# Patient Record
Sex: MALE | Race: WHITE | Employment: OTHER | URBAN - METROPOLITAN AREA
[De-identification: names, ages, dates, MRNs, and addresses within clinical notes are randomized per-mention and may not be internally consistent; named-entity substitution may affect disease eponyms.]

---

## 2017-01-05 ENCOUNTER — TELEPHONE (OUTPATIENT)
Dept: DERMATOLOGY | Facility: CLINIC | Age: 77
End: 2017-01-05

## 2017-01-05 NOTE — TELEPHONE ENCOUNTER
"RAJ Kennedy:    Patient has issue last week with Eye burning sensation after PDT treatment.     Spoke to patient who stated: \"I thought I would be warned about this and I was really upset- I really just think someone should warn the patient this can happen. I was really upset-that acetone was put so close to my eye that I thought my eye sight was damaged.. Then I had no one to call on a holiday weekend..\"     \"I spent an hour and a half [facility] yesterday and they gave me some eye gtts and I am doing better.. I have done this before and didn't have any problems, but I was really disappointed this time.. Please let her know that..\"   "

## 2017-01-05 NOTE — TELEPHONE ENCOUNTER
I spoke to the patient both on Monday and today.   Monday told me that he was having burning and sensitivity to light but no redness/swelling to eyelids and no blurriness. He stated he waited to call me until Monday because he didn't want to interrupt my holiday weekend even though my cell phone number was given to patient at end of visit yesterday.   Discussed to use gel rewetting drops and follow-up with eye doctor if not resolved in 1-2 days.     Called patient today.   He reports that eye burning/light sensitivity started more than 24 hours after treatment. He was using saline drops which did help some.   He reports he did go to ophthalmologist who evaluated eye and did not see any concerns and diagnosed with dry eye and given systane eye drops.   Since using systane eye symptoms have dramatically reduced.     He denies touching his eyes during the procedure.     Discussed with a Dr. Jenny Clarke at the [email or web address] patient's side effects.   Doctor believes that most eye reports happen from known contact with the acid and that it does not absorb and that there are no long term issues.   Most likely some levulan acid somehow got transferred to the eye can caused his symptoms.   Although unusual for eye symptoms to be so delayed.   She also offered her phone number for patient to discuss the medication.     Discussed this with patient, he denies any contact with eyes. He reports again that his eye symptoms did not start until over 24 hours after the treatment. Phone number of Dr. Jenny Clarke was given to patient.      Also discussed patient with Dr. Jay, who believes since symptoms started over 24 hours after treatment that eye symptoms are unlikely due to the PDT treatment. He reports that he will also look into this.

## 2017-01-06 ENCOUNTER — OFFICE VISIT - HEALTHEAST (OUTPATIENT)
Dept: INTERNAL MEDICINE | Facility: CLINIC | Age: 77
End: 2017-01-06

## 2017-01-06 DIAGNOSIS — H90.5 SNHL (SENSORINEURAL HEARING LOSS): ICD-10-CM

## 2017-01-06 DIAGNOSIS — M19.019 OSTEOARTHRITIS OF SHOULDER: ICD-10-CM

## 2017-01-06 DIAGNOSIS — H93.13 TINNITUS, BILATERAL: ICD-10-CM

## 2017-01-06 DIAGNOSIS — Z00.00 ROUTINE PHYSICAL EXAMINATION: ICD-10-CM

## 2017-01-06 DIAGNOSIS — Z87.891 FORMER SMOKER: ICD-10-CM

## 2017-01-06 DIAGNOSIS — R42 VERTIGO: ICD-10-CM

## 2017-01-06 DIAGNOSIS — E78.5 HLD (HYPERLIPIDEMIA): ICD-10-CM

## 2017-01-06 DIAGNOSIS — Z78.9 FULL CODE STATUS: ICD-10-CM

## 2017-01-06 DIAGNOSIS — M17.12 OSTEOARTHRITIS OF LEFT PATELLOFEMORAL JOINT: ICD-10-CM

## 2017-01-06 DIAGNOSIS — H35.30 AMD (AGE RELATED MACULAR DEGENERATION): ICD-10-CM

## 2017-01-06 LAB
CHOLEST SERPL-MCNC: 224 MG/DL
FASTING STATUS PATIENT QL REPORTED: YES
HDLC SERPL-MCNC: 57 MG/DL
LDLC SERPL CALC-MCNC: 134 MG/DL
TRIGL SERPL-MCNC: 164 MG/DL

## 2017-01-09 ENCOUNTER — COMMUNICATION - HEALTHEAST (OUTPATIENT)
Dept: INTERNAL MEDICINE | Facility: CLINIC | Age: 77
End: 2017-01-09

## 2017-01-17 ENCOUNTER — OFFICE VISIT (OUTPATIENT)
Dept: DERMATOLOGY | Facility: CLINIC | Age: 77
End: 2017-01-17
Payer: COMMERCIAL

## 2017-01-17 VITALS — DIASTOLIC BLOOD PRESSURE: 80 MMHG | HEART RATE: 68 BPM | OXYGEN SATURATION: 95 % | SYSTOLIC BLOOD PRESSURE: 134 MMHG

## 2017-01-17 DIAGNOSIS — L57.0 AK (ACTINIC KERATOSIS): Primary | ICD-10-CM

## 2017-01-17 PROCEDURE — 17003 DESTRUCT PREMALG LES 2-14: CPT | Performed by: PHYSICIAN ASSISTANT

## 2017-01-17 PROCEDURE — 17000 DESTRUCT PREMALG LESION: CPT | Performed by: PHYSICIAN ASSISTANT

## 2017-01-17 NOTE — MR AVS SNAPSHOT
After Visit Summary   1/17/2017    Nir Guy    MRN: [id number]           Patient Information     Date Of Birth          1940        Visit Information        Provider Department      1/17/2017 9:00 AM Marcia Reinoso PA-C [facility]        Care Instructions    WOUND CARE INSTRUCTIONS   FOR CRYOSURGERY   This area treated with liquid nitrogen will form a blister. You do not need to bandage the area until after the blister forms and breaks (which may be a few days). When the blister breaks, begin daily dressing changes as follows:   1) Clean and dry the area with tap water using clean Q-tip or sterile gauze pad.   2) Apply Polysporin ointment or Bacitracin ointment over entire wound. Do NOT use Neosporin ointment.   3) Cover the wound with a band-aid or sterile non-stick gauze pad and micropore paper tape.   REPEAT THESE INSTRUCTIONS AT LEAST ONCE A DAY UNTIL THE WOUND HAS COMPLETELY HEALED.   It is an old wives tale that a wound heals better when it is exposed to air and allowed to dry out. The wound will heal faster with a better cosmetic result if it is kept moist with ointment and covered with a bandage.   Do not let the wound dry out.   IMPORTANT INFORMATION ON REVERSE SIDE   Supplies Needed:   *Cotton tipped applicators (Q-tips)   *Polysporin ointment or Bacitracin ointment (NOT NEOSPORIN)   *Band-aids, or non stick gauze pads and micropore paper tape   PATIENT INFORMATION   During the healing process you will notice a number of changes. All wounds develop a small halo of redness surrounding the wound. This means healing is occurring. Severe itching with extensive redness usually indicates sensitivity to the ointment or bandage tape used to dress the wound. You should call our office if this develops.   Swelling and/or discoloration around your surgical site is common, particularly when performed around the eye.   All wounds normally drain. The larger the wound the  "more drainage there will be. After 7-10 days, you will notice the wound beginning to shrink and new skin will begin to grow. The wound is healed when you can see skin has formed over the entire area. A healed wound has a healthy, shiny look to the surface and is red to dark pink in color to normalize. Wounds may take approximately 4-6 weeks to heal. Larger wounds may take 6-8 weeks. After the wound is healed you may discontinue dressing changes.   You may experience a sensation of tightness as your wound heals. This is normal and will gradually subside.   Your healed wound may be sensitive to temperature changes. This sensitivity improves with time, but if you re having a lot of discomfort, try to avoid temperature extremes.   Patients frequently experience itching after their wound appears to have healed because of the continue healing under the skin. Plain Vaseline will help relieve the itching.               Follow-ups after your visit        Who to contact     If you have questions or need follow up information about today's clinic visit or your schedule please contact [facility] directly at [phone].  Normal or non-critical lab and imaging results will be communicated to you by [employer]iNationhart, letter or phone within 4 business days after the clinic has received the results. If you do not hear from us within 7 days, please contact the clinic through 2smst or phone. If you have a critical or abnormal lab result, we will notify you by phone as soon as possible.  Submit refill requests through [employer]or[employer] or call your pharmacy and they will forward the refill request to us. Please allow 3 business days for your refill to be completed.          Additional Information About Your Visit        Recorrido Information     Recorrido lets you send messages to your doctor, view your test results, renew your prescriptions, schedule appointments and more. To sign up, go to [email or web address] . Click on \"Log in\" on " "the left side of the screen, which will take you to the Welcome page. Then click on \"Sign up Now\" on the right side of the page.     You will be asked to enter the access code listed below, as well as some personal information. Please follow the directions to create your username and password.     Your access code is: [email or web address]  Expires: 3/20/2017  9:03 AM     Your access code will  in 90 days. If you need help or a new code, please call your [place] clinic or [phone].        Care EveryWhere ID     This is your Care EveryWhere ID. This could be used by other organizations to access your [place] medical records  [id number]        Your Vitals Were     Pulse Pulse Oximetry                68 95%           Blood Pressure from Last 3 Encounters:   17 134/80   16 130/84   16 147/82    Weight from Last 3 Encounters:   16 95.255 kg (210 lb)   10/19/15 95.255 kg (210 lb)   07/10/15 97.07 kg (214 lb)              Today, you had the following     No orders found for display       Primary Care Provider Office Phone # Fax #    German Mcgregor [phone]       [address] [address]        Thank you!     Thank you for choosing [facility]  for your care. Our goal is always to provide you with excellent care. Hearing back from our patients is one way we can continue to improve our services. Please take a few minutes to complete the written survey that you may receive in the mail after your visit with us. Thank you!             Your Updated Medication List - Protect others around you: Learn how to safely use, store and throw away your medicines at www.disposemymeds.org.          This list is accurate as of: 17  9:27 AM.  Always use your most recent med list.                   Brand Name Dispense Instructions for use    aspirin 81 MG tablet      Take by mouth daily       Fish Oil 500 MG Caps          Garlic 1250 MG Tabs          " GENTEAL OP          glucosamine-chondroitinoitin 3150-5344 MG/30ML Liqd          Multiple vitamin Tabs          SYSTANE 0.4-0.3 % Soln ophthalmic solution   Generic drug:  polyethylene glycol 0.4%- propylene glycol 0.3%          vitamin  s/Minerals Tabs

## 2017-01-17 NOTE — NURSING NOTE
"Initial /80 mmHg  Pulse 68  SpO2 95% Estimated body mass index is 27.71 kg/(m^2) as calculated from the following:    Height as of 1/25/16: 1.854 m (6' 1\").    Weight as of 1/25/16: 95.255 kg (210 lb). .      "

## 2017-01-17 NOTE — PROGRESS NOTES
Nir Guy is a 77 year old year old male patient here today for recheck after PDT.  Patient reports eye symptoms have resolved. He reports that his skin does appear improved after PDT. He only notes a few scaly areas on scalp.  Remainder of the HPI, Meds, PMH, Allergies, FH, and SH was reviewed in chart.    Pertinent Hx:  History of NMSC  Past Medical History   Diagnosis Date     Squamous cell carcinoma (H)      Actinic keratosis      Basal cell carcinoma        History reviewed. No pertinent past surgical history.     Family History   Problem Relation Age of Onset     Melanoma No family hx of        Social History     Social History     Marital Status:      Spouse Name: N/A     Number of Children: N/A     Years of Education: N/A     Occupational History      Retired     Social History Main Topics     Smoking status: Former Smoker     Types: Pipe     Quit date: 12/03/1999     Smokeless tobacco: Not on file     Alcohol Use: Not on file     Drug Use: Not on file     Sexual Activity: Not on file     Other Topics Concern     Not on file     Social History Narrative       Outpatient Encounter Prescriptions as of 1/17/2017   Medication Sig Dispense Refill     Carboxymethylcell-Hypromellose (GENTEAL OP)        Garlic 1250 MG TABS        glucosamine-chondroitinoitin 8698-8061 MG/30ML LIQD        Multiple vitamin TABS        vitamin  s/Minerals TABS        Omega-3 Fatty Acids (FISH OIL) 500 MG CAPS        polyethylene glycol 0.4%- propylene glycol 0.3% (SYSTANE) 0.4-0.3 % SOLN ophthalmic solution        aspirin 81 MG tablet Take by mouth daily       No facility-administered encounter medications on file as of 1/17/2017.             Review Of Systems  Skin: As above  Eyes: negative  Ears/Nose/Throat: negative  Respiratory: No shortness of breath, dyspnea on exertion, cough, or hemoptysis  Cardiovascular: negative  Gastrointestinal: negative  Genitourinary: negative  Musculoskeletal: negative  Neurologic:  negative  Psychiatric: negative  Hematologic/Lymphatic/Immunologic: negative  Endocrine: negative      O:   NAD, WDWN, Alert & Oriented, Mood & Affect wnl, Vitals stable   Here today alone   /80 mmHg  Pulse 68  SpO2 95%   General appearance normal   Vitals stable   Alert, oriented and in no acute distress      Pink gritty papules on scalp x 6      Eyes: Conjunctivae/lids:Normal     ENT: Lips    MSK:Normal    Pulm: Breathing Normal    Neuro/Psych: Orientation:Normal; Mood/Affect:Normal  A/P:  1. Actinic Keratoses on scalp x 6  LN2:  Treated with LN2 for 5s for 1-2 cycles. Warned risks of blistering, pain, pigment change, scarring, and incomplete resolution.  Advised patient to return if lesions do not completely resolve.  Wound care sheet given.

## 2017-07-26 ENCOUNTER — OFFICE VISIT (OUTPATIENT)
Dept: DERMATOLOGY | Facility: CLINIC | Age: 77
End: 2017-07-26
Payer: COMMERCIAL

## 2017-07-26 VITALS — OXYGEN SATURATION: 95 % | HEART RATE: 75 BPM | DIASTOLIC BLOOD PRESSURE: 76 MMHG | SYSTOLIC BLOOD PRESSURE: 145 MMHG

## 2017-07-26 DIAGNOSIS — L82.1 SK (SEBORRHEIC KERATOSIS): ICD-10-CM

## 2017-07-26 DIAGNOSIS — L81.4 LENTIGO: ICD-10-CM

## 2017-07-26 DIAGNOSIS — D18.00 ANGIOMA: ICD-10-CM

## 2017-07-26 DIAGNOSIS — Z85.828 HISTORY OF SKIN CANCER: Primary | ICD-10-CM

## 2017-07-26 PROCEDURE — 99213 OFFICE O/P EST LOW 20 MIN: CPT | Performed by: DERMATOLOGY

## 2017-07-26 NOTE — NURSING NOTE
Chief Complaint   Patient presents with     Derm Problem     skin check       Vitals:    07/26/17 1148   BP: 145/76   Pulse: 75   SpO2: 95%     Wt Readings from Last 1 Encounters:   01/25/16 95.3 kg (210 lb)       Kath Smith LPN.................7/26/2017

## 2017-07-26 NOTE — PROGRESS NOTES
Nir Guy is a 77 year old year old male patient here today for f/u hx of non-melanoma skin cancer.  Today he notes bumps on scalp .  Patient states this has been present for ?.  Patient reports the following symptoms:  none .  Patient reports the following previous treatments none.  Patient reports the following modifying factors none.  Associated symptoms: none.  Patient has no other skin complaints today.  Remainder of the HPI, Meds, PMH, Allergies, FH, and SH was reviewed in chart.    Pertinent Hx:   Non-melanoma skin cancer   Past Medical History:   Diagnosis Date     Actinic keratosis      Basal cell carcinoma      Squamous cell carcinoma        History reviewed. No pertinent surgical history.     Family History   Problem Relation Age of Onset     Melanoma No family hx of        Social History     Social History     Marital status:      Spouse name: N/A     Number of children: N/A     Years of education: N/A     Occupational History      Retired     Social History Main Topics     Smoking status: Former Smoker     Types: Pipe     Quit date: 12/3/1999     Smokeless tobacco: Never Used     Alcohol use Not on file     Drug use: Not on file     Sexual activity: Not on file     Other Topics Concern     Not on file     Social History Narrative       Outpatient Encounter Prescriptions as of 7/26/2017   Medication Sig Dispense Refill     Carboxymethylcell-Hypromellose (GENTEAL OP)        Garlic 1250 MG TABS        glucosamine-chondroitinoitin 8631-9600 MG/30ML LIQD        Multiple vitamin TABS        vitamin  s/Minerals TABS        Omega-3 Fatty Acids (FISH OIL) 500 MG CAPS        polyethylene glycol 0.4%- propylene glycol 0.3% (SYSTANE) 0.4-0.3 % SOLN ophthalmic solution        aspirin 81 MG tablet Take by mouth daily       No facility-administered encounter medications on file as of 7/26/2017.              Review Of Systems  Skin: As above  Eyes: negative  Ears/Nose/Throat: negative  Respiratory: No  shortness of breath, dyspnea on exertion, cough, or hemoptysis  Cardiovascular: negative  Gastrointestinal: negative  Genitourinary: negative  Musculoskeletal: negative  Neurologic: negative  Psychiatric: negative  Hematologic/Lymphatic/Immunologic: negative  Endocrine: negative      O:   NAD, WDWN, Alert & Oriented, Mood & Affect wnl, Vitals stable   Here today alone   /76  Pulse 75  SpO2 95%   General appearance normal   Vitals stable   Alert, oriented and in no acute distress      Following lymph nodes palpated: Occipital, Cervical, Supraclavicular no lad   Stuck on papules and brown macules on trunk and ext and scalp   Red papules on trunk         The remainder of the full exam was unremarkable; the following areas were examined:  conjunctiva/lids, oral mucosa, neck, peripheral vascular system, abdomen, lymph nodes, digits/nails, eccrine and apocrine glands, scalp/hair, face, neck, chest, abdomen, buttocks, back, RUE, LUE, RLE, LLE       Eyes: Conjunctivae/lids:Normal     ENT: Lips, buccal mucosa, tongue: normal    MSK:Normal    Cardiovascular: peripheral edema none    Pulm: Breathing Normal    Lymph Nodes: No Head and Neck Lymphadenopathy     Neuro/Psych: Orientation:Normal; Mood/Affect:Normal      A/P:  1. Seborrheic keratosis, lentiog, angioma, hx of non-melanoma skin cancer   BENIGN LESIONS DISCUSSED WITH PATIENT:  I discussed the specifics of tumor, prognosis, and genetics of benign lesions.  I explained that treatment of these lesions would be purely cosmetic and not medically neccessary.  I discussed with patient different removal options including excision, cautery and /or laser.      Nature and genetics of benign skin lesions dicussed with patient.  Signs and Symptoms of skin cancer discussed with patient.  ABCDEs of melanoma reviewed with patient.  Patient encouraged to perform monthly skin exams.  UV precautions reviewed with patient.  Skin care regimen reviewed with patient: Eliminate harsh  soaps, i.e. Dial, zest, irsih spring; Mild soaps such as Cetaphil or Dove sensitive skin, avoid hot or cold showers, aggressive use of emollients including vanicream, cetaphil or cerave discussed with patient.    Risks of non-melanoma skin cancer discussed with patient   Return to clinic 6 months

## 2017-07-26 NOTE — MR AVS SNAPSHOT
"              After Visit Summary   7/26/2017    Nir Guy    MRN: [id number]           Patient Information     Date Of Birth          1940        Visit Information        Provider Department      7/26/2017 11:30 AM Chavez Jay MD [facility]        Today's Diagnoses     History of skin cancer    -  1    Lentigo        Angioma        SK (seborrheic keratosis)           Follow-ups after your visit        Your next 10 appointments already scheduled     Max 10, 2018  9:00 AM CST   Return Visit with Chavez Jay MD   [facility]    [address] [address]   [phone]              Who to contact     If you have questions or need follow up information about today's clinic visit or your schedule please contact [facility] directly at [phone].  Normal or non-critical lab and imaging results will be communicated to you by MyChart, letter or phone within 4 business days after the clinic has received the results. If you do not hear from us within 7 days, please contact the clinic through MyChart or phone. If you have a critical or abnormal lab result, we will notify you by phone as soon as possible.  Submit refill requests through Genomera or call your pharmacy and they will forward the refill request to us. Please allow 3 business days for your refill to be completed.          Additional Information About Your Visit        MyChart Information     Genomera lets you send messages to your doctor, view your test results, renew your prescriptions, schedule appointments and more. To sign up, go to [email or web address] . Click on \"Log in\" on the left side of the screen, which will take you to the Welcome page. Then click on \"Sign up Now\" on the right side of the page.     You will be asked to enter the access code listed below, as well as some personal information. Please follow the directions to " create your username and password.     Your access code is: [email or web address]  Expires: 10/24/2017 12:37 PM     Your access code will  in 90 days. If you need help or a new code, please call your [place] clinic or [phone].        Care EveryWhere ID     This is your Care EveryWhere ID. This could be used by other organizations to access your [place] medical records  [id number]        Your Vitals Were     Pulse Pulse Oximetry                75 95%           Blood Pressure from Last 3 Encounters:   17 145/76   17 134/80   16 130/84    Weight from Last 3 Encounters:   16 95.3 kg (210 lb)   10/19/15 95.3 kg (210 lb)   07/10/15 97.1 kg (214 lb)              Today, you had the following     No orders found for display       Primary Care Provider Office Phone # Fax #    German Mcgregor [phone]       [address] [address]        Equal Access to Services     [facility] Hospital[facility]ARHAN : Hadii aad ku hadasho Soomaali, waaxda luqadaha, qaybta kaalmada adeegyada, waxay suryain haymike alegria . So [facility] [phone].    ATENCIÓN: Si habla español, tiene a barkley disposición servicios gratuitos de asistencia lingüística. Llame al [phone].    We comply with applicable federal civil rights laws and [place] laws. We do not discriminate on the basis of race, color, national origin, age, disability sex, sexual orientation or gender identity.            Thank you!     Thank you for choosing [facility]  for your care. Our goal is always to provide you with excellent care. Hearing back from our patients is one way we can continue to improve our services. Please take a few minutes to complete the written survey that you may receive in the mail after your visit with us. Thank you!             Your Updated Medication List - Protect others around you: Learn how to safely use, store and throw away your medicines at www.disposemymeds.org.           This list is accurate as of: 7/26/17 12:37 PM.  Always use your most recent med list.                   Brand Name Dispense Instructions for use Diagnosis    aspirin 81 MG tablet      Take by mouth daily        Fish Oil 500 MG Caps           Garlic 1250 MG Tabs           GENTEAL OP           glucosamine-chondroitinoitin 3202-8827 MG/30ML Liqd           Multiple vitamin Tabs           SYSTANE 0.4-0.3 % Soln ophthalmic solution   Generic drug:  polyethylene glycol 0.4%- propylene glycol 0.3%           vitamin  s/Minerals Tabs

## 2017-08-15 ENCOUNTER — COMMUNICATION - HEALTHEAST (OUTPATIENT)
Dept: INTERNAL MEDICINE | Facility: CLINIC | Age: 77
End: 2017-08-15

## 2017-09-21 ENCOUNTER — RECORDS - HEALTHEAST (OUTPATIENT)
Dept: GENERAL RADIOLOGY | Age: 77
End: 2017-09-21

## 2017-09-21 ENCOUNTER — COMMUNICATION - HEALTHEAST (OUTPATIENT)
Dept: INTERNAL MEDICINE | Facility: CLINIC | Age: 77
End: 2017-09-21

## 2017-09-21 ENCOUNTER — OFFICE VISIT - HEALTHEAST (OUTPATIENT)
Dept: INTERNAL MEDICINE | Facility: CLINIC | Age: 77
End: 2017-09-21

## 2017-09-21 DIAGNOSIS — M79.604 BILATERAL LEG PAIN: ICD-10-CM

## 2017-09-21 DIAGNOSIS — M79.605 PAIN IN LEFT LEG: ICD-10-CM

## 2017-09-21 DIAGNOSIS — M79.604 PAIN IN RIGHT LEG: ICD-10-CM

## 2017-09-21 DIAGNOSIS — M79.605 BILATERAL LEG PAIN: ICD-10-CM

## 2017-09-21 DIAGNOSIS — M17.12 OSTEOARTHRITIS OF LEFT PATELLOFEMORAL JOINT: ICD-10-CM

## 2017-11-17 ENCOUNTER — OFFICE VISIT - HEALTHEAST (OUTPATIENT)
Dept: INTERNAL MEDICINE | Facility: CLINIC | Age: 77
End: 2017-11-17

## 2017-11-17 DIAGNOSIS — M79.10 MUSCLE PAIN: ICD-10-CM

## 2017-11-17 DIAGNOSIS — M79.605 BILATERAL LEG PAIN: ICD-10-CM

## 2017-11-17 DIAGNOSIS — M79.604 BILATERAL LEG PAIN: ICD-10-CM

## 2017-11-19 ENCOUNTER — COMMUNICATION - HEALTHEAST (OUTPATIENT)
Dept: INTERNAL MEDICINE | Facility: CLINIC | Age: 77
End: 2017-11-19

## 2017-11-20 ENCOUNTER — AMBULATORY - HEALTHEAST (OUTPATIENT)
Dept: INTERNAL MEDICINE | Facility: CLINIC | Age: 77
End: 2017-11-20

## 2017-11-24 ENCOUNTER — AMBULATORY - HEALTHEAST (OUTPATIENT)
Dept: INTERNAL MEDICINE | Facility: CLINIC | Age: 77
End: 2017-11-24

## 2017-12-11 ENCOUNTER — COMMUNICATION - HEALTHEAST (OUTPATIENT)
Dept: INTERNAL MEDICINE | Facility: CLINIC | Age: 77
End: 2017-12-11

## 2017-12-11 DIAGNOSIS — M79.605 BILATERAL LEG PAIN: ICD-10-CM

## 2017-12-11 DIAGNOSIS — M79.604 BILATERAL LEG PAIN: ICD-10-CM

## 2017-12-11 DIAGNOSIS — M54.9 BACK PAIN: ICD-10-CM

## 2017-12-11 DIAGNOSIS — M25.551 RIGHT HIP PAIN: ICD-10-CM

## 2017-12-12 ENCOUNTER — COMMUNICATION - HEALTHEAST (OUTPATIENT)
Dept: INTERNAL MEDICINE | Facility: CLINIC | Age: 77
End: 2017-12-12

## 2017-12-13 ENCOUNTER — AMBULATORY - HEALTHEAST (OUTPATIENT)
Dept: INTERNAL MEDICINE | Facility: CLINIC | Age: 77
End: 2017-12-13

## 2017-12-21 ENCOUNTER — HOSPITAL ENCOUNTER (OUTPATIENT)
Dept: MRI IMAGING | Facility: HOSPITAL | Age: 77
Discharge: HOME | End: 2017-12-21
Attending: INTERNAL MEDICINE

## 2017-12-21 DIAGNOSIS — M79.605 BILATERAL LEG PAIN: ICD-10-CM

## 2017-12-21 DIAGNOSIS — M25.551 RIGHT HIP PAIN: ICD-10-CM

## 2017-12-21 DIAGNOSIS — M79.604 BILATERAL LEG PAIN: ICD-10-CM

## 2017-12-21 DIAGNOSIS — M54.9 BACK PAIN: ICD-10-CM

## 2017-12-22 ENCOUNTER — COMMUNICATION - HEALTHEAST (OUTPATIENT)
Dept: INTERNAL MEDICINE | Facility: CLINIC | Age: 77
End: 2017-12-22

## 2017-12-26 ENCOUNTER — OFFICE VISIT - HEALTHEAST (OUTPATIENT)
Dept: INTERNAL MEDICINE | Facility: CLINIC | Age: 77
End: 2017-12-26

## 2017-12-26 DIAGNOSIS — M16.11 OSTEOARTHRITIS OF RIGHT HIP: ICD-10-CM

## 2017-12-26 DIAGNOSIS — M48.061 LUMBAR SPINAL STENOSIS: ICD-10-CM

## 2017-12-27 ENCOUNTER — COMMUNICATION - HEALTHEAST (OUTPATIENT)
Dept: INTERNAL MEDICINE | Facility: CLINIC | Age: 77
End: 2017-12-27

## 2017-12-28 ENCOUNTER — RECORDS - HEALTHEAST (OUTPATIENT)
Dept: ADMINISTRATIVE | Facility: OTHER | Age: 77
End: 2017-12-28

## 2018-01-02 ENCOUNTER — COMMUNICATION - HEALTHEAST (OUTPATIENT)
Dept: INTERNAL MEDICINE | Facility: CLINIC | Age: 78
End: 2018-01-02

## 2018-01-02 DIAGNOSIS — M54.9 BACK PAIN WITH RADIATION: ICD-10-CM

## 2018-01-02 DIAGNOSIS — M48.061 FORAMINAL STENOSIS OF LUMBAR REGION: ICD-10-CM

## 2018-01-05 ENCOUNTER — AMBULATORY - HEALTHEAST (OUTPATIENT)
Dept: INTERNAL MEDICINE | Facility: CLINIC | Age: 78
End: 2018-01-05

## 2018-01-05 ENCOUNTER — COMMUNICATION - HEALTHEAST (OUTPATIENT)
Dept: INTERNAL MEDICINE | Facility: CLINIC | Age: 78
End: 2018-01-05

## 2018-01-05 DIAGNOSIS — M54.41 LOW BACK PAIN WITH RIGHT-SIDED SCIATICA: ICD-10-CM

## 2018-01-05 DIAGNOSIS — M99.83 OTHER BIOMECHANICAL LESIONS OF LUMBAR REGION: ICD-10-CM

## 2018-01-05 DIAGNOSIS — M16.11 OSTEOARTHRITIS OF RIGHT HIP: ICD-10-CM

## 2018-01-05 DIAGNOSIS — M48.061 FORAMINAL STENOSIS OF LUMBAR REGION: ICD-10-CM

## 2018-01-05 DIAGNOSIS — M54.9 BACK PAIN WITH RADIATION: ICD-10-CM

## 2018-01-10 ENCOUNTER — RECORDS - HEALTHEAST (OUTPATIENT)
Dept: ADMINISTRATIVE | Facility: OTHER | Age: 78
End: 2018-01-10

## 2018-01-10 ENCOUNTER — OFFICE VISIT (OUTPATIENT)
Dept: DERMATOLOGY | Facility: CLINIC | Age: 78
End: 2018-01-10
Payer: COMMERCIAL

## 2018-01-10 VITALS — DIASTOLIC BLOOD PRESSURE: 69 MMHG | TEMPERATURE: 97.9 F | SYSTOLIC BLOOD PRESSURE: 126 MMHG | HEART RATE: 86 BPM

## 2018-01-10 DIAGNOSIS — L82.1 SK (SEBORRHEIC KERATOSIS): ICD-10-CM

## 2018-01-10 DIAGNOSIS — L57.0 AK (ACTINIC KERATOSIS): ICD-10-CM

## 2018-01-10 DIAGNOSIS — Z85.828 HISTORY OF SKIN CANCER: ICD-10-CM

## 2018-01-10 DIAGNOSIS — D04.4 SQUAMOUS CELL CARCINOMA IN SITU OF SCALP: Primary | ICD-10-CM

## 2018-01-10 DIAGNOSIS — L81.4 LENTIGO: ICD-10-CM

## 2018-01-10 PROCEDURE — 17311 MOHS 1 STAGE H/N/HF/G: CPT | Performed by: DERMATOLOGY

## 2018-01-10 PROCEDURE — 11100 HC DESTRUCT PREMALIGNANT LESION, 2-14: CPT | Mod: 59 | Performed by: DERMATOLOGY

## 2018-01-10 PROCEDURE — 88331 PATH CONSLTJ SURG 1 BLK 1SPC: CPT | Mod: 59 | Performed by: DERMATOLOGY

## 2018-01-10 PROCEDURE — 17003 DESTRUCT PREMALG LES 2-14: CPT | Performed by: DERMATOLOGY

## 2018-01-10 PROCEDURE — 17000 DESTRUCT PREMALG LESION: CPT | Mod: 51 | Performed by: DERMATOLOGY

## 2018-01-10 PROCEDURE — 99213 OFFICE O/P EST LOW 20 MIN: CPT | Mod: 25 | Performed by: DERMATOLOGY

## 2018-01-10 NOTE — LETTER
1/10/2018         RE: Nir Guy  [address] [place] [id number]        Dear Colleague,    Thank you for referring your patient, iNr Guy, to the [facility]. Please see a copy of my visit note below.    Nir Guy is a 78 year old year old male patient here today for tender spot on scalp.   .  Patient states this has been present for a while.  Patient reports the following symptoms:  tender.  Patient reports the following previous treatments cryo.  Patient reports the following modifying factors none.  Associated symptoms: none.  Patient has no other skin complaints today.  Remainder of the HPI, Meds, PMH, Allergies, FH, and SH was reviewed in chart.    Pertinent Hx:   Non-melanoma skin cancer   Past Medical History:   Diagnosis Date     Actinic keratosis      Basal cell carcinoma      Squamous cell carcinoma        History reviewed. No pertinent surgical history.     Family History   Problem Relation Age of Onset     Melanoma No family hx of        Social History     Social History     Marital status:      Spouse name: N/A     Number of children: N/A     Years of education: N/A     Occupational History      Retired     Social History Main Topics     Smoking status: Former Smoker     Types: Pipe     Quit date: 12/3/1999     Smokeless tobacco: Never Used     Alcohol use Not on file     Drug use: Not on file     Sexual activity: Not on file     Other Topics Concern     Not on file     Social History Narrative       Outpatient Encounter Prescriptions as of 1/10/2018   Medication Sig Dispense Refill     Carboxymethylcell-Hypromellose (GENTEAL OP)        Garlic 1250 MG TABS        glucosamine-chondroitinoitin 0958-4126 MG/30ML LIQD        Multiple vitamin TABS        vitamin  s/Minerals TABS        Omega-3 Fatty Acids (FISH OIL) 500 MG CAPS        polyethylene glycol 0.4%- propylene glycol 0.3% (SYSTANE) 0.4-0.3 % SOLN ophthalmic solution        aspirin 81 MG  tablet Take by mouth daily       No facility-administered encounter medications on file as of 1/10/2018.              Review Of Systems  Skin: As above  Eyes: negative  Ears/Nose/Throat: negative  Respiratory: No shortness of breath, dyspnea on exertion, cough, or hemoptysis  Cardiovascular: negative  Gastrointestinal: negative  Genitourinary: negative  Musculoskeletal: negative  Neurologic: negative  Psychiatric: negative  Hematologic/Lymphatic/Immunologic: negative  Endocrine: negative      O:   NAD, WDWN, Alert & Oriented, Mood & Affect wnl, Vitals stable   Here today alone   /69  Pulse 86  Temp 97.9  F (36.6  C) (Tympanic)   General appearance normal   Vitals stable   Alert, oriented and in no acute distress      Following lymph nodes palpated: Occipital, Cervical, Supraclavicular no lad   Gritty papule on face   Stuck on papules and brown macules on trunk and ext    R forntal scalp ill-deifned 1.6cm bleeding scaly papule            The remainder of the full exam was unremarkable; the following areas were examined:  conjunctiva/lids, oral mucosa, neck, peripheral vascular system, abdomen, lymph nodes, digits/nails, eccrine and apocrine glands, scalp/hair, face, neck, chest, abdomen, buttocks, back, RUE, LUE, RLE, LLE       Eyes: Conjunctivae/lids:Normal     ENT: Lips, buccal mucosa, tongue: normal    MSK:Normal    Cardiovascular: peripheral edema none    Pulm: Breathing Normal    Lymph Nodes: No Head and Neck Lymphadenopathy     Neuro/Psych: Orientation:Normal; Mood/Affect:Normal      MICRO:   R frontal scalp:There is hyperkeratosis & parakeratosis of the epidermis, with full thickness epidermal involvement by atypical keratinocytes with rare pale vacuolated cells.  Unremarkable dermis.    A/P:  1. R frontal scalp r/o squamous cell carcinoma   TANGENTIAL BIOPSY IN HOUSE:  After consent, anesthesia with LEC and prep, tangential excision performed and dx above confirmed with frozen section histology.  No  complications and routine wound care.  Patient told result squamous cell carcinoma in situ  2. Seborrheic keratosis, letnigo, hx of non-melanoma skin cancer  3. Actinic keratosis   L cheekx3  LN2:  Treated with LN2 for 5s for 1-2 cycles. Warned risks of blistering, pain, pigment change, scarring, and incomplete resolution.  Advised patient to return if lesions do not completely resolve.  Wound care sheet given.  Efudex declined by patient  pdt declines.      BENIGN LESIONS DISCUSSED WITH PATIENT:  I discussed the specifics of tumor, prognosis, and genetics of benign lesions.  I explained that treatment of these lesions would be purely cosmetic and not medically neccessary.  I discussed with patient different removal options including excision, cautery and /or laser.      Nature and genetics of benign skin lesions dicussed with patient.  Signs and Symptoms of skin cancer discussed with patient.  Patient encouraged to perform monthly skin exams.  UV precautions reviewed with patient.  Skin care regimen reviewed with patient: Eliminate harsh soaps, i.e. Dial, zest, irsih spring; Mild soaps such as Cetaphil or Dove sensitive skin, avoid hot or cold showers, aggressive use of emollients including vanicream, cetaphil or cerave discussed with patient.    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 Indication for Mohs was Ill-defined margins. Patient confirmed the site with Dr. Jay.  After anesthesia with LEC, the tumor was excised using standard Mohs technique in 1 stages(s).  CLEAR MARGINS OBTAINED and Final defect size was 2 cm.       REPAIR SECOND INTENT: We discussed the options for wound management in full with the patient including risks/benefits/possible outcomes. Decision made to allow the wound to  heal by second intention. EBL minimal; complications none; wound care routine.  The patient was discharged in good condition and will return in one month or prn for wound evaluation.        Again, thank you for allowing me to participate in the care of your patient.        Sincerely,        Chavez Jay MD

## 2018-01-10 NOTE — MR AVS SNAPSHOT
After Visit Summary   1/10/2018    Nir Guy    MRN: [id number]           Patient Information     Date Of Birth          1940        Visit Information        Provider Department      1/10/2018 9:00 AM Chavez Jay MD [facility]        Care Instructions    Open Wound Care     for R frontal scalp        ? No strenuous activity for 48 hours    ? Take Tylenol as needed for discomfort.                                                .         ? Do not drink alcoholic beverages for 48 hours.    ? Keep the pressure bandage in place for 24 hours. If the bandage becomes blood tinged or loose, reinforce it with gauze and tape.        (Refer to the reverse side of this page for management of bleeding).    ? Remove bandage in 24 hours and begin wound care as follows:     1. Clean area with tap water using a Q tip or gauze pad, (shower / bathe normally)  2. Dry wound with Q tip or gauze pad  3. Apply Aquaphor, Vaseline, Polysporin or Bacitracin Ointment with a Q tip    Do NOT use Neosporin Ointment *  4. Cover the wound with a band-aid or nonstick gauze pad and paper tape.  5. Repeat wound care once a day until wound is completely healed.    It is an old wives tale that a wound heals better when it is exposed to air and allowed to dry out. The wound will heal faster with a better cosmetic result if it is kept moist with ointment and covered with a bandage.  Do not let the wound dry out.      Supplies Needed:                Qtips or gauze pads                Polysporin or Bacitracin Ointment                Bandaids or nonstick gauze pads and paper tape    Wound care kits and brown paper tape are available for purchase at   the pharmacy.    BLEEDIN. Use tightly rolled up gauze or cloth to apply direct pressure over the bandage for 20   minutes.  2. Reapply pressure for an additional 20 minutes if necessary  3. Call the office or go to the nearest emergency room if pressure  fails to stop the bleeding.  4. Use additional gauze and tape to maintain pressure once the bleeding has stopped.  5. Begin wound care 24 hours after surgery as directed.                  WOUND HEALING    1. One week after surgery a pink / red halo will form around the outside of the wound.   This is new skin.  2. The center of the wound will appear yellowish white and produce some drainage.  3. The pink halo will slowly migrate in toward the center of the wound until the wound is covered with new shiny pink skin.  4. There will be no more drainage when the wound is completely healed.  5. It will take six months to one year for the redness to fade.  6. The scar may be itchy, tight and sensitive to extreme temperatures for a year after the surgery.  7. Massaging the area several times a day for several minutes after the wound is completely healed will help the scar soften and normalize faster. Begin massage only after healing is complete.      In case of emergency call: Dr Jay: [phone]     [facility]: [phone]    [facility]: [phone]      WOUND CARE INSTRUCTIONS   FOR CRYOSURGERY   This area treated with liquid nitrogen will form a blister. You do not need to bandage the area until after the blister forms and breaks (which may be a few days). When the blister breaks, begin daily dressing changes as follows:   1) Clean and dry the area with tap water using clean Q-tip or sterile gauze pad.   2) Apply Polysporin ointment or Bacitracin ointment over entire wound. Do NOT use Neosporin ointment.   3) Cover the wound with a band-aid or sterile non-stick gauze pad and micropore paper tape.   REPEAT THESE INSTRUCTIONS AT LEAST ONCE A DAY UNTIL THE WOUND HAS COMPLETELY HEALED.   It is an old wives tale that a wound heals better when it is exposed to air and allowed to dry out. The wound will heal faster with a better cosmetic result if it is kept moist with ointment and covered with a  bandage.   Do not let the wound dry out.   IMPORTANT INFORMATION ON REVERSE SIDE   Supplies Needed:   *Cotton tipped applicators (Q-tips)   *Polysporin ointment or Bacitracin ointment (NOT NEOSPORIN)   *Band-aids, or non stick gauze pads and micropore paper tape   PATIENT INFORMATION   During the healing process you will notice a number of changes. All wounds develop a small halo of redness surrounding the wound. This means healing is occurring. Severe itching with extensive redness usually indicates sensitivity to the ointment or bandage tape used to dress the wound. You should call our office if this develops.   Swelling and/or discoloration around your surgical site is common, particularly when performed around the eye.   All wounds normally drain. The larger the wound the more drainage there will be. After 7-10 days, you will notice the wound beginning to shrink and new skin will begin to grow. The wound is healed when you can see skin has formed over the entire area. A healed wound has a healthy, shiny look to the surface and is red to dark pink in color to normalize. Wounds may take approximately 4-6 weeks to heal. Larger wounds may take 6-8 weeks. After the wound is healed you may discontinue dressing changes.   You may experience a sensation of tightness as your wound heals. This is normal and will gradually subside.   Your healed wound may be sensitive to temperature changes. This sensitivity improves with time, but if you re having a lot of discomfort, try to avoid temperature extremes.   Patients frequently experience itching after their wound appears to have healed because of the continue healing under the skin. Plain Vaseline will help relieve the itching.                   Follow-ups after your visit        Who to contact     If you have questions or need follow up information about today's clinic visit or your schedule please contact [facility] directly at [phone].  Normal or  "non-critical lab and imaging results will be communicated to you by MyChart, letter or phone within 4 business days after the clinic has received the results. If you do not hear from us within 7 days, please contact the clinic through reMail[facility] or phone. If you have a critical or abnormal lab result, we will notify you by phone as soon as possible.  Submit refill requests through [employer] or call your pharmacy and they will forward the refill request to us. Please allow 3 business days for your refill to be completed.          Additional Information About Your Visit        [facility] Information     [employer] lets you send messages to your doctor, view your test results, renew your prescriptions, schedule appointments and more. To sign up, go to [email or web address] . Click on \"Log in\" on the left side of the screen, which will take you to the Welcome page. Then click on \"Sign up Now\" on the right side of the page.     You will be asked to enter the access code listed below, as well as some personal information. Please follow the directions to create your username and password.     Your access code is: [email or web address]  Expires: 4/10/2018  9:55 AM     Your access code will  in 90 days. If you need help or a new code, please call your [facility] clinic or [phone].        Care EveryWhere ID     This is your Care EveryWhere ID. This could be used by other organizations to access your [facility] medical records  [id number]        Your Vitals Were     Pulse Temperature                86 97.9  F (36.6  C) (Tympanic)           Blood Pressure from Last 3 Encounters:   01/10/18 126/69   17 145/76   17 134/80    Weight from Last 3 Encounters:   16 95.3 kg (210 lb)   10/19/15 95.3 kg (210 lb)   07/10/15 97.1 kg (214 lb)              Today, you had the following     No orders found for display       Primary Care Provider Office Phone # Fax #    German Mcgregor [phone]       [facility] " [address] [facility] [address]        Equal Access to Services     SOCOJEANNE WERNER : Hadii aad ku hadkevynkevin Bronwyntayler, kodybruce aleman, dejachin deckerfitzbruce paige, gay martinhoneynas horner. So [facility] [phone].    ATENCIÓN: Si habla español, tiene a barkley disposición servicios gratuitos de asistencia lingüística. [facility] [phone].    We comply with applicable federal civil rights laws and [place] laws. We do not discriminate on the basis of race, color, national origin, age, disability, sex, sexual orientation, or gender identity.            Thank you!     Thank you for choosing [facility]  for your care. Our goal is always to provide you with excellent care. Hearing back from our patients is one way we can continue to improve our services. Please take a few minutes to complete the written survey that you may receive in the mail after your visit with us. Thank you!             Your Updated Medication List - Protect others around you: Learn how to safely use, store and throw away your medicines at www.disposemymeds.org.          This list is accurate as of: 1/10/18  9:55 AM.  Always use your most recent med list.                   Brand Name Dispense Instructions for use Diagnosis    aspirin 81 MG tablet      Take by mouth daily        Fish Oil 500 MG Caps           Garlic 1250 MG Tabs           GENTEAL OP           glucosamine-chondroitinoitin 2416-8317 MG/30ML Liqd           Multiple vitamin Tabs           SYSTANE 0.4-0.3 % Soln ophthalmic solution   Generic drug:  polyethylene glycol 0.4%- propylene glycol 0.3%           vitamin  s/Minerals Tabs

## 2018-01-10 NOTE — PROGRESS NOTES
Nir Guy is a 78 year old year old male patient here today for tender spot on scalp.   .  Patient states this has been present for a while.  Patient reports the following symptoms:  tender.  Patient reports the following previous treatments cryo.  Patient reports the following modifying factors none.  Associated symptoms: none.  Patient has no other skin complaints today.  Remainder of the HPI, Meds, PMH, Allergies, FH, and SH was reviewed in chart.    Pertinent Hx:   Non-melanoma skin cancer   Past Medical History:   Diagnosis Date     Actinic keratosis      Basal cell carcinoma      Squamous cell carcinoma        History reviewed. No pertinent surgical history.     Family History   Problem Relation Age of Onset     Melanoma No family hx of        Social History     Social History     Marital status:      Spouse name: N/A     Number of children: N/A     Years of education: N/A     Occupational History      Retired     Social History Main Topics     Smoking status: Former Smoker     Types: Pipe     Quit date: 12/3/1999     Smokeless tobacco: Never Used     Alcohol use Not on file     Drug use: Not on file     Sexual activity: Not on file     Other Topics Concern     Not on file     Social History Narrative       Outpatient Encounter Prescriptions as of 1/10/2018   Medication Sig Dispense Refill     Carboxymethylcell-Hypromellose (GENTEAL OP)        Garlic 1250 MG TABS        glucosamine-chondroitinoitin 9655-8577 MG/30ML LIQD        Multiple vitamin TABS        vitamin  s/Minerals TABS        Omega-3 Fatty Acids (FISH OIL) 500 MG CAPS        polyethylene glycol 0.4%- propylene glycol 0.3% (SYSTANE) 0.4-0.3 % SOLN ophthalmic solution        aspirin 81 MG tablet Take by mouth daily       No facility-administered encounter medications on file as of 1/10/2018.              Review Of Systems  Skin: As above  Eyes: negative  Ears/Nose/Throat: negative  Respiratory: No shortness of breath, dyspnea on  exertion, cough, or hemoptysis  Cardiovascular: negative  Gastrointestinal: negative  Genitourinary: negative  Musculoskeletal: negative  Neurologic: negative  Psychiatric: negative  Hematologic/Lymphatic/Immunologic: negative  Endocrine: negative      O:   NAD, WDWN, Alert & Oriented, Mood & Affect wnl, Vitals stable   Here today alone   /69  Pulse 86  Temp 97.9  F (36.6  C) (Tympanic)   General appearance normal   Vitals stable   Alert, oriented and in no acute distress      Following lymph nodes palpated: Occipital, Cervical, Supraclavicular no lad   Gritty papule on face   Stuck on papules and brown macules on trunk and ext    R forntal scalp ill-deifned 1.6cm bleeding scaly papule            The remainder of the full exam was unremarkable; the following areas were examined:  conjunctiva/lids, oral mucosa, neck, peripheral vascular system, abdomen, lymph nodes, digits/nails, eccrine and apocrine glands, scalp/hair, face, neck, chest, abdomen, buttocks, back, RUE, LUE, RLE, LLE       Eyes: Conjunctivae/lids:Normal     ENT: Lips, buccal mucosa, tongue: normal    MSK:Normal    Cardiovascular: peripheral edema none    Pulm: Breathing Normal    Lymph Nodes: No Head and Neck Lymphadenopathy     Neuro/Psych: Orientation:Normal; Mood/Affect:Normal      MICRO:   R frontal scalp:There is hyperkeratosis & parakeratosis of the epidermis, with full thickness epidermal involvement by atypical keratinocytes with rare pale vacuolated cells.  Unremarkable dermis.    A/P:  1. R frontal scalp r/o squamous cell carcinoma   TANGENTIAL BIOPSY IN HOUSE:  After consent, anesthesia with LEC and prep, tangential excision performed and dx above confirmed with frozen section histology.  No complications and routine wound care.  Patient told result squamous cell carcinoma in situ  2. Seborrheic keratosis, letnigo, hx of non-melanoma skin cancer  3. Actinic keratosis   L cheekx3  LN2:  Treated with LN2 for 5s for 1-2 cycles. Warned  risks of blistering, pain, pigment change, scarring, and incomplete resolution.  Advised patient to return if lesions do not completely resolve.  Wound care sheet given.  Efudex declined by patient  pdt declines.      BENIGN LESIONS DISCUSSED WITH PATIENT:  I discussed the specifics of tumor, prognosis, and genetics of benign lesions.  I explained that treatment of these lesions would be purely cosmetic and not medically neccessary.  I discussed with patient different removal options including excision, cautery and /or laser.      Nature and genetics of benign skin lesions dicussed with patient.  Signs and Symptoms of skin cancer discussed with patient.  Patient encouraged to perform monthly skin exams.  UV precautions reviewed with patient.  Skin care regimen reviewed with patient: Eliminate harsh soaps, i.e. Dial, zest, irsih spring; Mild soaps such as Cetaphil or Dove sensitive skin, avoid hot or cold showers, aggressive use of emollients including vanicream, cetaphil or cerave discussed with patient.    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 Indication for Mohs was Ill-defined margins. Patient confirmed the site with Dr. Jay.  After anesthesia with LEC, the tumor was excised using standard Mohs technique in 1 stages(s).  CLEAR MARGINS OBTAINED and Final defect size was 2 cm.       REPAIR SECOND INTENT: We discussed the options for wound management in full with the patient including risks/benefits/possible outcomes. Decision made to allow the wound to heal by second intention. EBL minimal; complications none; wound care routine.  The patient was discharged in good condition and will return in one month or prn for wound evaluation.

## 2018-01-10 NOTE — NURSING NOTE
"Initial /69  Pulse 86  Temp 97.9  F (36.6  C) (Tympanic) Estimated body mass index is 27.71 kg/(m^2) as calculated from the following:    Height as of 1/25/16: 1.854 m (6' 1\").    Weight as of 1/25/16: 95.3 kg (210 lb). .    Socorro Ventura LPN    "

## 2018-01-10 NOTE — PATIENT INSTRUCTIONS
Open Wound Care     for R frontal scalp        ? No strenuous activity for 48 hours    ? Take Tylenol as needed for discomfort.                                                .         ? Do not drink alcoholic beverages for 48 hours.    ? Keep the pressure bandage in place for 24 hours. If the bandage becomes blood tinged or loose, reinforce it with gauze and tape.        (Refer to the reverse side of this page for management of bleeding).    ? Remove bandage in 24 hours and begin wound care as follows:     1. Clean area with tap water using a Q tip or gauze pad, (shower / bathe normally)  2. Dry wound with Q tip or gauze pad  3. Apply Aquaphor, Vaseline, Polysporin or Bacitracin Ointment with a Q tip    Do NOT use Neosporin Ointment *  4. Cover the wound with a band-aid or nonstick gauze pad and paper tape.  5. Repeat wound care once a day until wound is completely healed.    It is an old wives tale that a wound heals better when it is exposed to air and allowed to dry out. The wound will heal faster with a better cosmetic result if it is kept moist with ointment and covered with a bandage.  Do not let the wound dry out.      Supplies Needed:                Qtips or gauze pads                Polysporin or Bacitracin Ointment                Bandaids or nonstick gauze pads and paper tape    Wound care kits and brown paper tape are available for purchase at   the pharmacy.    BLEEDIN. Use tightly rolled up gauze or cloth to apply direct pressure over the bandage for 20   minutes.  2. Reapply pressure for an additional 20 minutes if necessary  3. Call the office or go to the nearest emergency room if pressure fails to stop the bleeding.  4. Use additional gauze and tape to maintain pressure once the bleeding has stopped.  5. Begin wound care 24 hours after surgery as directed.                  WOUND HEALING    1. One week after surgery a pink / red halo will form around the outside of the wound.   This is new  skin.  2. The center of the wound will appear yellowish white and produce some drainage.  3. The pink halo will slowly migrate in toward the center of the wound until the wound is covered with new shiny pink skin.  4. There will be no more drainage when the wound is completely healed.  5. It will take six months to one year for the redness to fade.  6. The scar may be itchy, tight and sensitive to extreme temperatures for a year after the surgery.  7. Massaging the area several times a day for several minutes after the wound is completely healed will help the scar soften and normalize faster. Begin massage only after healing is complete.      In case of emergency call: Dr Jay: [phone]     [facility]: [phone]    [facility]: [phone]      WOUND CARE INSTRUCTIONS   FOR CRYOSURGERY   This area treated with liquid nitrogen will form a blister. You do not need to bandage the area until after the blister forms and breaks (which may be a few days). When the blister breaks, begin daily dressing changes as follows:   1) Clean and dry the area with tap water using clean Q-tip or sterile gauze pad.   2) Apply Polysporin ointment or Bacitracin ointment over entire wound. Do NOT use Neosporin ointment.   3) Cover the wound with a band-aid or sterile non-stick gauze pad and micropore paper tape.   REPEAT THESE INSTRUCTIONS AT LEAST ONCE A DAY UNTIL THE WOUND HAS COMPLETELY HEALED.   It is an old wives tale that a wound heals better when it is exposed to air and allowed to dry out. The wound will heal faster with a better cosmetic result if it is kept moist with ointment and covered with a bandage.   Do not let the wound dry out.   IMPORTANT INFORMATION ON REVERSE SIDE   Supplies Needed:   *Cotton tipped applicators (Q-tips)   *Polysporin ointment or Bacitracin ointment (NOT NEOSPORIN)   *Band-aids, or non stick gauze pads and micropore paper tape   PATIENT INFORMATION   During the healing  process you will notice a number of changes. All wounds develop a small halo of redness surrounding the wound. This means healing is occurring. Severe itching with extensive redness usually indicates sensitivity to the ointment or bandage tape used to dress the wound. You should call our office if this develops.   Swelling and/or discoloration around your surgical site is common, particularly when performed around the eye.   All wounds normally drain. The larger the wound the more drainage there will be. After 7-10 days, you will notice the wound beginning to shrink and new skin will begin to grow. The wound is healed when you can see skin has formed over the entire area. A healed wound has a healthy, shiny look to the surface and is red to dark pink in color to normalize. Wounds may take approximately 4-6 weeks to heal. Larger wounds may take 6-8 weeks. After the wound is healed you may discontinue dressing changes.   You may experience a sensation of tightness as your wound heals. This is normal and will gradually subside.   Your healed wound may be sensitive to temperature changes. This sensitivity improves with time, but if you re having a lot of discomfort, try to avoid temperature extremes.   Patients frequently experience itching after their wound appears to have healed because of the continue healing under the skin. Plain Vaseline will help relieve the itching.

## 2018-01-11 ENCOUNTER — COMMUNICATION - HEALTHEAST (OUTPATIENT)
Dept: INTERNAL MEDICINE | Facility: CLINIC | Age: 78
End: 2018-01-11

## 2018-01-11 ENCOUNTER — RECORDS - HEALTHEAST (OUTPATIENT)
Dept: ADMINISTRATIVE | Facility: OTHER | Age: 78
End: 2018-01-11

## 2018-01-11 ENCOUNTER — OFFICE VISIT - HEALTHEAST (OUTPATIENT)
Dept: INTERNAL MEDICINE | Facility: CLINIC | Age: 78
End: 2018-01-11

## 2018-01-11 DIAGNOSIS — M25.50 JOINT PAIN: ICD-10-CM

## 2018-01-11 DIAGNOSIS — R06.2 WHEEZING: ICD-10-CM

## 2018-01-11 DIAGNOSIS — Z00.00 ROUTINE PHYSICAL EXAMINATION: ICD-10-CM

## 2018-01-11 DIAGNOSIS — Z13.220 LIPID SCREENING: ICD-10-CM

## 2018-01-11 DIAGNOSIS — M16.11 OSTEOARTHRITIS OF RIGHT HIP: ICD-10-CM

## 2018-01-11 DIAGNOSIS — M48.061 FORAMINAL STENOSIS OF LUMBAR REGION: ICD-10-CM

## 2018-01-11 DIAGNOSIS — J32.9 RHINOSINUSITIS: ICD-10-CM

## 2018-01-11 DIAGNOSIS — Z12.5 SCREENING FOR PROSTATE CANCER: ICD-10-CM

## 2018-01-11 DIAGNOSIS — R97.20 ELEVATED PSA: ICD-10-CM

## 2018-01-11 DIAGNOSIS — R70.0 ELEVATED SEDIMENTATION RATE: ICD-10-CM

## 2018-01-11 LAB
ALBUMIN SERPL-MCNC: 3.4 G/DL (ref 3.5–5)
ALP SERPL-CCNC: 65 U/L (ref 45–120)
ALT SERPL W P-5'-P-CCNC: 49 U/L (ref 0–45)
ANION GAP SERPL CALCULATED.3IONS-SCNC: 10 MMOL/L (ref 5–18)
AST SERPL W P-5'-P-CCNC: 24 U/L (ref 0–40)
BILIRUB SERPL-MCNC: 0.5 MG/DL (ref 0–1)
BUN SERPL-MCNC: 14 MG/DL (ref 8–28)
C REACTIVE PROTEIN LHE: 4.5 MG/DL (ref 0–0.8)
CALCIUM SERPL-MCNC: 9.5 MG/DL (ref 8.5–10.5)
CHLORIDE BLD-SCNC: 99 MMOL/L (ref 98–107)
CHOLEST SERPL-MCNC: 190 MG/DL
CO2 SERPL-SCNC: 28 MMOL/L (ref 22–31)
CREAT SERPL-MCNC: 0.83 MG/DL (ref 0.7–1.3)
ERYTHROCYTE [DISTWIDTH] IN BLOOD BY AUTOMATED COUNT: 11.4 % (ref 11–14.5)
ERYTHROCYTE [SEDIMENTATION RATE] IN BLOOD BY WESTERGREN METHOD: 38 MM/HR (ref 0–15)
FASTING STATUS PATIENT QL REPORTED: YES
GFR SERPL CREATININE-BSD FRML MDRD: >60 ML/MIN/1.73M2
GLUCOSE BLD-MCNC: 76 MG/DL (ref 70–125)
HCT VFR BLD AUTO: 43.7 % (ref 40–54)
HDLC SERPL-MCNC: 55 MG/DL
HGB BLD-MCNC: 15.2 G/DL (ref 14–18)
LDLC SERPL CALC-MCNC: 114 MG/DL
MCH RBC QN AUTO: 32.6 PG (ref 27–34)
MCHC RBC AUTO-ENTMCNC: 34.7 G/DL (ref 32–36)
MCV RBC AUTO: 94 FL (ref 80–100)
PLATELET # BLD AUTO: 328 THOU/UL (ref 140–440)
PMV BLD AUTO: 6.5 FL (ref 7–10)
POTASSIUM BLD-SCNC: 4.9 MMOL/L (ref 3.5–5)
PROT SERPL-MCNC: 7 G/DL (ref 6–8)
PSA SERPL-MCNC: 7.5 NG/ML (ref 0–6.5)
RBC # BLD AUTO: 4.66 MILL/UL (ref 4.4–6.2)
SODIUM SERPL-SCNC: 137 MMOL/L (ref 136–145)
TRIGL SERPL-MCNC: 107 MG/DL
WBC: 15.4 THOU/UL (ref 4–11)

## 2018-01-22 ENCOUNTER — COMMUNICATION - HEALTHEAST (OUTPATIENT)
Dept: INTERNAL MEDICINE | Facility: CLINIC | Age: 78
End: 2018-01-22

## 2018-01-22 ENCOUNTER — OFFICE VISIT - HEALTHEAST (OUTPATIENT)
Dept: INTERNAL MEDICINE | Facility: CLINIC | Age: 78
End: 2018-01-22

## 2018-01-22 DIAGNOSIS — M48.061 FORAMINAL STENOSIS OF LUMBAR REGION: ICD-10-CM

## 2018-01-22 DIAGNOSIS — M16.11 OSTEOARTHRITIS OF RIGHT HIP: ICD-10-CM

## 2018-01-22 DIAGNOSIS — R97.20 ELEVATED PSA: ICD-10-CM

## 2018-01-26 ENCOUNTER — OFFICE VISIT - HEALTHEAST (OUTPATIENT)
Dept: INTERNAL MEDICINE | Facility: CLINIC | Age: 78
End: 2018-01-26

## 2018-01-26 ENCOUNTER — RECORDS - HEALTHEAST (OUTPATIENT)
Dept: ADMINISTRATIVE | Facility: OTHER | Age: 78
End: 2018-01-26

## 2018-01-26 DIAGNOSIS — M16.11 OSTEOARTHRITIS OF RIGHT HIP: ICD-10-CM

## 2018-01-26 DIAGNOSIS — R97.20 ELEVATED PSA: ICD-10-CM

## 2018-01-26 DIAGNOSIS — Z78.9 FULL CODE STATUS: ICD-10-CM

## 2018-01-26 DIAGNOSIS — Z01.810 PREOPERATIVE CARDIOVASCULAR EXAMINATION: ICD-10-CM

## 2018-01-26 DIAGNOSIS — M25.50 JOINT PAIN: ICD-10-CM

## 2018-01-26 DIAGNOSIS — M48.061 FORAMINAL STENOSIS OF LUMBAR REGION: ICD-10-CM

## 2018-01-26 LAB
ALBUMIN UR-MCNC: NEGATIVE MG/DL
ANION GAP SERPL CALCULATED.3IONS-SCNC: 10 MMOL/L (ref 5–18)
APPEARANCE UR: CLEAR
BACTERIA #/AREA URNS HPF: NORMAL HPF
BILIRUB UR QL STRIP: NEGATIVE
BUN SERPL-MCNC: 13 MG/DL (ref 8–28)
C REACTIVE PROTEIN LHE: 0.8 MG/DL (ref 0–0.8)
CALCIUM SERPL-MCNC: 9.4 MG/DL (ref 8.5–10.5)
CHLORIDE BLD-SCNC: 102 MMOL/L (ref 98–107)
CO2 SERPL-SCNC: 30 MMOL/L (ref 22–31)
COLOR UR AUTO: YELLOW
CREAT SERPL-MCNC: 0.92 MG/DL (ref 0.7–1.3)
ERYTHROCYTE [DISTWIDTH] IN BLOOD BY AUTOMATED COUNT: 11.4 % (ref 11–14.5)
ERYTHROCYTE [SEDIMENTATION RATE] IN BLOOD BY WESTERGREN METHOD: 12 MM/HR (ref 0–15)
GFR SERPL CREATININE-BSD FRML MDRD: >60 ML/MIN/1.73M2
GLUCOSE BLD-MCNC: 81 MG/DL (ref 70–125)
GLUCOSE UR STRIP-MCNC: NEGATIVE MG/DL
HCT VFR BLD AUTO: 43.3 % (ref 40–54)
HGB BLD-MCNC: 15.1 G/DL (ref 14–18)
HGB UR QL STRIP: NEGATIVE
KETONES UR STRIP-MCNC: NEGATIVE MG/DL
LEUKOCYTE ESTERASE UR QL STRIP: NEGATIVE
MCH RBC QN AUTO: 32.8 PG (ref 27–34)
MCHC RBC AUTO-ENTMCNC: 34.8 G/DL (ref 32–36)
MCV RBC AUTO: 94 FL (ref 80–100)
NITRATE UR QL: NEGATIVE
PH UR STRIP: 6 [PH] (ref 5–8)
PLATELET # BLD AUTO: 208 THOU/UL (ref 140–440)
PMV BLD AUTO: 6.4 FL (ref 7–10)
POTASSIUM BLD-SCNC: 4.5 MMOL/L (ref 3.5–5)
PSA SERPL-MCNC: 3.5 NG/ML (ref 0–6.5)
RBC # BLD AUTO: 4.59 MILL/UL (ref 4.4–6.2)
RBC #/AREA URNS AUTO: NORMAL HPF
SODIUM SERPL-SCNC: 142 MMOL/L (ref 136–145)
SP GR UR STRIP: 1.01 (ref 1–1.03)
SQUAMOUS #/AREA URNS AUTO: NORMAL LPF
UROBILINOGEN UR STRIP-ACNC: NORMAL
WBC #/AREA URNS AUTO: NORMAL HPF
WBC: 7.2 THOU/UL (ref 4–11)

## 2018-01-26 ASSESSMENT — MIFFLIN-ST. JEOR: SCORE: 1695.09

## 2018-01-27 LAB
ATRIAL RATE - MUSE: 59 BPM
BACTERIA SPEC CULT: NO GROWTH
DIASTOLIC BLOOD PRESSURE - MUSE: NORMAL MMHG
INTERPRETATION ECG - MUSE: NORMAL
P AXIS - MUSE: 43 DEGREES
PR INTERVAL - MUSE: 168 MS
QRS DURATION - MUSE: 94 MS
QT - MUSE: 414 MS
QTC - MUSE: 409 MS
R AXIS - MUSE: 33 DEGREES
SYSTOLIC BLOOD PRESSURE - MUSE: NORMAL MMHG
T AXIS - MUSE: 58 DEGREES
VENTRICULAR RATE- MUSE: 59 BPM

## 2018-01-29 ENCOUNTER — COMMUNICATION - HEALTHEAST (OUTPATIENT)
Dept: INTERNAL MEDICINE | Facility: CLINIC | Age: 78
End: 2018-01-29

## 2018-01-29 DIAGNOSIS — M48.061 FORAMINAL STENOSIS OF LUMBAR REGION: ICD-10-CM

## 2018-01-29 DIAGNOSIS — M16.11 OSTEOARTHRITIS OF RIGHT HIP: ICD-10-CM

## 2018-02-01 ENCOUNTER — COMMUNICATION - HEALTHEAST (OUTPATIENT)
Dept: INTERNAL MEDICINE | Facility: CLINIC | Age: 78
End: 2018-02-01

## 2018-02-13 ENCOUNTER — COMMUNICATION - HEALTHEAST (OUTPATIENT)
Dept: INTERNAL MEDICINE | Facility: CLINIC | Age: 78
End: 2018-02-13

## 2018-02-14 ASSESSMENT — MIFFLIN-ST. JEOR: SCORE: 1695.09

## 2018-02-19 ENCOUNTER — COMMUNICATION - HEALTHEAST (OUTPATIENT)
Dept: INTERNAL MEDICINE | Facility: CLINIC | Age: 78
End: 2018-02-19

## 2018-02-20 ENCOUNTER — ANESTHESIA - HEALTHEAST (OUTPATIENT)
Dept: SURGERY | Facility: CLINIC | Age: 78
End: 2018-02-20

## 2018-02-21 ENCOUNTER — SURGERY - HEALTHEAST (OUTPATIENT)
Dept: SURGERY | Facility: CLINIC | Age: 78
End: 2018-02-21

## 2018-02-21 ASSESSMENT — MIFFLIN-ST. JEOR
SCORE: 1676.95
SCORE: 1676.95

## 2018-02-27 ENCOUNTER — OFFICE VISIT - HEALTHEAST (OUTPATIENT)
Dept: INTERNAL MEDICINE | Facility: CLINIC | Age: 78
End: 2018-02-27

## 2018-02-27 DIAGNOSIS — R52 PAIN: ICD-10-CM

## 2018-02-27 DIAGNOSIS — M19.90 OA (OSTEOARTHRITIS): ICD-10-CM

## 2018-02-27 DIAGNOSIS — Z96.641 STATUS POST TOTAL REPLACEMENT OF RIGHT HIP: ICD-10-CM

## 2018-02-27 DIAGNOSIS — M48.061 FORAMINAL STENOSIS OF LUMBAR REGION: ICD-10-CM

## 2018-02-27 DIAGNOSIS — M48.061 SPINAL STENOSIS OF LUMBAR REGION: ICD-10-CM

## 2018-03-02 ENCOUNTER — RECORDS - HEALTHEAST (OUTPATIENT)
Dept: ADMINISTRATIVE | Facility: OTHER | Age: 78
End: 2018-03-02

## 2018-03-06 ENCOUNTER — TELEPHONE (OUTPATIENT)
Dept: DERMATOLOGY | Facility: CLINIC | Age: 78
End: 2018-03-06

## 2018-03-06 NOTE — TELEPHONE ENCOUNTER
Pt called stating that he had a procedure done on the top of his head on 01/10/18 which was about the size of a dime and that the wound still has not healed. Pt states that he has done everything he was told to do including use bacitracin, however, he still has an open wound which is not normal because similar procedures in the past have healed up normally. Please advise.    Alessandra Kenton  [facility] [place]

## 2018-03-07 NOTE — TELEPHONE ENCOUNTER
"Called and spoke to patient. He stated that wound is \"finally\" starting to scab over. I let him know that some wounds take much longer to heal, but to keep the area moist.  Wanda Hathaway RN-BSN  "

## 2018-03-30 ENCOUNTER — RECORDS - HEALTHEAST (OUTPATIENT)
Dept: ADMINISTRATIVE | Facility: OTHER | Age: 78
End: 2018-03-30

## 2018-07-11 ENCOUNTER — TELEPHONE (OUTPATIENT)
Dept: DERMATOLOGY | Facility: CLINIC | Age: 78
End: 2018-07-11

## 2018-07-11 ENCOUNTER — RECORDS - HEALTHEAST (OUTPATIENT)
Dept: ADMINISTRATIVE | Facility: OTHER | Age: 78
End: 2018-07-11

## 2018-07-11 ENCOUNTER — OFFICE VISIT (OUTPATIENT)
Dept: DERMATOLOGY | Facility: CLINIC | Age: 78
End: 2018-07-11
Payer: COMMERCIAL

## 2018-07-11 VITALS — SYSTOLIC BLOOD PRESSURE: 136 MMHG | OXYGEN SATURATION: 95 % | DIASTOLIC BLOOD PRESSURE: 92 MMHG | HEART RATE: 61 BPM

## 2018-07-11 DIAGNOSIS — D18.00 ANGIOMA: ICD-10-CM

## 2018-07-11 DIAGNOSIS — C44.329 SQUAMOUS CELL CARCINOMA OF FOREHEAD: ICD-10-CM

## 2018-07-11 DIAGNOSIS — L81.4 LENTIGO: ICD-10-CM

## 2018-07-11 DIAGNOSIS — L57.0 AK (ACTINIC KERATOSIS): ICD-10-CM

## 2018-07-11 DIAGNOSIS — L82.1 SK (SEBORRHEIC KERATOSIS): ICD-10-CM

## 2018-07-11 DIAGNOSIS — Z85.828 HISTORY OF SKIN CANCER: Primary | ICD-10-CM

## 2018-07-11 PROCEDURE — 17000 DESTRUCT PREMALG LESION: CPT | Mod: 59 | Performed by: DERMATOLOGY

## 2018-07-11 PROCEDURE — 88331 PATH CONSLTJ SURG 1 BLK 1SPC: CPT | Performed by: DERMATOLOGY

## 2018-07-11 PROCEDURE — 99213 OFFICE O/P EST LOW 20 MIN: CPT | Mod: 25 | Performed by: DERMATOLOGY

## 2018-07-11 PROCEDURE — 11100 HC DESTRUCT PREMALIGNANT LESION, FIRST: CPT | Mod: 59 | Performed by: DERMATOLOGY

## 2018-07-11 PROCEDURE — 17003 DESTRUCT PREMALG LES 2-14: CPT | Performed by: DERMATOLOGY

## 2018-07-11 PROCEDURE — 17110 DESTRUCTION B9 LES UP TO 14: CPT | Mod: 51 | Performed by: DERMATOLOGY

## 2018-07-11 NOTE — TELEPHONE ENCOUNTER
Message left to return call.     Needs one Mohs surgery appointment. Pre op letter drafted.     Anu Patino RN

## 2018-07-11 NOTE — LETTER
7/11/2018         RE: Nir Guy  [address] [place] [phone]        Dear Colleague,    Thank you for referring your patient, Nir Guy, to the [facility]. Please see a copy of my visit note below.    Nir Guy is a 78 year old year old male patient here today for bleeding spot on forehead, tender spot on scalp.   .  Patient states this has been present for a .  While Patient reports the following symptoms:  Bleeding on forehead.  Patient reports the following previous treatments none.  Patient reports the following modifying factors none.  Associated symptoms: none.  Patient has no other skin complaints today.  Remainder of the HPI, Meds, PMH, Allergies, FH, and SH was reviewed in chart.      Past Medical History:   Diagnosis Date     Actinic keratosis      Basal cell carcinoma      Squamous cell carcinoma        History reviewed. No pertinent surgical history.     Family History   Problem Relation Age of Onset     Melanoma No family hx of        Social History     Social History     Marital status:      Spouse name: N/A     Number of children: N/A     Years of education: N/A     Occupational History      Retired     Social History Main Topics     Smoking status: Former Smoker     Types: Pipe     Quit date: 12/3/1999     Smokeless tobacco: Never Used     Alcohol use Not on file     Drug use: Not on file     Sexual activity: Not on file     Other Topics Concern     Not on file     Social History Narrative       Outpatient Encounter Prescriptions as of 7/11/2018   Medication Sig Dispense Refill     aspirin 81 MG tablet Take by mouth daily       Carboxymethylcell-Hypromellose (GENTEAL OP)        MECLIZINE HCL PO        Multiple vitamin TABS        Omega-3 Fatty Acids (FISH OIL) 500 MG CAPS        polyethylene glycol 0.4%- propylene glycol 0.3% (SYSTANE) 0.4-0.3 % SOLN ophthalmic solution        Garlic 1250 MG TABS        glucosamine-chondroitinoitin  7473-6051 MG/30ML LIQD        vitamin  s/Minerals TABS        No facility-administered encounter medications on file as of 7/11/2018.              Review Of Systems  Skin: As above  Eyes: negative  Ears/Nose/Throat: negative  Respiratory: No shortness of breath, dyspnea on exertion, cough, or hemoptysis  Cardiovascular: negative  Gastrointestinal: negative  Genitourinary: negative  Musculoskeletal: negative  Neurologic: negative  Psychiatric: negative  Hematologic/Lymphatic/Immunologic: negative  Endocrine: negative      O:   NAD, WDWN, Alert & Oriented, Mood & Affect wnl, Vitals stable   Here today alone   BP (!) 136/92  Pulse 61  SpO2 95%   General appearance normal   Vitals stable   Alert, oriented and in no acute distress      Following lymph nodes palpated: Occipital, Cervical, Supraclavicular no lad   Inflamed seborrheic keratosis on scalp   Gritty papules on face   L forehead 4mm bleeding scaly papule    Stuck on papules and brown macules on trunk and ext    Red papules on trunk         The remainder of the full exam was unremarkable; the following areas were examined:  conjunctiva/lids, oral mucosa, neck, peripheral vascular system, abdomen, lymph nodes, digits/nails, eccrine and apocrine glands, scalp/hair, face, neck, chest, abdomen, buttocks, back, RUE, LUE, RLE, LLE       Eyes: Conjunctivae/lids:Normal     ENT: Lips, buccal mucosa, tongue: normal    MSK:Normal    Cardiovascular: peripheral edema none    Pulm: Breathing Normal    Lymph Nodes: No Head and Neck Lymphadenopathy     Neuro/Psych: Orientation:Normal; Mood/Affect:Normal      MICRO:   L forehead:There is hyperkeratosis & parakeratosis of the epidermis, with full thickness epidermal involvement by atypical keratinocytes with rare pale vacuolated cells invading dermis.    A/P:  1. Seborrheic keratosis, letnigo, hx of non-melanoma skin cancer , angioma  2. Scalp inflamed seborrheic keratosis   LN2:  Treated with LN2 for 5s for 1-2 cycles. Warned  risks of blistering, pain, pigment change, scarring, and incomplete resolution.  Advised patient to return if lesions do not completely resolve.  Wound care sheet given.  3. Actinic keratosis   Forehead x4  L temple x3  R templex1  LN2:  Treated with LN2 for 5s for 1-2 cycles. Warned risks of blistering, pain, pigment change, scarring, and incomplete resolution.  Advised patient to return if lesions do not completely resolve.  Wound care sheet given.  4. R/o squamous cell carcinoma   TANGENTIAL BIOPSY IN HOUSE:  After consent, anesthesia with LEC and prep, tangential excision performed and dx above confirmed with frozen section histology.  No complications and routine wound care.  Patient told result squamous cell carcinoma forehead schedulee xcision .      BENIGN LESIONS DISCUSSED WITH PATIENT:  I discussed the specifics of tumor, prognosis, and genetics of benign lesions.  I explained that treatment of these lesions would be purely cosmetic and not medically neccessary.  I discussed with patient different removal options including excision, cautery and /or laser.      Nature and genetics of benign skin lesions dicussed with patient.  Signs and Symptoms of skin cancer discussed with patient.  ABCDEs of melanoma reviewed with patient.  Patient encouraged to perform monthly skin exams.  UV precautions reviewed with patient.  Patient to follow up with Primary Care provider regarding elevated blood pressure.  Skin care regimen reviewed with patient: Eliminate harsh soaps, i.e. Dial, zest, irsih spring; Mild soaps such as Cetaphil or Dove sensitive skin, avoid hot or cold showers, aggressive use of emollients including vanicream, cetaphil or cerave discussed with patient.    Risks of non-melanoma skin cancer discussed with patient   Return to clinic 6 months    Patient to follow up with Primary Care provider regarding elevated blood pressure.        Again, thank you for allowing me to participate in the care of your  patient.        Sincerely,        Chavez Jay MD

## 2018-07-11 NOTE — TELEPHONE ENCOUNTER
Patient notified. Patient verbalized understanding. Scheduled for MOHS Surgery. Mohs Pre-op letter sent.   Anu Patino RN

## 2018-07-11 NOTE — PROGRESS NOTES
Nir Guy is a 78 year old year old male patient here today for bleeding spot on forehead, tender spot on scalp.   .  Patient states this has been present for a .  While Patient reports the following symptoms:  Bleeding on forehead.  Patient reports the following previous treatments none.  Patient reports the following modifying factors none.  Associated symptoms: none.  Patient has no other skin complaints today.  Remainder of the HPI, Meds, PMH, Allergies, FH, and SH was reviewed in chart.      Past Medical History:   Diagnosis Date     Actinic keratosis      Basal cell carcinoma      Squamous cell carcinoma        History reviewed. No pertinent surgical history.     Family History   Problem Relation Age of Onset     Melanoma No family hx of        Social History     Social History     Marital status:      Spouse name: N/A     Number of children: N/A     Years of education: N/A     Occupational History      Retired     Social History Main Topics     Smoking status: Former Smoker     Types: Pipe     Quit date: 12/3/1999     Smokeless tobacco: Never Used     Alcohol use Not on file     Drug use: Not on file     Sexual activity: Not on file     Other Topics Concern     Not on file     Social History Narrative       Outpatient Encounter Prescriptions as of 7/11/2018   Medication Sig Dispense Refill     aspirin 81 MG tablet Take by mouth daily       Carboxymethylcell-Hypromellose (GENTEAL OP)        MECLIZINE HCL PO        Multiple vitamin TABS        Omega-3 Fatty Acids (FISH OIL) 500 MG CAPS        polyethylene glycol 0.4%- propylene glycol 0.3% (SYSTANE) 0.4-0.3 % SOLN ophthalmic solution        Garlic 1250 MG TABS        glucosamine-chondroitinoitin 6636-9310 MG/30ML LIQD        vitamin  s/Minerals TABS        No facility-administered encounter medications on file as of 7/11/2018.              Review Of Systems  Skin: As above  Eyes: negative  Ears/Nose/Throat: negative  Respiratory: No shortness of  breath, dyspnea on exertion, cough, or hemoptysis  Cardiovascular: negative  Gastrointestinal: negative  Genitourinary: negative  Musculoskeletal: negative  Neurologic: negative  Psychiatric: negative  Hematologic/Lymphatic/Immunologic: negative  Endocrine: negative      O:   NAD, WDWN, Alert & Oriented, Mood & Affect wnl, Vitals stable   Here today alone   BP (!) 136/92  Pulse 61  SpO2 95%   General appearance normal   Vitals stable   Alert, oriented and in no acute distress      Following lymph nodes palpated: Occipital, Cervical, Supraclavicular no lad   Inflamed seborrheic keratosis on scalp   Gritty papules on face   L forehead 4mm bleeding scaly papule    Stuck on papules and brown macules on trunk and ext    Red papules on trunk         The remainder of the full exam was unremarkable; the following areas were examined:  conjunctiva/lids, oral mucosa, neck, peripheral vascular system, abdomen, lymph nodes, digits/nails, eccrine and apocrine glands, scalp/hair, face, neck, chest, abdomen, buttocks, back, RUE, LUE, RLE, LLE       Eyes: Conjunctivae/lids:Normal     ENT: Lips, buccal mucosa, tongue: normal    MSK:Normal    Cardiovascular: peripheral edema none    Pulm: Breathing Normal    Lymph Nodes: No Head and Neck Lymphadenopathy     Neuro/Psych: Orientation:Normal; Mood/Affect:Normal      MICRO:   L forehead:There is hyperkeratosis & parakeratosis of the epidermis, with full thickness epidermal involvement by atypical keratinocytes with rare pale vacuolated cells invading dermis.    A/P:  1. Seborrheic keratosis, letnigo, hx of non-melanoma skin cancer , angioma  2. Scalp inflamed seborrheic keratosis   LN2:  Treated with LN2 for 5s for 1-2 cycles. Warned risks of blistering, pain, pigment change, scarring, and incomplete resolution.  Advised patient to return if lesions do not completely resolve.  Wound care sheet given.  3. Actinic keratosis   Forehead x4  L temple x3  R templex1  LN2:  Treated with LN2  for 5s for 1-2 cycles. Warned risks of blistering, pain, pigment change, scarring, and incomplete resolution.  Advised patient to return if lesions do not completely resolve.  Wound care sheet given.  4. R/o squamous cell carcinoma   TANGENTIAL BIOPSY IN HOUSE:  After consent, anesthesia with LEC and prep, tangential excision performed and dx above confirmed with frozen section histology.  No complications and routine wound care.  Patient told result squamous cell carcinoma forehead schedulee xcision .      BENIGN LESIONS DISCUSSED WITH PATIENT:  I discussed the specifics of tumor, prognosis, and genetics of benign lesions.  I explained that treatment of these lesions would be purely cosmetic and not medically neccessary.  I discussed with patient different removal options including excision, cautery and /or laser.      Nature and genetics of benign skin lesions dicussed with patient.  Signs and Symptoms of skin cancer discussed with patient.  ABCDEs of melanoma reviewed with patient.  Patient encouraged to perform monthly skin exams.  UV precautions reviewed with patient.  Patient to follow up with Primary Care provider regarding elevated blood pressure.  Skin care regimen reviewed with patient: Eliminate harsh soaps, i.e. Dial, zest, irsih spring; Mild soaps such as Cetaphil or Dove sensitive skin, avoid hot or cold showers, aggressive use of emollients including vanicream, cetaphil or cerave discussed with patient.    Risks of non-melanoma skin cancer discussed with patient   Return to clinic 6 months    Patient to follow up with Primary Care provider regarding elevated blood pressure.

## 2018-07-11 NOTE — LETTER
[facility]  [address] [facility] [address]  Phone: [phone]    July 11, 2018    Nir Guy                                                                                                                  [address] [place] [phone]            Dear Mr. Guy,    You are scheduled for Mohs Surgery on Monday August 13 th at 7:45 am.    Please check in at Dermatology Clinic.   (2nd Floor, last  Clinic on right up staircase or elevator -past OB/GYN clinic)    You don't need to arrive more than 5-10 minutes prior to your appointment time.     Be sure to eat a good breakfast and bathe and wash your hair prior to Surgery.    If you are taking any anti-coagulants that are prescribed by your Doctor (such as Coumadin/warfarin, Plavix, Aspirin, Ibuprofen), please continue taking them.     However, If you are taking anti-coagulants over the counter without  a Doctor's order for a Medical condition, please discontinue them 10 days prior to Surgery.      Please wear loose comfortable clothing as it could possibly be 4-6 hours until your surgery is completed depending upon how many layers of tissue need to be removed.     Wi-fi access is available.     Thank you,      Chavez Jay MD/ Anu Patino RN

## 2018-07-11 NOTE — LETTER
[facility]  [address] [facility] [address]  Phone: [phone]    July 11, 2018    Nir Guy                                                                                                                     [address] [place] [phone]            Dear Mr. Guy,    You are scheduled for Mohs Surgery on     Please check in at Dermatology Clinic.   (2nd Floor, last  Clinic on right up staircase or elevator -past OB/GYN clinic)    You don't need to arrive more than 5-10 minutes prior to your appointment time.     Be sure to eat a good breakfast and bathe and wash your hair prior to Surgery.    If you are taking any anti-coagulants that are prescribed by your Doctor (such as Coumadin/warfarin, Plavix, Aspirin, Ibuprofen), please continue taking them.     However, If you are taking anti-coagulants over the counter without  a Doctor's order for a Medical condition, please discontinue them 10 days prior to Surgery.      Please wear loose comfortable clothing as it could possibly be 4-6 hours until your surgery is completed depending upon how many layers of tissue need to be removed.     Wi-fi access is available.     Thank you,      Chavez Jay MD/ Anu Patino RN

## 2018-07-11 NOTE — NURSING NOTE
Chief Complaint   Patient presents with     Skin Check       Vitals:    07/11/18 0916   BP: (!) 136/92   Pulse: 61   SpO2: 95%     Wt Readings from Last 1 Encounters:   01/25/16 95.3 kg (210 lb)     Kath Smith LPN.................7/11/2018

## 2018-07-11 NOTE — MR AVS SNAPSHOT
After Visit Summary   7/11/2018    Nir Guy    MRN: [id number]           Patient Information     Date Of Birth          1940        Visit Information        Provider Department      7/11/2018 9:30 AM Chavez Jay MD [facility]        Care Instructions          Wound Care Instructions     FOR SUPERFICIAL WOUNDS     [facility] [phone]    [facility] [phone]                       AFTER 24 HOURS YOU SHOULD REMOVE THE BANDAGE AND BEGIN DAILY DRESSING CHANGES AS FOLLOWS:     1) Remove Dressing.     2) Clean and dry the area with tap water using a Q-tip or sterile gauze pad.     3) Apply Vaseline, Aquaphor, Polysporin ointment or Bacitracin ointment over entire wound.  Do NOT use Neosporin ointment.     4) Cover the wound with a band-aid, or a sterile non-stick gauze pad and micropore paper tape      REPEAT THESE INSTRUCTIONS AT LEAST ONCE A DAY UNTIL THE WOUND HAS COMPLETELY HEALED.    It is an old wives tale that a wound heals better when it is exposed to air and allowed to dry out. The wound will heal faster with a better cosmetic result if it is kept moist with ointment and covered with a bandage.    **Do not let the wound dry out.**      Supplies Needed:      *Cotton tipped applicators (Q-tips)    *Polysporin Ointment or Bacitracin Ointment (NOT NEOSPORIN)    *Band-aids or non-stick gauze pads and micropore paper tape.      PATIENT INFORMATION:    During the healing process you will notice a number of changes. All wounds develop a small halo of redness surrounding the wound.  This means healing is occurring. Severe itching with extensive redness usually indicates sensitivity to the ointment or bandage tape used to dress the wound.  You should call our office if this develops.      Swelling  and/or discoloration around your surgical site is common, particularly when performed around the eye.    All wounds normally drain.  The larger  the wound the more drainage there will be.  After 7-10 days, you will notice the wound beginning to shrink and new skin will begin to grow.  The wound is healed when you can see skin has formed over the entire area.  A healed wound has a healthy, shiny look to the surface and is red to dark pink in color to normalize.  Wounds may take approximately 4-6 weeks to heal.  Larger wounds may take 6-8 weeks.  After the wound is healed you may discontinue dressing changes.    You may experience a sensation of tightness as your wound heals. This is normal and will gradually subside.    Your healed wound may be sensitive to temperature changes. This sensitivity improves with time, but if you re having a lot of discomfort, try to avoid temperature extremes.    Patients frequently experience itching after their wound appears to have healed because of the continue healing under the skin.  Plain Vaseline will help relieve the itching.        POSSIBLE COMPLICATIONS    BLEEDIN. Leave the bandage in place.  2. Use tightly rolled up gauze or a cloth to apply direct pressure over the bandage for 30  minutes.  3. Reapply pressure for an additional 30 minutes if necessary  4. Use additional gauze and tape to maintain pressure once the bleeding has stopped.    WOUND CARE INSTRUCTIONS   FOR CRYOSURGERY   This area treated with liquid nitrogen will form a blister. You do not need to bandage the area until after the blister forms and breaks (which may be a few days). When the blister breaks, begin daily dressing changes as follows:   1) Clean and dry the area with tap water using clean Q-tip or sterile gauze pad.   2) Apply Polysporin ointment or Bacitracin ointment over entire wound. Do NOT use Neosporin ointment.   3) Cover the wound with a band-aid or sterile non-stick gauze pad and micropore paper tape.   REPEAT THESE INSTRUCTIONS AT LEAST ONCE A DAY UNTIL THE WOUND HAS COMPLETELY HEALED.   It is an old wives tale that a wound  heals better when it is exposed to air and allowed to dry out. The wound will heal faster with a better cosmetic result if it is kept moist with ointment and covered with a bandage.   Do not let the wound dry out.   IMPORTANT INFORMATION ON REVERSE SIDE   Supplies Needed:   *Cotton tipped applicators (Q-tips)   *Polysporin ointment or Bacitracin ointment (NOT NEOSPORIN)   *Band-aids, or non stick gauze pads and micropore paper tape   PATIENT INFORMATION   During the healing process you will notice a number of changes. All wounds develop a small halo of redness surrounding the wound. This means healing is occurring. Severe itching with extensive redness usually indicates sensitivity to the ointment or bandage tape used to dress the wound. You should call our office if this develops.   Swelling and/or discoloration around your surgical site is common, particularly when performed around the eye.   All wounds normally drain. The larger the wound the more drainage there will be. After 7-10 days, you will notice the wound beginning to shrink and new skin will begin to grow. The wound is healed when you can see skin has formed over the entire area. A healed wound has a healthy, shiny look to the surface and is red to dark pink in color to normalize. Wounds may take approximately 4-6 weeks to heal. Larger wounds may take 6-8 weeks. After the wound is healed you may discontinue dressing changes.   You may experience a sensation of tightness as your wound heals. This is normal and will gradually subside.   Your healed wound may be sensitive to temperature changes. This sensitivity improves with time, but if you re having a lot of discomfort, try to avoid temperature extremes.   Patients frequently experience itching after their wound appears to have healed because of the continue healing under the skin. Plain Vaseline will help relieve the itching.                 Follow-ups after your visit        Your next 10 appointments  already scheduled     Jul 11, 2018  9:30 AM CDT   [facility]    [address] [address] [phone]              Who to contact     If you have questions or need follow up information about today's clinic visit or your schedule please contact [facility] directly at [phone].  Normal or non-critical lab and imaging results will be communicated to you by MyChart, letter or phone within 4 business days after the clinic has received the results. If you do not hear from us within 7 days, please contact the clinic through MyChart or phone. If you have a critical or abnormal lab result, we will notify you by phone as soon as possible.  Submit refill requests through [employer] or call your pharmacy and they will forward the refill request to us. Please allow 3 business days for your refill to be completed.          Additional Information About Your Visit        Care EveryWhere ID     This is your Care EveryWhere ID. This could be used by other organizations to access your [place] medical records  [id number]        Your Vitals Were     Pulse Pulse Oximetry                61 95%           Blood Pressure from Last 3 Encounters:   07/11/18 (!) 136/92   01/10/18 126/69   07/26/17 145/76    Weight from Last 3 Encounters:   01/25/16 95.3 kg (210 lb)   10/19/15 95.3 kg (210 lb)   07/10/15 97.1 kg (214 lb)              Today, you had the following     No orders found for display       Primary Care Provider Office Phone # Fax #    German Mcgregor [phone]       [facility] [address] [address]        Equal Access to Services     ELIANA CALDERA : Hadii jessica Bauer, waifeanyida luqadaha, qaybta kaalmada grecia, gay horner. So [facility] [phone].    ATENCIÓN: Si habla español, tiene a barkley disposición servicios gratuitos de asistencia  lingüística. Arnulfo al [phone].    We comply with applicable federal civil rights laws and [place] laws. We do not discriminate on the basis of race, color, national origin, age, disability, sex, sexual orientation, or gender identity.            Thank you!     Thank you for choosing [facility]  for your care. Our goal is always to provide you with excellent care. Hearing back from our patients is one way we can continue to improve our services. Please take a few minutes to complete the written survey that you may receive in the mail after your visit with us. Thank you!             Your Updated Medication List - Protect others around you: Learn how to safely use, store and throw away your medicines at www.disposemymeds.org.          This list is accurate as of 7/11/18  9:28 AM.  Always use your most recent med list.                   Brand Name Dispense Instructions for use Diagnosis    aspirin 81 MG tablet      Take by mouth daily        Fish Oil 500 MG Caps           Garlic 1250 MG Tabs           GENTEAL OP           glucosamine-chondroitinoitin 3089-4756 MG/30ML Liqd           MECLIZINE HCL PO           Multiple vitamin Tabs           SYSTANE 0.4-0.3 % Soln ophthalmic solution   Generic drug:  polyethylene glycol 0.4%- propylene glycol 0.3%           vitamin  s/Minerals Tabs

## 2018-07-11 NOTE — PATIENT INSTRUCTIONS
Wound Care Instructions     FOR SUPERFICIAL WOUNDS     [facility] [phone]    [facility] [phone]                       AFTER 24 HOURS YOU SHOULD REMOVE THE BANDAGE AND BEGIN DAILY DRESSING CHANGES AS FOLLOWS:     1) Remove Dressing.     2) Clean and dry the area with tap water using a Q-tip or sterile gauze pad.     3) Apply Vaseline, Aquaphor, Polysporin ointment or Bacitracin ointment over entire wound.  Do NOT use Neosporin ointment.     4) Cover the wound with a band-aid, or a sterile non-stick gauze pad and micropore paper tape      REPEAT THESE INSTRUCTIONS AT LEAST ONCE A DAY UNTIL THE WOUND HAS COMPLETELY HEALED.    It is an old wives tale that a wound heals better when it is exposed to air and allowed to dry out. The wound will heal faster with a better cosmetic result if it is kept moist with ointment and covered with a bandage.    **Do not let the wound dry out.**      Supplies Needed:      *Cotton tipped applicators (Q-tips)    *Polysporin Ointment or Bacitracin Ointment (NOT NEOSPORIN)    *Band-aids or non-stick gauze pads and micropore paper tape.      PATIENT INFORMATION:    During the healing process you will notice a number of changes. All wounds develop a small halo of redness surrounding the wound.  This means healing is occurring. Severe itching with extensive redness usually indicates sensitivity to the ointment or bandage tape used to dress the wound.  You should call our office if this develops.      Swelling  and/or discoloration around your surgical site is common, particularly when performed around the eye.    All wounds normally drain.  The larger the wound the more drainage there will be.  After 7-10 days, you will notice the wound beginning to shrink and new skin will begin to grow.  The wound is healed when you can see skin has formed over the entire area.  A healed wound has a healthy, shiny look to the surface and is red to dark pink in color  to normalize.  Wounds may take approximately 4-6 weeks to heal.  Larger wounds may take 6-8 weeks.  After the wound is healed you may discontinue dressing changes.    You may experience a sensation of tightness as your wound heals. This is normal and will gradually subside.    Your healed wound may be sensitive to temperature changes. This sensitivity improves with time, but if you re having a lot of discomfort, try to avoid temperature extremes.    Patients frequently experience itching after their wound appears to have healed because of the continue healing under the skin.  Plain Vaseline will help relieve the itching.        POSSIBLE COMPLICATIONS    BLEEDIN. Leave the bandage in place.  2. Use tightly rolled up gauze or a cloth to apply direct pressure over the bandage for 30  minutes.  3. Reapply pressure for an additional 30 minutes if necessary  4. Use additional gauze and tape to maintain pressure once the bleeding has stopped.    WOUND CARE INSTRUCTIONS   FOR CRYOSURGERY   This area treated with liquid nitrogen will form a blister. You do not need to bandage the area until after the blister forms and breaks (which may be a few days). When the blister breaks, begin daily dressing changes as follows:   1) Clean and dry the area with tap water using clean Q-tip or sterile gauze pad.   2) Apply Polysporin ointment or Bacitracin ointment over entire wound. Do NOT use Neosporin ointment.   3) Cover the wound with a band-aid or sterile non-stick gauze pad and micropore paper tape.   REPEAT THESE INSTRUCTIONS AT LEAST ONCE A DAY UNTIL THE WOUND HAS COMPLETELY HEALED.   It is an old wives tale that a wound heals better when it is exposed to air and allowed to dry out. The wound will heal faster with a better cosmetic result if it is kept moist with ointment and covered with a bandage.   Do not let the wound dry out.   IMPORTANT INFORMATION ON REVERSE SIDE   Supplies Needed:   *Cotton tipped applicators  (Q-tips)   *Polysporin ointment or Bacitracin ointment (NOT NEOSPORIN)   *Band-aids, or non stick gauze pads and micropore paper tape   PATIENT INFORMATION   During the healing process you will notice a number of changes. All wounds develop a small halo of redness surrounding the wound. This means healing is occurring. Severe itching with extensive redness usually indicates sensitivity to the ointment or bandage tape used to dress the wound. You should call our office if this develops.   Swelling and/or discoloration around your surgical site is common, particularly when performed around the eye.   All wounds normally drain. The larger the wound the more drainage there will be. After 7-10 days, you will notice the wound beginning to shrink and new skin will begin to grow. The wound is healed when you can see skin has formed over the entire area. A healed wound has a healthy, shiny look to the surface and is red to dark pink in color to normalize. Wounds may take approximately 4-6 weeks to heal. Larger wounds may take 6-8 weeks. After the wound is healed you may discontinue dressing changes.   You may experience a sensation of tightness as your wound heals. This is normal and will gradually subside.   Your healed wound may be sensitive to temperature changes. This sensitivity improves with time, but if you re having a lot of discomfort, try to avoid temperature extremes.   Patients frequently experience itching after their wound appears to have healed because of the continue healing under the skin. Plain Vaseline will help relieve the itching.

## 2018-07-18 ENCOUNTER — COMMUNICATION - HEALTHEAST (OUTPATIENT)
Dept: INTERNAL MEDICINE | Facility: CLINIC | Age: 78
End: 2018-07-18

## 2018-08-02 ENCOUNTER — OFFICE VISIT - HEALTHEAST (OUTPATIENT)
Dept: INTERNAL MEDICINE | Facility: CLINIC | Age: 78
End: 2018-08-02

## 2018-08-02 DIAGNOSIS — M19.90 OA (OSTEOARTHRITIS): ICD-10-CM

## 2018-08-02 DIAGNOSIS — H35.30 AMD (AGE RELATED MACULAR DEGENERATION): ICD-10-CM

## 2018-08-02 DIAGNOSIS — Z01.810 PREOPERATIVE CARDIOVASCULAR EXAMINATION: ICD-10-CM

## 2018-08-02 DIAGNOSIS — H25.13 NUCLEAR SCLEROTIC CATARACT OF BOTH EYES: ICD-10-CM

## 2018-08-02 DIAGNOSIS — E78.5 HLD (HYPERLIPIDEMIA): ICD-10-CM

## 2018-08-02 DIAGNOSIS — R42 VERTIGO: ICD-10-CM

## 2018-08-02 ASSESSMENT — MIFFLIN-ST. JEOR: SCORE: 1724.34

## 2018-08-13 ENCOUNTER — OFFICE VISIT (OUTPATIENT)
Dept: DERMATOLOGY | Facility: CLINIC | Age: 78
End: 2018-08-13
Payer: COMMERCIAL

## 2018-08-13 ENCOUNTER — RECORDS - HEALTHEAST (OUTPATIENT)
Dept: ADMINISTRATIVE | Facility: OTHER | Age: 78
End: 2018-08-13

## 2018-08-13 VITALS — HEART RATE: 79 BPM | OXYGEN SATURATION: 97 % | SYSTOLIC BLOOD PRESSURE: 131 MMHG | DIASTOLIC BLOOD PRESSURE: 74 MMHG

## 2018-08-13 DIAGNOSIS — C44.329 SQUAMOUS CELL CARCINOMA OF FOREHEAD: Primary | ICD-10-CM

## 2018-08-13 PROCEDURE — 17311 MOHS 1 STAGE H/N/HF/G: CPT | Performed by: DERMATOLOGY

## 2018-08-13 NOTE — LETTER
8/13/2018         RE: Nir Guy  [address] [place] [address] [address]  [facility] [address]        Dear Colleague,    Thank you for referring your patient, Nir Guy, to the [facility]. Please see a copy of my visit note below.    Nir Guy is a 78 year old year old male patient here today for evaluation and managment of squamous cell carcinoma on left foreheadPatient has no other skin complaints today.  Remainder of the HPI, Meds, PMH, Allergies, FH, and SH was reviewed in chart.      Past Medical History:   Diagnosis Date     Actinic keratosis      Basal cell carcinoma      Squamous cell carcinoma        History reviewed. No pertinent surgical history.     Family History   Problem Relation Age of Onset     Melanoma No family hx of        Social History     Social History     Marital status:      Spouse name: N/A     Number of children: N/A     Years of education: N/A     Occupational History      Retired     Social History Main Topics     Smoking status: Former Smoker     Types: Pipe     Quit date: 12/3/1999     Smokeless tobacco: Never Used     Alcohol use Not on file     Drug use: Not on file     Sexual activity: Not on file     Other Topics Concern     Not on file     Social History Narrative       Outpatient Encounter Prescriptions as of 8/13/2018   Medication Sig Dispense Refill     aspirin 81 MG tablet Take by mouth daily       Carboxymethylcell-Hypromellose (GENTEAL OP)        Garlic 1250 MG TABS        glucosamine-chondroitinoitin 2683-6670 MG/30ML LIQD        MECLIZINE HCL PO        Multiple vitamin TABS        Omega-3 Fatty Acids (FISH OIL) 500 MG CAPS        polyethylene glycol 0.4%- propylene glycol 0.3% (SYSTANE) 0.4-0.3 % SOLN ophthalmic solution        vitamin  s/Minerals TABS        No facility-administered encounter medications on file as of 8/13/2018.              Review Of Systems  Skin: As above  Eyes: negative  Ears/Nose/Throat: negative  Respiratory: No  shortness of breath, dyspnea on exertion, cough, or hemoptysis  Cardiovascular: negative  Gastrointestinal: negative  Genitourinary: negative  Musculoskeletal: negative  Neurologic: negative  Psychiatric: negative  Hematologic/Lymphatic/Immunologic: negative  Endocrine: negative      O:   NAD, WDWN, Alert & Oriented, Mood & Affect wnl, Vitals stable   Here today alone   /74  Pulse 79  SpO2 97%   General appearance normal   Vitals stable   Alert, oriented and in no acute distress      Following lymph nodes palpated: Occipital, Cervical, Supraclavicular no lad   L forehead 4mm tender scaly papule   Eyes: Conjunctivae/lids:Normal     ENT: Lips, buccal mucosa, tongue: normal    MSK:Normal    Cardiovascular: peripheral edema none    Pulm: Breathing Normal    Lymph Nodes: No Head and Neck Lymphadenopathy     Neuro/Psych: Orientation:Normal; Mood/Affect:Normal      A/P:  1. L forehead squamous cell carcinoma   MOHS:   Location    After PGACAC discussed with patient, decision for Mohs surgery was made. Indication for Mohs was Location. Patient confirmed the site with Dr. Jay.  After anesthesia with LEC, the tumor was excised using standard Mohs technique in 1 stages(s).  CLEAR MARGINS OBTAINED and Final defect size was 0.9 cm.       REPAIR SECOND INTENT: We discussed the options for wound management in full with the patient including risks/benefits/possible outcomes. Decision made to allow the wound to heal by second intention. EBL minimal; complications none; wound care routine.  The patient was discharged in good condition and will return in one month or prn for wound evaluation.    BENIGN LESIONS DISCUSSED WITH PATIENT:  I discussed the specifics of tumor, prognosis, and genetics of benign lesions.  I explained that treatment of these lesions would be purely cosmetic and not medically neccessary.  I discussed with patient different removal options including excision, cautery and /or laser.      Nature and  genetics of benign skin lesions dicussed with patient.  Signs and Symptoms of skin cancer discussed with patient.  Patient encouraged to perform monthly skin exams.  UV precautions reviewed with patient.  Patient to follow up with Primary Care provider regarding elevated blood pressure.  Skin care regimen reviewed with patient: Eliminate harsh soaps, i.e. Dial, zest, irsih spring; Mild soaps such as Cetaphil or Dove sensitive skin, avoid hot or cold showers, aggressive use of emollients including vanicream, cetaphil or cerave discussed with patient.    Risks of non-melanoma skin cancer discussed with patient   Return to clinic 6 months      Again, thank you for allowing me to participate in the care of your patient.        Sincerely,        Chavez Jay MD

## 2018-08-13 NOTE — PATIENT INSTRUCTIONS
Open Wound Care     for Forehead        ? No strenuous activity for 48 hours    ? Take Tylenol as needed for discomfort.                                                .         ? Do not drink alcoholic beverages for 48 hours.    ? Keep the pressure bandage in place for 24 hours. If the bandage becomes blood tinged or loose, reinforce it with gauze and tape.        (Refer to the reverse side of this page for management of bleeding).    ? Remove bandage in 24 hours and begin wound care as follows:     1. Clean area with tap water using a Q tip or gauze pad, (shower / bathe normally)  2. Dry wound with Q tip or gauze pad  3. Apply Aquaphor, Vaseline, Polysporin or Bacitracin Ointment with a Q tip    Do NOT use Neosporin Ointment *  4. Cover the wound with a band-aid or nonstick gauze pad and paper tape.  5. Repeat wound care once a day until wound is completely healed.    It is an old wives tale that a wound heals better when it is exposed to air and allowed to dry out. The wound will heal faster with a better cosmetic result if it is kept moist with ointment and covered with a bandage.  Do not let the wound dry out.      Supplies Needed:                Qtips or gauze pads                Polysporin or Bacitracin Ointment                Bandaids or nonstick gauze pads and paper tape    Wound care kits and brown paper tape are available for purchase at   the pharmacy.    BLEEDIN. Use tightly rolled up gauze or cloth to apply direct pressure over the bandage for 20   minutes.  2. Reapply pressure for an additional 20 minutes if necessary  3. Call the office or go to the nearest emergency room if pressure fails to stop the bleeding.  4. Use additional gauze and tape to maintain pressure once the bleeding has stopped.  5. Begin wound care 24 hours after surgery as directed.                  WOUND HEALING    1. One week after surgery a pink / red halo will form around the outside of the wound.   This is new  skin.  2. The center of the wound will appear yellowish white and produce some drainage.  3. The pink halo will slowly migrate in toward the center of the wound until the wound is covered with new shiny pink skin.  4. There will be no more drainage when the wound is completely healed.  5. It will take six months to one year for the redness to fade.  6. The scar may be itchy, tight and sensitive to extreme temperatures for a year after the surgery.  7. Massaging the area several times a day for several minutes after the wound is completely healed will help the scar soften and normalize faster. Begin massage only after healing is complete.      In case of emergency call: Dr Jay: [phone]     [facility]: [phone]    [facility]: [phone]

## 2018-08-13 NOTE — PROGRESS NOTES
Nir Guy is a 78 year old year old male patient here today for evaluation and managment of squamous cell carcinoma on left foreheadPatient has no other skin complaints today.  Remainder of the HPI, Meds, PMH, Allergies, FH, and SH was reviewed in chart.      Past Medical History:   Diagnosis Date     Actinic keratosis      Basal cell carcinoma      Squamous cell carcinoma        History reviewed. No pertinent surgical history.     Family History   Problem Relation Age of Onset     Melanoma No family hx of        Social History     Social History     Marital status:      Spouse name: N/A     Number of children: N/A     Years of education: N/A     Occupational History      Retired     Social History Main Topics     Smoking status: Former Smoker     Types: Pipe     Quit date: 12/3/1999     Smokeless tobacco: Never Used     Alcohol use Not on file     Drug use: Not on file     Sexual activity: Not on file     Other Topics Concern     Not on file     Social History Narrative       Outpatient Encounter Prescriptions as of 8/13/2018   Medication Sig Dispense Refill     aspirin 81 MG tablet Take by mouth daily       Carboxymethylcell-Hypromellose (GENTEAL OP)        Garlic 1250 MG TABS        glucosamine-chondroitinoitin 7248-4491 MG/30ML LIQD        MECLIZINE HCL PO        Multiple vitamin TABS        Omega-3 Fatty Acids (FISH OIL) 500 MG CAPS        polyethylene glycol 0.4%- propylene glycol 0.3% (SYSTANE) 0.4-0.3 % SOLN ophthalmic solution        vitamin  s/Minerals TABS        No facility-administered encounter medications on file as of 8/13/2018.              Review Of Systems  Skin: As above  Eyes: negative  Ears/Nose/Throat: negative  Respiratory: No shortness of breath, dyspnea on exertion, cough, or hemoptysis  Cardiovascular: negative  Gastrointestinal: negative  Genitourinary: negative  Musculoskeletal: negative  Neurologic: negative  Psychiatric: negative  Hematologic/Lymphatic/Immunologic:  negative  Endocrine: negative      O:   NAD, WDWN, Alert & Oriented, Mood & Affect wnl, Vitals stable   Here today alone   /74  Pulse 79  SpO2 97%   General appearance normal   Vitals stable   Alert, oriented and in no acute distress      Following lymph nodes palpated: Occipital, Cervical, Supraclavicular no lad   L forehead 4mm tender scaly papule   Eyes: Conjunctivae/lids:Normal     ENT: Lips, buccal mucosa, tongue: normal    MSK:Normal    Cardiovascular: peripheral edema none    Pulm: Breathing Normal    Lymph Nodes: No Head and Neck Lymphadenopathy     Neuro/Psych: Orientation:Normal; Mood/Affect:Normal      A/P:  1. L forehead squamous cell carcinoma   MOHS:   Location    After PGACAC discussed with patient, decision for Mohs surgery was made. Indication for Mohs was Location. Patient confirmed the site with Dr. Jay.  After anesthesia with LEC, the tumor was excised using standard Mohs technique in 1 stages(s).  CLEAR MARGINS OBTAINED and Final defect size was 0.9 cm.       REPAIR SECOND INTENT: We discussed the options for wound management in full with the patient including risks/benefits/possible outcomes. Decision made to allow the wound to heal by second intention. EBL minimal; complications none; wound care routine.  The patient was discharged in good condition and will return in one month or prn for wound evaluation.    BENIGN LESIONS DISCUSSED WITH PATIENT:  I discussed the specifics of tumor, prognosis, and genetics of benign lesions.  I explained that treatment of these lesions would be purely cosmetic and not medically neccessary.  I discussed with patient different removal options including excision, cautery and /or laser.      Nature and genetics of benign skin lesions dicussed with patient.  Signs and Symptoms of skin cancer discussed with patient.  Patient encouraged to perform monthly skin exams.  UV precautions reviewed with patient.  Patient to follow up with Primary Care provider  regarding elevated blood pressure.  Skin care regimen reviewed with patient: Eliminate harsh soaps, i.e. Dial, zest, irsih spring; Mild soaps such as Cetaphil or Dove sensitive skin, avoid hot or cold showers, aggressive use of emollients including vanicream, cetaphil or cerave discussed with patient.    Risks of non-melanoma skin cancer discussed with patient   Return to clinic 6 months

## 2018-08-13 NOTE — MR AVS SNAPSHOT
After Visit Summary   2018    Nir Guy    MRN: [id number]           Patient Information     Date Of Birth          1940        Visit Information        Provider Department      2018 7:45 AM Chavez Jay MD [facility]        Care Instructions    Open Wound Care     for Forehead        ? No strenuous activity for 48 hours    ? Take Tylenol as needed for discomfort.                                                .         ? Do not drink alcoholic beverages for 48 hours.    ? Keep the pressure bandage in place for 24 hours. If the bandage becomes blood tinged or loose, reinforce it with gauze and tape.        (Refer to the reverse side of this page for management of bleeding).    ? Remove bandage in 24 hours and begin wound care as follows:     1. Clean area with tap water using a Q tip or gauze pad, (shower / bathe normally)  2. Dry wound with Q tip or gauze pad  3. Apply Aquaphor, Vaseline, Polysporin or Bacitracin Ointment with a Q tip    Do NOT use Neosporin Ointment *  4. Cover the wound with a band-aid or nonstick gauze pad and paper tape.  5. Repeat wound care once a day until wound is completely healed.    It is an old wives tale that a wound heals better when it is exposed to air and allowed to dry out. The wound will heal faster with a better cosmetic result if it is kept moist with ointment and covered with a bandage.  Do not let the wound dry out.      Supplies Needed:                Qtips or gauze pads                Polysporin or Bacitracin Ointment                Bandaids or nonstick gauze pads and paper tape    Wound care kits and brown paper tape are available for purchase at   the pharmacy.    BLEEDIN. Use tightly rolled up gauze or cloth to apply direct pressure over the bandage for 20   minutes.  2. Reapply pressure for an additional 20 minutes if necessary  3. Call the office or go to the nearest emergency room if pressure fails to  stop the bleeding.  4. Use additional gauze and tape to maintain pressure once the bleeding has stopped.  5. Begin wound care 24 hours after surgery as directed.                  WOUND HEALING    1. One week after surgery a pink / red halo will form around the outside of the wound.   This is new skin.  2. The center of the wound will appear yellowish white and produce some drainage.  3. The pink halo will slowly migrate in toward the center of the wound until the wound is covered with new shiny pink skin.  4. There will be no more drainage when the wound is completely healed.  5. It will take six months to one year for the redness to fade.  6. The scar may be itchy, tight and sensitive to extreme temperatures for a year after the surgery.  7. Massaging the area several times a day for several minutes after the wound is completely healed will help the scar soften and normalize faster. Begin massage only after healing is complete.      In case of emergency call: Dr Jay: [phone]     [facility]: [phone]    [facility]: [phone]              Follow-ups after your visit        Your next 10 appointments already scheduled     2019  9:30 AM CST   [facility] Visit with Chavez Jay MD   [facility]    [address] [address] [phone]              Who to contact     If you have questions or need follow up information about today's clinic visit or your schedule please contact [facility] directly at [phone].  Normal or non-critical lab and imaging results will be communicated to you by MyChart, letter or phone within 4 business days after the clinic has received the results. If you do not hear from us within 7 days, please contact the clinic through MyChart or phone. If you have a critical or abnormal lab result, we will notify you by phone as soon as possible.  Submit refill requests  through [employer] or call your pharmacy and they will forward the refill request to us. Please allow 3 business days for your refill to be completed.          Additional Information About Your Visit        Care EveryWhere ID     This is your Care EveryWhere ID. This could be used by other organizations to access your [place] medical records  [id number]        Your Vitals Were     Pulse Pulse Oximetry                79 97%           Blood Pressure from Last 3 Encounters:   08/13/18 131/74   07/11/18 (!) 136/92   01/10/18 126/69    Weight from Last 3 Encounters:   01/25/16 95.3 kg (210 lb)   10/19/15 95.3 kg (210 lb)   07/10/15 97.1 kg (214 lb)              Today, you had the following     No orders found for display       Primary Care Provider Office Phone # Fax #    German Mcgregor [phone]       [facility] [address] [place] [address]        Equal Access to Services     ELIANA CALDERA : Hadii jessica zabalao Sotayler, waaxda luqadaha, qaybta kaalmada adejoseyada, gay alegria . So [facility] [phone].    ATENCIÓN: Si habla español, tiene a barkley disposición servicios gratuitos de asistencia lingüística. Llame al [phone].    We comply with applicable federal civil rights laws and [place] laws. We do not discriminate on the basis of race, color, national origin, age, disability, sex, sexual orientation, or gender identity.            Thank you!     Thank you for choosing [facility]  for your care. Our goal is always to provide you with excellent care. Hearing back from our patients is one way we can continue to improve our services. Please take a few minutes to complete the written survey that you may receive in the mail after your visit with us. Thank you!             Your Updated Medication List - Protect others around you: Learn how to safely use, store and throw away your medicines at www.disposemymeds.org.          This list is accurate as of  8/13/18  9:07 AM.  Always use your most recent med list.                   Brand Name Dispense Instructions for use Diagnosis    aspirin 81 MG tablet      Take by mouth daily        Fish Oil 500 MG Caps           Garlic 1250 MG Tabs           GENTEAL OP           glucosamine-chondroitinoitin 3801-5253 MG/30ML Liqd           MECLIZINE HCL PO       History of skin cancer, SK (seborrheic keratosis), Lentigo, [place] (actinic keratosis), Squamous cell carcinoma of forehead, Angioma       Multiple vitamin Tabs           SYSTANE 0.4-0.3 % Soln ophthalmic solution   Generic drug:  polyethylene glycol 0.4%- propylene glycol 0.3%           vitamin  s/Minerals Tabs

## 2018-08-13 NOTE — NURSING NOTE
Surgical Office Location :   [facility]  [address] [place] [address]

## 2018-08-13 NOTE — NURSING NOTE
"Initial /74  Pulse 79  SpO2 97% Estimated body mass index is 27.71 kg/(m^2) as calculated from the following:    Height as of 1/25/16: 1.854 m (6' 1\").    Weight as of 1/25/16: 95.3 kg (210 lb). .      "

## 2018-09-11 ENCOUNTER — COMMUNICATION - HEALTHEAST (OUTPATIENT)
Dept: INTERNAL MEDICINE | Facility: CLINIC | Age: 78
End: 2018-09-11

## 2019-01-09 ENCOUNTER — OFFICE VISIT (OUTPATIENT)
Dept: DERMATOLOGY | Facility: CLINIC | Age: 79
End: 2019-01-09
Payer: MEDICARE

## 2019-01-09 ENCOUNTER — RECORDS - HEALTHEAST (OUTPATIENT)
Dept: ADMINISTRATIVE | Facility: OTHER | Age: 79
End: 2019-01-09

## 2019-01-09 VITALS
DIASTOLIC BLOOD PRESSURE: 82 MMHG | HEART RATE: 70 BPM | WEIGHT: 215 LBS | BODY MASS INDEX: 28.37 KG/M2 | SYSTOLIC BLOOD PRESSURE: 134 MMHG

## 2019-01-09 DIAGNOSIS — L57.0 AK (ACTINIC KERATOSIS): ICD-10-CM

## 2019-01-09 DIAGNOSIS — Z85.828 HISTORY OF SKIN CANCER: Primary | ICD-10-CM

## 2019-01-09 DIAGNOSIS — L81.4 LENTIGO: ICD-10-CM

## 2019-01-09 DIAGNOSIS — C44.41 BASAL CELL CARCINOMA (BCC) OF SCALP: ICD-10-CM

## 2019-01-09 DIAGNOSIS — L82.1 SEBORRHEIC KERATOSIS: ICD-10-CM

## 2019-01-09 PROCEDURE — 11102 TANGNTL BX SKIN SINGLE LES: CPT | Mod: 59 | Performed by: DERMATOLOGY

## 2019-01-09 PROCEDURE — 88331 PATH CONSLTJ SURG 1 BLK 1SPC: CPT | Mod: 59 | Performed by: DERMATOLOGY

## 2019-01-09 PROCEDURE — 17311 MOHS 1 STAGE H/N/HF/G: CPT | Performed by: DERMATOLOGY

## 2019-01-09 PROCEDURE — 99213 OFFICE O/P EST LOW 20 MIN: CPT | Mod: 25 | Performed by: DERMATOLOGY

## 2019-01-09 NOTE — PROGRESS NOTES
Nir Guy is a 79 year old year old male patient here today for spot growing on scalp.   .  Patient states this has been present for a while.  Patient reports the following symptoms:  growing.  Patient reports the following previous treatments none.  Patient reports the following modifying factors none.  Associated symptoms: none.  Patient has no other skin complaints today.  Remainder of the HPI, Meds, PMH, Allergies, FH, and SH was reviewed in chart.      Past Medical History:   Diagnosis Date     Actinic keratosis      Basal cell carcinoma      Squamous cell carcinoma        Past Surgical History:   Procedure Laterality Date     CATARACT IOL, RT/LT          Family History   Problem Relation Age of Onset     Melanoma No family hx of        Social History     Socioeconomic History     Marital status:      Spouse name: Not on file     Number of children: Not on file     Years of education: Not on file     Highest education level: Not on file   Social Needs     Financial resource strain: Not on file     Food insecurity - worry: Not on file     Food insecurity - inability: Not on file     Transportation needs - medical: Not on file     Transportation needs - non-medical: Not on file   Occupational History     Employer: RETIRED   Tobacco Use     Smoking status: Former Smoker     Types: Pipe     Last attempt to quit: 12/3/1999     Years since quittin.1     Smokeless tobacco: Never Used   Substance and Sexual Activity     Alcohol use: Not on file     Drug use: Not on file     Sexual activity: Not on file   Other Topics Concern     Not on file   Social History Narrative     Not on file       Outpatient Encounter Medications as of 2019   Medication Sig Dispense Refill     aspirin 81 MG tablet Take by mouth daily       MECLIZINE HCL PO        Multiple vitamin TABS        Multiple Vitamins-Minerals (PRESERVISION AREDS PO) Take by mouth daily       Omega-3 Fatty Acids (FISH OIL) 500 MG CAPS         polyvinyl alcohol-povidone PF (REFRESH) 1.4-0.6 % ophthalmic solution 1-2 drops 3 times daily       [DISCONTINUED] Garlic 1250 MG TABS        Garlic 1250 MG TABS Take 1 tablet by mouth       [DISCONTINUED] Carboxymethylcell-Hypromellose (GENTEAL OP)        [DISCONTINUED] glucosamine-chondroitinoitin 2824-3264 MG/30ML LIQD        [DISCONTINUED] polyethylene glycol 0.4%- propylene glycol 0.3% (SYSTANE) 0.4-0.3 % SOLN ophthalmic solution        [DISCONTINUED] vitamin  s/Minerals TABS        No facility-administered encounter medications on file as of 1/9/2019.              Review Of Systems  Skin: As above  Eyes: negative  Ears/Nose/Throat: negative  Respiratory: No shortness of breath, dyspnea on exertion, cough, or hemoptysis  Cardiovascular: negative  Gastrointestinal: negative  Genitourinary: negative  Musculoskeletal: negative  Neurologic: negative  Psychiatric: negative  Hematologic/Lymphatic/Immunologic: negative  Endocrine: negative      O:   NAD, WDWN, Alert & Oriented, Mood & Affect wnl, Vitals stable   Here today alone   /82 (BP Location: Left arm, Patient Position: Chair, Cuff Size: Adult Regular)   Pulse 70   Wt 97.5 kg (215 lb)   BMI 28.37 kg/m     General appearance normal   Vitals stable   Alert, oriented and in no acute distress      Following lymph nodes palpated: Occipital, Cervical, Supraclavicular no lad   Gritty papules on face   R fronta scalp 9mm pink pearly papule        Stuck on papules and brown macules on trunk and ext   Red papules on trunk     The remainder of the full exam was unremarkable; the following areas were examined:  conjunctiva/lids, oral mucosa, neck, peripheral vascular system, abdomen, lymph nodes, digits/nails, eccrine and apocrine glands, scalp/hair, face, neck, chest, abdomen, buttocks, back, RUE, LUE, RLE, LLE       Eyes: Conjunctivae/lids:Normal     ENT: Lips, buccal mucosa, tongue: normal    MSK:Normal    Cardiovascular: peripheral edema none    Pulm: Breathing  Normal    Lymph Nodes: No Head and Neck Lymphadenopathy     Neuro/Psych: Orientation:Normal; Mood/Affect:Normal      MICRO:   R frontal scalp:Orthokeratosis of epidermis with a proliferation of nests of basaloid cells, with peripheral palisading and a haphazard arrangement in the center extending into the dermis, forming nodules.  The tumor cells have hyperchromatic nuclei. Poor cytoplasm and intercellular bridging.    A/P:  1. Seborrheic keratosis, lentigo, angioma, hx of non-melanoma skin cancer   2. Actinic keratosis   Efudex and pdt discussed with patient   ge pdt  3. R frontal scalp r/o basal cell carcinoma   TANGENTIAL BIOPSY IN HOUSE:  After consent, anesthesia with LEC and prep, tangential excision performed and dx above confirmed with frozen section histology.  No complications and routine wound care.  Patient told result basal cell carcinoma .      BENIGN LESIONS DISCUSSED WITH PATIENT:  I discussed the specifics of tumor, prognosis, and genetics of benign lesions.  I explained that treatment of these lesions would be purely cosmetic and not medically neccessary.  I discussed with patient different removal options including excision, cautery and /or laser.      Nature and genetics of benign skin lesions dicussed with patient.  Signs and Symptoms of skin cancer discussed with patient.  ABCDEs of melanoma reviewed with patient.  Patient encouraged to perform monthly skin exams.  UV precautions reviewed with patient.  Patient to follow up with Primary Care provider regarding elevated blood pressure.  Skin care regimen reviewed with patient: Eliminate harsh soaps, i.e. Dial, zest, irsih spring; Mild soaps such as Cetaphil or Dove sensitive skin, avoid hot or cold showers, aggressive use of emollients including vanicream, cetaphil or cerave discussed with patient.    Risks of non-melanoma skin cancer discussed with patient   Return to clinic 6 months      PROCEDURE NOTE  R frontal scalp basal cell  carcinoma   MOHS:   Location    After PGACAC discussed with patient, decision for Mohs surgery was made. Indication for Mohs was Location. Patient confirmed the site with Dr. Jay.  After anesthesia with LEC, the tumor was excised using standard Mohs technique in 1 stages(s).  CLEAR MARGINS OBTAINED and Final defect size was 1.3 x 1.2 cm.       REPAIR SECOND INTENT: We discussed the options for wound management in full with the patient including risks/benefits/possible outcomes. Decision made to allow the wound to heal by second intention. EBL minimal; complications none; wound care routine.  The patient was discharged in good condition and will return in one month or prn for wound evaluation.

## 2019-01-09 NOTE — PATIENT INSTRUCTIONS
Open Wound Care     for _SCALP_____________        ? No strenuous activity for 48 hours    ? Take Tylenol as needed for discomfort.                                                .         ? Do not drink alcoholic beverages for 48 hours.    ? Keep the pressure bandage in place for 24 hours. If the bandage becomes blood tinged or loose, reinforce it with gauze and tape.        (Refer to the reverse side of this page for management of bleeding).    ? Remove bandage in 24 hours and begin wound care as follows:     1. Clean area with tap water using a Q tip or gauze pad, (shower / bathe normally)  2. Dry wound with Q tip or gauze pad  3. Apply Aquaphor, Vaseline, Polysporin or Bacitracin Ointment with a Q tip    Do NOT use Neosporin Ointment *  4. Cover the wound with a band-aid or nonstick gauze pad and paper tape.  5. Repeat wound care once a day until wound is completely healed.    It is an old wives tale that a wound heals better when it is exposed to air and allowed to dry out. The wound will heal faster with a better cosmetic result if it is kept moist with ointment and covered with a bandage.  Do not let the wound dry out.      Supplies Needed:                Qtips or gauze pads                Polysporin or Bacitracin Ointment                Bandaids or nonstick gauze pads and paper tape    Wound care kits and brown paper tape are available for purchase at   the pharmacy.    BLEEDIN. Use tightly rolled up gauze or cloth to apply direct pressure over the bandage for 20   minutes.  2. Reapply pressure for an additional 20 minutes if necessary  3. Call the office or go to the nearest emergency room if pressure fails to stop the bleeding.  4. Use additional gauze and tape to maintain pressure once the bleeding has stopped.  5. Begin wound care 24 hours after surgery as directed.                  WOUND HEALING    1. One week after surgery a pink / red halo will form around the outside of the wound.   This is  new skin.  2. The center of the wound will appear yellowish white and produce some drainage.  3. The pink halo will slowly migrate in toward the center of the wound until the wound is covered with new shiny pink skin.  4. There will be no more drainage when the wound is completely healed.  5. It will take six months to one year for the redness to fade.  6. The scar may be itchy, tight and sensitive to extreme temperatures for a year after the surgery.  7. Massaging the area several times a day for several minutes after the wound is completely healed will help the scar soften and normalize faster. Begin massage only after healing is complete.      In case of emergency call: Dr Jay: [phone]     [facility]: [phone]    [facility]: [phone]

## 2019-01-09 NOTE — LETTER
2019         RE: Nir Guy  [address] [place] [id number]        Dear Colleague,    Thank you for referring your patient, Nir Guy, to the [facility]. Please see a copy of my visit note below.    Nir Guy is a 79 year old year old male patient here today for spot growing on scalp.   .  Patient states this has been present for a while.  Patient reports the following symptoms:  growing.  Patient reports the following previous treatments none.  Patient reports the following modifying factors none.  Associated symptoms: none.  Patient has no other skin complaints today.  Remainder of the HPI, Meds, PMH, Allergies, FH, and SH was reviewed in chart.      Past Medical History:   Diagnosis Date     Actinic keratosis      Basal cell carcinoma      Squamous cell carcinoma        Past Surgical History:   Procedure Laterality Date     CATARACT IOL, RT/LT          Family History   Problem Relation Age of Onset     Melanoma No family hx of        Social History     Socioeconomic History     Marital status:      Spouse name: Not on file     Number of children: Not on file     Years of education: Not on file     Highest education level: Not on file   Social Needs     Financial resource strain: Not on file     Food insecurity - worry: Not on file     Food insecurity - inability: Not on file     Transportation needs - medical: Not on file     Transportation needs - non-medical: Not on file   Occupational History     Employer: RETIRED   Tobacco Use     Smoking status: Former Smoker     Types: Pipe     Last attempt to quit: 12/3/1999     Years since quittin.1     Smokeless tobacco: Never Used   Substance and Sexual Activity     Alcohol use: Not on file     Drug use: Not on file     Sexual activity: Not on file   Other Topics Concern     Not on file   Social History Narrative     Not on file       Outpatient Encounter Medications as of 2019   Medication Sig  Dispense Refill     aspirin 81 MG tablet Take by mouth daily       MECLIZINE HCL PO        Multiple vitamin TABS        Multiple Vitamins-Minerals (PRESERVISION AREDS PO) Take by mouth daily       Omega-3 Fatty Acids (FISH OIL) 500 MG CAPS        polyvinyl alcohol-povidone PF (REFRESH) 1.4-0.6 % ophthalmic solution 1-2 drops 3 times daily       [DISCONTINUED] Garlic 1250 MG TABS        Garlic 1250 MG TABS Take 1 tablet by mouth       [DISCONTINUED] Carboxymethylcell-Hypromellose (GENTEAL OP)        [DISCONTINUED] glucosamine-chondroitinoitin 7033-2992 MG/30ML LIQD        [DISCONTINUED] polyethylene glycol 0.4%- propylene glycol 0.3% (SYSTANE) 0.4-0.3 % SOLN ophthalmic solution        [DISCONTINUED] vitamin  s/Minerals TABS        No facility-administered encounter medications on file as of 1/9/2019.              Review Of Systems  Skin: As above  Eyes: negative  Ears/Nose/Throat: negative  Respiratory: No shortness of breath, dyspnea on exertion, cough, or hemoptysis  Cardiovascular: negative  Gastrointestinal: negative  Genitourinary: negative  Musculoskeletal: negative  Neurologic: negative  Psychiatric: negative  Hematologic/Lymphatic/Immunologic: negative  Endocrine: negative      O:   NAD, WDWN, Alert & Oriented, Mood & Affect wnl, Vitals stable   Here today alone   /82 (BP Location: Left arm, Patient Position: Chair, Cuff Size: Adult Regular)   Pulse 70   Wt 97.5 kg (215 lb)   BMI 28.37 kg/m      General appearance normal   Vitals stable   Alert, oriented and in no acute distress      Following lymph nodes palpated: Occipital, Cervical, Supraclavicular no lad   Gritty papules on face   R fronta scalp 9mm pink pearly papule        Stuck on papules and brown macules on trunk and ext   Red papules on trunk     The remainder of the full exam was unremarkable; the following areas were examined:  conjunctiva/lids, oral mucosa, neck, peripheral vascular system, abdomen, lymph nodes, digits/nails, eccrine and  apocrine glands, scalp/hair, face, neck, chest, abdomen, buttocks, back, RUE, LUE, RLE, LLE       Eyes: Conjunctivae/lids:Normal     ENT: Lips, buccal mucosa, tongue: normal    MSK:Normal    Cardiovascular: peripheral edema none    Pulm: Breathing Normal    Lymph Nodes: No Head and Neck Lymphadenopathy     Neuro/Psych: Orientation:Normal; Mood/Affect:Normal      MICRO:   R frontal scalp:Orthokeratosis of epidermis with a proliferation of nests of basaloid cells, with peripheral palisading and a haphazard arrangement in the center extending into the dermis, forming nodules.  The tumor cells have hyperchromatic nuclei. Poor cytoplasm and intercellular bridging.    A/P:  1. Seborrheic keratosis, lentigo, angioma, hx of non-melanoma skin cancer   2. Actinic keratosis   Efudex and pdt discussed with patient   schewdle pdt  3. R frontal scalp r/o basal cell carcinoma   TANGENTIAL BIOPSY IN HOUSE:  After consent, anesthesia with LEC and prep, tangential excision performed and dx above confirmed with frozen section histology.  No complications and routine wound care.  Patient told result basal cell carcinoma .      BENIGN LESIONS DISCUSSED WITH PATIENT:  I discussed the specifics of tumor, prognosis, and genetics of benign lesions.  I explained that treatment of these lesions would be purely cosmetic and not medically neccessary.  I discussed with patient different removal options including excision, cautery and /or laser.      Nature and genetics of benign skin lesions dicussed with patient.  Signs and Symptoms of skin cancer discussed with patient.  ABCDEs of melanoma reviewed with patient.  Patient encouraged to perform monthly skin exams.  UV precautions reviewed with patient.  Patient to follow up with Primary Care provider regarding elevated blood pressure.  Skin care regimen reviewed with patient: Eliminate harsh soaps, i.e. Dial, zest, irsih spring; Mild soaps such as Cetaphil or Dove sensitive skin, avoid hot or  cold showers, aggressive use of emollients including vanicream, cetaphil or cerave discussed with patient.    Risks of non-melanoma skin cancer discussed with patient   Return to clinic 6 months      PROCEDURE NOTE  R frontal scalp basal cell carcinoma   MOHS:   Location    After PGACAC discussed with patient, decision for Mohs surgery was made. Indication for Mohs was Location. Patient confirmed the site with Dr. Jay.  After anesthesia with LEC, the tumor was excised using standard Mohs technique in 1 stages(s).  CLEAR MARGINS OBTAINED and Final defect size was 1.3 x 1.2 cm.       REPAIR SECOND INTENT: We discussed the options for wound management in full with the patient including risks/benefits/possible outcomes. Decision made to allow the wound to heal by second intention. EBL minimal; complications none; wound care routine.  The patient was discharged in good condition and will return in one month or prn for wound evaluation.      Again, thank you for allowing me to participate in the care of your patient.        Sincerely,        Chavez Jay MD

## 2019-01-09 NOTE — NURSING NOTE
"Initial /82 (BP Location: Left arm, Patient Position: Chair, Cuff Size: Adult Regular)   Pulse 70   Wt 97.5 kg (215 lb)   BMI 28.37 kg/m   Estimated body mass index is 28.37 kg/m  as calculated from the following:    Height as of 1/25/16: 1.854 m (6' 1\").    Weight as of this encounter: 97.5 kg (215 lb). .    Ace HOLDEN CMA    "

## 2019-03-08 ENCOUNTER — RECORDS - HEALTHEAST (OUTPATIENT)
Dept: ADMINISTRATIVE | Facility: OTHER | Age: 79
End: 2019-03-08

## 2019-03-22 ENCOUNTER — OFFICE VISIT - HEALTHEAST (OUTPATIENT)
Dept: INTERNAL MEDICINE | Facility: CLINIC | Age: 79
End: 2019-03-22

## 2019-03-22 DIAGNOSIS — M25.462 KNEE EFFUSION, LEFT: ICD-10-CM

## 2019-03-22 DIAGNOSIS — Z13.228 SCREENING FOR METABOLIC DISORDER: ICD-10-CM

## 2019-03-22 DIAGNOSIS — M17.12 OSTEOARTHRITIS OF LEFT PATELLOFEMORAL JOINT: ICD-10-CM

## 2019-03-22 DIAGNOSIS — M20.42 HAMMERTOE, BILATERAL: ICD-10-CM

## 2019-03-22 DIAGNOSIS — H93.13 TINNITUS OF BOTH EARS: ICD-10-CM

## 2019-03-22 DIAGNOSIS — M16.11 PRIMARY OSTEOARTHRITIS OF RIGHT HIP: ICD-10-CM

## 2019-03-22 DIAGNOSIS — Z00.00 MEDICARE ANNUAL WELLNESS VISIT, SUBSEQUENT: ICD-10-CM

## 2019-03-22 DIAGNOSIS — H90.A22 SENSORINEURAL HEARING LOSS (SNHL) OF LEFT EAR WITH RESTRICTED HEARING OF RIGHT EAR: ICD-10-CM

## 2019-03-22 DIAGNOSIS — E78.2 MIXED HYPERLIPIDEMIA: ICD-10-CM

## 2019-03-22 DIAGNOSIS — M19.019 OSTEOARTHRITIS OF SHOULDER, UNSPECIFIED LATERALITY, UNSPECIFIED OSTEOARTHRITIS TYPE: ICD-10-CM

## 2019-03-22 DIAGNOSIS — M20.41 HAMMERTOE, BILATERAL: ICD-10-CM

## 2019-03-22 DIAGNOSIS — Z13.220 LIPID SCREENING: ICD-10-CM

## 2019-03-22 LAB
ANION GAP SERPL CALCULATED.3IONS-SCNC: 10 MMOL/L (ref 5–18)
BUN SERPL-MCNC: 14 MG/DL (ref 8–28)
CALCIUM SERPL-MCNC: 9.4 MG/DL (ref 8.5–10.5)
CHLORIDE BLD-SCNC: 103 MMOL/L (ref 98–107)
CHOLEST SERPL-MCNC: 218 MG/DL
CO2 SERPL-SCNC: 28 MMOL/L (ref 22–31)
CREAT SERPL-MCNC: 0.96 MG/DL (ref 0.7–1.3)
ERYTHROCYTE [DISTWIDTH] IN BLOOD BY AUTOMATED COUNT: 11.5 % (ref 11–14.5)
FASTING STATUS PATIENT QL REPORTED: ABNORMAL
GFR SERPL CREATININE-BSD FRML MDRD: >60 ML/MIN/1.73M2
GLUCOSE BLD-MCNC: 97 MG/DL (ref 70–125)
HCT VFR BLD AUTO: 47.2 % (ref 40–54)
HDLC SERPL-MCNC: 59 MG/DL
HGB BLD-MCNC: 16 G/DL (ref 14–18)
LDLC SERPL CALC-MCNC: 132 MG/DL
MCH RBC QN AUTO: 31.6 PG (ref 27–34)
MCHC RBC AUTO-ENTMCNC: 33.8 G/DL (ref 32–36)
MCV RBC AUTO: 93 FL (ref 80–100)
PLATELET # BLD AUTO: 214 THOU/UL (ref 140–440)
PMV BLD AUTO: 7 FL (ref 7–10)
POTASSIUM BLD-SCNC: 4.9 MMOL/L (ref 3.5–5)
RBC # BLD AUTO: 5.05 MILL/UL (ref 4.4–6.2)
SODIUM SERPL-SCNC: 141 MMOL/L (ref 136–145)
TRIGL SERPL-MCNC: 135 MG/DL
WBC: 6.8 THOU/UL (ref 4–11)

## 2019-03-22 ASSESSMENT — MIFFLIN-ST. JEOR: SCORE: 1692.93

## 2019-03-29 ENCOUNTER — OFFICE VISIT (OUTPATIENT)
Dept: DERMATOLOGY | Facility: CLINIC | Age: 79
End: 2019-03-29
Payer: MEDICARE

## 2019-03-29 ENCOUNTER — RECORDS - HEALTHEAST (OUTPATIENT)
Dept: ADMINISTRATIVE | Facility: OTHER | Age: 79
End: 2019-03-29

## 2019-03-29 VITALS — DIASTOLIC BLOOD PRESSURE: 77 MMHG | SYSTOLIC BLOOD PRESSURE: 149 MMHG | HEART RATE: 67 BPM | OXYGEN SATURATION: 94 %

## 2019-03-29 DIAGNOSIS — L57.0 ACTINIC KERATOSIS: Primary | ICD-10-CM

## 2019-03-29 PROCEDURE — 96567 PDT DSTR PRMLG LES SKN: CPT | Performed by: PHYSICIAN ASSISTANT

## 2019-03-29 RX ORDER — CARBOXYMETHYLCELLULOSE SODIUM 10 MG/ML
1 GEL OPHTHALMIC 4 TIMES DAILY
COMMUNITY
End: 2021-07-13

## 2019-03-29 NOTE — PROGRESS NOTES
Nir Guy is a 79 year old year old male patient here today for actinic keratoses on face and scalp. Here today for PDT. Last treatment was 2.5 years ago. Patient has no other skin complaints today.  Remainder of the HPI, Meds, PMH, Allergies, FH, and SH was reviewed in chart.    Pertinent Hx:   History of NMSC  Past Medical History:   Diagnosis Date     Actinic keratosis      Basal cell carcinoma      Squamous cell carcinoma        Past Surgical History:   Procedure Laterality Date     CATARACT IOL, RT/LT          Family History   Problem Relation Age of Onset     Melanoma No family hx of        Social History     Socioeconomic History     Marital status:      Spouse name: Not on file     Number of children: Not on file     Years of education: Not on file     Highest education level: Not on file   Occupational History     Employer: RETIRED   Social Needs     Financial resource strain: Not on file     Food insecurity:     Worry: Not on file     Inability: Not on file     Transportation needs:     Medical: Not on file     Non-medical: Not on file   Tobacco Use     Smoking status: Former Smoker     Types: Pipe     Last attempt to quit: 12/3/1999     Years since quittin.3     Smokeless tobacco: Never Used   Substance and Sexual Activity     Alcohol use: Not on file     Drug use: Not on file     Sexual activity: Not on file   Lifestyle     Physical activity:     Days per week: Not on file     Minutes per session: Not on file     Stress: Not on file   Relationships     Social connections:     Talks on phone: Not on file     Gets together: Not on file     Attends Moravian service: Not on file     Active member of club or organization: Not on file     Attends meetings of clubs or organizations: Not on file     Relationship status: Not on file     Intimate partner violence:     Fear of current or ex partner: Not on file     Emotionally abused: Not on file     Physically abused: Not on file     Forced  sexual activity: Not on file   Other Topics Concern     Not on file   Social History Narrative     Not on file       Outpatient Encounter Medications as of 3/29/2019   Medication Sig Dispense Refill     Carboxymethylcellulose Sod PF (REFRESH CELLUVISC) 1 % ophthalmic gel Place 1 drop into both eyes 4 times daily       hypromellose-dextran (GENTEAL TEARS) 0.1-0.3 % ophthalmic solution Place 1 drop into both eyes daily as needed for dry eyes       MECLIZINE HCL PO        Multiple vitamin TABS        Multiple Vitamins-Minerals (PRESERVISION AREDS PO) Take by mouth daily       Omega-3 Fatty Acids (FISH OIL) 500 MG CAPS        [DISCONTINUED] aspirin 81 MG tablet Take by mouth daily       [DISCONTINUED] Garlic 1250 MG TABS Take 1 tablet by mouth       [DISCONTINUED] polyvinyl alcohol-povidone PF (REFRESH) 1.4-0.6 % ophthalmic solution 1-2 drops 3 times daily       No facility-administered encounter medications on file as of 3/29/2019.              Review Of Systems  Skin: As above  Eyes: negative  Ears/Nose/Throat: negative  Respiratory: No shortness of breath, dyspnea on exertion, cough, or hemoptysis  Cardiovascular: negative  Gastrointestinal: negative  Genitourinary: negative  Musculoskeletal: negative  Neurologic: negative  Psychiatric: negative  Hematologic/Lymphatic/Immunologic: negative  Endocrine: negative      O:   NAD, WDWN, Alert & Oriented, Mood & Affect wnl, Vitals stable   Here today alone   /77   Pulse 67   SpO2 94%    General appearance normal   Vitals stable   Alert, oriented and in no acute distress     Pink gritty papules on face and scalp     Eyes: Conjunctivae/lids:Normal     ENT: Lips: normal    MSK:Normal    Pulm: Breathing Normal    Neuro/Psych: Orientation:Normal; Mood/Affect:Normal    A/P:  1. Actinic Keratoses on face and scalp  PDT: PGACAC discussed. Risks including but not limited to redness, swelling, and blistering to treated area. Patient was instructed to cleanse face thoroughly  "with a mild cleanser, then face and scalp was degreased with acetone. ALA was then applied to face and scalp in a uniform manner. Patient sat for 90 minutes after ALA was applied. The blue light shined on patient's face and scalp for 16 minutes 40 seconds he was approximately 2-4\" away from the light. Patient then was instructed to wash face and sunscreen with spf 50 was applied. Instructed patient to avoid sun light for 48 hours and apply sunscreen daily. Follow-up in 3-4 months.     Lot: [id number]  Exp: 11/20  Nir to follow up with Primary Care provider regarding elevated blood pressure.    "

## 2019-03-29 NOTE — LETTER
3/29/2019         RE: Nir Guy  [address] [place] [id number]        Dear Colleague,    Thank you for referring your patient, Nir Guy, to the [facility]. Please see a copy of my visit note below.    Nir Guy is a 79 year old year old male patient here today for actinic keratoses on face and scalp. Here today for PDT. Last treatment was 2.5 years ago. Patient has no other skin complaints today.  Remainder of the HPI, Meds, PMH, Allergies, FH, and SH was reviewed in chart.    Pertinent Hx:   History of NMSC  Past Medical History:   Diagnosis Date     Actinic keratosis      Basal cell carcinoma      Squamous cell carcinoma        Past Surgical History:   Procedure Laterality Date     CATARACT IOL, RT/LT          Family History   Problem Relation Age of Onset     Melanoma No family hx of        Social History     Socioeconomic History     Marital status:      Spouse name: Not on file     Number of children: Not on file     Years of education: Not on file     Highest education level: Not on file   Occupational History     Employer: RETIRED   Social Needs     Financial resource strain: Not on file     Food insecurity:     Worry: Not on file     Inability: Not on file     Transportation needs:     Medical: Not on file     Non-medical: Not on file   Tobacco Use     Smoking status: Former Smoker     Types: Pipe     Last attempt to quit: 12/3/1999     Years since quittin.3     Smokeless tobacco: Never Used   Substance and Sexual Activity     Alcohol use: Not on file     Drug use: Not on file     Sexual activity: Not on file   Lifestyle     Physical activity:     Days per week: Not on file     Minutes per session: Not on file     Stress: Not on file   Relationships     Social connections:     Talks on phone: Not on file     Gets together: Not on file     Attends Mandaeism service: Not on file     Active member of club or organization: Not on file      Attends meetings of clubs or organizations: Not on file     Relationship status: Not on file     Intimate partner violence:     Fear of current or ex partner: Not on file     Emotionally abused: Not on file     Physically abused: Not on file     Forced sexual activity: Not on file   Other Topics Concern     Not on file   Social History Narrative     Not on file       Outpatient Encounter Medications as of 3/29/2019   Medication Sig Dispense Refill     Carboxymethylcellulose Sod PF (REFRESH CELLUVISC) 1 % ophthalmic gel Place 1 drop into both eyes 4 times daily       hypromellose-dextran (GENTEAL TEARS) 0.1-0.3 % ophthalmic solution Place 1 drop into both eyes daily as needed for dry eyes       MECLIZINE HCL PO        Multiple vitamin TABS        Multiple Vitamins-Minerals (PRESERVISION AREDS PO) Take by mouth daily       Omega-3 Fatty Acids (FISH OIL) 500 MG CAPS        [DISCONTINUED] aspirin 81 MG tablet Take by mouth daily       [DISCONTINUED] Garlic 1250 MG TABS Take 1 tablet by mouth       [DISCONTINUED] polyvinyl alcohol-povidone PF (REFRESH) 1.4-0.6 % ophthalmic solution 1-2 drops 3 times daily       No facility-administered encounter medications on file as of 3/29/2019.              Review Of Systems  Skin: As above  Eyes: negative  Ears/Nose/Throat: negative  Respiratory: No shortness of breath, dyspnea on exertion, cough, or hemoptysis  Cardiovascular: negative  Gastrointestinal: negative  Genitourinary: negative  Musculoskeletal: negative  Neurologic: negative  Psychiatric: negative  Hematologic/Lymphatic/Immunologic: negative  Endocrine: negative      O:   NAD, WDWN, Alert & Oriented, Mood & Affect wnl, Vitals stable   Here today alone   /77   Pulse 67   SpO2 94%    General appearance normal   Vitals stable   Alert, oriented and in no acute distress     Pink gritty papules on face and scalp     Eyes: Conjunctivae/lids:Normal     ENT: Lips: normal    MSK:Normal    Pulm: Breathing  "Normal    Neuro/Psych: Orientation:Normal; Mood/Affect:Normal    A/P:  1. Actinic Keratoses on face and scalp  PDT: PGACAC discussed. Risks including but not limited to redness, swelling, and blistering to treated area. Patient was instructed to cleanse face thoroughly with a mild cleanser, then face and scalp was degreased with acetone. ALA was then applied to face and scalp in a uniform manner. Patient sat for 90 minutes after ALA was applied. The blue light shined on patient's face and scalp for 16 minutes 40 seconds he was approximately 2-4\" away from the light. Patient then was instructed to wash face and sunscreen with spf 50 was applied. Instructed patient to avoid sun light for 48 hours and apply sunscreen daily. Follow-up in 3-4 months.     Lot: [id number]  Exp: 11/20  Nir to follow up with Primary Care provider regarding elevated blood pressure.      Again, thank you for allowing me to participate in the care of your patient.        Sincerely,        Marcia Richards PA-C    "

## 2019-03-29 NOTE — PATIENT INSTRUCTIONS
Possible side effects  Photosensitivity reactions: Reactions caused by light can show up on the skin where the drug is applied. They usually involve redness and a tingling or burning sensation. For about 2 days after the drug is used, you should take care to not expose treated areas of your face and scalp to light.    Stay out of strong, direct light.     Stay indoors as much as possible.     Wear protective clothing and wide-brimmed hats to avoid sunlight when outdoors.     Avoid beaches, snow, light colored concrete, or other surfaces where strong light may be reflected.  Sunscreens will not protect the skin from photosensitivity reactions.  Skin changes: The treated skin will likely turn red and may swell after treatment. Maybe mild or severe. Can use solarcaine, aquaphor or OTC hydrocortisone to help with the irritation after treatment. Can use cool compresses if uncomfortable. If needed you can take tylenol or ibuprofen to help with your discomfort, as long as these medications are considered safe for you. This usually peaks about a day after treatment and gets better within a week. It should be gone about 4 weeks after treatment. The skin may also be itchy or change color after treatment.  Recommended General Skin care:   Eliminate harsh soaps, i.e. Dial, Zest, Janeth spring.  Use mild soaps such as Cetaphil or Dove sensitive skin.  Avoid overly hot or cold showers.  Use Vanicream, Cetaphil, Eucerin or Cerave creams to moisturize your skin.  Scoop-able creams are better than thin lotions.  Apply moisturizer immediately to damp skin after patting skin dry with towel.   [employer] Public inFormation Center:   Informative resources for the public to learn more about   skin conditions, tips on performing self-examinations, sun   safety, and more The public can find information online at   www aad org or by calling [phone] 825-SOIB (2618)     Please call Marcia Reinoso P.A.-C @ [phone] if  you have any questions or concerns over the weekend.

## 2019-03-29 NOTE — NURSING NOTE
"Initial /77   Pulse 67   SpO2 94%  Estimated body mass index is 28.37 kg/m  as calculated from the following:    Height as of 1/25/16: 1.854 m (6' 1\").    Weight as of 1/9/19: 97.5 kg (215 lb). .    Socorro Ventura LPN    "

## 2019-04-02 ENCOUNTER — OFFICE VISIT - HEALTHEAST (OUTPATIENT)
Dept: INTERNAL MEDICINE | Facility: CLINIC | Age: 79
End: 2019-04-02

## 2019-04-02 DIAGNOSIS — R68.83 CHILLS: ICD-10-CM

## 2019-04-02 DIAGNOSIS — R68.89 FLU-LIKE SYMPTOMS: ICD-10-CM

## 2019-04-02 DIAGNOSIS — R05.9 COUGH: ICD-10-CM

## 2019-04-02 LAB
ANION GAP SERPL CALCULATED.3IONS-SCNC: 10 MMOL/L (ref 5–18)
BASOPHILS # BLD AUTO: 0 THOU/UL (ref 0–0.2)
BASOPHILS NFR BLD AUTO: 0 % (ref 0–2)
BUN SERPL-MCNC: 13 MG/DL (ref 8–28)
CALCIUM SERPL-MCNC: 8.7 MG/DL (ref 8.5–10.5)
CHLORIDE BLD-SCNC: 102 MMOL/L (ref 98–107)
CO2 SERPL-SCNC: 24 MMOL/L (ref 22–31)
CREAT SERPL-MCNC: 0.92 MG/DL (ref 0.7–1.3)
EOSINOPHIL # BLD AUTO: 0.1 THOU/UL (ref 0–0.4)
EOSINOPHIL NFR BLD AUTO: 1 % (ref 0–6)
ERYTHROCYTE [DISTWIDTH] IN BLOOD BY AUTOMATED COUNT: 11.1 % (ref 11–14.5)
FLUAV AG SPEC QL IA: ABNORMAL
FLUBV AG SPEC QL IA: ABNORMAL
GFR SERPL CREATININE-BSD FRML MDRD: >60 ML/MIN/1.73M2
GLUCOSE BLD-MCNC: 103 MG/DL (ref 70–125)
HCT VFR BLD AUTO: 44 % (ref 40–54)
HGB BLD-MCNC: 14.8 G/DL (ref 14–18)
LYMPHOCYTES # BLD AUTO: 0.8 THOU/UL (ref 0.8–4.4)
LYMPHOCYTES NFR BLD AUTO: 12 % (ref 20–40)
MCH RBC QN AUTO: 31.8 PG (ref 27–34)
MCHC RBC AUTO-ENTMCNC: 33.6 G/DL (ref 32–36)
MCV RBC AUTO: 94 FL (ref 80–100)
MONOCYTES # BLD AUTO: 0.4 THOU/UL (ref 0–0.9)
MONOCYTES NFR BLD AUTO: 6 % (ref 2–10)
NEUTROPHILS # BLD AUTO: 5.5 THOU/UL (ref 2–7.7)
NEUTROPHILS NFR BLD AUTO: 80 % (ref 50–70)
PLATELET # BLD AUTO: 148 THOU/UL (ref 140–440)
PMV BLD AUTO: 6.8 FL (ref 7–10)
POTASSIUM BLD-SCNC: 4 MMOL/L (ref 3.5–5)
RBC # BLD AUTO: 4.66 MILL/UL (ref 4.4–6.2)
SODIUM SERPL-SCNC: 136 MMOL/L (ref 136–145)
WBC: 6.9 THOU/UL (ref 4–11)

## 2019-04-03 ENCOUNTER — COMMUNICATION - HEALTHEAST (OUTPATIENT)
Dept: FAMILY MEDICINE | Facility: CLINIC | Age: 79
End: 2019-04-03

## 2019-04-07 LAB
AEROBIC BLOOD CULTURE BOTTLE: NO GROWTH
ANAEROBIC BLOOD CULTURE BOTTLE: NO GROWTH

## 2019-04-08 ENCOUNTER — COMMUNICATION - HEALTHEAST (OUTPATIENT)
Dept: PEDIATRICS | Facility: CLINIC | Age: 79
End: 2019-04-08

## 2019-04-29 ENCOUNTER — OFFICE VISIT (OUTPATIENT)
Dept: DERMATOLOGY | Facility: CLINIC | Age: 79
End: 2019-04-29
Payer: MEDICARE

## 2019-04-29 ENCOUNTER — RECORDS - HEALTHEAST (OUTPATIENT)
Dept: ADMINISTRATIVE | Facility: OTHER | Age: 79
End: 2019-04-29

## 2019-04-29 ENCOUNTER — TELEPHONE (OUTPATIENT)
Dept: DERMATOLOGY | Facility: CLINIC | Age: 79
End: 2019-04-29

## 2019-04-29 VITALS — DIASTOLIC BLOOD PRESSURE: 74 MMHG | SYSTOLIC BLOOD PRESSURE: 136 MMHG | OXYGEN SATURATION: 96 % | HEART RATE: 72 BPM

## 2019-04-29 DIAGNOSIS — C44.42 SQUAMOUS CELL CARCINOMA OF SCALP: ICD-10-CM

## 2019-04-29 DIAGNOSIS — D23.9 DERMAL NEVUS: ICD-10-CM

## 2019-04-29 DIAGNOSIS — Z85.828 HISTORY OF SKIN CANCER: Primary | ICD-10-CM

## 2019-04-29 DIAGNOSIS — L81.4 LENTIGO: ICD-10-CM

## 2019-04-29 DIAGNOSIS — L82.1 SEBORRHEIC KERATOSIS: ICD-10-CM

## 2019-04-29 PROCEDURE — 88331 PATH CONSLTJ SURG 1 BLK 1SPC: CPT | Performed by: DERMATOLOGY

## 2019-04-29 PROCEDURE — 11102 TANGNTL BX SKIN SINGLE LES: CPT | Performed by: DERMATOLOGY

## 2019-04-29 PROCEDURE — 99213 OFFICE O/P EST LOW 20 MIN: CPT | Mod: 25 | Performed by: DERMATOLOGY

## 2019-04-29 NOTE — NURSING NOTE
Chief Complaint   Patient presents with     Derm Problem     spot on scalp       Vitals:    04/29/19 1138   BP: 136/74   Pulse: 72   SpO2: 96%     Wt Readings from Last 1 Encounters:   01/09/19 97.5 kg (215 lb)       Kath Smith LPN.................4/29/2019

## 2019-04-29 NOTE — TELEPHONE ENCOUNTER
----- Message from Chavez Jay MD sent at 4/29/2019 12:04 PM CDT -----  r frontal scalp squamous cell carcinoma schedule excision

## 2019-04-29 NOTE — LETTER
[facility]  [address] [facility] [address]  Phone: [phone]    April 29, 2019    Nir Guy                                                                                                                  [address] [place] [id number]            Dear Mr. Guy,    You are scheduled for Mohs Surgery on Tuesday May 14 th at 7:45 am.    Please check in at Dermatology Clinic.   (2nd Floor, last  Clinic on right up staircase or elevator -past OB/GYN clinic)    You don't need to arrive more than 5-10 minutes prior to your appointment time.     Be sure to eat a good breakfast and bathe and wash your hair prior to Surgery.    If you are taking any anti-coagulants that are prescribed by your Doctor (such as Coumadin/warfarin, Plavix, Aspirin, Ibuprofen), please continue taking them.     However, If you are taking anti-coagulants over the counter without  a Doctor's order for a Medical condition, please discontinue them 10 days prior to Surgery.      Please wear loose comfortable clothing as it could possibly be 4-6 hours until your surgery is completed depending upon how many layers of tissue need to be removed.     Wi-fi access is available.     Thank you,      Chavez Jay MD/ Anu Patino RN

## 2019-04-29 NOTE — PATIENT INSTRUCTIONS
Wound Care Instructions     FOR SUPERFICIAL WOUNDS     [facility] [place] [phone]    [facility] [phone]  Scalp                       AFTER 24 HOURS YOU SHOULD REMOVE THE BANDAGE AND BEGIN DAILY DRESSING CHANGES AS FOLLOWS:     1) Remove Dressing.     2) Clean and dry the area with tap water using a Q-tip or sterile gauze pad.     3) Apply Vaseline, Aquaphor, Polysporin ointment or Bacitracin ointment over entire wound.  Do NOT use Neosporin ointment.     4) Cover the wound with a band-aid, or a sterile non-stick gauze pad and micropore paper tape      REPEAT THESE INSTRUCTIONS AT LEAST ONCE A DAY UNTIL THE WOUND HAS COMPLETELY HEALED.    It is an old wives tale that a wound heals better when it is exposed to air and allowed to dry out. The wound will heal faster with a better cosmetic result if it is kept moist with ointment and covered with a bandage.    **Do not let the wound dry out.**      Supplies Needed:      *Cotton tipped applicators (Q-tips)    *Polysporin Ointment or Bacitracin Ointment (NOT NEOSPORIN)    *Band-aids or non-stick gauze pads and micropore paper tape.      PATIENT INFORMATION:    During the healing process you will notice a number of changes. All wounds develop a small halo of redness surrounding the wound.  This means healing is occurring. Severe itching with extensive redness usually indicates sensitivity to the ointment or bandage tape used to dress the wound.  You should call our office if this develops.      Swelling  and/or discoloration around your surgical site is common, particularly when performed around the eye.    All wounds normally drain.  The larger the wound the more drainage there will be.  After 7-10 days, you will notice the wound beginning to shrink and new skin will begin to grow.  The wound is healed when you can see skin has formed over the entire area.  A healed wound has a healthy, shiny look to the surface and is red to dark pink in  color to normalize.  Wounds may take approximately 4-6 weeks to heal.  Larger wounds may take 6-8 weeks.  After the wound is healed you may discontinue dressing changes.    You may experience a sensation of tightness as your wound heals. This is normal and will gradually subside.    Your healed wound may be sensitive to temperature changes. This sensitivity improves with time, but if you re having a lot of discomfort, try to avoid temperature extremes.    Patients frequently experience itching after their wound appears to have healed because of the continue healing under the skin.  Plain Vaseline will help relieve the itching.        POSSIBLE COMPLICATIONS    BLEEDIN. Leave the bandage in place.  2. Use tightly rolled up gauze or a cloth to apply direct pressure over the bandage for 30  minutes.  3. Reapply pressure for an additional 30 minutes if necessary  4. Use additional gauze and tape to maintain pressure once the bleeding has stopped.

## 2019-04-29 NOTE — PROGRESS NOTES
Nir Guy is a 79 year old year old male patient here today for growth on scalp after PDT>   .  Patient states this has been present for ?.  Patient reports the following symptoms:  growing.  Patient reports the following previous treatments none.  Patient reports the following modifying factors none.  Associated symptoms: none.  Patient has no other skin complaints today.  Remainder of the HPI, Meds, PMH, Allergies, FH, and SH was reviewed in chart.      Past Medical History:   Diagnosis Date     Actinic keratosis      Basal cell carcinoma      Squamous cell carcinoma        Past Surgical History:   Procedure Laterality Date     CATARACT IOL, RT/LT          Family History   Problem Relation Age of Onset     Melanoma No family hx of        Social History     Socioeconomic History     Marital status:      Spouse name: Not on file     Number of children: Not on file     Years of education: Not on file     Highest education level: Not on file   Occupational History     Employer: RETIRED   Social Needs     Financial resource strain: Not on file     Food insecurity:     Worry: Not on file     Inability: Not on file     Transportation needs:     Medical: Not on file     Non-medical: Not on file   Tobacco Use     Smoking status: Former Smoker     Types: Pipe     Last attempt to quit: 12/3/1999     Years since quittin.4     Smokeless tobacco: Never Used   Substance and Sexual Activity     Alcohol use: Not on file     Drug use: Not on file     Sexual activity: Not on file   Lifestyle     Physical activity:     Days per week: Not on file     Minutes per session: Not on file     Stress: Not on file   Relationships     Social connections:     Talks on phone: Not on file     Gets together: Not on file     Attends Scientology service: Not on file     Active member of club or organization: Not on file     Attends meetings of clubs or organizations: Not on file     Relationship status: Not on file     Intimate  partner violence:     Fear of current or ex partner: Not on file     Emotionally abused: Not on file     Physically abused: Not on file     Forced sexual activity: Not on file   Other Topics Concern     Not on file   Social History Narrative     Not on file       Outpatient Encounter Medications as of 4/29/2019   Medication Sig Dispense Refill     Carboxymethylcellulose Sod PF (REFRESH CELLUVISC) 1 % ophthalmic gel Place 1 drop into both eyes 4 times daily       hypromellose-dextran (GENTEAL TEARS) 0.1-0.3 % ophthalmic solution Place 1 drop into both eyes daily as needed for dry eyes       MECLIZINE HCL PO        Multiple Vitamins-Minerals (MENS MULTIVITAMIN PLUS PO)        Multiple Vitamins-Minerals (PRESERVISION AREDS PO) Take by mouth daily       Omega-3 Fatty Acids (FISH OIL) 500 MG CAPS        [DISCONTINUED] Multiple vitamin TABS        No facility-administered encounter medications on file as of 4/29/2019.              Review Of Systems  Skin: As above  Eyes: negative  Ears/Nose/Throat: negative  Respiratory: No shortness of breath, dyspnea on exertion, cough, or hemoptysis  Cardiovascular: negative  Gastrointestinal: negative  Genitourinary: negative  Musculoskeletal: negative  Neurologic: negative  Psychiatric: negative  Hematologic/Lymphatic/Immunologic: negative  Endocrine: negative      O:   NAD, WDWN, Alert & Oriented, Mood & Affect wnl, Vitals stable   Here today alone   /74   Pulse 72   SpO2 96%    General appearance normal   Vitals stable   Alert, oriented and in no acute distress      Following lymph nodes palpated: Occipital, Cervical, Supraclavicular no lad   R frontal scalp 1.5cm nodule       Stuck on papules and brown macules on trunk and ext   Flesh colored papules on face     The remainder of expanded problem focused exam was unremarkable; the following areas were examined:  scalp/hair, conjunctiva/lids, face, neck, lips, chest, digits/nails, RUE, LUE.      Eyes:  Conjunctivae/lids:Normal     ENT: Lips, buccal mucosa, tongue: normal    MSK:Normal    Cardiovascular: peripheral edema none    Pulm: Breathing Normal    Lymph Nodes: No Head and Neck Lymphadenopathy     Neuro/Psych: Orientation:Normal; Mood/Affect:Normal      MICRO:   R frontal scalp:There is a proliferation of irregular nests of abnormal squamous cells arising from the epidermis and invading the dermis. These are well differentiated. The dermis shows a variable superficial perivascular inflammatory infiltrate.   A/P:  1. Seborrheic keratosis, lentigo, angioma, dermal nevus  2. R frontal scalp r/o squamous cell carcinoma   TANGENTIAL BIOPSY IN HOUSE:  After consent, anesthesia with LEC and prep, tangential excision performed and dx above confirmed with frozen section histology.  No complications and routine wound care.  Patient told result squamous cell carcinoma schedule exciison .      BENIGN LESIONS DISCUSSED WITH PATIENT:  I discussed the specifics of tumor, prognosis, and genetics of benign lesions.  I explained that treatment of these lesions would be purely cosmetic and not medically neccessary.  I discussed with patient different removal options including excision, cautery and /or laser.      Nature and genetics of benign skin lesions dicussed with patient.  Signs and Symptoms of skin cancer discussed with patient.  Patient encouraged to perform monthly skin exams.  UV precautions reviewed with patient.  Patient to follow up with Primary Care provider regarding elevated blood pressure.  Skin care regimen reviewed with patient: Eliminate harsh soaps, i.e. Dial, zest, irsih spring; Mild soaps such as Cetaphil or Dove sensitive skin, avoid hot or cold showers, aggressive use of emollients including vanicream, cetaphil or cerave discussed with patient.    Risks of non-melanoma skin cancer discussed with patient   Return to clinic fro squamous cell carcinoma on scalp

## 2019-04-29 NOTE — LETTER
2019         RE: Nir Guy  [address] [place] [id number]        Dear Colleague,    Thank you for referring your patient, Nir Guy, to the [facility]. Please see a copy of my visit note below.    Nir Guy is a 79 year old year old male patient here today for growth on scalp after PDT>   .  Patient states this has been present for ?.  Patient reports the following symptoms:  growing.  Patient reports the following previous treatments none.  Patient reports the following modifying factors none.  Associated symptoms: none.  Patient has no other skin complaints today.  Remainder of the HPI, Meds, PMH, Allergies, FH, and SH was reviewed in chart.      Past Medical History:   Diagnosis Date     Actinic keratosis      Basal cell carcinoma      Squamous cell carcinoma        Past Surgical History:   Procedure Laterality Date     CATARACT IOL, RT/LT          Family History   Problem Relation Age of Onset     Melanoma No family hx of        Social History     Socioeconomic History     Marital status:      Spouse name: Not on file     Number of children: Not on file     Years of education: Not on file     Highest education level: Not on file   Occupational History     Employer: RETIRED   Social Needs     Financial resource strain: Not on file     Food insecurity:     Worry: Not on file     Inability: Not on file     Transportation needs:     Medical: Not on file     Non-medical: Not on file   Tobacco Use     Smoking status: Former Smoker     Types: Pipe     Last attempt to quit: 12/3/1999     Years since quittin.4     Smokeless tobacco: Never Used   Substance and Sexual Activity     Alcohol use: Not on file     Drug use: Not on file     Sexual activity: Not on file   Lifestyle     Physical activity:     Days per week: Not on file     Minutes per session: Not on file     Stress: Not on file   Relationships     Social connections:     Talks on phone: Not  on file     Gets together: Not on file     Attends Anabaptist service: Not on file     Active member of club or organization: Not on file     Attends meetings of clubs or organizations: Not on file     Relationship status: Not on file     Intimate partner violence:     Fear of current or ex partner: Not on file     Emotionally abused: Not on file     Physically abused: Not on file     Forced sexual activity: Not on file   Other Topics Concern     Not on file   Social History Narrative     Not on file       Outpatient Encounter Medications as of 4/29/2019   Medication Sig Dispense Refill     Carboxymethylcellulose Sod PF (REFRESH CELLUVISC) 1 % ophthalmic gel Place 1 drop into both eyes 4 times daily       hypromellose-dextran (GENTEAL TEARS) 0.1-0.3 % ophthalmic solution Place 1 drop into both eyes daily as needed for dry eyes       MECLIZINE HCL PO        Multiple Vitamins-Minerals (MENS MULTIVITAMIN PLUS PO)        Multiple Vitamins-Minerals (PRESERVISION AREDS PO) Take by mouth daily       Omega-3 Fatty Acids (FISH OIL) 500 MG CAPS        [DISCONTINUED] Multiple vitamin TABS        No facility-administered encounter medications on file as of 4/29/2019.              Review Of Systems  Skin: As above  Eyes: negative  Ears/Nose/Throat: negative  Respiratory: No shortness of breath, dyspnea on exertion, cough, or hemoptysis  Cardiovascular: negative  Gastrointestinal: negative  Genitourinary: negative  Musculoskeletal: negative  Neurologic: negative  Psychiatric: negative  Hematologic/Lymphatic/Immunologic: negative  Endocrine: negative      O:   NAD, WDWN, Alert & Oriented, Mood & Affect wnl, Vitals stable   Here today alone   /74   Pulse 72   SpO2 96%    General appearance normal   Vitals stable   Alert, oriented and in no acute distress      Following lymph nodes palpated: Occipital, Cervical, Supraclavicular no lad   R frontal scalp 1.5cm nodule       Stuck on papules and brown macules on trunk and ext    Flesh colored papules on face     The remainder of expanded problem focused exam was unremarkable; the following areas were examined:  scalp/hair, conjunctiva/lids, face, neck, lips, chest, digits/nails, RUE, LUE.      Eyes: Conjunctivae/lids:Normal     ENT: Lips, buccal mucosa, tongue: normal    MSK:Normal    Cardiovascular: peripheral edema none    Pulm: Breathing Normal    Lymph Nodes: No Head and Neck Lymphadenopathy     Neuro/Psych: Orientation:Normal; Mood/Affect:Normal      MICRO:   R frontal scalp:There is a proliferation of irregular nests of abnormal squamous cells arising from the epidermis and invading the dermis. These are well differentiated. The dermis shows a variable superficial perivascular inflammatory infiltrate.   A/P:  1. Seborrheic keratosis, lentigo, angioma, dermal nevus  2. R frontal scalp r/o squamous cell carcinoma   TANGENTIAL BIOPSY IN HOUSE:  After consent, anesthesia with LEC and prep, tangential excision performed and dx above confirmed with frozen section histology.  No complications and routine wound care.  Patient told result squamous cell carcinoma schedule exciison .      BENIGN LESIONS DISCUSSED WITH PATIENT:  I discussed the specifics of tumor, prognosis, and genetics of benign lesions.  I explained that treatment of these lesions would be purely cosmetic and not medically neccessary.  I discussed with patient different removal options including excision, cautery and /or laser.      Nature and genetics of benign skin lesions dicussed with patient.  Signs and Symptoms of skin cancer discussed with patient.  Patient encouraged to perform monthly skin exams.  UV precautions reviewed with patient.  Patient to follow up with Primary Care provider regarding elevated blood pressure.  Skin care regimen reviewed with patient: Eliminate harsh soaps, i.e. Dial, zest, irsih spring; Mild soaps such as Cetaphil or Dove sensitive skin, avoid hot or cold showers, aggressive use of  emollients including vanicream, cetaphil or cerave discussed with patient.    Risks of non-melanoma skin cancer discussed with patient   Return to clinic fro squamous cell carcinoma on scalp     Again, thank you for allowing me to participate in the care of your patient.        Sincerely,        Chavez Jay MD

## 2019-05-14 ENCOUNTER — OFFICE VISIT (OUTPATIENT)
Dept: DERMATOLOGY | Facility: CLINIC | Age: 79
End: 2019-05-14
Payer: MEDICARE

## 2019-05-14 ENCOUNTER — RECORDS - HEALTHEAST (OUTPATIENT)
Dept: ADMINISTRATIVE | Facility: OTHER | Age: 79
End: 2019-05-14

## 2019-05-14 VITALS
DIASTOLIC BLOOD PRESSURE: 81 MMHG | HEART RATE: 81 BPM | BODY MASS INDEX: 29.16 KG/M2 | SYSTOLIC BLOOD PRESSURE: 140 MMHG | HEIGHT: 72 IN

## 2019-05-14 DIAGNOSIS — C44.42 SQUAMOUS CELL CARCINOMA OF SCALP: Primary | ICD-10-CM

## 2019-05-14 PROCEDURE — 17311 MOHS 1 STAGE H/N/HF/G: CPT | Performed by: DERMATOLOGY

## 2019-05-14 PROCEDURE — 13121 CMPLX RPR S/A/L 2.6-7.5 CM: CPT | Mod: 51 | Performed by: DERMATOLOGY

## 2019-05-14 NOTE — NURSING NOTE
Initial /81   Pulse 81   Ht 1.829 m (6')   BMI 29.16 kg/m   Estimated body mass index is 29.16 kg/m  as calculated from the following:    Height as of this encounter: 1.829 m (6').    Weight as of 1/9/19: 97.5 kg (215 lb). .

## 2019-05-14 NOTE — PROGRESS NOTES
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 Patient reports the following modifying factors none.  Associated symptoms: none.  Patient has no other skin complaints today.  Remainder of the HPI, Meds, PMH, Allergies, FH, and SH was reviewed in chart.      Past Medical History:   Diagnosis Date     Actinic keratosis      Basal cell carcinoma      Squamous cell carcinoma        Past Surgical History:   Procedure Laterality Date     CATARACT IOL, RT/LT          Family History   Problem Relation Age of Onset     Melanoma No family hx of        Social History     Socioeconomic History     Marital status:      Spouse name: Not on file     Number of children: Not on file     Years of education: Not on file     Highest education level: Not on file   Occupational History     Employer: RETIRED   Social Needs     Financial resource strain: Not on file     Food insecurity:     Worry: Not on file     Inability: Not on file     Transportation needs:     Medical: Not on file     Non-medical: Not on file   Tobacco Use     Smoking status: Former Smoker     Types: Pipe     Last attempt to quit: 12/3/1999     Years since quittin.4     Smokeless tobacco: Never Used   Substance and Sexual Activity     Alcohol use: Not on file     Drug use: Not on file     Sexual activity: Not on file   Lifestyle     Physical activity:     Days per week: Not on file     Minutes per session: Not on file     Stress: Not on file   Relationships     Social connections:     Talks on phone: Not on file     Gets together: Not on file     Attends Mu-ism service: Not on file     Active member of club or organization: Not on file     Attends meetings of clubs or organizations: Not on file     Relationship status: Not on file     Intimate partner violence:     Fear of current or ex partner: Not on file     Emotionally abused: Not on file     Physically  abused: Not on file     Forced sexual activity: Not on file   Other Topics Concern     Not on file   Social History Narrative     Not on file       Outpatient Encounter Medications as of 5/14/2019   Medication Sig Dispense Refill     Carboxymethylcellulose Sod PF (REFRESH CELLUVISC) 1 % ophthalmic gel Place 1 drop into both eyes 4 times daily       hypromellose-dextran (GENTEAL TEARS) 0.1-0.3 % ophthalmic solution Place 1 drop into both eyes daily as needed for dry eyes       MECLIZINE HCL PO        Multiple Vitamins-Minerals (MENS MULTIVITAMIN PLUS PO)        Multiple Vitamins-Minerals (PRESERVISION AREDS PO) Take by mouth daily       Omega-3 Fatty Acids (FISH OIL) 500 MG CAPS        No facility-administered encounter medications on file as of 5/14/2019.              Review Of Systems  Skin: As above  Eyes: negative  Ears/Nose/Throat: negative  Respiratory: No shortness of breath, dyspnea on exertion, cough, or hemoptysis  Cardiovascular: negative  Gastrointestinal: negative  Genitourinary: negative  Musculoskeletal: negative  Neurologic: negative  Psychiatric: negative  Hematologic/Lymphatic/Immunologic: negative  Endocrine: negative      O:   NAD, WDWN, Alert & Oriented, Mood & Affect wnl, Vitals stable   Here today alone   /81   Pulse 81   Ht 1.829 m (6')   BMI 29.16 kg/m     General appearance normal   Vitals stable   Alert, oriented and in no acute distress      Following lymph nodes palpated: Occipital, Cervical, Supraclavicular no lad   R frontal scalp 1.5cm red plaque       Eyes: Conjunctivae/lids:Normal     ENT: Lips, buccal mucosa, tongue: normal    MSK:Normal    Cardiovascular: peripheral edema none    Pulm: Breathing Normal    Lymph Nodes: No Head and Neck Lymphadenopathy     Neuro/Psych: Orientation:Normal; Mood/Affect:Normal      A/P:  1. R frontal scalp squamous cell carcinoma   MOHS:   Location    After PGACAC discussed with patient, decision for Mohs surgery was made. Indication for Mohs  was Location. Patient confirmed the site with Dr. Jay.  After anesthesia with LEC, the tumor was excised using standard Mohs technique in 1 stages(s).  CLEAR MARGINS OBTAINED and Final defect size was 2 x 2.1 cm.       REPAIR COMPLEX: Because of the tightness of the surrounding skin and Because of the size and full thickness nature of the defect, a complex closure was planned. After LEC anesthesia and prep, Burow's triangles were excised in the relaxed skin tension lines. The wound edges were widely undermined by dissection in the subcutaneous plane until adequate tissue mobility was obtained. Hemostasis was obtained. The wound edges were closed in a layered fashion using Vicryl and Fast Absorbing Plain Gut sutures. Postoperative length was 4.3 cm.   EBL minimal; complications none; wound care routine.  The patient was discharged in good condition and will return in one week for wound evaluation.  BENIGN LESIONS DISCUSSED WITH PATIENT:  I discussed the specifics of tumor, prognosis, and genetics of benign lesions.  I explained that treatment of these lesions would be purely cosmetic and not medically neccessary.  I discussed with patient different removal options including excision, cautery and /or laser.      Nature and genetics of benign skin lesions dicussed with patient.  Signs and Symptoms of skin cancer discussed with patient.  Patient encouraged to perform monthly skin exams.  UV precautions reviewed with patient.  Patient to follow up with Primary Care provider regarding elevated blood pressure.  Skin care regimen reviewed with patient: Eliminate harsh soaps, i.e. Dial, zest, irsih spring; Mild soaps such as Cetaphil or Dove sensitive skin, avoid hot or cold showers, aggressive use of emollients including vanicream, cetaphil or cerave discussed with patient.    Risks of non-melanoma skin cancer discussed with patient   Return to clinic 6 months  Nir to follow up with Primary Care provider regarding  elevated blood pressure.

## 2019-05-14 NOTE — LETTER
2019         RE: Nir Guy  [address] [place] [id number]        Dear Colleague,    Thank you for referring your patient, Nir Guy, to the [facility]. Please see a copy of my visit note below.    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 Patient reports the following modifying factors none.  Associated symptoms: none.  Patient has no other skin complaints today.  Remainder of the HPI, Meds, PMH, Allergies, FH, and SH was reviewed in chart.      Past Medical History:   Diagnosis Date     Actinic keratosis      Basal cell carcinoma      Squamous cell carcinoma        Past Surgical History:   Procedure Laterality Date     CATARACT IOL, RT/LT          Family History   Problem Relation Age of Onset     Melanoma No family hx of        Social History     Socioeconomic History     Marital status:      Spouse name: Not on file     Number of children: Not on file     Years of education: Not on file     Highest education level: Not on file   Occupational History     Employer: RETIRED   Social Needs     Financial resource strain: Not on file     Food insecurity:     Worry: Not on file     Inability: Not on file     Transportation needs:     Medical: Not on file     Non-medical: Not on file   Tobacco Use     Smoking status: Former Smoker     Types: Pipe     Last attempt to quit: 12/3/1999     Years since quittin.4     Smokeless tobacco: Never Used   Substance and Sexual Activity     Alcohol use: Not on file     Drug use: Not on file     Sexual activity: Not on file   Lifestyle     Physical activity:     Days per week: Not on file     Minutes per session: Not on file     Stress: Not on file   Relationships     Social connections:     Talks on phone: Not on file     Gets together: Not on file     Attends Oriental orthodox service: Not on file     Active member of club or  organization: Not on file     Attends meetings of clubs or organizations: Not on file     Relationship status: Not on file     Intimate partner violence:     Fear of current or ex partner: Not on file     Emotionally abused: Not on file     Physically abused: Not on file     Forced sexual activity: Not on file   Other Topics Concern     Not on file   Social History Narrative     Not on file       Outpatient Encounter Medications as of 5/14/2019   Medication Sig Dispense Refill     Carboxymethylcellulose Sod PF (REFRESH CELLUVISC) 1 % ophthalmic gel Place 1 drop into both eyes 4 times daily       hypromellose-dextran (GENTEAL TEARS) 0.1-0.3 % ophthalmic solution Place 1 drop into both eyes daily as needed for dry eyes       MECLIZINE HCL PO        Multiple Vitamins-Minerals (MENS MULTIVITAMIN PLUS PO)        Multiple Vitamins-Minerals (PRESERVISION AREDS PO) Take by mouth daily       Omega-3 Fatty Acids (FISH OIL) 500 MG CAPS        No facility-administered encounter medications on file as of 5/14/2019.              Review Of Systems  Skin: As above  Eyes: negative  Ears/Nose/Throat: negative  Respiratory: No shortness of breath, dyspnea on exertion, cough, or hemoptysis  Cardiovascular: negative  Gastrointestinal: negative  Genitourinary: negative  Musculoskeletal: negative  Neurologic: negative  Psychiatric: negative  Hematologic/Lymphatic/Immunologic: negative  Endocrine: negative      O:   NAD, WDWN, Alert & Oriented, Mood & Affect wnl, Vitals stable   Here today alone   /81   Pulse 81   Ht 1.829 m (6')   BMI 29.16 kg/m      General appearance normal   Vitals stable   Alert, oriented and in no acute distress      Following lymph nodes palpated: Occipital, Cervical, Supraclavicular no lad   R frontal scalp 1.5cm red plaque       Eyes: Conjunctivae/lids:Normal     ENT: Lips, buccal mucosa, tongue: normal    MSK:Normal    Cardiovascular: peripheral edema none    Pulm: Breathing Normal    Lymph Nodes: No  Head and Neck Lymphadenopathy     Neuro/Psych: Orientation:Normal; Mood/Affect:Normal      A/P:  1. R frontal scalp squamous cell carcinoma   MOHS:   Location    After PGACAC discussed with patient, decision for Mohs surgery was made. Indication for Mohs was Location. Patient confirmed the site with Dr. Jay.  After anesthesia with LEC, the tumor was excised using standard Mohs technique in 1 stages(s).  CLEAR MARGINS OBTAINED and Final defect size was 2 x 2.1 cm.       REPAIR COMPLEX: Because of the tightness of the surrounding skin and Because of the size and full thickness nature of the defect, a complex closure was planned. After LEC anesthesia and prep, Burow's triangles were excised in the relaxed skin tension lines. The wound edges were widely undermined by dissection in the subcutaneous plane until adequate tissue mobility was obtained. Hemostasis was obtained. The wound edges were closed in a layered fashion using Vicryl and Fast Absorbing Plain Gut sutures. Postoperative length was 4.3 cm.   EBL minimal; complications none; wound care routine.  The patient was discharged in good condition and will return in one week for wound evaluation.  BENIGN LESIONS DISCUSSED WITH PATIENT:  I discussed the specifics of tumor, prognosis, and genetics of benign lesions.  I explained that treatment of these lesions would be purely cosmetic and not medically neccessary.  I discussed with patient different removal options including excision, cautery and /or laser.      Nature and genetics of benign skin lesions dicussed with patient.  Signs and Symptoms of skin cancer discussed with patient.  Patient encouraged to perform monthly skin exams.  UV precautions reviewed with patient.  Patient to follow up with Primary Care provider regarding elevated blood pressure.  Skin care regimen reviewed with patient: Eliminate harsh soaps, i.e. Dial, zest, irsih spring; Mild soaps such as Cetaphil or Dove sensitive skin, avoid hot or  cold showers, aggressive use of emollients including vanicream, cetaphil or cerave discussed with patient.    Risks of non-melanoma skin cancer discussed with patient   Return to clinic 6 months  Nir to follow up with Primary Care provider regarding elevated blood pressure.      Again, thank you for allowing me to participate in the care of your patient.        Sincerely,        Chavez Jay MD

## 2019-05-14 NOTE — PATIENT INSTRUCTIONS
Stapled Wound Care     for __SCALP____________        ? No strenuous activity for 48 hours. Resume moderate activity in 48 hours. No heavy exercising until you are seen for follow up in one week.    ? Take Tylenol as needed for discomfort.                                        ? Do not drink alcoholic beverages for 48 hours.    ? Keep the pressure bandage in place for 24 hours. If the bandage becomes blood tinged or loose, reinforce it with gauze and tape.       (Refer to the reverse side of this page for management of bleeding).    ? Remove bandage in 24 hours and begin wound care as follows:         1. Rinse the stapled area with tap water. (shower / bathe / shampoo normally)  2. Dry wound with Q tip or gauze pad  3. Apply Vaseline, Aquaphor, Polysporin or Bacitracin Ointment to the staples.    Do NOT use Neosporin Ointment *  4. Cover the wound with a bandaid or nonstick gauze pad and paper tape.  5. Repeat wound care once a day until staples are removed.                  BLEEDIN. Use tightly rolled up gauze or cloth to apply direct pressure over the bandage for 20   minutes.  2. Reapply pressure for an additional 20 minutes if necessary  3. Call the office or go to the nearest emergency room if pressure fails to stop the bleeding.  4. Use additional gauze and tape to maintain pressure once the bleeding has stopped.  5. Begin wound care 24 hours after surgery as directed.              PAIN:    1. Post operative pain should slowly get better, beginning the evening after surgery.  2.  A sudden or severe increase in pain may indicate a problem. Call the office if this occurs.          In case of emergency phone:[phone]

## 2019-05-21 ENCOUNTER — ALLIED HEALTH/NURSE VISIT (OUTPATIENT)
Dept: DERMATOLOGY | Facility: CLINIC | Age: 79
End: 2019-05-21
Payer: COMMERCIAL

## 2019-05-21 DIAGNOSIS — Z48.02 REMOVAL OF STAPLES: Primary | ICD-10-CM

## 2019-05-21 PROCEDURE — 99207 ZZC NO CHARGE NURSE ONLY: CPT

## 2019-05-21 NOTE — PATIENT INSTRUCTIONS
ONE WEEK DRESSING CHANGE  For  STAPLE REMOVAL     The following information has been compiled to offer assistance with the dressing change or wound evaluation. Please feel free to call our office to speak with one of the nurses if you have any questions or concerns about the progress of the wound healing process especially if there are any signs of flap necrosis or infection. We will be happy to answer any questions you might have.                                                 AFTER 24 HOURS YOU SHOULD REMOVE THE BANDAGE AND BEGIN DAILY DRESSING CHANGES AS FOLLOWS:     1) Remove Dressing.     2) Clean and dry the area with tap water using a Q-tip or sterile gauze pad.     3) Apply Vaseline, Polysporin ointment, Aquaphor or Bacitracin ointment over entire wound.  Do NOT use Neosporin ointment.     4) Cover the wound with a band-aid, or a sterile non-stick gauze pad and micropore paper tape      REPEAT THESE INSTRUCTIONS AT LEAST ONCE A DAY UNTIL THE WOUND HAS COMPLETELY HEALED. DO NOT LET THE WOUND SCAB OVER.    It is an old wives tale that a wound heals better when it is exposed to air and allowed to dry out. The wound will heal faster with a better cosmetic result if it is kept moist with ointment and covered with a bandage.     Massaging the wound site hastens the healing process by softening the scar tissue and fading the scar. Begin massaging the area one month after surgery as often as possible. Continue to massage the area for 2-3 months or until they feel the scar tissue has softened. Moisturizers can be used during the massaging but are not necessary. Ultimately it takes six months for the scar to soften and blend into the surrounding skin.

## 2019-05-21 NOTE — PROGRESS NOTES
Pt returned to clinic for post surgery 1 week follow up bandage change. Pt has no complaints, denies pain. Bandage removed. Removed 10 staples from the right scalp. The area cleansed with normal saline. Site is healing and wound edges approximating well. Applied Aquaphor and a bandage.    Advised to watch for signs/sx of infection; spreading redness, drainage, odor, fever. Call or report promptly to clinic. Pt given written instructions and informed to rtc as needed. Patient verbalized understanding.

## 2019-07-15 ENCOUNTER — OFFICE VISIT (OUTPATIENT)
Dept: DERMATOLOGY | Facility: CLINIC | Age: 79
End: 2019-07-15
Payer: MEDICARE

## 2019-07-15 VITALS — SYSTOLIC BLOOD PRESSURE: 132 MMHG | OXYGEN SATURATION: 96 % | DIASTOLIC BLOOD PRESSURE: 85 MMHG | HEART RATE: 77 BPM

## 2019-07-15 DIAGNOSIS — D23.9 DERMAL NEVUS: ICD-10-CM

## 2019-07-15 DIAGNOSIS — Z85.828 HISTORY OF SKIN CANCER: Primary | ICD-10-CM

## 2019-07-15 DIAGNOSIS — L81.4 LENTIGO: ICD-10-CM

## 2019-07-15 DIAGNOSIS — L82.1 SEBORRHEIC KERATOSIS: ICD-10-CM

## 2019-07-15 DIAGNOSIS — D18.01 ANGIOMA OF SKIN: ICD-10-CM

## 2019-07-15 DIAGNOSIS — L57.0 AK (ACTINIC KERATOSIS): ICD-10-CM

## 2019-07-15 DIAGNOSIS — L82.0 INFLAMED SEBORRHEIC KERATOSIS: ICD-10-CM

## 2019-07-15 PROCEDURE — 17000 DESTRUCT PREMALG LESION: CPT | Mod: 59 | Performed by: DERMATOLOGY

## 2019-07-15 PROCEDURE — 99213 OFFICE O/P EST LOW 20 MIN: CPT | Mod: 25 | Performed by: DERMATOLOGY

## 2019-07-15 PROCEDURE — 17003 DESTRUCT PREMALG LES 2-14: CPT | Mod: 59 | Performed by: DERMATOLOGY

## 2019-07-15 PROCEDURE — 17110 DESTRUCTION B9 LES UP TO 14: CPT | Performed by: DERMATOLOGY

## 2019-07-15 NOTE — PROGRESS NOTES
Nir Guy is a 79 year old year old male patient here today for f/u hx of non-melanoma skin cancer.  He notes rough spot son face .   .  Patient states this has been present for a wijhle.  Patient reports the following symptoms:  Cut while shaving.  .  Patient reports the following previous treatments none.  Patient reports the following modifying factors none.  Associated symptoms: none.  Patient has no other skin complaints today.  Remainder of the HPI, Meds, PMH, Allergies, FH, and SH was reviewed in chart.      Past Medical History:   Diagnosis Date     Actinic keratosis      Basal cell carcinoma      Squamous cell carcinoma        Past Surgical History:   Procedure Laterality Date     CATARACT IOL, RT/LT          Family History   Problem Relation Age of Onset     Melanoma No family hx of        Social History     Socioeconomic History     Marital status:      Spouse name: Not on file     Number of children: Not on file     Years of education: Not on file     Highest education level: Not on file   Occupational History     Employer: RETIRED   Social Needs     Financial resource strain: Not on file     Food insecurity:     Worry: Not on file     Inability: Not on file     Transportation needs:     Medical: Not on file     Non-medical: Not on file   Tobacco Use     Smoking status: Former Smoker     Types: Pipe     Last attempt to quit: 12/3/1999     Years since quittin.6     Smokeless tobacco: Never Used   Substance and Sexual Activity     Alcohol use: Not on file     Drug use: Not on file     Sexual activity: Not on file   Lifestyle     Physical activity:     Days per week: Not on file     Minutes per session: Not on file     Stress: Not on file   Relationships     Social connections:     Talks on phone: Not on file     Gets together: Not on file     Attends Mormonism service: Not on file     Active member of club or organization: Not on file     Attends meetings of clubs or organizations: Not  on file     Relationship status: Not on file     Intimate partner violence:     Fear of current or ex partner: Not on file     Emotionally abused: Not on file     Physically abused: Not on file     Forced sexual activity: Not on file   Other Topics Concern     Not on file   Social History Narrative     Not on file       Outpatient Encounter Medications as of 7/15/2019   Medication Sig Dispense Refill     Carboxymethylcellulose Sod PF (REFRESH CELLUVISC) 1 % ophthalmic gel Place 1 drop into both eyes 4 times daily       hypromellose-dextran (GENTEAL TEARS) 0.1-0.3 % ophthalmic solution Place 1 drop into both eyes daily as needed for dry eyes       MECLIZINE HCL PO        Multiple Vitamins-Minerals (MENS MULTIVITAMIN PLUS PO)        Multiple Vitamins-Minerals (PRESERVISION AREDS PO) Take by mouth daily       Omega-3 Fatty Acids (FISH OIL) 500 MG CAPS        No facility-administered encounter medications on file as of 7/15/2019.              Review Of Systems  Skin: As above  Eyes: negative  Ears/Nose/Throat: negative  Respiratory: No shortness of breath, dyspnea on exertion, cough, or hemoptysis  Cardiovascular: negative  Gastrointestinal: negative  Genitourinary: negative  Musculoskeletal: negative  Neurologic: negative  Psychiatric: negative  Hematologic/Lymphatic/Immunologic: negative  Endocrine: negative      O:   NAD, WDWN, Alert & Oriented, Mood & Affect wnl, Vitals stable   Here today alone   /85   Pulse 77   SpO2 96%    General appearance normal   Vitals stable   Alert, oriented and in no acute distress      Following lymph nodes palpated: Occipital, Cervical, Supraclavicular no lad   R cheek inflamed seborrheic keratosis    Gritty papules on face     Stuck on papules and brown macules on trunk and ext   Red papules on trunk  Flesh colored papules on trunk     The remainder of the full exam was unremarkable; the following areas were examined:  conjunctiva/lids, oral mucosa, neck, peripheral vascular  system, abdomen, lymph nodes, digits/nails, eccrine and apocrine glands, scalp/hair, face, neck, chest, abdomen, buttocks, back, RUE, LUE, RLE, LLE       Eyes: Conjunctivae/lids:Normal     ENT: Lips, buccal mucosa, tongue: normal    MSK:Normal    Cardiovascular: peripheral edema none    Pulm: Breathing Normal    Lymph Nodes: No Head and Neck Lymphadenopathy     Neuro/Psych: Orientation:Normal; Mood/Affect:Normal      A/P:  1. Seborrheic keratosis, lentigo, angioma, dermal nevus, hx of non-melanoma skin cancer   2. R cheek inflamed seborrheic keratosis  LN2:  Treated with LN2 for 5s for 1-2 cycles. Warned risks of blistering, pain, pigment change, scarring, and incomplete resolution.  Advised patient to return if lesions do not completely resolve.  Wound care sheet given.  3. Actinic keratosis   L cheek x2  R temple x4  LN2:  Treated with LN2 for 5s for 1-2 cycles. Warned risks of blistering, pain, pigment change, scarring, and incomplete resolution.  Advised patient to return if lesions do not completely resolve.  Wound care sheet given.    BENIGN LESIONS DISCUSSED WITH PATIENT:  I discussed the specifics of tumor, prognosis, and genetics of benign lesions.  I explained that treatment of these lesions would be purely cosmetic and not medically neccessary.  I discussed with patient different removal options including excision, cautery and /or laser.      Nature and genetics of benign skin lesions dicussed with patient.  Signs and Symptoms of skin cancer discussed with patient.  Patient encouraged to perform monthly skin exams.  UV precautions reviewed with patient.  Skin care regimen reviewed with patient: Eliminate harsh soaps, i.e. Dial, zest, irsih spring; Mild soaps such as Cetaphil or Dove sensitive skin, avoid hot or cold showers, aggressive use of emollients including vanicream, cetaphil or cerave discussed with patient.    Risks of non-melanoma skin cancer discussed with patient   Return to clinic 6  months

## 2019-07-15 NOTE — LETTER
7/15/2019         RE: Nir Guy  [address] [place] [phone]        Dear Colleague,    Thank you for referring your patient, Nir Guy, to the [facility]. Please see a copy of my visit note below.    Nir Guy is a 79 year old year old male patient here today for f/u hx of non-melanoma skin cancer.  He notes rough spot son face .   .  Patient states this has been present for a wijhle.  Patient reports the following symptoms:  Cut while shaving.  .  Patient reports the following previous treatments none.  Patient reports the following modifying factors none.  Associated symptoms: none.  Patient has no other skin complaints today.  Remainder of the HPI, Meds, PMH, Allergies, FH, and SH was reviewed in chart.      Past Medical History:   Diagnosis Date     Actinic keratosis      Basal cell carcinoma      Squamous cell carcinoma        Past Surgical History:   Procedure Laterality Date     CATARACT IOL, RT/LT          Family History   Problem Relation Age of Onset     Melanoma No family hx of        Social History     Socioeconomic History     Marital status:      Spouse name: Not on file     Number of children: Not on file     Years of education: Not on file     Highest education level: Not on file   Occupational History     Employer: RETIRED   Social Needs     Financial resource strain: Not on file     Food insecurity:     Worry: Not on file     Inability: Not on file     Transportation needs:     Medical: Not on file     Non-medical: Not on file   Tobacco Use     Smoking status: Former Smoker     Types: Pipe     Last attempt to quit: 12/3/1999     Years since quittin.6     Smokeless tobacco: Never Used   Substance and Sexual Activity     Alcohol use: Not on file     Drug use: Not on file     Sexual activity: Not on file   Lifestyle     Physical activity:     Days per week: Not on file     Minutes per session: Not on file     Stress: Not on file    Relationships     Social connections:     Talks on phone: Not on file     Gets together: Not on file     Attends Episcopal service: Not on file     Active member of club or organization: Not on file     Attends meetings of clubs or organizations: Not on file     Relationship status: Not on file     Intimate partner violence:     Fear of current or ex partner: Not on file     Emotionally abused: Not on file     Physically abused: Not on file     Forced sexual activity: Not on file   Other Topics Concern     Not on file   Social History Narrative     Not on file       Outpatient Encounter Medications as of 7/15/2019   Medication Sig Dispense Refill     Carboxymethylcellulose Sod PF (REFRESH CELLUVISC) 1 % ophthalmic gel Place 1 drop into both eyes 4 times daily       hypromellose-dextran (GENTEAL TEARS) 0.1-0.3 % ophthalmic solution Place 1 drop into both eyes daily as needed for dry eyes       MECLIZINE HCL PO        Multiple Vitamins-Minerals (MENS MULTIVITAMIN PLUS PO)        Multiple Vitamins-Minerals (PRESERVISION AREDS PO) Take by mouth daily       Omega-3 Fatty Acids (FISH OIL) 500 MG CAPS        No facility-administered encounter medications on file as of 7/15/2019.              Review Of Systems  Skin: As above  Eyes: negative  Ears/Nose/Throat: negative  Respiratory: No shortness of breath, dyspnea on exertion, cough, or hemoptysis  Cardiovascular: negative  Gastrointestinal: negative  Genitourinary: negative  Musculoskeletal: negative  Neurologic: negative  Psychiatric: negative  Hematologic/Lymphatic/Immunologic: negative  Endocrine: negative      O:   NAD, WDWN, Alert & Oriented, Mood & Affect wnl, Vitals stable   Here today alone   /85   Pulse 77   SpO2 96%    General appearance normal   Vitals stable   Alert, oriented and in no acute distress      Following lymph nodes palpated: Occipital, Cervical, Supraclavicular no lad   R cheek inflamed seborrheic keratosis    Gritty papules on  face     Stuck on papules and brown macules on trunk and ext   Red papules on trunk  Flesh colored papules on trunk     The remainder of the full exam was unremarkable; the following areas were examined:  conjunctiva/lids, oral mucosa, neck, peripheral vascular system, abdomen, lymph nodes, digits/nails, eccrine and apocrine glands, scalp/hair, face, neck, chest, abdomen, buttocks, back, RUE, LUE, RLE, LLE       Eyes: Conjunctivae/lids:Normal     ENT: Lips, buccal mucosa, tongue: normal    MSK:Normal    Cardiovascular: peripheral edema none    Pulm: Breathing Normal    Lymph Nodes: No Head and Neck Lymphadenopathy     Neuro/Psych: Orientation:Normal; Mood/Affect:Normal      A/P:  1. Seborrheic keratosis, lentigo, angioma, dermal nevus, hx of non-melanoma skin cancer   2. R cheek inflamed seborrheic keratosis  LN2:  Treated with LN2 for 5s for 1-2 cycles. Warned risks of blistering, pain, pigment change, scarring, and incomplete resolution.  Advised patient to return if lesions do not completely resolve.  Wound care sheet given.  3. Actinic keratosis   L cheek x2  R temple x4  LN2:  Treated with LN2 for 5s for 1-2 cycles. Warned risks of blistering, pain, pigment change, scarring, and incomplete resolution.  Advised patient to return if lesions do not completely resolve.  Wound care sheet given.    BENIGN LESIONS DISCUSSED WITH PATIENT:  I discussed the specifics of tumor, prognosis, and genetics of benign lesions.  I explained that treatment of these lesions would be purely cosmetic and not medically neccessary.  I discussed with patient different removal options including excision, cautery and /or laser.      Nature and genetics of benign skin lesions dicussed with patient.  Signs and Symptoms of skin cancer discussed with patient.  Patient encouraged to perform monthly skin exams.  UV precautions reviewed with patient.  Skin care regimen reviewed with patient: Eliminate harsh soaps, i.e. Dial, zest, irsih spring;  Mild soaps such as Cetaphil or Dove sensitive skin, avoid hot or cold showers, aggressive use of emollients including vanicream, cetaphil or cerave discussed with patient.    Risks of non-melanoma skin cancer discussed with patient   Return to clinic 6 months      Again, thank you for allowing me to participate in the care of your patient.        Sincerely,        Chavez Jay MD

## 2019-07-15 NOTE — PATIENT INSTRUCTIONS
WOUND CARE INSTRUCTIONS   FOR CRYOSURGERY   This area treated with liquid nitrogen should form a blister (areas treated may or may not blister-skin may just turn dark and slough off). You do not need to bandage the area unless a blister forms and breaks (which may be a few days). When the blister breaks, begin daily dressing changes as follows:  1) Clean and dry the area with tap water using clean Q-tip or sterile gauze pad.   2) Apply Polysporin ointment or Bacitracin ointment over entire wound. Do NOT use Neosporin ointment.   3) Cover the wound with a band-aid or sterile non-stick gauze pad and micropore paper tape.   REPEAT THESE INSTRUCTIONS AT LEAST ONCE A DAY UNTIL THE WOUND HAS COMPLETELY HEALED.   It is an old wives tale that a wound heals better when it is exposed to air and allowed to dry out. The wound will heal faster with a better cosmetic result if it is kept moist with ointment and covered with a bandage.   Do not let the wound dry out.   IMPORTANT INFORMATION ON REVERSE SIDE   Supplies Needed:   *Cotton tipped applicators (Q-tips)   *Polysporin ointment or Bacitracin ointment (NOT NEOSPORIN)   *Band-aids, or non stick gauze pads and micropore paper tape   PATIENT INFORMATION   During the healing process you will notice a number of changes. All wounds develop a small halo of redness surrounding the wound. This means healing is occurring. Severe itching with extensive redness usually indicates sensitivity to the ointment or bandage tape used to dress the wound. You should call our office if this develops.   Swelling and/or discoloration around your surgical site is common, particularly when performed around the eye.   All wounds normally drain. The larger the wound the more drainage there will be. After 7-10 days, you will notice the wound beginning to shrink and new skin will begin to grow. The wound is healed when you can see skin has formed over the entire area. A healed wound has a healthy, shiny  look to the surface and is red to dark pink in color to normalize. Wounds may take approximately 4-6 weeks to heal. Larger wounds may take 6-8 weeks. After the wound is healed you may discontinue dressing changes.   You may experience a sensation of tightness as your wound heals. This is normal and will gradually subside.   Your healed wound may be sensitive to temperature changes. This sensitivity improves with time, but if you re having a lot of discomfort, try to avoid temperature extremes.   Patients frequently experience itching after their wound appears to have healed because of the continue healing under the skin. Plain Vaseline will help relieve the itching.       .

## 2019-07-30 ENCOUNTER — RECORDS - HEALTHEAST (OUTPATIENT)
Dept: ADMINISTRATIVE | Facility: OTHER | Age: 79
End: 2019-07-30

## 2019-09-03 ENCOUNTER — COMMUNICATION - HEALTHEAST (OUTPATIENT)
Dept: INTERNAL MEDICINE | Facility: CLINIC | Age: 79
End: 2019-09-03

## 2019-09-06 ENCOUNTER — OFFICE VISIT - HEALTHEAST (OUTPATIENT)
Dept: INTERNAL MEDICINE | Facility: CLINIC | Age: 79
End: 2019-09-06

## 2019-09-06 DIAGNOSIS — M65.341 TRIGGER RING FINGER OF RIGHT HAND: ICD-10-CM

## 2019-09-06 DIAGNOSIS — M19.041 OSTEOARTHRITIS OF FINGERS OF BOTH HANDS: ICD-10-CM

## 2019-09-06 DIAGNOSIS — M19.042 OSTEOARTHRITIS OF FINGERS OF BOTH HANDS: ICD-10-CM

## 2019-11-04 ENCOUNTER — OFFICE VISIT - HEALTHEAST (OUTPATIENT)
Dept: FAMILY MEDICINE | Facility: CLINIC | Age: 79
End: 2019-11-04

## 2019-11-04 DIAGNOSIS — J06.9 VIRAL URI WITH COUGH: ICD-10-CM

## 2019-11-13 ENCOUNTER — OFFICE VISIT - HEALTHEAST (OUTPATIENT)
Dept: FAMILY MEDICINE | Facility: CLINIC | Age: 79
End: 2019-11-13

## 2019-11-13 ENCOUNTER — COMMUNICATION - HEALTHEAST (OUTPATIENT)
Dept: INTERNAL MEDICINE | Facility: CLINIC | Age: 79
End: 2019-11-13

## 2019-11-13 DIAGNOSIS — R05.9 COUGH: ICD-10-CM

## 2019-11-13 ASSESSMENT — MIFFLIN-ST. JEOR: SCORE: 1686.87

## 2019-11-19 ENCOUNTER — COMMUNICATION - HEALTHEAST (OUTPATIENT)
Dept: INTERNAL MEDICINE | Facility: CLINIC | Age: 79
End: 2019-11-19

## 2019-11-19 DIAGNOSIS — R05.9 COUGH: ICD-10-CM

## 2019-11-22 ENCOUNTER — OFFICE VISIT - HEALTHEAST (OUTPATIENT)
Dept: INTERNAL MEDICINE | Facility: CLINIC | Age: 79
End: 2019-11-22

## 2019-11-22 ENCOUNTER — COMMUNICATION - HEALTHEAST (OUTPATIENT)
Dept: INTERNAL MEDICINE | Facility: CLINIC | Age: 79
End: 2019-11-22

## 2019-11-22 DIAGNOSIS — R79.81 LOW OXYGEN SATURATION: ICD-10-CM

## 2019-11-22 DIAGNOSIS — R19.7 DIARRHEA, UNSPECIFIED TYPE: ICD-10-CM

## 2019-11-22 DIAGNOSIS — J98.8 RESPIRATORY INFECTION: ICD-10-CM

## 2019-11-22 DIAGNOSIS — R50.9 FEVER, UNSPECIFIED FEVER CAUSE: ICD-10-CM

## 2019-11-22 DIAGNOSIS — R05.9 COUGH: ICD-10-CM

## 2019-11-22 LAB
ALBUMIN SERPL-MCNC: 2.9 G/DL (ref 3.5–5)
ALP SERPL-CCNC: 62 U/L (ref 45–120)
ALT SERPL W P-5'-P-CCNC: 28 U/L (ref 0–45)
ANION GAP SERPL CALCULATED.3IONS-SCNC: 12 MMOL/L (ref 5–18)
AST SERPL W P-5'-P-CCNC: 26 U/L (ref 0–40)
BASOPHILS # BLD AUTO: 0.1 THOU/UL (ref 0–0.2)
BASOPHILS NFR BLD AUTO: 1 % (ref 0–2)
BILIRUB SERPL-MCNC: 0.4 MG/DL (ref 0–1)
BUN SERPL-MCNC: 8 MG/DL (ref 8–28)
C REACTIVE PROTEIN LHE: 8.1 MG/DL (ref 0–0.8)
CALCIUM SERPL-MCNC: 8.3 MG/DL (ref 8.5–10.5)
CHLORIDE BLD-SCNC: 99 MMOL/L (ref 98–107)
CO2 SERPL-SCNC: 27 MMOL/L (ref 22–31)
CREAT SERPL-MCNC: 0.79 MG/DL (ref 0.7–1.3)
EOSINOPHIL # BLD AUTO: 0.4 THOU/UL (ref 0–0.4)
EOSINOPHIL NFR BLD AUTO: 3 % (ref 0–6)
ERYTHROCYTE [DISTWIDTH] IN BLOOD BY AUTOMATED COUNT: 10.9 % (ref 11–14.5)
GFR SERPL CREATININE-BSD FRML MDRD: >60 ML/MIN/1.73M2
GLUCOSE BLD-MCNC: 102 MG/DL (ref 70–125)
HCT VFR BLD AUTO: 42.1 % (ref 40–54)
HGB BLD-MCNC: 14.2 G/DL (ref 14–18)
LYMPHOCYTES # BLD AUTO: 1.7 THOU/UL (ref 0.8–4.4)
LYMPHOCYTES NFR BLD AUTO: 13 % (ref 20–40)
MCH RBC QN AUTO: 32 PG (ref 27–34)
MCHC RBC AUTO-ENTMCNC: 33.7 G/DL (ref 32–36)
MCV RBC AUTO: 95 FL (ref 80–100)
MONOCYTES # BLD AUTO: 0.7 THOU/UL (ref 0–0.9)
MONOCYTES NFR BLD AUTO: 5 % (ref 2–10)
NEUTROPHILS # BLD AUTO: 10.5 THOU/UL (ref 2–7.7)
NEUTROPHILS NFR BLD AUTO: 79 % (ref 50–70)
PLATELET # BLD AUTO: 329 THOU/UL (ref 140–440)
PMV BLD AUTO: 6.4 FL (ref 7–10)
POTASSIUM BLD-SCNC: 4.3 MMOL/L (ref 3.5–5)
PROCALCITONIN SERPL-MCNC: 0.04 NG/ML (ref 0–0.49)
PROT SERPL-MCNC: 5.7 G/DL (ref 6–8)
RBC # BLD AUTO: 4.43 MILL/UL (ref 4.4–6.2)
SODIUM SERPL-SCNC: 138 MMOL/L (ref 136–145)
WBC: 13.4 THOU/UL (ref 4–11)

## 2019-11-26 ENCOUNTER — OFFICE VISIT - HEALTHEAST (OUTPATIENT)
Dept: INTERNAL MEDICINE | Facility: CLINIC | Age: 79
End: 2019-11-26

## 2019-11-26 DIAGNOSIS — R05.9 COUGH: ICD-10-CM

## 2019-11-26 DIAGNOSIS — J98.8 RESPIRATORY INFECTION: ICD-10-CM

## 2019-11-26 DIAGNOSIS — R05.8 RECURRENT COUGH: ICD-10-CM

## 2019-11-26 DIAGNOSIS — R50.9 FEVER, UNSPECIFIED FEVER CAUSE: ICD-10-CM

## 2019-11-26 DIAGNOSIS — R79.81 LOW OXYGEN SATURATION: ICD-10-CM

## 2019-11-27 LAB
AEROBIC BLOOD CULTURE BOTTLE: NO GROWTH
ANAEROBIC BLOOD CULTURE BOTTLE: NO GROWTH

## 2020-01-13 ENCOUNTER — RECORDS - HEALTHEAST (OUTPATIENT)
Dept: ADMINISTRATIVE | Facility: OTHER | Age: 80
End: 2020-01-13

## 2020-01-13 ENCOUNTER — COMMUNICATION - HEALTHEAST (OUTPATIENT)
Dept: INTERNAL MEDICINE | Facility: CLINIC | Age: 80
End: 2020-01-13

## 2020-01-13 ENCOUNTER — OFFICE VISIT (OUTPATIENT)
Dept: DERMATOLOGY | Facility: CLINIC | Age: 80
End: 2020-01-13
Payer: MEDICARE

## 2020-01-13 ENCOUNTER — TELEPHONE (OUTPATIENT)
Dept: DERMATOLOGY | Facility: CLINIC | Age: 80
End: 2020-01-13

## 2020-01-13 VITALS — OXYGEN SATURATION: 94 % | DIASTOLIC BLOOD PRESSURE: 81 MMHG | SYSTOLIC BLOOD PRESSURE: 135 MMHG | HEART RATE: 85 BPM

## 2020-01-13 DIAGNOSIS — D18.01 ANGIOMA OF SKIN: ICD-10-CM

## 2020-01-13 DIAGNOSIS — Z85.828 HISTORY OF SKIN CANCER: ICD-10-CM

## 2020-01-13 DIAGNOSIS — D23.9 DERMAL NEVUS: ICD-10-CM

## 2020-01-13 DIAGNOSIS — L81.4 LENTIGO: ICD-10-CM

## 2020-01-13 DIAGNOSIS — L82.1 SEBORRHEIC KERATOSIS: ICD-10-CM

## 2020-01-13 DIAGNOSIS — L57.0 AK (ACTINIC KERATOSIS): Primary | ICD-10-CM

## 2020-01-13 PROCEDURE — 99214 OFFICE O/P EST MOD 30 MIN: CPT | Mod: 25 | Performed by: DERMATOLOGY

## 2020-01-13 PROCEDURE — 11102 TANGNTL BX SKIN SINGLE LES: CPT | Mod: 59 | Performed by: DERMATOLOGY

## 2020-01-13 PROCEDURE — 17003 DESTRUCT PREMALG LES 2-14: CPT | Mod: 59 | Performed by: DERMATOLOGY

## 2020-01-13 PROCEDURE — 17311 MOHS 1 STAGE H/N/HF/G: CPT | Performed by: DERMATOLOGY

## 2020-01-13 PROCEDURE — 11103 TANGNTL BX SKIN EA SEP/ADDL: CPT | Performed by: DERMATOLOGY

## 2020-01-13 PROCEDURE — 88331 PATH CONSLTJ SURG 1 BLK 1SPC: CPT | Mod: 59 | Performed by: DERMATOLOGY

## 2020-01-13 PROCEDURE — 17000 DESTRUCT PREMALG LESION: CPT | Mod: 59 | Performed by: DERMATOLOGY

## 2020-01-13 NOTE — LETTER
[facility]  [address] [facility] [address]  Phone: [phone]    January 13, 2020    Nir Guy                                                                                                                 [address] [place] [id number]            Dear Mr. Guy,    You are scheduled for Mohs Surgery on     Please check in at Dermatology Clinic.   (2nd Floor, last  Clinic on right up staircase or elevator -past OB/GYN clinic)    You don't need to arrive more than 5-10 minutes prior to your appointment time.     Be sure to eat a good breakfast and bathe and wash your hair prior to Surgery.    If you are taking any anti-coagulants that are prescribed by your Doctor (such as Coumadin/warfarin, Plavix, Aspirin, Ibuprofen), please continue taking them.     However, If you are taking anti-coagulants over the counter without  a Doctor's order for a Medical condition, please discontinue them 10 days prior to Surgery.      Please wear loose comfortable clothing as it could possibly be 4-6 hours until your surgery is completed depending upon how many layers of tissue need to be removed.     Wi-fi access is available.     Thank you,      Chavez Jay MD/ Anu Patino RN

## 2020-01-13 NOTE — TELEPHONE ENCOUNTER
Patient notified. Patient verbalized understanding.     Scheduled for MOHS Surgery. Mohs Pre-op info reviewed with patient as appt is tomorrow. I did advise he may need a 2nd appt, but that will be up to Dr. Jay.    Anu Patino RN

## 2020-01-13 NOTE — LETTER
2020         RE: Nir Guy  [address] [place] [id number]        Dear Colleague,    Thank you for referring your patient, Nir Guy, to the [facility]. Please see a copy of my visit note below.    Nir Guy is a 80 year old year old male patient here today for tender spot on scalp and bleeding spot on forehead.   .  Patient states this has been present for ?.  Patient reports the following symptoms:  bleeding.  Patient reports the following previous treatments cryo.  These treatments did not work.  Patient reports the following modifying factors none.  Associated symptoms: none.  Patient has no other skin complaints today.  Remainder of the HPI, Meds, PMH, Allergies, FH, and SH was reviewed in chart.      Past Medical History:   Diagnosis Date     Actinic keratosis      Basal cell carcinoma      Squamous cell carcinoma        Past Surgical History:   Procedure Laterality Date     CATARACT IOL, RT/LT          Family History   Problem Relation Age of Onset     Melanoma No family hx of        Social History     Socioeconomic History     Marital status:      Spouse name: Not on file     Number of children: Not on file     Years of education: Not on file     Highest education level: Not on file   Occupational History     Employer: RETIRED   Social Needs     Financial resource strain: Not on file     Food insecurity:     Worry: Not on file     Inability: Not on file     Transportation needs:     Medical: Not on file     Non-medical: Not on file   Tobacco Use     Smoking status: Former Smoker     Types: Pipe     Last attempt to quit: 12/3/1999     Years since quittin.1     Smokeless tobacco: Never Used   Substance and Sexual Activity     Alcohol use: Not on file     Drug use: Not on file     Sexual activity: Not on file   Lifestyle     Physical activity:     Days per week: Not on file     Minutes per session: Not on file     Stress: Not on file    Relationships     Social connections:     Talks on phone: Not on file     Gets together: Not on file     Attends Yazidi service: Not on file     Active member of club or organization: Not on file     Attends meetings of clubs or organizations: Not on file     Relationship status: Not on file     Intimate partner violence:     Fear of current or ex partner: Not on file     Emotionally abused: Not on file     Physically abused: Not on file     Forced sexual activity: Not on file   Other Topics Concern     Not on file   Social History Narrative     Not on file       Outpatient Encounter Medications as of 1/13/2020   Medication Sig Dispense Refill     Carboxymethylcellulose Sod PF (REFRESH CELLUVISC) 1 % ophthalmic gel Place 1 drop into both eyes 4 times daily       hypromellose-dextran (GENTEAL TEARS) 0.1-0.3 % ophthalmic solution Place 1 drop into both eyes daily as needed for dry eyes       MECLIZINE HCL PO        Multiple Vitamins-Minerals (MENS MULTIVITAMIN PLUS PO)        Multiple Vitamins-Minerals (PRESERVISION AREDS PO) Take by mouth daily       Omega-3 Fatty Acids (FISH OIL) 500 MG CAPS        No facility-administered encounter medications on file as of 1/13/2020.              Review Of Systems  Skin: As above  Eyes: negative  Ears/Nose/Throat: negative  Respiratory: No shortness of breath, dyspnea on exertion, cough, or hemoptysis  Cardiovascular: negative  Gastrointestinal: negative  Genitourinary: negative  Musculoskeletal: negative  Neurologic: negative  Psychiatric: negative  Hematologic/Lymphatic/Immunologic: negative  Endocrine: negative      O:   NAD, WDWN, Alert & Oriented, Mood & Affect wnl, Vitals stable   Here today alone   /81   Pulse 85   SpO2 94%    General appearance normal   Vitals stable   Alert, oriented and in no acute distress      Following lymph nodes palpated: Occipital, Cervical, Supraclavicular no lad   Gritty papules ons calp and R temple   L forehead bleeding 4mm scaly  papule    L parietal scalp 8mm tender scaly papule    R parietal scalp 5mm tender scaly papule        Stuck on papules and brown macules on trunk and ext   Red papules on trunk  Flesh colored papules on trunk     The remainder of the full exam was unremarkable; the following areas were examined:  conjunctiva/lids, oral mucosa, neck, peripheral vascular system, abdomen, lymph nodes, digits/nails, eccrine and apocrine glands, scalp/hair, face, neck, chest, abdomen, buttocks, back, RUE, LUE, RLE, LLE       Eyes: Conjunctivae/lids:Normal     ENT: Lips, buccal mucosa, tongue: normal    MSK:Normal    Cardiovascular: peripheral edema none    Pulm: Breathing Normal    Lymph Nodes: No Head and Neck Lymphadenopathy     Neuro/Psych: Orientation:Alert and Orientedx3 ; Mood/Affect:normal       MICRO:     L forehead:There is hyperkeratosis & parakeratosis of the epidermis, with full thickness epidermal involvement by atypical keratinocytes with rare pale vacuolated cells invading dermis.    L parietal scalp:There is hyperkeratosis & parakeratosis of the epidermis, with full thickness epidermal involvement by atypical keratinocytes with rare pale vacuolated cells invading dermis.    R parietal scalp:There is hyperkeratosis & parakeratosis of the epidermis, with full thickness epidermal involvement by atypical keratinocytes with rare pale vacuolated cells invading dermis.    A/P:  1. Seborrheic keratosis, lentigo, angioma, dermal nevus, hx of non-melanoma skin cancer   2. Actinic keratosis   Scalp x5  R temple x3  LN2:  Treated with LN2 for 5s for 1-2 cycles. Warned risks of blistering, pain, pigment change, scarring, and incomplete resolution.  Advised patient to return if lesions do not completely resolve.  Wound care sheet given.  3. R/o squamous cell carcinoma   TANGENTIAL BIOPSY IN HOUSE:  After consent, anesthesia with LEC and prep, tangential excision performed and dx above confirmed with frozen section histology.  No  complications and routine wound care.      I have personally reviewed all specimens and/or slides and used them with my medical judgement to determine or confirm the final diagnosis.     Patient told result   L forehead squamous cell carcinoma schedule   L parietal scalp squamous cell carcinoma schedule  R parietal scalp  Squamous cell carcinoma treated     BENIGN LESIONS DISCUSSED WITH PATIENT:  I discussed the specifics of tumor, prognosis, and genetics of benign lesions.  I explained that treatment of these lesions would be purely cosmetic and not medically neccessary.  I discussed with patient different removal options including excision, cautery and /or laser.      Nature and genetics of benign skin lesions dicussed with patient.  Signs and Symptoms of skin cancer discussed with patient.  ABCDEs of melanoma reviewed with patient.  Patient encouraged to perform monthly skin exams.  UV precautions reviewed with patient.  Patient to follow up with Primary Care provider regarding elevated blood pressure.  Skin care regimen reviewed with patient: Eliminate harsh soaps, i.e. Dial, zest, irsih spring; Mild soaps such as Cetaphil or Dove sensitive skin, avoid hot or cold showers, aggressive use of emollients including vanicream, cetaphil or cerave discussed with patient.    Risks of non-melanoma skin cancer discussed with patient   Return to clinic next appt    PROCEDURE NOTE    R parietal scalp squamous cell carcinoma   MOHS:   Location    After PGACAC discussed with patient, decision for Mohs surgery was made. Indication for Mohs was Location. Patient confirmed the site with Dr. Jay.  After anesthesia with LEC, the tumor was excised using standard Mohs technique in 1 stages(s).  CLEAR MARGINS OBTAINED and Final defect size was 1 cm.       REPAIR SECOND INTENT: We discussed the options for wound management in full with the patient including risks/benefits/possible outcomes. Decision made to allow the wound to heal  by second intention. EBL minimal; complications none; wound care routine.  The patient was discharged in good condition and will return in one month or prn for wound evaluation.      Again, thank you for allowing me to participate in the care of your patient.        Sincerely,        Chavez Jay MD

## 2020-01-13 NOTE — TELEPHONE ENCOUNTER
----- Message from Chavez Jay MD sent at 1/13/2020 10:36 AM CST -----  L forehead squamous cell carcinoma schedule   L parietal scalp squamous cell carcinoma schedule  R parietal scalp  Squamous cell carcinoma treated

## 2020-01-13 NOTE — PROGRESS NOTES
Nir Guy is a 80 year old year old male patient here today for tender spot on scalp and bleeding spot on forehead.   .  Patient states this has been present for ?.  Patient reports the following symptoms:  bleeding.  Patient reports the following previous treatments cryo.  These treatments did not work.  Patient reports the following modifying factors none.  Associated symptoms: none.  Patient has no other skin complaints today.  Remainder of the HPI, Meds, PMH, Allergies, FH, and SH was reviewed in chart.      Past Medical History:   Diagnosis Date     Actinic keratosis      Basal cell carcinoma      Squamous cell carcinoma        Past Surgical History:   Procedure Laterality Date     CATARACT IOL, RT/LT          Family History   Problem Relation Age of Onset     Melanoma No family hx of        Social History     Socioeconomic History     Marital status:      Spouse name: Not on file     Number of children: Not on file     Years of education: Not on file     Highest education level: Not on file   Occupational History     Employer: RETIRED   Social Needs     Financial resource strain: Not on file     Food insecurity:     Worry: Not on file     Inability: Not on file     Transportation needs:     Medical: Not on file     Non-medical: Not on file   Tobacco Use     Smoking status: Former Smoker     Types: Pipe     Last attempt to quit: 12/3/1999     Years since quittin.1     Smokeless tobacco: Never Used   Substance and Sexual Activity     Alcohol use: Not on file     Drug use: Not on file     Sexual activity: Not on file   Lifestyle     Physical activity:     Days per week: Not on file     Minutes per session: Not on file     Stress: Not on file   Relationships     Social connections:     Talks on phone: Not on file     Gets together: Not on file     Attends Islam service: Not on file     Active member of club or organization: Not on file     Attends meetings of clubs or organizations: Not on  file     Relationship status: Not on file     Intimate partner violence:     Fear of current or ex partner: Not on file     Emotionally abused: Not on file     Physically abused: Not on file     Forced sexual activity: Not on file   Other Topics Concern     Not on file   Social History Narrative     Not on file       Outpatient Encounter Medications as of 1/13/2020   Medication Sig Dispense Refill     Carboxymethylcellulose Sod PF (REFRESH CELLUVISC) 1 % ophthalmic gel Place 1 drop into both eyes 4 times daily       hypromellose-dextran (GENTEAL TEARS) 0.1-0.3 % ophthalmic solution Place 1 drop into both eyes daily as needed for dry eyes       MECLIZINE HCL PO        Multiple Vitamins-Minerals (MENS MULTIVITAMIN PLUS PO)        Multiple Vitamins-Minerals (PRESERVISION AREDS PO) Take by mouth daily       Omega-3 Fatty Acids (FISH OIL) 500 MG CAPS        No facility-administered encounter medications on file as of 1/13/2020.              Review Of Systems  Skin: As above  Eyes: negative  Ears/Nose/Throat: negative  Respiratory: No shortness of breath, dyspnea on exertion, cough, or hemoptysis  Cardiovascular: negative  Gastrointestinal: negative  Genitourinary: negative  Musculoskeletal: negative  Neurologic: negative  Psychiatric: negative  Hematologic/Lymphatic/Immunologic: negative  Endocrine: negative      O:   NAD, WDWN, Alert & Oriented, Mood & Affect wnl, Vitals stable   Here today alone   /80   Pulse 85   SpO2 94%    General appearance normal   Vitals stable   Alert, oriented and in no acute distress      Following lymph nodes palpated: Occipital, Cervical, Supraclavicular no lad   Gritty papules ons calp and R temple   L forehead bleeding 4mm scaly papule    L parietal scalp 8mm tender scaly papule    R parietal scalp 5mm tender scaly papule        Stuck on papules and brown macules on trunk and ext   Red papules on trunk  Flesh colored papules on trunk     The remainder of the full exam was  unremarkable; the following areas were examined:  conjunctiva/lids, oral mucosa, neck, peripheral vascular system, abdomen, lymph nodes, digits/nails, eccrine and apocrine glands, scalp/hair, face, neck, chest, abdomen, buttocks, back, RUE, LUE, RLE, LLE       Eyes: Conjunctivae/lids:Normal     ENT: Lips, buccal mucosa, tongue: normal    MSK:Normal    Cardiovascular: peripheral edema none    Pulm: Breathing Normal    Lymph Nodes: No Head and Neck Lymphadenopathy     Neuro/Psych: Orientation:Alert and Orientedx3 ; Mood/Affect:normal       MICRO:     L forehead:There is hyperkeratosis & parakeratosis of the epidermis, with full thickness epidermal involvement by atypical keratinocytes with rare pale vacuolated cells invading dermis.    L parietal scalp:There is hyperkeratosis & parakeratosis of the epidermis, with full thickness epidermal involvement by atypical keratinocytes with rare pale vacuolated cells invading dermis.    R parietal scalp:There is hyperkeratosis & parakeratosis of the epidermis, with full thickness epidermal involvement by atypical keratinocytes with rare pale vacuolated cells invading dermis.    A/P:  1. Seborrheic keratosis, lentigo, angioma, dermal nevus, hx of non-melanoma skin cancer   2. Actinic keratosis   Scalp x5  R temple x3  LN2:  Treated with LN2 for 5s for 1-2 cycles. Warned risks of blistering, pain, pigment change, scarring, and incomplete resolution.  Advised patient to return if lesions do not completely resolve.  Wound care sheet given.  3. R/o squamous cell carcinoma   TANGENTIAL BIOPSY IN HOUSE:  After consent, anesthesia with LEC and prep, tangential excision performed and dx above confirmed with frozen section histology.  No complications and routine wound care.      I have personally reviewed all specimens and/or slides and used them with my medical judgement to determine or confirm the final diagnosis.     Patient told result   L forehead squamous cell carcinoma schedule    L parietal scalp squamous cell carcinoma schedule  R parietal scalp  Squamous cell carcinoma treated     BENIGN LESIONS DISCUSSED WITH PATIENT:  I discussed the specifics of tumor, prognosis, and genetics of benign lesions.  I explained that treatment of these lesions would be purely cosmetic and not medically neccessary.  I discussed with patient different removal options including excision, cautery and /or laser.      Nature and genetics of benign skin lesions dicussed with patient.  Signs and Symptoms of skin cancer discussed with patient.  ABCDEs of melanoma reviewed with patient.  Patient encouraged to perform monthly skin exams.  UV precautions reviewed with patient.  Patient to follow up with Primary Care provider regarding elevated blood pressure.  Skin care regimen reviewed with patient: Eliminate harsh soaps, i.e. Dial, zest, irsih spring; Mild soaps such as Cetaphil or Dove sensitive skin, avoid hot or cold showers, aggressive use of emollients including vanicream, cetaphil or cerave discussed with patient.    Risks of non-melanoma skin cancer discussed with patient   Return to clinic next appt    PROCEDURE NOTE    R parietal scalp squamous cell carcinoma   MOHS:   Location    After PGACAC discussed with patient, decision for Mohs surgery was made. Indication for Mohs was Location. Patient confirmed the site with Dr. Jay.  After anesthesia with LEC, the tumor was excised using standard Mohs technique in 1 stages(s).  CLEAR MARGINS OBTAINED and Final defect size was 1 cm.       REPAIR SECOND INTENT: We discussed the options for wound management in full with the patient including risks/benefits/possible outcomes. Decision made to allow the wound to heal by second intention. EBL minimal; complications none; wound care routine.  The patient was discharged in good condition and will return in one month or prn for wound evaluation.

## 2020-01-13 NOTE — PATIENT INSTRUCTIONS
Wound Care Instructions     FOR SUPERFICIAL WOUNDS     [facility] [phone]    [facility] [phone]                       AFTER 24 HOURS YOU SHOULD REMOVE THE BANDAGE AND BEGIN DAILY DRESSING CHANGES AS FOLLOWS:     1) Remove Dressing.     2) Clean and dry the area with tap water using a Q-tip or sterile gauze pad.     3) Apply Vaseline, Aquaphor, Polysporin ointment or Bacitracin ointment over entire wound.  Do NOT use Neosporin ointment.     4) Cover the wound with a band-aid, or a sterile non-stick gauze pad and micropore paper tape      REPEAT THESE INSTRUCTIONS AT LEAST ONCE A DAY UNTIL THE WOUND HAS COMPLETELY HEALED.    It is an old wives tale that a wound heals better when it is exposed to air and allowed to dry out. The wound will heal faster with a better cosmetic result if it is kept moist with ointment and covered with a bandage.    **Do not let the wound dry out.**      Supplies Needed:      *Cotton tipped applicators (Q-tips)    *Polysporin Ointment or Bacitracin Ointment (NOT NEOSPORIN)    *Band-aids or non-stick gauze pads and micropore paper tape.      PATIENT INFORMATION:    During the healing process you will notice a number of changes. All wounds develop a small halo of redness surrounding the wound.  This means healing is occurring. Severe itching with extensive redness usually indicates sensitivity to the ointment or bandage tape used to dress the wound.  You should call our office if this develops.      Swelling  and/or discoloration around your surgical site is common, particularly when performed around the eye.    All wounds normally drain.  The larger the wound the more drainage there will be.  After 7-10 days, you will notice the wound beginning to shrink and new skin will begin to grow.  The wound is healed when you can see skin has formed over the entire area.  A healed wound has a healthy, shiny look to the surface and is red to dark pink in color  to normalize.  Wounds may take approximately 4-6 weeks to heal.  Larger wounds may take 6-8 weeks.  After the wound is healed you may discontinue dressing changes.    You may experience a sensation of tightness as your wound heals. This is normal and will gradually subside.    Your healed wound may be sensitive to temperature changes. This sensitivity improves with time, but if you re having a lot of discomfort, try to avoid temperature extremes.    Patients frequently experience itching after their wound appears to have healed because of the continue healing under the skin.  Plain Vaseline will help relieve the itching.        POSSIBLE COMPLICATIONS    BLEEDIN. Leave the bandage in place.  2. Use tightly rolled up gauze or a cloth to apply direct pressure over the bandage for 30  minutes.  3. Reapply pressure for an additional 30 minutes if necessary  4. Use additional gauze and tape to maintain pressure once the bleeding has stopped.    WOUND CARE INSTRUCTIONS   FOR CRYOSURGERY   This area treated with liquid nitrogen should form a blister (areas treated may or may not blister-skin may just turn dark and slough off). You do not need to bandage the area unless a blister forms and breaks (which may be a few days). When the blister breaks, begin daily dressing changes as follows:  1) Clean and dry the area with tap water using clean Q-tip or sterile gauze pad.   2) Apply Polysporin ointment or Bacitracin ointment over entire wound. Do NOT use Neosporin ointment.   3) Cover the wound with a band-aid or sterile non-stick gauze pad and micropore paper tape.   REPEAT THESE INSTRUCTIONS AT LEAST ONCE A DAY UNTIL THE WOUND HAS COMPLETELY HEALED.   It is an old wives tale that a wound heals better when it is exposed to air and allowed to dry out. The wound will heal faster with a better cosmetic result if it is kept moist with ointment and covered with a bandage.   Do not let the wound dry out.   IMPORTANT  INFORMATION ON REVERSE SIDE   Supplies Needed:   *Cotton tipped applicators (Q-tips)   *Polysporin ointment or Bacitracin ointment (NOT NEOSPORIN)   *Band-aids, or non stick gauze pads and micropore paper tape   PATIENT INFORMATION   During the healing process you will notice a number of changes. All wounds develop a small halo of redness surrounding the wound. This means healing is occurring. Severe itching with extensive redness usually indicates sensitivity to the ointment or bandage tape used to dress the wound. You should call our office if this develops.   Swelling and/or discoloration around your surgical site is common, particularly when performed around the eye.   All wounds normally drain. The larger the wound the more drainage there will be. After 7-10 days, you will notice the wound beginning to shrink and new skin will begin to grow. The wound is healed when you can see skin has formed over the entire area. A healed wound has a healthy, shiny look to the surface and is red to dark pink in color to normalize. Wounds may take approximately 4-6 weeks to heal. Larger wounds may take 6-8 weeks. After the wound is healed you may discontinue dressing changes.   You may experience a sensation of tightness as your wound heals. This is normal and will gradually subside.   Your healed wound may be sensitive to temperature changes. This sensitivity improves with time, but if you re having a lot of discomfort, try to avoid temperature extremes.   Patients frequently experience itching after their wound appears to have healed because of the continue healing under the skin. Plain Vaseline will help relieve the itching.

## 2020-01-14 ENCOUNTER — OFFICE VISIT (OUTPATIENT)
Dept: DERMATOLOGY | Facility: CLINIC | Age: 80
End: 2020-01-14
Payer: MEDICARE

## 2020-01-14 VITALS — HEART RATE: 80 BPM | BODY MASS INDEX: 29.16 KG/M2 | HEIGHT: 72 IN | OXYGEN SATURATION: 94 %

## 2020-01-14 DIAGNOSIS — C44.329 SQUAMOUS CELL CARCINOMA OF FOREHEAD: ICD-10-CM

## 2020-01-14 DIAGNOSIS — C44.42 SQUAMOUS CELL CARCINOMA OF SCALP: Primary | ICD-10-CM

## 2020-01-14 PROCEDURE — 17311 MOHS 1 STAGE H/N/HF/G: CPT | Mod: 79 | Performed by: DERMATOLOGY

## 2020-01-14 PROCEDURE — 17311 MOHS 1 STAGE H/N/HF/G: CPT | Mod: 59 | Performed by: DERMATOLOGY

## 2020-01-14 NOTE — NURSING NOTE
Chief Complaint   Patient presents with     Derm Problem     mohs       Vitals:    01/14/20 0725   Pulse: 80   SpO2: 94%   Height: 1.829 m (6')     Wt Readings from Last 1 Encounters:   01/09/19 97.5 kg (215 lb)       Kath Smith LPN.................1/14/2020

## 2020-01-14 NOTE — PATIENT INSTRUCTIONS
Open Wound Care     for ______________        ? No strenuous activity for 48 hours    ? Take Tylenol as needed for discomfort.                                                .         ? Do not drink alcoholic beverages for 48 hours.    ? Keep the pressure bandage in place for 24 hours. If the bandage becomes blood tinged or loose, reinforce it with gauze and tape.        (Refer to the reverse side of this page for management of bleeding).    ? Remove bandage in 24 hours and begin wound care as follows:     1. Clean area with tap water using a Q tip or gauze pad, (shower / bathe normally)  2. Dry wound with Q tip or gauze pad  3. Apply Aquaphor, Vaseline, Polysporin or Bacitracin Ointment with a Q tip    Do NOT use Neosporin Ointment *  4. Cover the wound with a band-aid or nonstick gauze pad and paper tape.  5. Repeat wound care once a day until wound is completely healed.    It is an old wives tale that a wound heals better when it is exposed to air and allowed to dry out. The wound will heal faster with a better cosmetic result if it is kept moist with ointment and covered with a bandage.  Do not let the wound dry out.      Supplies Needed:                Qtips or gauze pads                Polysporin or Bacitracin Ointment                Bandaids or nonstick gauze pads and paper tape    Wound care kits and brown paper tape are available for purchase at   the pharmacy.    BLEEDIN. Use tightly rolled up gauze or cloth to apply direct pressure over the bandage for 20   minutes.  2. Reapply pressure for an additional 20 minutes if necessary  3. Call the office or go to the nearest emergency room if pressure fails to stop the bleeding.  4. Use additional gauze and tape to maintain pressure once the bleeding has stopped.  5. Begin wound care 24 hours after surgery as directed.                  WOUND HEALING    1. One week after surgery a pink / red halo will form around the outside of the wound.   This is new  skin.  2. The center of the wound will appear yellowish white and produce some drainage.  3. The pink halo will slowly migrate in toward the center of the wound until the wound is covered with new shiny pink skin.  4. There will be no more drainage when the wound is completely healed.  5. It will take six months to one year for the redness to fade.  6. The scar may be itchy, tight and sensitive to extreme temperatures for a year after the surgery.  7. Massaging the area several times a day for several minutes after the wound is completely healed will help the scar soften and normalize faster. Begin massage only after healing is complete.      In case of emergency call: Dr Jay: [phone]     [facility]: [phone]    [facility]: [phone]

## 2020-01-14 NOTE — LETTER
2020         RE: Nir Guy  [address] [place] [address] [place]  [facility] [id number]        Dear Colleague,    Thank you for referring your patient, Nir Guy, to the [facility]. Please see a copy of my visit note below.    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  Previous site healing well.  Patient has no other skin complaints today.  Remainder of the HPI, Meds, PMH, Allergies, FH, and SH was reviewed in chart.      Past Medical History:   Diagnosis Date     Actinic keratosis      Basal cell carcinoma      Squamous cell carcinoma        Past Surgical History:   Procedure Laterality Date     CATARACT IOL, RT/LT          Family History   Problem Relation Age of Onset     Melanoma No family hx of        Social History     Socioeconomic History     Marital status:      Spouse name: Not on file     Number of children: Not on file     Years of education: Not on file     Highest education level: Not on file   Occupational History     Employer: RETIRED   Social Needs     Financial resource strain: Not on file     Food insecurity:     Worry: Not on file     Inability: Not on file     Transportation needs:     Medical: Not on file     Non-medical: Not on file   Tobacco Use     Smoking status: Former Smoker     Types: Pipe     Last attempt to quit: 12/3/1999     Years since quittin.1     Smokeless tobacco: Never Used   Substance and Sexual Activity     Alcohol use: Not on file     Drug use: Not on file     Sexual activity: Not on file   Lifestyle     Physical activity:     Days per week: Not on file     Minutes per session: Not on file     Stress: Not on file   Relationships     Social connections:     Talks on phone: Not on file     Gets together: Not on file     Attends [employer] service: Not on file     Active member of club or organization: Not on file     Attends meetings of clubs or organizations: Not on  file     Relationship status: Not on file     Intimate partner violence:     Fear of current or ex partner: Not on file     Emotionally abused: Not on file     Physically abused: Not on file     Forced sexual activity: Not on file   Other Topics Concern     Not on file   Social History Narrative     Not on file       Outpatient Encounter Medications as of 1/14/2020   Medication Sig Dispense Refill     Carboxymethylcellulose Sod PF (REFRESH CELLUVISC) 1 % ophthalmic gel Place 1 drop into both eyes 4 times daily       hypromellose-dextran (GENTEAL TEARS) 0.1-0.3 % ophthalmic solution Place 1 drop into both eyes daily as needed for dry eyes       MECLIZINE HCL PO        Multiple Vitamins-Minerals (MENS MULTIVITAMIN PLUS PO)        Multiple Vitamins-Minerals (PRESERVISION AREDS PO) Take by mouth daily       Omega-3 Fatty Acids (FISH OIL) 500 MG CAPS        No facility-administered encounter medications on file as of 1/14/2020.              Review Of Systems  Skin: As above  Eyes: negative  Ears/Nose/Throat: negative  Respiratory: No shortness of breath, dyspnea on exertion, cough, or hemoptysis  Cardiovascular: negative  Gastrointestinal: negative  Genitourinary: negative  Musculoskeletal: negative  Neurologic: negative  Psychiatric: negative  Hematologic/Lymphatic/Immunologic: negative  Endocrine: negative      O:   NAD, WDWN, Alert & Oriented, Mood & Affect wnl, Vitals stable   Here today alone   Pulse 80   Ht 1.829 m (6')   SpO2 94%   BMI 29.16 kg/m      General appearance normal   Vitals stable   Alert, oriented and in no acute distress      Following lymph nodes palpated: Occipital, Cervical, Supraclavicular no lad   L forehead 5mm red scaly papule    L parietal scalp 8mm scaly papule       Eyes: Conjunctivae/lids:Normal     ENT: Lips, buccal mucosa, tongue: normal    MSK:Normal    Cardiovascular: peripheral edema none    Pulm: Breathing Normal    Lymph Nodes: No Head and Neck Lymphadenopathy     Neuro/Psych:  Orientation:Alert and Orientedx3 ; Mood/Affect:normal       A/P:  1. L forehead squamous cell carcinoma   MOHS:   Location    After PGACAC discussed with patient, decision for Mohs surgery was made. Indication for Mohs was Location. Patient confirmed the site with Dr. Jay.  After anesthesia with LEC, the tumor was excised using standard Mohs technique in 1 stages(s).  CLEAR MARGINS OBTAINED and Final defect size was 1 cm.       REPAIR SECOND INTENT: We discussed the options for wound management in full with the patient including risks/benefits/possible outcomes. Decision made to allow the wound to heal by second intention. EBL minimal; complications none; wound care routine.  The patient was discharged in good condition and will return in one month or prn for wound evaluation.  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 Indication for Mohs was Location. Patient confirmed the site with Dr. Jay.  After anesthesia with LEC, the tumor was excised using standard Mohs technique in 1 stages(s).  CLEAR MARGINS OBTAINED and Final defect size was 1.3 x 1.1 cm.       REPAIR SECOND INTENT: We discussed the options for wound management in full with the patient including risks/benefits/possible outcomes. Decision made to allow the wound to heal by second intention. EBL minimal; complications none; wound care routine.  The patient was discharged in good condition and will return in one month or prn for wound evaluation.  BENIGN LESIONS DISCUSSED WITH PATIENT:  I discussed the specifics of tumor, prognosis, and genetics of benign lesions.  I explained that treatment of these lesions would be purely cosmetic and not medically neccessary.  I discussed with patient different removal options including excision, cautery and /or laser.      Nature and genetics of benign skin lesions dicussed with patient.  Signs and Symptoms of skin  cancer discussed with patient.  ABCDEs of melanoma reviewed with patient.  Patient encouraged to perform monthly skin exams.  UV precautions reviewed with patient.  Patient to follow up with Primary Care provider regarding elevated blood pressure.  Skin care regimen reviewed with patient: Eliminate harsh soaps, i.e. Dial, zest, irsih spring; Mild soaps such as Cetaphil or Dove sensitive skin, avoid hot or cold showers, aggressive use of emollients including vanicream, cetaphil or cerave discussed with patient.    Risks of non-melanoma skin cancer discussed with patient   Return to clinic 6 months      Again, thank you for allowing me to participate in the care of your patient.        Sincerely,        Chavez Jay MD

## 2020-01-14 NOTE — PROGRESS NOTES
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  Previous site healing well.  Patient has no other skin complaints today.  Remainder of the HPI, Meds, PMH, Allergies, FH, and SH was reviewed in chart.      Past Medical History:   Diagnosis Date     Actinic keratosis      Basal cell carcinoma      Squamous cell carcinoma        Past Surgical History:   Procedure Laterality Date     CATARACT IOL, RT/LT          Family History   Problem Relation Age of Onset     Melanoma No family hx of        Social History     Socioeconomic History     Marital status:      Spouse name: Not on file     Number of children: Not on file     Years of education: Not on file     Highest education level: Not on file   Occupational History     Employer: RETIRED   Social Needs     Financial resource strain: Not on file     Food insecurity:     Worry: Not on file     Inability: Not on file     Transportation needs:     Medical: Not on file     Non-medical: Not on file   Tobacco Use     Smoking status: Former Smoker     Types: Pipe     Last attempt to quit: 12/3/1999     Years since quittin.1     Smokeless tobacco: Never Used   Substance and Sexual Activity     Alcohol use: Not on file     Drug use: Not on file     Sexual activity: Not on file   Lifestyle     Physical activity:     Days per week: Not on file     Minutes per session: Not on file     Stress: Not on file   Relationships     Social connections:     Talks on phone: Not on file     Gets together: Not on file     Attends Faith service: Not on file     Active member of club or organization: Not on file     Attends meetings of clubs or organizations: Not on file     Relationship status: Not on file     Intimate partner violence:     Fear of current or ex partner: Not on file     Emotionally abused: Not on file     Physically abused: Not on file     Forced sexual activity: Not on file   Other Topics  Concern     Not on file   Social History Narrative     Not on file       Outpatient Encounter Medications as of 1/14/2020   Medication Sig Dispense Refill     Carboxymethylcellulose Sod PF (REFRESH CELLUVISC) 1 % ophthalmic gel Place 1 drop into both eyes 4 times daily       hypromellose-dextran (GENTEAL TEARS) 0.1-0.3 % ophthalmic solution Place 1 drop into both eyes daily as needed for dry eyes       MECLIZINE HCL PO        Multiple Vitamins-Minerals (MENS MULTIVITAMIN PLUS PO)        Multiple Vitamins-Minerals (PRESERVISION AREDS PO) Take by mouth daily       Omega-3 Fatty Acids (FISH OIL) 500 MG CAPS        No facility-administered encounter medications on file as of 1/14/2020.              Review Of Systems  Skin: As above  Eyes: negative  Ears/Nose/Throat: negative  Respiratory: No shortness of breath, dyspnea on exertion, cough, or hemoptysis  Cardiovascular: negative  Gastrointestinal: negative  Genitourinary: negative  Musculoskeletal: negative  Neurologic: negative  Psychiatric: negative  Hematologic/Lymphatic/Immunologic: negative  Endocrine: negative      O:   NAD, WDWN, Alert & Oriented, Mood & Affect wnl, Vitals stable   Here today alone   Pulse 80   Ht 1.829 m (6')   SpO2 94%   BMI 29.16 kg/m     General appearance normal   Vitals stable   Alert, oriented and in no acute distress      Following lymph nodes palpated: Occipital, Cervical, Supraclavicular no lad   L forehead 5mm red scaly papule    L parietal scalp 8mm scaly papule       Eyes: Conjunctivae/lids:Normal     ENT: Lips, buccal mucosa, tongue: normal    MSK:Normal    Cardiovascular: peripheral edema none    Pulm: Breathing Normal    Lymph Nodes: No Head and Neck Lymphadenopathy     Neuro/Psych: Orientation:Alert and Orientedx3 ; Mood/Affect:normal       A/P:  1. L forehead squamous cell carcinoma   MOHS:   Location    After PGACAC discussed with patient, decision for Mohs surgery was made. Indication for Mohs was Location. Patient  confirmed the site with Dr. Jay.  After anesthesia with LEC, the tumor was excised using standard Mohs technique in 1 stages(s).  CLEAR MARGINS OBTAINED and Final defect size was 1 cm.       REPAIR SECOND INTENT: We discussed the options for wound management in full with the patient including risks/benefits/possible outcomes. Decision made to allow the wound to heal by second intention. EBL minimal; complications none; wound care routine.  The patient was discharged in good condition and will return in one month or prn for wound evaluation.  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 Indication for Mohs was Location. Patient confirmed the site with Dr. Jay.  After anesthesia with LEC, the tumor was excised using standard Mohs technique in 1 stages(s).  CLEAR MARGINS OBTAINED and Final defect size was 1.3 x 1.1 cm.       REPAIR SECOND INTENT: We discussed the options for wound management in full with the patient including risks/benefits/possible outcomes. Decision made to allow the wound to heal by second intention. EBL minimal; complications none; wound care routine.  The patient was discharged in good condition and will return in one month or prn for wound evaluation.  BENIGN LESIONS DISCUSSED WITH PATIENT:  I discussed the specifics of tumor, prognosis, and genetics of benign lesions.  I explained that treatment of these lesions would be purely cosmetic and not medically neccessary.  I discussed with patient different removal options including excision, cautery and /or laser.      Nature and genetics of benign skin lesions dicussed with patient.  Signs and Symptoms of skin cancer discussed with patient.  ABCDEs of melanoma reviewed with patient.  Patient encouraged to perform monthly skin exams.  UV precautions reviewed with patient.  Patient to follow up with Primary Care provider regarding elevated blood  pressure.  Skin care regimen reviewed with patient: Eliminate harsh soaps, i.e. Dial, zest, irsih spring; Mild soaps such as Cetaphil or Dove sensitive skin, avoid hot or cold showers, aggressive use of emollients including vanicream, cetaphil or cerave discussed with patient.    Risks of non-melanoma skin cancer discussed with patient   Return to clinic 6 months

## 2020-01-17 ENCOUNTER — OFFICE VISIT - HEALTHEAST (OUTPATIENT)
Dept: INTERNAL MEDICINE | Facility: CLINIC | Age: 80
End: 2020-01-17

## 2020-01-17 DIAGNOSIS — J98.8 RESPIRATORY INFECTION: ICD-10-CM

## 2020-01-17 DIAGNOSIS — R05.8 RECURRENT COUGH: ICD-10-CM

## 2020-01-17 DIAGNOSIS — Z87.891 FORMER SMOKER: ICD-10-CM

## 2020-01-17 DIAGNOSIS — R05.9 COUGH: ICD-10-CM

## 2020-02-20 ENCOUNTER — RECORDS - HEALTHEAST (OUTPATIENT)
Dept: ADMINISTRATIVE | Facility: OTHER | Age: 80
End: 2020-02-20

## 2020-02-28 ENCOUNTER — COMMUNICATION - HEALTHEAST (OUTPATIENT)
Dept: INTERNAL MEDICINE | Facility: CLINIC | Age: 80
End: 2020-02-28

## 2020-02-28 DIAGNOSIS — M10.9 ACUTE GOUT INVOLVING TOE, UNSPECIFIED CAUSE, UNSPECIFIED LATERALITY: ICD-10-CM

## 2020-03-02 ENCOUNTER — OFFICE VISIT - HEALTHEAST (OUTPATIENT)
Dept: INTERNAL MEDICINE | Facility: CLINIC | Age: 80
End: 2020-03-02

## 2020-03-02 DIAGNOSIS — M79.675 PAIN OF TOE OF LEFT FOOT: ICD-10-CM

## 2020-03-02 DIAGNOSIS — M10.9 ACUTE GOUT OF LEFT FOOT, UNSPECIFIED CAUSE: ICD-10-CM

## 2020-03-02 DIAGNOSIS — M79.672 LEFT FOOT PAIN: ICD-10-CM

## 2020-03-02 DIAGNOSIS — R06.02 SOB (SHORTNESS OF BREATH): ICD-10-CM

## 2020-03-02 DIAGNOSIS — L84 CORN OF TOE: ICD-10-CM

## 2020-03-02 DIAGNOSIS — Z86.19 HISTORY OF RECURRENT PULMONARY INFECTION: ICD-10-CM

## 2020-03-02 DIAGNOSIS — R05.3 CHRONIC COUGH: ICD-10-CM

## 2020-03-02 DIAGNOSIS — Z87.891 FORMER SMOKER: ICD-10-CM

## 2020-03-06 ENCOUNTER — COMMUNICATION - HEALTHEAST (OUTPATIENT)
Dept: INTERNAL MEDICINE | Facility: CLINIC | Age: 80
End: 2020-03-06

## 2020-03-06 DIAGNOSIS — L03.032 CELLULITIS OF FOURTH TOE OF LEFT FOOT: ICD-10-CM

## 2020-03-06 DIAGNOSIS — M10.9 ACUTE GOUT INVOLVING TOE, UNSPECIFIED CAUSE, UNSPECIFIED LATERALITY: ICD-10-CM

## 2020-03-13 ENCOUNTER — HOSPITAL ENCOUNTER (OUTPATIENT)
Dept: CT IMAGING | Facility: CLINIC | Age: 80
Discharge: HOME | End: 2020-03-13
Attending: INTERNAL MEDICINE

## 2020-03-13 DIAGNOSIS — Z86.19 HISTORY OF RECURRENT PULMONARY INFECTION: ICD-10-CM

## 2020-03-13 DIAGNOSIS — R06.02 SOB (SHORTNESS OF BREATH): ICD-10-CM

## 2020-03-13 DIAGNOSIS — R05.3 CHRONIC COUGH: ICD-10-CM

## 2020-03-13 DIAGNOSIS — Z87.891 FORMER SMOKER: ICD-10-CM

## 2020-03-16 ENCOUNTER — COMMUNICATION - HEALTHEAST (OUTPATIENT)
Dept: INTERNAL MEDICINE | Facility: CLINIC | Age: 80
End: 2020-03-16

## 2020-03-17 ENCOUNTER — OFFICE VISIT - HEALTHEAST (OUTPATIENT)
Dept: INTERNAL MEDICINE | Facility: CLINIC | Age: 80
End: 2020-03-17

## 2020-03-17 DIAGNOSIS — J43.9 PULMONARY EMPHYSEMA, UNSPECIFIED EMPHYSEMA TYPE (H): ICD-10-CM

## 2020-03-17 DIAGNOSIS — L84 CORN OF TOE: ICD-10-CM

## 2020-03-17 DIAGNOSIS — Z86.19 HISTORY OF RECURRENT PULMONARY INFECTION: ICD-10-CM

## 2020-03-17 DIAGNOSIS — L03.032 CELLULITIS OF FOURTH TOE OF LEFT FOOT: ICD-10-CM

## 2020-04-22 ENCOUNTER — TELEPHONE (OUTPATIENT)
Dept: DERMATOLOGY | Facility: CLINIC | Age: 80
End: 2020-04-22

## 2020-04-22 NOTE — TELEPHONE ENCOUNTER
Reason for Call:  Other call back    Detailed comments: pt has a spot on his head that his sore. His ball cap rubs on it. Would like it to be looked at.     Phone Number Patient can be reached at: Cell number on file:    Telephone Information:   Mobile [phone]       Best Time: any     Can we leave a detailed message on this number? YES    Call taken on 4/22/2020 at 9:23 AM by Lindsey Nieto

## 2020-04-22 NOTE — TELEPHONE ENCOUNTER
Spoke to patient and he has a new spot on his head that is scabbed and feels like a scab the size off a dime. He does not remember injuring this spot at all. Pt is concerned as he has had a few spots on his head in January. Appt made for pt.  Cristy ABDUL RN BSN PHN  Specialty Clinics

## 2020-05-04 ENCOUNTER — RECORDS - HEALTHEAST (OUTPATIENT)
Dept: ADMINISTRATIVE | Facility: OTHER | Age: 80
End: 2020-05-04

## 2020-05-04 ENCOUNTER — OFFICE VISIT (OUTPATIENT)
Dept: DERMATOLOGY | Facility: CLINIC | Age: 80
End: 2020-05-04
Payer: MEDICARE

## 2020-05-04 VITALS — SYSTOLIC BLOOD PRESSURE: 152 MMHG | HEART RATE: 70 BPM | OXYGEN SATURATION: 97 % | DIASTOLIC BLOOD PRESSURE: 86 MMHG

## 2020-05-04 DIAGNOSIS — L82.1 SEBORRHEIC KERATOSIS: ICD-10-CM

## 2020-05-04 DIAGNOSIS — L81.4 LENTIGO: ICD-10-CM

## 2020-05-04 DIAGNOSIS — C44.42 SQUAMOUS CELL CARCINOMA OF SCALP: ICD-10-CM

## 2020-05-04 DIAGNOSIS — D18.01 ANGIOMA OF SKIN: ICD-10-CM

## 2020-05-04 DIAGNOSIS — Z85.828 HISTORY OF SKIN CANCER: Primary | ICD-10-CM

## 2020-05-04 PROCEDURE — 88331 PATH CONSLTJ SURG 1 BLK 1SPC: CPT | Mod: 59 | Performed by: DERMATOLOGY

## 2020-05-04 PROCEDURE — 99213 OFFICE O/P EST LOW 20 MIN: CPT | Mod: 25 | Performed by: DERMATOLOGY

## 2020-05-04 PROCEDURE — 17311 MOHS 1 STAGE H/N/HF/G: CPT | Performed by: DERMATOLOGY

## 2020-05-04 PROCEDURE — 11106 INCAL BX SKN SINGLE LES: CPT | Mod: 59 | Performed by: DERMATOLOGY

## 2020-05-04 PROCEDURE — 13132 CMPLX RPR F/C/C/M/N/AX/G/H/F: CPT | Mod: 59 | Performed by: DERMATOLOGY

## 2020-05-04 NOTE — PROGRESS NOTES
Nir Guy is a 80 year old year old male patient here today for tender growth on scalp.   .  Patient states this has been present for weeks.  Patient reports the following symptoms:  painful.  Patient reports the following previous treatments none.  These treatments did not work.  Patient reports the following modifying factors none.  Associated symptoms: none.  Patient has no other skin complaints today.  Remainder of the HPI, Meds, PMH, Allergies, FH, and SH was reviewed in chart.      Past Medical History:   Diagnosis Date     Actinic keratosis      Basal cell carcinoma      Squamous cell carcinoma        Past Surgical History:   Procedure Laterality Date     CATARACT IOL, RT/LT          Family History   Problem Relation Age of Onset     Melanoma No family hx of        Social History     Socioeconomic History     Marital status:      Spouse name: Not on file     Number of children: Not on file     Years of education: Not on file     Highest education level: Not on file   Occupational History     Employer: RETIRED   Social Needs     Financial resource strain: Not on file     Food insecurity     Worry: Not on file     Inability: Not on file     Transportation needs     Medical: Not on file     Non-medical: Not on file   Tobacco Use     Smoking status: Former Smoker     Types: Pipe     Last attempt to quit: 12/3/1999     Years since quittin.4     Smokeless tobacco: Never Used   Substance and Sexual Activity     Alcohol use: Not on file     Drug use: Not on file     Sexual activity: Not on file   Lifestyle     Physical activity     Days per week: Not on file     Minutes per session: Not on file     Stress: Not on file   Relationships     Social connections     Talks on phone: Not on file     Gets together: Not on file     Attends Mandaen service: Not on file     Active member of club or organization: Not on file     Attends meetings of clubs or organizations: Not on file     Relationship status:  Not on file     Intimate partner violence     Fear of current or ex partner: Not on file     Emotionally abused: Not on file     Physically abused: Not on file     Forced sexual activity: Not on file   Other Topics Concern     Not on file   Social History Narrative     Not on file       Outpatient Encounter Medications as of 5/4/2020   Medication Sig Dispense Refill     Carboxymethylcellulose Sod PF (REFRESH CELLUVISC) 1 % ophthalmic gel Place 1 drop into both eyes 4 times daily       hypromellose-dextran (GENTEAL TEARS) 0.1-0.3 % ophthalmic solution Place 1 drop into both eyes daily as needed for dry eyes       MECLIZINE HCL PO        Multiple Vitamins-Minerals (MENS MULTIVITAMIN PLUS PO)        Multiple Vitamins-Minerals (PRESERVISION AREDS PO) Take by mouth daily       Omega-3 Fatty Acids (FISH OIL) 500 MG CAPS        No facility-administered encounter medications on file as of 5/4/2020.              Review Of Systems  Skin: As above  Eyes: negative  Ears/Nose/Throat: negative  Respiratory: No shortness of breath, dyspnea on exertion, cough, or hemoptysis  Cardiovascular: negative  Gastrointestinal: negative  Genitourinary: negative  Musculoskeletal: negative  Neurologic: negative  Psychiatric: negative  Hematologic/Lymphatic/Immunologic: negative  Endocrine: negative      O:   NAD, WDWN, Alert & Oriented, Mood & Affect wnl, Vitals stable   Here today alone   BP (!) 152/86   Pulse 70   SpO2 97%    General appearance normal   Vitals stable   Alert, oriented and in no acute distress      Following lymph nodes palpated: Occipital, Cervical, Supraclavicular no lad   L vertex scalp 1.3cm keraottic nodule       Stuck on papules and brown macules on trunk and ext   Red papules on trunk     The remainder of expanded problem focused exam was normal; the following areas were examined:  scalp/hair, conjunctiva/lids, face, neck, lips, chest, digits/nails, RUE, LUE.      Eyes: Conjunctivae/lids:Normal     ENT: Lips, buccal  mucosa, tongue: normal    MSK:Normal    Cardiovascular: peripheral edema none    Pulm: Breathing Normal    Lymph Nodes: No Head and Neck Lymphadenopathy     Neuro/Psych: Orientation:Alert and Orientedx3 ; Mood/Affect:normal       MICRO:   L vertex scalp:There is a proliferation of irregular nests of abnormal squamous cells arising from the epidermis and invading the dermis. These are well differentiated. The dermis shows a variable superficial perivascular inflammatory infiltrate.   A/P:  1. Seborrheic keratosis, lentigo, angioma, hx of non-melanoma skin cancer   2. L vertex scalp r/o squamous cell carcinoma   Incisional BIOPSY IN HOUSE:  After consent, anesthesia with LEC and prep, incisional bx performed and dx above confirmed with frozen section histology.  No complications and routine wound care.      I have personally reviewed all specimens and/or slides and used them with my medical judgement to determine or confirm the final diagnosis.     Patient told result squamous cell carcinoma .      BENIGN LESIONS DISCUSSED WITH PATIENT:  I discussed the specifics of tumor, prognosis, and genetics of benign lesions.  I explained that treatment of these lesions would be purely cosmetic and not medically neccessary.  I discussed with patient different removal options including excision, cautery and /or laser.      Nature and genetics of benign skin lesions dicussed with patient.  Signs and Symptoms of skin cancer discussed with patient.  Patient encouraged to perform monthly skin exams.  UV precautions reviewed with patient.  Skin care regimen reviewed with patient: Eliminate harsh soaps, i.e. Dial, zest, irsih spring; Mild soaps such as Cetaphil or Dove sensitive skin, avoid hot or cold showers, aggressive use of emollients including vanicream, cetaphil or cerave discussed with patient.    Risks of non-melanoma skin cancer discussed with patient   Return to clinic 4 months    PROCEDURE NOTE  L veretx scalp squamous cell  carcinoma   MOHS:   Location    The rationale for Mohs surgery was discussed with the patient and consent was obtained.  The risks and benefits as well as alternatives to therapy were discussed, in detail.  Specifically, the risks of infection, scarring, bleeding, prolonged wound healing, incomplete removal, allergy to anesthesia, nerve injury and recurrence were addressed.  Indication for Mohs was Location. Prior to the procedure, the treatment site was clearly identified and, if available, confirmed with previous photos and confirmed by the patient   All components of the Universal Protocol/PAUSE rule were completed.  The Mohs surgeon operated in two distinct and integrated capacities as the surgeon and pathologist.      The area was prepped with Betasept.  A rim of normal appearing skin was marked circumferentially around the lesion.  The area was infiltrated with local anesthesia.  The tumor was first debulked to remove all clinically apparent tumor.  An incision following the standard Mohs approach was done and the specimen was oriented,mapped and placed in 1 block(s).  Each specimen was then chromacoded and processed in the Mohs laboratory using standard Mohs technique and submitted for frozen section histology.  Frozen section analysis showed no  residual tumor but CLEAR MARGINS.      The tumor was excised using standard Mohs technique in 1 stages(s).  CLEAR MARGINS OBTAINED and Final defect size was 2x1.8 cm.     We discussed the options for wound management in full with the patient including risks/benefits/ possible outcomes.        REPAIR COMPLEX: Because of the tightness of the surrounding skin and Because of the size and full thickness nature of the defect, a complex closure was planned. After LEC anesthesia and prep, Burow's triangles were excised in the relaxed skin tension lines. The wound edges were widely undermined by dissection in the subcutaneous plane until adequate tissue mobility was obtained.  Hemostasis was obtained. The wound edges were closed in a layered fashion using Vicryl and Fast Absorbing Plain Gut sutures. Postoperative length was 4 cm.   EBL minimal; complications none; wound care routine.  The patient was discharged in good condition and will return in one week for wound evaluation.

## 2020-05-04 NOTE — PATIENT INSTRUCTIONS
Sutured Wound Care     [facility]: [phone]    [facility]: [phone]          ? No strenuous activity for 48 hours. Resume moderate activity in 48 hours. No heavy exercising until you are seen for follow up in one week.     ? Take Tylenol as needed for discomfort.                         ? Do not drink alcoholic beverages for 48 hours.     ? Keep the pressure bandage in place for 24 hours. If the bandage becomes blood tinged or loose, reinforce it with gauze and tape.        (Refer to the reverse side of this page for management of bleeding).    ? Remove pressure bandage in 24 hours     ? Leave the flat bandage in place until your follow up appointment.    ? Keep the bandage dry. Wash around it carefully.    ? If the tape becomes soiled or starts to come off, reinforce it with additional paper tape.    ? Do not smoke for 3 weeks; smoking is detrimental to wound healing.    ? It is normal to have swelling and bruising around the surgical site. The bruising will fade in approximately 10-14 days. Elevate the area to reduce swelling.    ? Numbness, itchiness and sensitivity to temperature changes can occur after surgery and may take up to 18 months to normalize.      POSSIBLE COMPLICATIONS    BLEEDIN. Leave the bandage in place.  2. Use tightly rolled up gauze or a cloth to apply direct pressure over the bandage for 20   minutes.  3. Reapply pressure for an additional 20 minutes if necessary  4. Call the office or go to the nearest emergency room if pressure fails to stop the bleeding.  5. Use additional gauze and tape to maintain pressure once the bleeding has stopped.        PAIN:    1. Post operative pain should slowly get better, never worse.  2. A severe increase in pain may indicate a problem. Call the office if this occurs.            1) Leave flat bandage on your skin for one week after today s bandage change.  2) In one week when you remove the bandage, you may resume your  regular skin care routine, including washing with mild soap and water, applying moisturizer, make-up and sunscreen.    3) If there are any open or bleeding areas at the incision/graft site you should begin to cover the area with a bandage daily as follows:    1) Clean and dry the area with plain tap water using a Q-tip or sterile gauze pad.  2) Apply Polysporin or Bacitracin ointment to the open area.  3) Cover the wound with a band-aid or a sterile non-stick gauze pad and micropore paper tape.         SIGNS OF INFECTION  - If you notice any of these signs of infection, call your doctor right away: expanding redness around the wound.  - Yellow or greenish-colored pus or cloudy wound drainage.    - Red streaking spreading from the wound.  - Increased swelling, tenderness, or pain around the wound.   - Fever.    Please remember that yellow and clear drainage from a wound can be normal and related to normal wound healing.  Isolated drainage from a wound without a combination of the above features does not indicate infection.       *Once the bandages are removed, the scar will be red and firm (especially in the lip/chin area). This is normal and will fade in time. It might take 6-12 months for this to happen.     *Massaging the area will help the scar soften and fade quicker. Begin to massage the area one month after the bandages have been removed. To massage apply pressure directly and firmly over the scar with the fingertips and move in a circular motion. Massage the area for a few minutes several times a day. Continue to massage the site for several months.    *Approximately 6-8 weeks after surgery it is not uncommon to see the formation of  tender pimple-like  bump along the scar. This is normal. As the scar continues to mature and the stitches underneath the skin begin to dissolve, this might occur. Do not pick or squeeze, this will resolve on it s own. Should one break open producing a small amount of drainage,  apply Polysporin or Bacitracin ointment a few times a day until the wound is completely healed.    *Numbness in the surgical area is expected. It might take 12-18 months for the feeling to return to normal. During this time sensations of itchiness, tingling and occasional sharp pains might be noted. These feelings are normal and will subside once the nerves have completely healed.       In case of emergency phone:Dr Jay [phone]

## 2020-07-14 ENCOUNTER — OFFICE VISIT (OUTPATIENT)
Dept: DERMATOLOGY | Facility: CLINIC | Age: 80
End: 2020-07-14
Payer: MEDICARE

## 2020-07-14 VITALS — DIASTOLIC BLOOD PRESSURE: 99 MMHG | HEART RATE: 74 BPM | OXYGEN SATURATION: 97 % | SYSTOLIC BLOOD PRESSURE: 144 MMHG

## 2020-07-14 DIAGNOSIS — Z85.828 HISTORY OF SKIN CANCER: ICD-10-CM

## 2020-07-14 DIAGNOSIS — L57.0 AK (ACTINIC KERATOSIS): ICD-10-CM

## 2020-07-14 DIAGNOSIS — L81.4 LENTIGO: Primary | ICD-10-CM

## 2020-07-14 DIAGNOSIS — L82.1 SEBORRHEIC KERATOSIS: ICD-10-CM

## 2020-07-14 DIAGNOSIS — D18.01 ANGIOMA OF SKIN: ICD-10-CM

## 2020-07-14 DIAGNOSIS — D23.9 DERMAL NEVUS: ICD-10-CM

## 2020-07-14 PROCEDURE — 17000 DESTRUCT PREMALG LESION: CPT | Performed by: DERMATOLOGY

## 2020-07-14 PROCEDURE — 99213 OFFICE O/P EST LOW 20 MIN: CPT | Mod: 25 | Performed by: DERMATOLOGY

## 2020-07-14 PROCEDURE — 17003 DESTRUCT PREMALG LES 2-14: CPT | Performed by: DERMATOLOGY

## 2020-07-14 NOTE — LETTER
2020         RE: Nir Guy  [address] [place] [id number]        Dear Colleague,    Thank you for referring your patient, Nir Guy, to the [facility]. Please see a copy of my visit note below.    Nir Guy is a 80 year old year old male patient here today for hx of non-melanoma skin cancer.  He notes spot on right cheek   .  Patient states this has been present for a while.  Patient reports the following symptoms:  none.  Patient reports the following previous treatments none.  These treatments did not work.  Patient reports the following modifying factors none.  Associated symptoms: none.  Patient has no other skin complaints today.  Remainder of the HPI, Meds, PMH, Allergies, FH, and SH was reviewed in chart.      Past Medical History:   Diagnosis Date     Actinic keratosis      Basal cell carcinoma      Squamous cell carcinoma        Past Surgical History:   Procedure Laterality Date     CATARACT IOL, RT/LT          Family History   Problem Relation Age of Onset     Melanoma No family hx of        Social History     Socioeconomic History     Marital status:      Spouse name: Not on file     Number of children: Not on file     Years of education: Not on file     Highest education level: Not on file   Occupational History     Employer: RETIRED   Social Needs     Financial resource strain: Not on file     Food insecurity     Worry: Not on file     Inability: Not on file     Transportation needs     Medical: Not on file     Non-medical: Not on file   Tobacco Use     Smoking status: Former Smoker     Types: Pipe     Last attempt to quit: 12/3/1999     Years since quittin.6     Smokeless tobacco: Never Used   Substance and Sexual Activity     Alcohol use: Not on file     Drug use: Not on file     Sexual activity: Not on file   Lifestyle     Physical activity     Days per week: Not on file     Minutes per session: Not on file     Stress: Not on  file   Relationships     Social connections     Talks on phone: Not on file     Gets together: Not on file     Attends Mormon service: Not on file     Active member of club or organization: Not on file     Attends meetings of clubs or organizations: Not on file     Relationship status: Not on file     Intimate partner violence     Fear of current or ex partner: Not on file     Emotionally abused: Not on file     Physically abused: Not on file     Forced sexual activity: Not on file   Other Topics Concern     Not on file   Social History Narrative     Not on file       Outpatient Encounter Medications as of 7/14/2020   Medication Sig Dispense Refill     Carboxymethylcellulose Sod PF (REFRESH CELLUVISC) 1 % ophthalmic gel Place 1 drop into both eyes 4 times daily       hypromellose-dextran (GENTEAL TEARS) 0.1-0.3 % ophthalmic solution Place 1 drop into both eyes daily as needed for dry eyes       MECLIZINE HCL PO        Multiple Vitamins-Minerals (MENS MULTIVITAMIN PLUS PO)        Multiple Vitamins-Minerals (PRESERVISION AREDS PO) Take by mouth daily       Omega-3 Fatty Acids (FISH OIL) 500 MG CAPS        No facility-administered encounter medications on file as of 7/14/2020.              Review Of Systems  Skin: As above  Eyes: negative  Ears/Nose/Throat: negative  Respiratory: No shortness of breath, dyspnea on exertion, cough, or hemoptysis  Cardiovascular: negative  Gastrointestinal: negative  Genitourinary: negative  Musculoskeletal: negative  Neurologic: negative  Psychiatric: negative  Hematologic/Lymphatic/Immunologic: negative  Endocrine: negative      O:   NAD, WDWN, Alert & Oriented, Mood & Affect wnl, Vitals stable   Here today alone   BP (!) 144/99   Pulse 74   SpO2 97%    General appearance normal   Vitals stable   Alert, oriented and in no acute distress      Following lymph nodes palpated: Occipital, Cervical, Supraclavicular no lad   Gritty papules ons calp    R cheek flesh colore dpapule       Stuck on papules and brown macules on trunk and ext   Red papules on trunk  Flesh colored papules on trunk     The remainder of the full exam was normal; the following areas were examined:  conjunctiva/lids, oral mucosa, neck, peripheral vascular system, abdomen, lymph nodes, digits/nails, eccrine and apocrine glands, scalp/hair, face, neck, chest, abdomen, buttocks, back, RUE, LUE, RLE, LLE       Eyes: Conjunctivae/lids:Normal     ENT: Lips, buccal mucosa, tongue: normal    MSK:Normal    Cardiovascular: peripheral edema none    Pulm: Breathing Normal    Lymph Nodes: No Head and Neck Lymphadenopathy     Neuro/Psych: Orientation:Alert and Orientedx3 ; Mood/Affect:normal       A/P:  1. Seborrheic keratosis, lentigo, angioma, dermal nevus, hx of non-melanoma skin cancer   2. Scalp actinic keratosis x5  LN2:  Treated with LN2 for 5s for 1-2 cycles. Warned risks of blistering, pain, pigment change, scarring, and incomplete resolution.  Advised patient to return if lesions do not completely resolve.  Wound care sheet given.    BENIGN LESIONS DISCUSSED WITH PATIENT:  I discussed the specifics of tumor, prognosis, and genetics of benign lesions.  I explained that treatment of these lesions would be purely cosmetic and not medically neccessary.  I discussed with patient different removal options including excision, cautery and /or laser.      Nature and genetics of benign skin lesions dicussed with patient.  Signs and Symptoms of skin cancer discussed with patient.  Patient encouraged to perform monthly skin exams.  UV precautions reviewed with patient.  Skin care regimen reviewed with patient: Eliminate harsh soaps, i.e. Dial, zest, irsih spring; Mild soaps such as Cetaphil or Dove sensitive skin, avoid hot or cold showers, aggressive use of emollients including vanicream, cetaphil or cerave discussed with patient.    Risks of non-melanoma skin cancer discussed with patient   Return to clinic 6 mnths      Again, thank  you for allowing me to participate in the care of your patient.        Sincerely,        Chavez Jay MD

## 2020-07-14 NOTE — PROGRESS NOTES
Nir Guy is a 80 year old year old male patient here today for hx of non-melanoma skin cancer.  He notes spot on right cheek   .  Patient states this has been present for a while.  Patient reports the following symptoms:  none.  Patient reports the following previous treatments none.  These treatments did not work.  Patient reports the following modifying factors none.  Associated symptoms: none.  Patient has no other skin complaints today.  Remainder of the HPI, Meds, PMH, Allergies, FH, and SH was reviewed in chart.      Past Medical History:   Diagnosis Date     Actinic keratosis      Basal cell carcinoma      Squamous cell carcinoma        Past Surgical History:   Procedure Laterality Date     CATARACT IOL, RT/LT          Family History   Problem Relation Age of Onset     Melanoma No family hx of        Social History     Socioeconomic History     Marital status:      Spouse name: Not on file     Number of children: Not on file     Years of education: Not on file     Highest education level: Not on file   Occupational History     Employer: RETIRED   Social Needs     Financial resource strain: Not on file     Food insecurity     Worry: Not on file     Inability: Not on file     Transportation needs     Medical: Not on file     Non-medical: Not on file   Tobacco Use     Smoking status: Former Smoker     Types: Pipe     Last attempt to quit: 12/3/1999     Years since quittin.6     Smokeless tobacco: Never Used   Substance and Sexual Activity     Alcohol use: Not on file     Drug use: Not on file     Sexual activity: Not on file   Lifestyle     Physical activity     Days per week: Not on file     Minutes per session: Not on file     Stress: Not on file   Relationships     Social connections     Talks on phone: Not on file     Gets together: Not on file     Attends [employer] service: Not on file     Active member of club or organization: Not on file     Attends meetings of clubs or organizations:  Not on file     Relationship status: Not on file     Intimate partner violence     Fear of current or ex partner: Not on file     Emotionally abused: Not on file     Physically abused: Not on file     Forced sexual activity: Not on file   Other Topics Concern     Not on file   Social History Narrative     Not on file       Outpatient Encounter Medications as of 7/14/2020   Medication Sig Dispense Refill     Carboxymethylcellulose Sod PF (REFRESH CELLUVISC) 1 % ophthalmic gel Place 1 drop into both eyes 4 times daily       hypromellose-dextran (GENTEAL TEARS) 0.1-0.3 % ophthalmic solution Place 1 drop into both eyes daily as needed for dry eyes       MECLIZINE HCL PO        Multiple Vitamins-Minerals (MENS MULTIVITAMIN PLUS PO)        Multiple Vitamins-Minerals (PRESERVISION AREDS PO) Take by mouth daily       Omega-3 Fatty Acids (FISH OIL) 500 MG CAPS        No facility-administered encounter medications on file as of 7/14/2020.              Review Of Systems  Skin: As above  Eyes: negative  Ears/Nose/Throat: negative  Respiratory: No shortness of breath, dyspnea on exertion, cough, or hemoptysis  Cardiovascular: negative  Gastrointestinal: negative  Genitourinary: negative  Musculoskeletal: negative  Neurologic: negative  Psychiatric: negative  Hematologic/Lymphatic/Immunologic: negative  Endocrine: negative      O:   NAD, WDWN, Alert & Oriented, Mood & Affect wnl, Vitals stable   Here today alone   BP (!) 144/99   Pulse 74   SpO2 97%    General appearance normal   Vitals stable   Alert, oriented and in no acute distress      Following lymph nodes palpated: Occipital, Cervical, Supraclavicular no lad   Gritty papules ons calp    R cheek flesh colore dpapule      Stuck on papules and brown macules on trunk and ext   Red papules on trunk  Flesh colored papules on trunk     The remainder of the full exam was normal; the following areas were examined:  conjunctiva/lids, oral mucosa, neck, peripheral vascular system,  abdomen, lymph nodes, digits/nails, eccrine and apocrine glands, scalp/hair, face, neck, chest, abdomen, buttocks, back, RUE, LUE, RLE, LLE       Eyes: Conjunctivae/lids:Normal     ENT: Lips, buccal mucosa, tongue: normal    MSK:Normal    Cardiovascular: peripheral edema none    Pulm: Breathing Normal    Lymph Nodes: No Head and Neck Lymphadenopathy     Neuro/Psych: Orientation:Alert and Orientedx3 ; Mood/Affect:normal       A/P:  1. Seborrheic keratosis, lentigo, angioma, dermal nevus, hx of non-melanoma skin cancer   2. Scalp actinic keratosis x5  LN2:  Treated with LN2 for 5s for 1-2 cycles. Warned risks of blistering, pain, pigment change, scarring, and incomplete resolution.  Advised patient to return if lesions do not completely resolve.  Wound care sheet given.    BENIGN LESIONS DISCUSSED WITH PATIENT:  I discussed the specifics of tumor, prognosis, and genetics of benign lesions.  I explained that treatment of these lesions would be purely cosmetic and not medically neccessary.  I discussed with patient different removal options including excision, cautery and /or laser.      Nature and genetics of benign skin lesions dicussed with patient.  Signs and Symptoms of skin cancer discussed with patient.  Patient encouraged to perform monthly skin exams.  UV precautions reviewed with patient.  Skin care regimen reviewed with patient: Eliminate harsh soaps, i.e. Dial, zest, irsih spring; Mild soaps such as Cetaphil or Dove sensitive skin, avoid hot or cold showers, aggressive use of emollients including vanicream, cetaphil or cerave discussed with patient.    Risks of non-melanoma skin cancer discussed with patient   Return to clinic 6 mnths

## 2020-07-31 ENCOUNTER — OFFICE VISIT - HEALTHEAST (OUTPATIENT)
Dept: INTERNAL MEDICINE | Facility: CLINIC | Age: 80
End: 2020-07-31

## 2020-07-31 DIAGNOSIS — M19.041 PRIMARY OSTEOARTHRITIS OF RIGHT HAND: ICD-10-CM

## 2020-07-31 DIAGNOSIS — M25.462 EFFUSION OF LEFT KNEE JOINT: ICD-10-CM

## 2020-07-31 DIAGNOSIS — M71.331 SYNOVIAL CYST OF WRIST, RIGHT: ICD-10-CM

## 2020-07-31 DIAGNOSIS — R79.82 ELEVATED C-REACTIVE PROTEIN (CRP): ICD-10-CM

## 2020-07-31 DIAGNOSIS — M79.641 PAIN OF RIGHT HAND: ICD-10-CM

## 2020-07-31 DIAGNOSIS — J43.9 PULMONARY EMPHYSEMA, UNSPECIFIED EMPHYSEMA TYPE (H): ICD-10-CM

## 2020-07-31 LAB
ALBUMIN SERPL-MCNC: 4 G/DL (ref 3.5–5)
ALP SERPL-CCNC: 74 U/L (ref 45–120)
ALT SERPL W P-5'-P-CCNC: 20 U/L (ref 0–45)
ANION GAP SERPL CALCULATED.3IONS-SCNC: 10 MMOL/L (ref 5–18)
AST SERPL W P-5'-P-CCNC: 20 U/L (ref 0–40)
BILIRUB SERPL-MCNC: 0.4 MG/DL (ref 0–1)
BUN SERPL-MCNC: 14 MG/DL (ref 8–28)
C REACTIVE PROTEIN LHE: 0.3 MG/DL (ref 0–0.8)
CALCIUM SERPL-MCNC: 9.4 MG/DL (ref 8.5–10.5)
CHLORIDE BLD-SCNC: 103 MMOL/L (ref 98–107)
CO2 SERPL-SCNC: 27 MMOL/L (ref 22–31)
CREAT SERPL-MCNC: 0.93 MG/DL (ref 0.7–1.3)
ERYTHROCYTE [DISTWIDTH] IN BLOOD BY AUTOMATED COUNT: 11.1 % (ref 11–14.5)
ERYTHROCYTE [SEDIMENTATION RATE] IN BLOOD BY WESTERGREN METHOD: 2 MM/HR (ref 0–15)
GFR SERPL CREATININE-BSD FRML MDRD: >60 ML/MIN/1.73M2
GLUCOSE BLD-MCNC: 127 MG/DL (ref 70–125)
HCT VFR BLD AUTO: 44 % (ref 40–54)
HGB BLD-MCNC: 15.3 G/DL (ref 14–18)
MCH RBC QN AUTO: 32 PG (ref 27–34)
MCHC RBC AUTO-ENTMCNC: 34.8 G/DL (ref 32–36)
MCV RBC AUTO: 92 FL (ref 80–100)
PLATELET # BLD AUTO: 187 THOU/UL (ref 140–440)
PMV BLD AUTO: 7.6 FL (ref 7–10)
POTASSIUM BLD-SCNC: 4.2 MMOL/L (ref 3.5–5)
PROT SERPL-MCNC: 6.7 G/DL (ref 6–8)
RBC # BLD AUTO: 4.77 MILL/UL (ref 4.4–6.2)
RHEUMATOID FACT SERPL-ACNC: 16.3 IU/ML (ref 0–30)
SODIUM SERPL-SCNC: 140 MMOL/L (ref 136–145)
WBC: 7 THOU/UL (ref 4–11)

## 2020-08-04 LAB — CCP AB SER IA-ACNC: <0.5 U/ML

## 2020-10-19 ENCOUNTER — OFFICE VISIT - HEALTHEAST (OUTPATIENT)
Dept: INTERNAL MEDICINE | Facility: CLINIC | Age: 80
End: 2020-10-19

## 2020-10-19 DIAGNOSIS — Z98.890 H/O CARPAL TUNNEL REPAIR: ICD-10-CM

## 2020-10-19 DIAGNOSIS — M54.50 LOW BACK PAIN RADIATING TO BOTH LEGS: ICD-10-CM

## 2020-10-19 DIAGNOSIS — M79.605 LOW BACK PAIN RADIATING TO BOTH LEGS: ICD-10-CM

## 2020-10-19 DIAGNOSIS — M19.041 OSTEOARTHRITIS OF FINGERS OF BOTH HANDS: ICD-10-CM

## 2020-10-19 DIAGNOSIS — M79.604 LOW BACK PAIN RADIATING TO BOTH LEGS: ICD-10-CM

## 2020-10-19 DIAGNOSIS — M79.641 PAIN OF RIGHT HAND: ICD-10-CM

## 2020-10-19 DIAGNOSIS — M19.042 OSTEOARTHRITIS OF FINGERS OF BOTH HANDS: ICD-10-CM

## 2020-10-19 DIAGNOSIS — J43.9 PULMONARY EMPHYSEMA, UNSPECIFIED EMPHYSEMA TYPE (H): ICD-10-CM

## 2020-10-20 ENCOUNTER — COMMUNICATION - HEALTHEAST (OUTPATIENT)
Dept: INTERNAL MEDICINE | Facility: CLINIC | Age: 80
End: 2020-10-20

## 2020-11-25 ENCOUNTER — RECORDS - HEALTHEAST (OUTPATIENT)
Dept: ADMINISTRATIVE | Facility: OTHER | Age: 80
End: 2020-11-25

## 2020-12-17 ENCOUNTER — RECORDS - HEALTHEAST (OUTPATIENT)
Dept: ADMINISTRATIVE | Facility: OTHER | Age: 80
End: 2020-12-17

## 2020-12-21 ENCOUNTER — COMMUNICATION - HEALTHEAST (OUTPATIENT)
Dept: INTERNAL MEDICINE | Facility: CLINIC | Age: 80
End: 2020-12-21

## 2020-12-21 DIAGNOSIS — Z11.59 ENCOUNTER FOR SCREENING FOR OTHER VIRAL DISEASES: Primary | ICD-10-CM

## 2020-12-28 ENCOUNTER — OFFICE VISIT - HEALTHEAST (OUTPATIENT)
Dept: INTERNAL MEDICINE | Facility: CLINIC | Age: 80
End: 2020-12-28

## 2020-12-28 DIAGNOSIS — Z01.818 PRE-OP EXAM: ICD-10-CM

## 2020-12-28 DIAGNOSIS — G56.01 CARPAL TUNNEL SYNDROME OF RIGHT WRIST: ICD-10-CM

## 2020-12-28 ASSESSMENT — MIFFLIN-ST. JEOR: SCORE: 1713.35

## 2020-12-29 LAB
ATRIAL RATE - MUSE: 60 BPM
DIASTOLIC BLOOD PRESSURE - MUSE: NORMAL
INTERPRETATION ECG - MUSE: NORMAL
P AXIS - MUSE: 60 DEGREES
PR INTERVAL - MUSE: 190 MS
QRS DURATION - MUSE: 120 MS
QT - MUSE: 442 MS
QTC - MUSE: 442 MS
R AXIS - MUSE: 42 DEGREES
SYSTOLIC BLOOD PRESSURE - MUSE: NORMAL
T AXIS - MUSE: 55 DEGREES
VENTRICULAR RATE- MUSE: 60 BPM

## 2020-12-31 ENCOUNTER — ANESTHESIA EVENT (OUTPATIENT)
Dept: SURGERY | Facility: CLINIC | Age: 80
End: 2020-12-31
Payer: COMMERCIAL

## 2020-12-31 DIAGNOSIS — Z11.59 ENCOUNTER FOR SCREENING FOR OTHER VIRAL DISEASES: ICD-10-CM

## 2020-12-31 LAB
SARS-COV-2 RNA SPEC QL NAA+PROBE: NORMAL
SPECIMEN SOURCE: NORMAL

## 2020-12-31 PROCEDURE — U0003 INFECTIOUS AGENT DETECTION BY NUCLEIC ACID (DNA OR RNA); SEVERE ACUTE RESPIRATORY SYNDROME CORONAVIRUS 2 (SARS-COV-2) (CORONAVIRUS DISEASE [COVID-19]), AMPLIFIED PROBE TECHNIQUE, MAKING USE OF HIGH THROUGHPUT TECHNOLOGIES AS DESCRIBED BY CMS-2020-01-R: HCPCS | Performed by: ORTHOPAEDIC SURGERY

## 2020-12-31 ASSESSMENT — LIFESTYLE VARIABLES: TOBACCO_USE: 1

## 2020-12-31 ASSESSMENT — COPD QUESTIONNAIRES: COPD: 1

## 2020-12-31 NOTE — ANESTHESIA PREPROCEDURE EVALUATION
Anesthesia Pre-Procedure Evaluation    Patient: Nir Guy   MRN: [id number] : 1940          Preoperative Diagnosis: Carpal tunnel syndrome [G56.00]    Procedure(s):  Revision open carpal tunnel release.    Past Medical History:   Diagnosis Date     Actinic keratosis      Basal cell carcinoma      Squamous cell carcinoma      Past Surgical History:   Procedure Laterality Date     CATARACT IOL, RT/LT         Anesthesia Evaluation     . Pt has had prior anesthetic. Type: MAC           ROS/MED HX    ENT/Pulmonary: Comment: Pseudophakia  Macular degeneration  Glaucoma  Hearing loss  Dry eyes  Emphysema  MARCH    (+)tobacco use, Past use COPD, , . Other pulmonary disease hx of recurrent pulmonary infections.    Neurologic:     (+)other neuro CTS    Cardiovascular:     (+) Dyslipidemia, ----. : . . . :. .       METS/Exercise Tolerance:  3 - Able to walk 1-2 blocks without stopping   Hematologic:         Musculoskeletal: Comment: DJD  (+) arthritis,  other musculoskeletal- CTS      GI/Hepatic: Comment: 8 drinks / week  Hx of colon polyps  Hx of diverticulosis - neg GI/hepatic ROS       Renal/Genitourinary:         Endo:  - neg endo ROS       Psychiatric:        Psychiatric history: vertigo.   Infectious Disease:  - neg infectious disease ROS       Malignancy:   (+) Malignancy History of Skin          Other: Comment: dermatitis   - neg other ROS                      Physical Exam  Normal systems: cardiovascular, pulmonary and dental    Airway   Mallampati: II  TM distance: >3 FB  Neck ROM: full    Dental     Cardiovascular       Pulmonary             Lab Results   Component Value Date    WBC 9.8 2008    HGB 15.8 2008    HCT 45.0 2008     2008     2008    POTASSIUM 3.5 2008    CHLORIDE 105 2008    CO2 29 2008    BUN 13 2008    CR 1.01 2008     (H) 2008    GINI 8.8 2008       Preop Vitals  BP Readings from Last 3 Encounters:    07/14/20 (!) 144/99   05/04/20 (!) 152/86   01/13/20 135/81    Pulse Readings from Last 3 Encounters:   07/14/20 74   05/04/20 70   01/14/20 80      Resp Readings from Last 3 Encounters:   01/06/16 18   10/19/15 18    SpO2 Readings from Last 3 Encounters:   07/14/20 97%   05/04/20 97%   01/14/20 94%      Temp Readings from Last 1 Encounters:   01/10/18 36.6  C (97.9  F) (Tympanic)    Ht Readings from Last 1 Encounters:   01/14/20 1.829 m (6')      Wt Readings from Last 1 Encounters:   01/09/19 97.5 kg (215 lb)    Estimated body mass index is 29.16 kg/m  as calculated from the following:    Height as of 1/14/20: 1.829 m (6').    Weight as of 1/9/19: 97.5 kg (215 lb).       Anesthesia Plan      History & Physical Review      ASA Status:  3 .    NPO Status:  > 6 hours    Plan for MAC Reason for MAC:  Deep or markedly invasive procedure (G8)  PONV prophylaxis:  Ondansetron (or other 5HT-3) and Dexamethasone or Solumedrol  The History and Physical has been reviewed, the patient has been examined and no changes have occurred in the patient's condition since the H & P was completed.           Postoperative Care  Postoperative pain management:  IV analgesics and Oral pain medications.      Consents  Anesthetic plan, risks, benefits and alternatives discussed with:  Patient..                 ADAM Worley CRNA

## 2021-01-01 LAB
LABORATORY COMMENT REPORT: NORMAL
SARS-COV-2 RNA SPEC QL NAA+PROBE: NEGATIVE
SPECIMEN SOURCE: NORMAL

## 2021-01-04 ENCOUNTER — HOSPITAL ENCOUNTER (OUTPATIENT)
Facility: CLINIC | Age: 81
Discharge: HOME | End: 2021-01-04
Attending: ORTHOPAEDIC SURGERY | Admitting: ORTHOPAEDIC SURGERY
Payer: COMMERCIAL

## 2021-01-04 ENCOUNTER — COMMUNICATION - HEALTHEAST (OUTPATIENT)
Dept: INTERNAL MEDICINE | Facility: CLINIC | Age: 81
End: 2021-01-04

## 2021-01-04 ENCOUNTER — ANESTHESIA (OUTPATIENT)
Dept: SURGERY | Facility: CLINIC | Age: 81
End: 2021-01-04
Payer: COMMERCIAL

## 2021-01-04 VITALS
DIASTOLIC BLOOD PRESSURE: 85 MMHG | HEIGHT: 72 IN | BODY MASS INDEX: 28.71 KG/M2 | SYSTOLIC BLOOD PRESSURE: 142 MMHG | RESPIRATION RATE: 16 BRPM | TEMPERATURE: 98.5 F | WEIGHT: 212 LBS | OXYGEN SATURATION: 95 %

## 2021-01-04 DIAGNOSIS — G56.00 CARPAL TUNNEL SYNDROME, UNSPECIFIED LATERALITY: Primary | ICD-10-CM

## 2021-01-04 PROCEDURE — 999N000141 HC STATISTIC PRE-PROCEDURE NURSING ASSESSMENT: Performed by: ORTHOPAEDIC SURGERY

## 2021-01-04 PROCEDURE — 258N000003 HC RX IP 258 OP 636: Performed by: NURSE ANESTHETIST, CERTIFIED REGISTERED

## 2021-01-04 PROCEDURE — 250N000013 HC RX MED GY IP 250 OP 250 PS 637: Performed by: NURSE ANESTHETIST, CERTIFIED REGISTERED

## 2021-01-04 PROCEDURE — 272N000001 HC OR GENERAL SUPPLY STERILE: Performed by: ORTHOPAEDIC SURGERY

## 2021-01-04 PROCEDURE — 710N000012 HC RECOVERY PHASE 2, PER MINUTE: Performed by: ORTHOPAEDIC SURGERY

## 2021-01-04 PROCEDURE — 250N000009 HC RX 250: Performed by: ORTHOPAEDIC SURGERY

## 2021-01-04 PROCEDURE — 370N000017 HC ANESTHESIA TECHNICAL FEE, PER MIN: Performed by: ORTHOPAEDIC SURGERY

## 2021-01-04 PROCEDURE — 250N000011 HC RX IP 250 OP 636: Performed by: NURSE ANESTHETIST, CERTIFIED REGISTERED

## 2021-01-04 PROCEDURE — 250N000009 HC RX 250: Performed by: NURSE ANESTHETIST, CERTIFIED REGISTERED

## 2021-01-04 PROCEDURE — 360N000075 HC SURGERY LEVEL 2, PER MIN: Performed by: ORTHOPAEDIC SURGERY

## 2021-01-04 RX ORDER — ONDANSETRON 2 MG/ML
INJECTION INTRAMUSCULAR; INTRAVENOUS PRN
Status: DISCONTINUED | OUTPATIENT
Start: 2021-01-04 | End: 2021-01-04

## 2021-01-04 RX ORDER — LIDOCAINE HYDROCHLORIDE 10 MG/ML
INJECTION, SOLUTION EPIDURAL; INFILTRATION; INTRACAUDAL; PERINEURAL PRN
Status: DISCONTINUED | OUTPATIENT
Start: 2021-01-04 | End: 2021-01-04

## 2021-01-04 RX ORDER — SODIUM CHLORIDE, SODIUM LACTATE, POTASSIUM CHLORIDE, CALCIUM CHLORIDE 600; 310; 30; 20 MG/100ML; MG/100ML; MG/100ML; MG/100ML
INJECTION, SOLUTION INTRAVENOUS CONTINUOUS
Status: DISCONTINUED | OUTPATIENT
Start: 2021-01-04 | End: 2021-01-04 | Stop reason: HOSPADM

## 2021-01-04 RX ORDER — FENTANYL CITRATE 50 UG/ML
INJECTION, SOLUTION INTRAMUSCULAR; INTRAVENOUS PRN
Status: DISCONTINUED | OUTPATIENT
Start: 2021-01-04 | End: 2021-01-04

## 2021-01-04 RX ORDER — ACETAMINOPHEN 325 MG/1
975 TABLET ORAL ONCE
Status: COMPLETED | OUTPATIENT
Start: 2021-01-04 | End: 2021-01-04

## 2021-01-04 RX ORDER — BUPIVACAINE HYDROCHLORIDE 5 MG/ML
INJECTION, SOLUTION PERINEURAL PRN
Status: DISCONTINUED | OUTPATIENT
Start: 2021-01-04 | End: 2021-01-04 | Stop reason: HOSPADM

## 2021-01-04 RX ORDER — DIMENHYDRINATE 50 MG/ML
25 INJECTION, SOLUTION INTRAMUSCULAR; INTRAVENOUS
Status: DISCONTINUED | OUTPATIENT
Start: 2021-01-04 | End: 2021-01-04 | Stop reason: HOSPADM

## 2021-01-04 RX ORDER — LIDOCAINE HYDROCHLORIDE 10 MG/ML
INJECTION, SOLUTION INFILTRATION; PERINEURAL PRN
Status: DISCONTINUED | OUTPATIENT
Start: 2021-01-04 | End: 2021-01-04 | Stop reason: HOSPADM

## 2021-01-04 RX ORDER — ONDANSETRON 4 MG/1
4 TABLET, ORALLY DISINTEGRATING ORAL EVERY 30 MIN PRN
Status: DISCONTINUED | OUTPATIENT
Start: 2021-01-04 | End: 2021-01-04 | Stop reason: HOSPADM

## 2021-01-04 RX ORDER — NALOXONE HYDROCHLORIDE 0.4 MG/ML
0.4 INJECTION, SOLUTION INTRAMUSCULAR; INTRAVENOUS; SUBCUTANEOUS
Status: DISCONTINUED | OUTPATIENT
Start: 2021-01-04 | End: 2021-01-04 | Stop reason: HOSPADM

## 2021-01-04 RX ORDER — PROPOFOL 10 MG/ML
INJECTION, EMULSION INTRAVENOUS CONTINUOUS PRN
Status: DISCONTINUED | OUTPATIENT
Start: 2021-01-04 | End: 2021-01-04

## 2021-01-04 RX ORDER — NALOXONE HYDROCHLORIDE 0.4 MG/ML
0.2 INJECTION, SOLUTION INTRAMUSCULAR; INTRAVENOUS; SUBCUTANEOUS
Status: DISCONTINUED | OUTPATIENT
Start: 2021-01-04 | End: 2021-01-04 | Stop reason: HOSPADM

## 2021-01-04 RX ORDER — DEXAMETHASONE SODIUM PHOSPHATE 4 MG/ML
INJECTION, SOLUTION INTRA-ARTICULAR; INTRALESIONAL; INTRAMUSCULAR; INTRAVENOUS; SOFT TISSUE PRN
Status: DISCONTINUED | OUTPATIENT
Start: 2021-01-04 | End: 2021-01-04

## 2021-01-04 RX ORDER — ACETAMINOPHEN 325 MG/1
650 TABLET ORAL
Status: DISCONTINUED | OUTPATIENT
Start: 2021-01-04 | End: 2021-01-04 | Stop reason: HOSPADM

## 2021-01-04 RX ORDER — FENTANYL CITRATE 50 UG/ML
25-50 INJECTION, SOLUTION INTRAMUSCULAR; INTRAVENOUS
Status: DISCONTINUED | OUTPATIENT
Start: 2021-01-04 | End: 2021-01-04 | Stop reason: HOSPADM

## 2021-01-04 RX ORDER — ONDANSETRON 2 MG/ML
4 INJECTION INTRAMUSCULAR; INTRAVENOUS EVERY 30 MIN PRN
Status: DISCONTINUED | OUTPATIENT
Start: 2021-01-04 | End: 2021-01-04 | Stop reason: HOSPADM

## 2021-01-04 RX ORDER — HYDROCODONE BITARTRATE AND ACETAMINOPHEN 5; 325 MG/1; MG/1
1-2 TABLET ORAL EVERY 4 HOURS PRN
Qty: 6 TABLET | Refills: 0 | Status: SHIPPED | OUTPATIENT
Start: 2021-01-04 | End: 2021-07-13

## 2021-01-04 RX ORDER — ALBUTEROL SULFATE 0.83 MG/ML
2.5 SOLUTION RESPIRATORY (INHALATION) EVERY 4 HOURS PRN
Status: DISCONTINUED | OUTPATIENT
Start: 2021-01-04 | End: 2021-01-04 | Stop reason: HOSPADM

## 2021-01-04 RX ORDER — GABAPENTIN 300 MG/1
300 CAPSULE ORAL ONCE
Status: COMPLETED | OUTPATIENT
Start: 2021-01-04 | End: 2021-01-04

## 2021-01-04 RX ORDER — HYDROCODONE BITARTRATE AND ACETAMINOPHEN 5; 325 MG/1; MG/1
1 TABLET ORAL
Status: DISCONTINUED | OUTPATIENT
Start: 2021-01-04 | End: 2021-01-04 | Stop reason: HOSPADM

## 2021-01-04 RX ORDER — LIDOCAINE 40 MG/G
CREAM TOPICAL
Status: DISCONTINUED | OUTPATIENT
Start: 2021-01-04 | End: 2021-01-04 | Stop reason: HOSPADM

## 2021-01-04 RX ADMIN — FENTANYL CITRATE 50 MCG: 50 INJECTION, SOLUTION INTRAMUSCULAR; INTRAVENOUS at 15:14

## 2021-01-04 RX ADMIN — SODIUM CHLORIDE, POTASSIUM CHLORIDE, SODIUM LACTATE AND CALCIUM CHLORIDE: 600; 310; 30; 20 INJECTION, SOLUTION INTRAVENOUS at 14:02

## 2021-01-04 RX ADMIN — ONDANSETRON 4 MG: 2 INJECTION INTRAMUSCULAR; INTRAVENOUS at 15:18

## 2021-01-04 RX ADMIN — LIDOCAINE HYDROCHLORIDE 50 MG: 10 INJECTION, SOLUTION EPIDURAL; INFILTRATION; INTRACAUDAL; PERINEURAL at 15:14

## 2021-01-04 RX ADMIN — DEXAMETHASONE SODIUM PHOSPHATE 4 MG: 4 INJECTION, SOLUTION INTRA-ARTICULAR; INTRALESIONAL; INTRAMUSCULAR; INTRAVENOUS; SOFT TISSUE at 15:18

## 2021-01-04 RX ADMIN — GABAPENTIN 300 MG: 300 CAPSULE ORAL at 13:53

## 2021-01-04 RX ADMIN — FENTANYL CITRATE 50 MCG: 50 INJECTION, SOLUTION INTRAMUSCULAR; INTRAVENOUS at 15:10

## 2021-01-04 RX ADMIN — MIDAZOLAM 2 MG: 1 INJECTION INTRAMUSCULAR; INTRAVENOUS at 15:07

## 2021-01-04 RX ADMIN — PROPOFOL 50 MCG/KG/MIN: 10 INJECTION, EMULSION INTRAVENOUS at 15:14

## 2021-01-04 RX ADMIN — ACETAMINOPHEN 975 MG: 325 TABLET, FILM COATED ORAL at 13:53

## 2021-01-04 ASSESSMENT — MIFFLIN-ST. JEOR: SCORE: 1704.63

## 2021-01-04 NOTE — ANESTHESIA CARE TRANSFER NOTE
Patient: Nir Guy    Procedure(s):  Revision open carpal tunnel release.    Diagnosis: Carpal tunnel syndrome [G56.00]  Diagnosis Additional Information: No value filed.    Anesthesia Type:   MAC     Note:  Airway :Room Air  Patient transferred to:Phase II  Handoff Report: Identifed the Patient, Identified the Reponsible Provider, Reviewed the pertinent medical history, Discussed the surgical course, Reviewed Intra-OP anesthesia mangement and issues during anesthesia, Set expectations for post-procedure period and Allowed opportunity for questions and acknowledgement of understanding      Vitals: (Last set prior to Anesthesia Care Transfer)    CRNA VITALS  1/4/2021 1507 - 1/4/2021 1541      1/4/2021             Resp Rate (observed):  (!) 4                Electronically Signed By: ADAM Carey CRNA  January 4, 2021  3:41 PM

## 2021-01-04 NOTE — DISCHARGE INSTRUCTIONS
Same Day Surgery Discharge Instructions  Special Precautions After Surgery - Adult    1. It is not unusual to feel lightheaded or faint, up to 24 hours after surgery or while taking pain medication.  If you have these symptoms; sit for a few minutes before standing and have someone assist you when getting up.  2. You should rest and relax for the next 24 hours and must have someone stay with you for at least 24 hours after your discharge.  3. DO NOT DRIVE any vehicle or operate mechanical equipment for 24 hours following the end of your surgery.  DO NOT DRIVE while taking narcotic pain medications that have been prescribed by your physician.  If you had a limb operated on, you must be able to use it fully to drive.  4. DO NOT drink alcoholic beverages for 24 hours following surgery or while taking prescription pain medication.  5. Drink clear liquids (apple juice, ginger ale, broth, 7-Up, etc.).  Progress to your regular diet as you feel able.  6. Any questions call your physician and do not make important decisions for 24 hours.        __________________________________________________________________________________________________________________________________  IMPORTANT NUMBERS:    [facility] Main Number:  [phone], [phone]  Pharmacy:  [phone]  Same Day Surgery:  [phone], Monday - Friday until 8:30 p.m.  Urgent Care:  [phone]  Emergency Room:  [phone]      [facility]:  [phone]                                                                             [facility]:  [phone] option 1  [facility]:  [phone]     [facility]:  [phone]   [facility]:  [phone]   Home Medical Equipment: [phone]  [facility]:  [phone]

## 2021-01-04 NOTE — OP NOTE
POST OPERATIVE NOTE-IMMEDIATE  AND PROCEDURE      Date:   January 4, 2021       Preoperative Diagnosis: Right Recurrent Carpal Tunnel Syndrome       Postoperative Diagnosis: Right Recurrent Carpal Tunnel Syndrome       Procedures:  Right Revision Open Carpal Tunnel Release        Prosthetic Devices:  None      Surgeon(s) and Assistants (if any):      Surgeon:  Nasir Casper MD     1st assist:  VALENTINA Munroe     Drains:  none      Specimens: none      Complications: None      Estimated Blood Loss:  Minimal         Condition on discharge from OR: Satisfactory       FINDINGS   There was no significant synovitis within the carpal canal.      PROCEDURE   The patient was brought to the operating room and was placed on the operating table in the supine position.  We applied a tourniquet to the right  upper extremity.  Anesthesia was administered using the technique describe above.  The right  hand, wrist, and distal forearm were then prepped with Hibiclens and were draped in the usual sterile manner.  We exsanguinated the hand and wrist  and inflated the tourniquet to 250 mmHg.  We made a  2 to 2.5 cm longitudinal incision distal to the distal wrist flexor crease at the base of the palm in line with the ulnar side of the palmaris longus and the radial ring finger at the site of the patient's previous incision.  We sharply dissected the subcutaneous tissue and the underlying fibers of the palmar fascia were incised in line with the incision.  The underlying transverse carpal ligament was identified.  We carefully incised this sharply along its ulnar border.  We then placed tenotomy scissors beneath the transverse carpal ligament and then divided the transverse carpal ligament from midpoint distally to the level of the superficial palmar arch.  Similarly, we placed scissors in a distal to proximal direction and, with the skin retracted, we divided the most proximal portion of the transverse carpal ligament to the level  of the wrist crease.  No neurolysis was deemed necessary as there was no neurofibrosis that I could identify.  Under direct vision, we divided the antebrachial fascia for a distance of 3-4 cm proximal to the wrist crease on the ulnar side of palmaris longus tendon.  We irrigated the wound.  The tourniquet was deflated and hemostasis was obtained.  We reapproximated the skin using interrupted 4 0 nylon sutures.  We applied a light compressive dressing to the hand and wrist.  The patient was then taken to the recovery room in good condition.      Nasir Casper MD

## 2021-01-04 NOTE — ANESTHESIA POSTPROCEDURE EVALUATION
Patient: Nir DOWNING Guy    Procedure(s):  Revision open carpal tunnel release.    Diagnosis:Carpal tunnel syndrome [G56.00]  Diagnosis Additional Information: No value filed.    Anesthesia Type:  MAC    Note:  Anesthesia Post Evaluation    Patient location during evaluation: Bedside  Patient participation: Able to fully participate in evaluation  Level of consciousness: awake and alert  Pain management: adequate  Airway patency: patent  Cardiovascular status: acceptable  Respiratory status: acceptable  Hydration status: acceptable  PONV: none     Anesthetic complications: None          Last vitals:  Vitals:    01/04/21 1327   BP: (!) 185/93   Resp: 18   Temp: 36.9  C (98.5  F)   SpO2: 97%         Electronically Signed By: ADAM Carey CRNA  January 4, 2021  3:41 PM

## 2021-01-12 ENCOUNTER — OFFICE VISIT (OUTPATIENT)
Dept: DERMATOLOGY | Facility: CLINIC | Age: 81
End: 2021-01-12
Payer: COMMERCIAL

## 2021-01-12 VITALS — DIASTOLIC BLOOD PRESSURE: 70 MMHG | HEART RATE: 63 BPM | SYSTOLIC BLOOD PRESSURE: 139 MMHG | OXYGEN SATURATION: 97 %

## 2021-01-12 DIAGNOSIS — D18.01 ANGIOMA OF SKIN: ICD-10-CM

## 2021-01-12 DIAGNOSIS — Z85.828 HISTORY OF SKIN CANCER: Primary | ICD-10-CM

## 2021-01-12 DIAGNOSIS — L81.4 LENTIGO: ICD-10-CM

## 2021-01-12 DIAGNOSIS — C44.321 SQUAMOUS CELL CANCER OF SKIN OF NOSE: ICD-10-CM

## 2021-01-12 DIAGNOSIS — L82.1 SEBORRHEIC KERATOSIS: ICD-10-CM

## 2021-01-12 DIAGNOSIS — L57.0 AK (ACTINIC KERATOSIS): ICD-10-CM

## 2021-01-12 DIAGNOSIS — D23.9 DERMAL NEVUS: ICD-10-CM

## 2021-01-12 DIAGNOSIS — C44.329 SQUAMOUS CELL CANCER OF SKIN OF RIGHT CHEEK: ICD-10-CM

## 2021-01-12 PROCEDURE — 17003 DESTRUCT PREMALG LES 2-14: CPT | Mod: 59 | Performed by: DERMATOLOGY

## 2021-01-12 PROCEDURE — 11102 TANGNTL BX SKIN SINGLE LES: CPT | Performed by: DERMATOLOGY

## 2021-01-12 PROCEDURE — 17000 DESTRUCT PREMALG LESION: CPT | Mod: 59 | Performed by: DERMATOLOGY

## 2021-01-12 PROCEDURE — 99213 OFFICE O/P EST LOW 20 MIN: CPT | Mod: 25 | Performed by: DERMATOLOGY

## 2021-01-12 PROCEDURE — 11103 TANGNTL BX SKIN EA SEP/ADDL: CPT | Performed by: DERMATOLOGY

## 2021-01-12 PROCEDURE — 88331 PATH CONSLTJ SURG 1 BLK 1SPC: CPT | Mod: 59 | Performed by: DERMATOLOGY

## 2021-01-12 NOTE — NURSING NOTE
Chief Complaint   Patient presents with     Skin Check       Vitals:    01/12/21 0905   BP: 139/70   Pulse: 63   SpO2: 97%     Wt Readings from Last 1 Encounters:   01/04/21 96.2 kg (212 lb)       Kath Smith LPN.................1/12/2021

## 2021-01-12 NOTE — PATIENT INSTRUCTIONS
Wound Care Instructions     FOR SUPERFICIAL WOUNDS     [facility] [phone]    [facility] [phone]    Nose & under right eye                   AFTER 24 HOURS YOU SHOULD REMOVE THE BANDAGE AND BEGIN DAILY DRESSING CHANGES AS FOLLOWS:     1) Remove Dressing.     2) Clean and dry the area with tap water using a Q-tip or sterile gauze pad.     3) Apply Vaseline, Aquaphor, Polysporin ointment or Bacitracin ointment over entire wound.  Do NOT use Neosporin ointment.     4) Cover the wound with a band-aid, or a sterile non-stick gauze pad and micropore paper tape      REPEAT THESE INSTRUCTIONS AT LEAST ONCE A DAY UNTIL THE WOUND HAS COMPLETELY HEALED.    It is an old wives tale that a wound heals better when it is exposed to air and allowed to dry out. The wound will heal faster with a better cosmetic result if it is kept moist with ointment and covered with a bandage.    **Do not let the wound dry out.**      Supplies Needed:      *Cotton tipped applicators (Q-tips)    *Polysporin Ointment or Bacitracin Ointment (NOT NEOSPORIN)    *Band-aids or non-stick gauze pads and micropore paper tape.      PATIENT INFORMATION:    During the healing process you will notice a number of changes. All wounds develop a small halo of redness surrounding the wound.  This means healing is occurring. Severe itching with extensive redness usually indicates sensitivity to the ointment or bandage tape used to dress the wound.  You should call our office if this develops.      Swelling  and/or discoloration around your surgical site is common, particularly when performed around the eye.    All wounds normally drain.  The larger the wound the more drainage there will be.  After 7-10 days, you will notice the wound beginning to shrink and new skin will begin to grow.  The wound is healed when you can see skin has formed over the entire area.  A healed wound has a healthy, shiny look to the surface and is red  to dark pink in color to normalize.  Wounds may take approximately 4-6 weeks to heal.  Larger wounds may take 6-8 weeks.  After the wound is healed you may discontinue dressing changes.    You may experience a sensation of tightness as your wound heals. This is normal and will gradually subside.    Your healed wound may be sensitive to temperature changes. This sensitivity improves with time, but if you re having a lot of discomfort, try to avoid temperature extremes.    Patients frequently experience itching after their wound appears to have healed because of the continue healing under the skin.  Plain Vaseline will help relieve the itching.        POSSIBLE COMPLICATIONS    BLEEDIN. Leave the bandage in place.  2. Use tightly rolled up gauze or a cloth to apply direct pressure over the bandage for 30  minutes.  3. Reapply pressure for an additional 30 minutes if necessary  4. Use additional gauze and tape to maintain pressure once the bleeding has stopped.  WOUND CARE INSTRUCTIONS   FOR CRYOSURGERY   This area treated with liquid nitrogen should form a blister (areas treated may or may not blister-skin may just turn dark and slough off). You do not need to bandage the area unless a blister forms and breaks (which may be a few days). When the blister breaks, begin daily dressing changes as follows:  1) Clean and dry the area with tap water using clean Q-tip or sterile gauze pad.   2) Apply Polysporin ointment or Bacitracin ointment over entire wound. Do NOT use Neosporin ointment.   3) Cover the wound with a band-aid or sterile non-stick gauze pad and micropore paper tape.   REPEAT THESE INSTRUCTIONS AT LEAST ONCE A DAY UNTIL THE WOUND HAS COMPLETELY HEALED.   It is an old wives tale that a wound heals better when it is exposed to air and allowed to dry out. The wound will heal faster with a better cosmetic result if it is kept moist with ointment and covered with a bandage.   Do not let the wound dry out.    IMPORTANT INFORMATION ON REVERSE SIDE   Supplies Needed:   *Cotton tipped applicators (Q-tips)   *Polysporin ointment or Bacitracin ointment (NOT NEOSPORIN)   *Band-aids, or non stick gauze pads and micropore paper tape   PATIENT INFORMATION   During the healing process you will notice a number of changes. All wounds develop a small halo of redness surrounding the wound. This means healing is occurring. Severe itching with extensive redness usually indicates sensitivity to the ointment or bandage tape used to dress the wound. You should call our office if this develops.   Swelling and/or discoloration around your surgical site is common, particularly when performed around the eye.   All wounds normally drain. The larger the wound the more drainage there will be. After 7-10 days, you will notice the wound beginning to shrink and new skin will begin to grow. The wound is healed when you can see skin has formed over the entire area. A healed wound has a healthy, shiny look to the surface and is red to dark pink in color to normalize. Wounds may take approximately 4-6 weeks to heal. Larger wounds may take 6-8 weeks. After the wound is healed you may discontinue dressing changes.   You may experience a sensation of tightness as your wound heals. This is normal and will gradually subside.   Your healed wound may be sensitive to temperature changes. This sensitivity improves with time, but if you re having a lot of discomfort, try to avoid temperature extremes.   Patients frequently experience itching after their wound appears to have healed because of the continue healing under the skin. Plain Vaseline will help relieve the itching.

## 2021-01-12 NOTE — LETTER
2021         RE: Nir Guy  [address] [place] [id number]        Dear Colleague,    Thank you for referring your patient, Nir Guy, to the [facility]. Please see a copy of my visit note below.    Nir Guy is an extremely pleasant 81 year old year old male patient here today for spot on nose.   .   Patient states this has been present for sometime.  Patient reports the following symptoms:  bleeding.  Patient reports the following previous treatments none.  These treatments did not work.  Patient reports the following modifying factors none.  Associated symptoms: none.  Patient has no other skin complaints today.  Remainder of the HPI, Meds, PMH, Allergies, FH, and SH was reviewed in chart.      Past Medical History:   Diagnosis Date     Actinic keratosis      Basal cell carcinoma      Squamous cell carcinoma        Past Surgical History:   Procedure Laterality Date     CATARACT IOL, RT/LT       RELEASE CARPAL TUNNEL Right 2021    Procedure: Revision open carpal tunnel release.;  Surgeon: Nasir Casper MD;  Location: [facility] OR        Family History   Problem Relation Age of Onset     Melanoma No family hx of        Social History     Socioeconomic History     Marital status:      Spouse name: Not on file     Number of children: Not on file     Years of education: Not on file     Highest education level: Not on file   Occupational History     Employer: RETIRED   Social Needs     Financial resource strain: Not on file     Food insecurity     Worry: Not on file     Inability: Not on file     Transportation needs     Medical: Not on file     Non-medical: Not on file   Tobacco Use     Smoking status: Former Smoker     Types: Pipe     Quit date: 12/3/1999     Years since quittin.1     Smokeless tobacco: Never Used   Substance and Sexual Activity     Alcohol use: Not on file     Drug use: Not on file     Sexual activity: Not on file    Lifestyle     Physical activity     Days per week: Not on file     Minutes per session: Not on file     Stress: Not on file   Relationships     Social connections     Talks on phone: Not on file     Gets together: Not on file     Attends Oriental orthodox service: Not on file     Active member of club or organization: Not on file     Attends meetings of clubs or organizations: Not on file     Relationship status: Not on file     Intimate partner violence     Fear of current or ex partner: Not on file     Emotionally abused: Not on file     Physically abused: Not on file     Forced sexual activity: Not on file   Other Topics Concern     Not on file   Social History Narrative     Not on file       Outpatient Encounter Medications as of 1/12/2021   Medication Sig Dispense Refill     Carboxymethylcellulose Sod PF (REFRESH CELLUVISC) 1 % ophthalmic gel Place 1 drop into both eyes 4 times daily       HYDROcodone-acetaminophen (NORCO) 5-325 MG tablet Take 1-2 tablets by mouth every 4 hours as needed for moderate to severe pain 6 tablet 0     hypromellose-dextran (GENTEAL TEARS) 0.1-0.3 % ophthalmic solution Place 1 drop into both eyes daily as needed for dry eyes       MECLIZINE HCL PO        Multiple Vitamins-Minerals (MENS MULTIVITAMIN PLUS PO)        Multiple Vitamins-Minerals (PRESERVISION AREDS PO) Take by mouth daily       Omega-3 Fatty Acids (FISH OIL) 500 MG CAPS        No facility-administered encounter medications on file as of 1/12/2021.              Review Of Systems  Skin: As above  Eyes: negative  Ears/Nose/Throat: negative  Respiratory: No shortness of breath, dyspnea on exertion, cough, or hemoptysis  Cardiovascular: negative  Gastrointestinal: negative  Genitourinary: negative  Musculoskeletal: negative  Neurologic: negative  Psychiatric: negative  Hematologic/Lymphatic/Immunologic: negative  Endocrine: negative      O:   NAD, WDWN, Alert & Oriented, Mood & Affect wnl, Vitals stable   Here today alone   BP  139/70   Pulse 63   SpO2 97%    General appearance normal   Vitals stable   Alert, oriented and in no acute distress      Following lymph nodes palpated: Occipital, Cervical, Supraclavicular no lad   Gritty papules on forehead and scalp    R ala 4mm ulcerated scaly papule    R medial canthus 4mm tender scaly papule        Stuck on papules and brown macules on trunk and ext   Red papules on trunk  Flesh colored papules on trunk     The remainder of the full exam was normal; the following areas were examined:  conjunctiva/lids, oral mucosa, neck, peripheral vascular system, abdomen, lymph nodes, digits/nails, eccrine and apocrine glands, scalp/hair, face, neck, chest, abdomen, buttocks, back, RUE, LUE, RLE, LLE       Eyes: Conjunctivae/lids:Normal     ENT: Lips, buccal mucosa, tongue: normal    MSK:Normal    Cardiovascular: peripheral edema none    Pulm: Breathing Normal    Lymph Nodes: No Head and Neck Lymphadenopathy     Neuro/Psych: Orientation:Alert and Orientedx3 ; Mood/Affect:normal       MICRO:     R ala (red):There is a proliferation of irregular nests of abnormal squamous cells arising from the epidermis and invading the dermis. These are well differentiated. The dermis shows a variable superficial perivascular inflammatory infiltrate.   R medial canthus (blue):There is a proliferation of irregular nests of abnormal squamous cells arising from the epidermis and invading the dermis. These are well differentiated. The dermis shows a variable superficial perivascular inflammatory infiltrate.   A/P:  1. Seborrheic keratosis, lentigo, angioma, dermal nevus, hx of non-melanoma skin cancer   2. Actinic keratosis   Efudex and pdt discussed with patient he declines  LN2:  Treated with LN2 for 5s for 1-2 cycles. Warned risks of blistering, pain, pigment change, scarring, and incomplete resolution.  Advised patient to return if lesions do not completely resolve.  Wound care sheet given.  Forehead x4  Scalp x10  3. R/o  basal cell carcinoma   TANGENTIAL BIOPSY IN HOUSE:  After consent, anesthesia with LEC and prep, tangential excision performed and dx above confirmed with frozen section histology.  No complications and routine wound care.  Patient is not on  anticoagulants and risk of bleeding discussed with patient.       I have personally reviewed all specimens and/or slides and used them with my medical judgement to determine or confirm the final diagnosis.     Patient told result   R ala squamous cell carcinoma schedule  R cheek squamous cell carcinoma treated schedule     It was a pleasure speaking to Nir SALINA Guy today.  This is an chronic issue with no systemic symptoms and acute exacerbation of disease.     Previous clinic  notes and pertinent laboratory tests were reviewed prior to Nir Guy's visit.  The following medical tests were ordered and interpreted (bx and frozen section) to assist in the evaluation and management of Nir Guy's issue.    Possible management options including excision  were shared with the patient.     BENIGN LESIONS DISCUSSED WITH PATIENT:  I discussed the specifics of tumor, prognosis, and genetics of benign lesions.  I explained that treatment of these lesions would be purely cosmetic and not medically neccessary.  I discussed with patient different removal options including excision, cautery and /or laser.      Nature and genetics of benign skin lesions dicussed with patient.  Signs and Symptoms of skin cancer discussed with patient.  Patient encouraged to perform monthly skin exams.  UV precautions reviewed with patient.  Skin care regimen reviewed with patient: Eliminate harsh soaps, i.e. Dial, zest, irsih spring; Mild soaps such as Cetaphil or Dove sensitive skin, avoid hot or cold showers, aggressive use of emollients including vanicream, cetaphil or cerave discussed with patient.    Risks of non-melanoma skin cancer discussed with patient   Return to clinic next  appt          Again, thank you for allowing me to participate in the care of your patient.        Sincerely,        Chavez Jay MD

## 2021-01-12 NOTE — PROGRESS NOTES
Nir Guy is an extremely pleasant 81 year old year old male patient here today for spot on nose.   .   Patient states this has been present for sometime.  Patient reports the following symptoms:  bleeding.  Patient reports the following previous treatments none.  These treatments did not work.  Patient reports the following modifying factors none.  Associated symptoms: none.  Patient has no other skin complaints today.  Remainder of the HPI, Meds, PMH, Allergies, FH, and SH was reviewed in chart.      Past Medical History:   Diagnosis Date     Actinic keratosis      Basal cell carcinoma      Squamous cell carcinoma        Past Surgical History:   Procedure Laterality Date     CATARACT IOL, RT/LT       RELEASE CARPAL TUNNEL Right 2021    Procedure: Revision open carpal tunnel release.;  Surgeon: Nasir Casper MD;  Location: [facility] OR        Family History   Problem Relation Age of Onset     Melanoma No family hx of        Social History     Socioeconomic History     Marital status:      Spouse name: Not on file     Number of children: Not on file     Years of education: Not on file     Highest education level: Not on file   Occupational History     Employer: RETIRED   Social Needs     Financial resource strain: Not on file     Food insecurity     Worry: Not on file     Inability: Not on file     Transportation needs     Medical: Not on file     Non-medical: Not on file   Tobacco Use     Smoking status: Former Smoker     Types: Pipe     Quit date: 12/3/1999     Years since quittin.1     Smokeless tobacco: Never Used   Substance and Sexual Activity     Alcohol use: Not on file     Drug use: Not on file     Sexual activity: Not on file   Lifestyle     Physical activity     Days per week: Not on file     Minutes per session: Not on file     Stress: Not on file   Relationships     Social connections     Talks on phone: Not on file     Gets together: Not on file     Attends Sabianist service:  Not on file     Active member of club or organization: Not on file     Attends meetings of clubs or organizations: Not on file     Relationship status: Not on file     Intimate partner violence     Fear of current or ex partner: Not on file     Emotionally abused: Not on file     Physically abused: Not on file     Forced sexual activity: Not on file   Other Topics Concern     Not on file   Social History Narrative     Not on file       Outpatient Encounter Medications as of 1/12/2021   Medication Sig Dispense Refill     Carboxymethylcellulose Sod PF (REFRESH CELLUVISC) 1 % ophthalmic gel Place 1 drop into both eyes 4 times daily       HYDROcodone-acetaminophen (NORCO) 5-325 MG tablet Take 1-2 tablets by mouth every 4 hours as needed for moderate to severe pain 6 tablet 0     hypromellose-dextran (GENTEAL TEARS) 0.1-0.3 % ophthalmic solution Place 1 drop into both eyes daily as needed for dry eyes       MECLIZINE HCL PO        Multiple Vitamins-Minerals (MENS MULTIVITAMIN PLUS PO)        Multiple Vitamins-Minerals (PRESERVISION AREDS PO) Take by mouth daily       Omega-3 Fatty Acids (FISH OIL) 500 MG CAPS        No facility-administered encounter medications on file as of 1/12/2021.              Review Of Systems  Skin: As above  Eyes: negative  Ears/Nose/Throat: negative  Respiratory: No shortness of breath, dyspnea on exertion, cough, or hemoptysis  Cardiovascular: negative  Gastrointestinal: negative  Genitourinary: negative  Musculoskeletal: negative  Neurologic: negative  Psychiatric: negative  Hematologic/Lymphatic/Immunologic: negative  Endocrine: negative      O:   NAD, WDWN, Alert & Oriented, Mood & Affect wnl, Vitals stable   Here today alone   /70   Pulse 63   SpO2 97%    General appearance normal   Vitals stable   Alert, oriented and in no acute distress      Following lymph nodes palpated: Occipital, Cervical, Supraclavicular no lad   Gritty papules on forehead and scalp    R ala 4mm ulcerated  scaly papule    R medial canthus 4mm tender scaly papule        Stuck on papules and brown macules on trunk and ext   Red papules on trunk  Flesh colored papules on trunk     The remainder of the full exam was normal; the following areas were examined:  conjunctiva/lids, oral mucosa, neck, peripheral vascular system, abdomen, lymph nodes, digits/nails, eccrine and apocrine glands, scalp/hair, face, neck, chest, abdomen, buttocks, back, RUE, LUE, RLE, LLE       Eyes: Conjunctivae/lids:Normal     ENT: Lips, buccal mucosa, tongue: normal    MSK:Normal    Cardiovascular: peripheral edema none    Pulm: Breathing Normal    Lymph Nodes: No Head and Neck Lymphadenopathy     Neuro/Psych: Orientation:Alert and Orientedx3 ; Mood/Affect:normal       MICRO:     R ala (red):There is a proliferation of irregular nests of abnormal squamous cells arising from the epidermis and invading the dermis. These are well differentiated. The dermis shows a variable superficial perivascular inflammatory infiltrate.   R medial canthus (blue):There is a proliferation of irregular nests of abnormal squamous cells arising from the epidermis and invading the dermis. These are well differentiated. The dermis shows a variable superficial perivascular inflammatory infiltrate.   A/P:  1. Seborrheic keratosis, lentigo, angioma, dermal nevus, hx of non-melanoma skin cancer   2. Actinic keratosis   Efudex and pdt discussed with patient he declines  LN2:  Treated with LN2 for 5s for 1-2 cycles. Warned risks of blistering, pain, pigment change, scarring, and incomplete resolution.  Advised patient to return if lesions do not completely resolve.  Wound care sheet given.  Forehead x4  Scalp x10  3. R/o basal cell carcinoma   TANGENTIAL BIOPSY IN HOUSE:  After consent, anesthesia with LEC and prep, tangential excision performed and dx above confirmed with frozen section histology.  No complications and routine wound care.  Patient is not on  anticoagulants and  risk of bleeding discussed with patient.       I have personally reviewed all specimens and/or slides and used them with my medical judgement to determine or confirm the final diagnosis.     Patient told result   R ala squamous cell carcinoma schedule  R cheek squamous cell carcinoma treated schedule     It was a pleasure speaking to Nir Guy today.  This is an chronic issue with no systemic symptoms and acute exacerbation of disease.     Previous clinic  notes and pertinent laboratory tests were reviewed prior to Nir Guy's visit.  The following medical tests were ordered and interpreted (bx and frozen section) to assist in the evaluation and management of Nir Guy's issue.    Possible management options including excision  were shared with the patient.     BENIGN LESIONS DISCUSSED WITH PATIENT:  I discussed the specifics of tumor, prognosis, and genetics of benign lesions.  I explained that treatment of these lesions would be purely cosmetic and not medically neccessary.  I discussed with patient different removal options including excision, cautery and /or laser.      Nature and genetics of benign skin lesions dicussed with patient.  Signs and Symptoms of skin cancer discussed with patient.  Patient encouraged to perform monthly skin exams.  UV precautions reviewed with patient.  Skin care regimen reviewed with patient: Eliminate harsh soaps, i.e. Dial, zest, irsih spring; Mild soaps such as Cetaphil or Dove sensitive skin, avoid hot or cold showers, aggressive use of emollients including vanicream, cetaphil or cerave discussed with patient.    Risks of non-melanoma skin cancer discussed with patient   Return to clinic next appt

## 2021-01-18 ENCOUNTER — OFFICE VISIT (OUTPATIENT)
Dept: DERMATOLOGY | Facility: CLINIC | Age: 81
End: 2021-01-18
Payer: COMMERCIAL

## 2021-01-18 VITALS — HEART RATE: 77 BPM | BODY MASS INDEX: 28.75 KG/M2 | OXYGEN SATURATION: 96 % | HEIGHT: 72 IN

## 2021-01-18 DIAGNOSIS — C44.321 SQUAMOUS CELL CANCER OF SKIN OF NOSE: Primary | ICD-10-CM

## 2021-01-18 PROCEDURE — 11313 SHAVE SKIN LESION >2.0 CM: CPT | Performed by: DERMATOLOGY

## 2021-01-18 PROCEDURE — 99207 PR NO CHARGE LOS: CPT | Performed by: DERMATOLOGY

## 2021-01-18 PROCEDURE — 17311 MOHS 1 STAGE H/N/HF/G: CPT | Performed by: DERMATOLOGY

## 2021-01-18 NOTE — PATIENT INSTRUCTIONS
Open Wound Care     for _____NOSE_________        ? No strenuous activity for 48 hours    ? Take Tylenol as needed for discomfort.                                                .         ? Do not drink alcoholic beverages for 48 hours.    ? Keep the pressure bandage in place for 24 hours. If the bandage becomes blood tinged or loose, reinforce it with gauze and tape.        (Refer to the reverse side of this page for management of bleeding).    ? Remove bandage in 24 hours and begin wound care as follows:     1. Clean area with tap water using a Q tip or gauze pad, (shower / bathe normally)  2. Dry wound with Q tip or gauze pad  3. Apply Aquaphor, Vaseline, Polysporin or Bacitracin Ointment with a Q tip    Do NOT use Neosporin Ointment *  4. Cover the wound with a band-aid or nonstick gauze pad and paper tape.  5. Repeat wound care once a day until wound is completely healed.    It is an old wives tale that a wound heals better when it is exposed to air and allowed to dry out. The wound will heal faster with a better cosmetic result if it is kept moist with ointment and covered with a bandage.  Do not let the wound dry out.      Supplies Needed:                Qtips or gauze pads                Polysporin or Bacitracin Ointment                Bandaids or nonstick gauze pads and paper tape    Wound care kits and brown paper tape are available for purchase at   the pharmacy.    BLEEDIN. Use tightly rolled up gauze or cloth to apply direct pressure over the bandage for 20   minutes.  2. Reapply pressure for an additional 20 minutes if necessary  3. Call the office or go to the nearest emergency room if pressure fails to stop the bleeding.  4. Use additional gauze and tape to maintain pressure once the bleeding has stopped.  5. Begin wound care 24 hours after surgery as directed.                  WOUND HEALING    1. One week after surgery a pink / red halo will form around the outside of the wound.   This is new  skin.  2. The center of the wound will appear yellowish white and produce some drainage.  3. The pink halo will slowly migrate in toward the center of the wound until the wound is covered with new shiny pink skin.  4. There will be no more drainage when the wound is completely healed.  5. It will take six months to one year for the redness to fade.  6. The scar may be itchy, tight and sensitive to extreme temperatures for a year after the surgery.  7. Massaging the area several times a day for several minutes after the wound is completely healed will help the scar soften and normalize faster. Begin massage only after healing is complete.      In case of emergency call: Dr Jay: [phone]     [facility]: [phone]    [facility]: [phone]

## 2021-01-18 NOTE — PROGRESS NOTES
Nir Guy is an extremely pleasant 81 year old year old male patient here today for evaluation and managment of squamous cell carcinoma on right ala. Patient has no other skin complaints today.  Remainder of the HPI, Meds, PMH, Allergies, FH, and SH was reviewed in chart.      Past Medical History:   Diagnosis Date     Actinic keratosis      Basal cell carcinoma      Squamous cell carcinoma        Past Surgical History:   Procedure Laterality Date     CATARACT IOL, RT/LT       RELEASE CARPAL TUNNEL Right 2021    Procedure: Revision open carpal tunnel release.;  Surgeon: Nasir Casper MD;  Location: [facility] OR        Family History   Problem Relation Age of Onset     Melanoma No family hx of        Social History     Socioeconomic History     Marital status:      Spouse name: Not on file     Number of children: Not on file     Years of education: Not on file     Highest education level: Not on file   Occupational History     Employer: RETIRED   Social Needs     Financial resource strain: Not on file     Food insecurity     Worry: Not on file     Inability: Not on file     Transportation needs     Medical: Not on file     Non-medical: Not on file   Tobacco Use     Smoking status: Former Smoker     Types: Pipe     Quit date: 12/3/1999     Years since quittin.1     Smokeless tobacco: Never Used   Substance and Sexual Activity     Alcohol use: Not on file     Drug use: Not on file     Sexual activity: Not on file   Lifestyle     Physical activity     Days per week: Not on file     Minutes per session: Not on file     Stress: Not on file   Relationships     Social connections     Talks on phone: Not on file     Gets together: Not on file     Attends Evangelical service: Not on file     Active member of club or organization: Not on file     Attends meetings of clubs or organizations: Not on file     Relationship status: Not on file     Intimate partner violence     Fear of current or ex partner:  Not on file     Emotionally abused: Not on file     Physically abused: Not on file     Forced sexual activity: Not on file   Other Topics Concern     Not on file   Social History Narrative     Not on file       Outpatient Encounter Medications as of 1/18/2021   Medication Sig Dispense Refill     Carboxymethylcellulose Sod PF (REFRESH CELLUVISC) 1 % ophthalmic gel Place 1 drop into both eyes 4 times daily       HYDROcodone-acetaminophen (NORCO) 5-325 MG tablet Take 1-2 tablets by mouth every 4 hours as needed for moderate to severe pain 6 tablet 0     hypromellose-dextran (GENTEAL TEARS) 0.1-0.3 % ophthalmic solution Place 1 drop into both eyes daily as needed for dry eyes       MECLIZINE HCL PO        Multiple Vitamins-Minerals (MENS MULTIVITAMIN PLUS PO)        Multiple Vitamins-Minerals (PRESERVISION AREDS PO) Take by mouth daily       Omega-3 Fatty Acids (FISH OIL) 500 MG CAPS        No facility-administered encounter medications on file as of 1/18/2021.              Review Of Systems  Skin: As above  Eyes: negative  Ears/Nose/Throat: negative  Respiratory: No shortness of breath, dyspnea on exertion, cough, or hemoptysis  Cardiovascular: negative  Gastrointestinal: negative  Genitourinary: negative  Musculoskeletal: negative  Neurologic: negative  Psychiatric: negative  Hematologic/Lymphatic/Immunologic: negative  Endocrine: negative      O:   NAD, WDWN, Alert & Oriented, Mood & Affect wnl, Vitals stable   Here today alone   Pulse 77   Ht 1.829 m (6')   SpO2 96%   BMI 28.75 kg/m     General appearance normal   Vitals stable   Alert, oriented and in no acute distress     R ala 4mm scaly papule   Eyes: Conjunctivae/lids:Normal     ENT: Lips, buccal mucosa, tongue: normal    MSK:Normal    Cardiovascular: peripheral edema none    Pulm: Breathing Normal    Lymph Nodes: No Head and Neck Lymphadenopathy     Neuro/Psych: Orientation:Alert and Orientedx3 ; Mood/Affect:normal       A/P:  1. R ala squamous cell  carcinoma   MOHS:   Location    The rationale for Mohs surgery was discussed with the patient and consent was obtained.  The risks and benefits as well as alternatives to therapy were discussed, in detail.  Specifically, the risks of infection, scarring, bleeding, prolonged wound healing, incomplete removal, allergy to anesthesia, nerve injury and recurrence were addressed.  Indication for Mohs was Location. Prior to the procedure, the treatment site was clearly identified and, if available, confirmed with previous photos and confirmed by the patient   All components of the Universal Protocol/PAUSE rule were completed.  The Mohs surgeon operated in two distinct and integrated capacities as the surgeon and pathologist.      The area was prepped with Betasept.  A rim of normal appearing skin was marked circumferentially around the lesion.  The area was infiltrated with local anesthesia.  The tumor was first debulked to remove all clinically apparent tumor.  An incision following the standard Mohs approach was done and the specimen was oriented,mapped and placed in 1 block(s).  Each specimen was then chromacoded and processed in the Mohs laboratory using standard Mohs technique and submitted for frozen section histology.  Frozen section analysis showed no residual tumor but CLEAR MARGINS.      The tumor was excised using standard Mohs technique in 1 stages(s).  CLEAR MARGINS OBTAINED and Final defect size was 1 cm.     We discussed the options for wound management in full with the patient including risks/benefits/ possible outcomes.        DERMABRASION: After PGACAC discussed with patient, decision for tangential excision and dermabrasion was made. After anesthesia with Lido/Epi/Clinda and prep with hibiclens, hypertrophic areas were tangentially excised and entire cosmetic unit was smoothed 2.3cm. Hemostasis was obtained with pressure. Patient tolerated procedure well. There were no complications and EBL minimal.  Patient advised to keep abraded surfaces covered with generous ointment until healed, approximately 2 weeks. Return to office in 3 months or prn.   It was a pleasure speaking to Nir Guy today.  Signs and Symptoms of skin cancer discussed with patient.  Patient encouraged to perform monthly skin exams.  UV precautions reviewed with patient.  Return to clinic next appt

## 2021-01-18 NOTE — LETTER
2021         RE: Nir Guy  [address] [place] [address] [address]  [facility] [address]        Dear Colleague,    Thank you for referring your patient, Nir Guy, to the [facility]. Please see a copy of my visit note below.    Surgical Office Location :   [facility]  [address] [place] [address]      Nir Guy is an extremely pleasant 81 year old year old male patient here today for evaluation and managment of squamous cell carcinoma on right ala. Patient has no other skin complaints today.  Remainder of the HPI, Meds, PMH, Allergies, FH, and SH was reviewed in chart.      Past Medical History:   Diagnosis Date     Actinic keratosis      Basal cell carcinoma      Squamous cell carcinoma        Past Surgical History:   Procedure Laterality Date     CATARACT IOL, RT/LT       RELEASE CARPAL TUNNEL Right 2021    Procedure: Revision open carpal tunnel release.;  Surgeon: Nasir Casper MD;  Location: [facility] OR        Family History   Problem Relation Age of Onset     Melanoma No family hx of        Social History     Socioeconomic History     Marital status:      Spouse name: Not on file     Number of children: Not on file     Years of education: Not on file     Highest education level: Not on file   Occupational History     Employer: RETIRED   Social Needs     Financial resource strain: Not on file     Food insecurity     Worry: Not on file     Inability: Not on file     Transportation needs     Medical: Not on file     Non-medical: Not on file   Tobacco Use     Smoking status: Former Smoker     Types: Pipe     Quit date: 12/3/1999     Years since quittin.1     Smokeless tobacco: Never Used   Substance and Sexual Activity     Alcohol use: Not on file     Drug use: Not on file     Sexual activity: Not on file   Lifestyle     Physical activity     Days per week: Not on file     Minutes per session: Not on file     Stress: Not on file    Relationships     Social connections     Talks on phone: Not on file     Gets together: Not on file     Attends Jehovah's witness service: Not on file     Active member of club or organization: Not on file     Attends meetings of clubs or organizations: Not on file     Relationship status: Not on file     Intimate partner violence     Fear of current or ex partner: Not on file     Emotionally abused: Not on file     Physically abused: Not on file     Forced sexual activity: Not on file   Other Topics Concern     Not on file   Social History Narrative     Not on file       Outpatient Encounter Medications as of 1/18/2021   Medication Sig Dispense Refill     Carboxymethylcellulose Sod PF (REFRESH CELLUVISC) 1 % ophthalmic gel Place 1 drop into both eyes 4 times daily       HYDROcodone-acetaminophen (NORCO) 5-325 MG tablet Take 1-2 tablets by mouth every 4 hours as needed for moderate to severe pain 6 tablet 0     hypromellose-dextran (GENTEAL TEARS) 0.1-0.3 % ophthalmic solution Place 1 drop into both eyes daily as needed for dry eyes       MECLIZINE HCL PO        Multiple Vitamins-Minerals (MENS MULTIVITAMIN PLUS PO)        Multiple Vitamins-Minerals (PRESERVISION AREDS PO) Take by mouth daily       Omega-3 Fatty Acids (FISH OIL) 500 MG CAPS        No facility-administered encounter medications on file as of 1/18/2021.              Review Of Systems  Skin: As above  Eyes: negative  Ears/Nose/Throat: negative  Respiratory: No shortness of breath, dyspnea on exertion, cough, or hemoptysis  Cardiovascular: negative  Gastrointestinal: negative  Genitourinary: negative  Musculoskeletal: negative  Neurologic: negative  Psychiatric: negative  Hematologic/Lymphatic/Immunologic: negative  Endocrine: negative      O:   NAD, WDWN, Alert & Oriented, Mood & Affect wnl, Vitals stable   Here today alone   Pulse 77   Ht 1.829 m (6')   SpO2 96%   BMI 28.75 kg/m     General appearance normal   Vitals stable   Alert, oriented and in no  acute distress     R ala 4mm scaly papule   Eyes: Conjunctivae/lids:Normal     ENT: Lips, buccal mucosa, tongue: normal    MSK:Normal    Cardiovascular: peripheral edema none    Pulm: Breathing Normal    Lymph Nodes: No Head and Neck Lymphadenopathy     Neuro/Psych: Orientation:Alert and Orientedx3 ; Mood/Affect:normal       A/P:  1. R ala squamous cell carcinoma   MOHS:   Location    The rationale for Mohs surgery was discussed with the patient and consent was obtained.  The risks and benefits as well as alternatives to therapy were discussed, in detail.  Specifically, the risks of infection, scarring, bleeding, prolonged wound healing, incomplete removal, allergy to anesthesia, nerve injury and recurrence were addressed.  Indication for Mohs was Location. Prior to the procedure, the treatment site was clearly identified and, if available, confirmed with previous photos and confirmed by the patient   All components of the Universal Protocol/PAUSE rule were completed.  The Mohs surgeon operated in two distinct and integrated capacities as the surgeon and pathologist.      The area was prepped with Betasept.  A rim of normal appearing skin was marked circumferentially around the lesion.  The area was infiltrated with local anesthesia.  The tumor was first debulked to remove all clinically apparent tumor.  An incision following the standard Mohs approach was done and the specimen was oriented,mapped and placed in 1 block(s).  Each specimen was then chromacoded and processed in the Mohs laboratory using standard Mohs technique and submitted for frozen section histology.  Frozen section analysis showed no residual tumor but CLEAR MARGINS.      The tumor was excised using standard Mohs technique in 1 stages(s).  CLEAR MARGINS OBTAINED and Final defect size was 1 cm.     We discussed the options for wound management in full with the patient including risks/benefits/ possible outcomes.        DERMABRASION: After PGACAC  discussed with patient, decision for tangential excision and dermabrasion was made. After anesthesia with Lido/Epi/Clinda and prep with hibiclens, hypertrophic areas were tangentially excised and entire cosmetic unit was smoothed 2.3cm. Hemostasis was obtained with pressure. Patient tolerated procedure well. There were no complications and EBL minimal. Patient advised to keep abraded surfaces covered with generous ointment until healed, approximately 2 weeks. Return to office in 3 months or prn.   It was a pleasure speaking to Nir Guy today.  Signs and Symptoms of skin cancer discussed with patient.  Patient encouraged to perform monthly skin exams.  UV precautions reviewed with patient.  Return to clinic next appt      Again, thank you for allowing me to participate in the care of your patient.        Sincerely,        Chavez Jay MD

## 2021-01-18 NOTE — NURSING NOTE
Chief Complaint   Patient presents with     Derm Problem     mohs 1 of 2       Vitals:    01/18/21 0745   Pulse: 77   SpO2: 96%   Height: 1.829 m (6')     Wt Readings from Last 1 Encounters:   01/04/21 96.2 kg (212 lb)       Kath Smith LPN.................1/18/2021

## 2021-01-19 ENCOUNTER — OFFICE VISIT (OUTPATIENT)
Dept: DERMATOLOGY | Facility: CLINIC | Age: 81
End: 2021-01-19
Payer: COMMERCIAL

## 2021-01-19 VITALS — HEART RATE: 71 BPM | OXYGEN SATURATION: 96 % | DIASTOLIC BLOOD PRESSURE: 78 MMHG | SYSTOLIC BLOOD PRESSURE: 131 MMHG

## 2021-01-19 DIAGNOSIS — C44.121: Primary | ICD-10-CM

## 2021-01-19 PROCEDURE — 17311 MOHS 1 STAGE H/N/HF/G: CPT | Mod: 79 | Performed by: DERMATOLOGY

## 2021-01-19 PROCEDURE — 14061 TIS TRNFR E/N/E/L10.1-30SQCM: CPT | Mod: 79 | Performed by: DERMATOLOGY

## 2021-01-19 PROCEDURE — 17312 MOHS ADDL STAGE: CPT | Performed by: DERMATOLOGY

## 2021-01-19 NOTE — PROGRESS NOTES
Surgical Office Location :   [facility]  [address] [place] [address]

## 2021-01-19 NOTE — LETTER
2021         RE: Nir Guy  [address] [place] [address] [address]  [facility] [address]        Dear Colleague,    Thank you for referring your patient, Nir Guy, to the [facility]. Please see a copy of my visit note below.    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  Nose healing well. Patient has no other skin complaints today.  Remainder of the HPI, Meds, PMH, Allergies, FH, and SH was reviewed in chart.      Past Medical History:   Diagnosis Date     Actinic keratosis      Basal cell carcinoma      Squamous cell carcinoma        Past Surgical History:   Procedure Laterality Date     CATARACT IOL, RT/LT       RELEASE CARPAL TUNNEL Right 2021    Procedure: Revision open carpal tunnel release.;  Surgeon: Nasir Casper MD;  Location: [facility] OR        Family History   Problem Relation Age of Onset     Melanoma No family hx of        Social History     Socioeconomic History     Marital status:      Spouse name: Not on file     Number of children: Not on file     Years of education: Not on file     Highest education level: Not on file   Occupational History     Employer: RETIRED   Social Needs     Financial resource strain: Not on file     Food insecurity     Worry: Not on file     Inability: Not on file     Transportation needs     Medical: Not on file     Non-medical: Not on file   Tobacco Use     Smoking status: Former Smoker     Types: Pipe     Quit date: 12/3/1999     Years since quittin.1     Smokeless tobacco: Never Used   Substance and Sexual Activity     Alcohol use: Not on file     Drug use: Not on file     Sexual activity: Not on file   Lifestyle     Physical activity     Days per week: Not on file     Minutes per session: Not on  file     Stress: Not on file   Relationships     Social connections     Talks on phone: Not on file     Gets together: Not on file     Attends Orthodoxy service: Not on file     Active member of club or organization: Not on file     Attends meetings of clubs or organizations: Not on file     Relationship status: Not on file     Intimate partner violence     Fear of current or ex partner: Not on file     Emotionally abused: Not on file     Physically abused: Not on file     Forced sexual activity: Not on file   Other Topics Concern     Not on file   Social History Narrative     Not on file       Outpatient Encounter Medications as of 1/19/2021   Medication Sig Dispense Refill     Carboxymethylcellulose Sod PF (REFRESH CELLUVISC) 1 % ophthalmic gel Place 1 drop into both eyes 4 times daily       HYDROcodone-acetaminophen (NORCO) 5-325 MG tablet Take 1-2 tablets by mouth every 4 hours as needed for moderate to severe pain 6 tablet 0     hypromellose-dextran (GENTEAL TEARS) 0.1-0.3 % ophthalmic solution Place 1 drop into both eyes daily as needed for dry eyes       MECLIZINE HCL PO        Multiple Vitamins-Minerals (MENS MULTIVITAMIN PLUS PO)        Multiple Vitamins-Minerals (PRESERVISION AREDS PO) Take by mouth daily       Omega-3 Fatty Acids (FISH OIL) 500 MG CAPS        No facility-administered encounter medications on file as of 1/19/2021.              Review Of Systems  Skin: As above  Eyes: negative  Ears/Nose/Throat: negative  Respiratory: No shortness of breath, dyspnea on exertion, cough, or hemoptysis  Cardiovascular: negative  Gastrointestinal: negative  Genitourinary: negative  Musculoskeletal: negative  Neurologic: negative  Psychiatric: negative  Hematologic/Lymphatic/Immunologic: negative  Endocrine: negative      O:   NAD, WDWN, Alert & Oriented, Mood & Affect wnl, Vitals stable   Here today alone   /78 (BP Location: Left arm)   Pulse 71   SpO2 96%    General appearance normal   Vitals  stable   Alert, oriented and in no acute distress     R medial canthus 4mm scaly papule   Eyes: Conjunctivae/lids:Normal     ENT: Lips, buccal mucosa, tongue: normal    MSK:Normal    Cardiovascular: peripheral edema none    Pulm: Breathing Normal    Lymph Nodes: No Head and Neck Lymphadenopathy     Neuro/Psych: Orientation:Alert and Orientedx3 ; Mood/Affect:normal       A/P:  1. R medial canthus squamous cell carcinoma   MOHS:   Location    The rationale for Mohs surgery was discussed with the patient and consent was obtained.  The risks and benefits as well as alternatives to therapy were discussed, in detail.  Specifically, the risks of infection, scarring, bleeding, prolonged wound healing, incomplete removal, allergy to anesthesia, nerve injury and recurrence were addressed.  Indication for Mohs was Location. Prior to the procedure, the treatment site was clearly identified and, if available, confirmed with previous photos and confirmed by the patient   All components of the Universal Protocol/PAUSE rule were completed.  The Mohs surgeon operated in two distinct and integrated capacities as the surgeon and pathologist.      The area was prepped with Betasept.  A rim of normal appearing skin was marked circumferentially around the lesion.  The area was infiltrated with local anesthesia.  The tumor was first debulked to remove all clinically apparent tumor.  An incision following the standard Mohs approach was done and the specimen was oriented,mapped and placed in 3 block(s).  Each specimen was then chromacoded and processed in the Mohs laboratory using standard Mohs technique and submitted for frozen section histology.  Frozen section analysis showed  residual tumor but CLEAR MARGINS.  bc seeno n 2nd and 3rd stage     The tumor was excised using standard Mohs technique in 3 stages(s).  CLEAR MARGINS OBTAINED and Final defect size was 1.7 x 1.3 cm.     We discussed the options for wound management in full with  the patient including risks/benefits/ possible outcomes.      REPAIR WITH BUROW'S FLAP: Because of the Because of the proximity to the lid, an advancement flap was planned. After LEC anesthesia and prep, the Burow's triangles were excised. One Burow's triangle was displaced laterally to hide incisions within skin relaxation lines. The advancement flap was raised by dissection in the deep subcutaneous plane. The remaining wound edges were undermined and hemostasis was obtained. The flap was advanced into the defect with care to avoid distortion and was sutured into place in a layered fashion using Vicryl and Fast Absorbing sutures. Postoperative size was 4.3 x 3 cm.  EBL minimal; complications none; wound care routine.  The patient was discharged in good condition and will return in one week for wound evaluation.  It was a pleasure speaking to Nir Guy today.  Signs and Symptoms of skin cancer discussed with patient.  Patient encouraged to perform monthly skin exams.  UV precautions reviewed with patient.  Risks of non-melanoma skin cancer discussed with patient   Return to clinic 6 months        Again, thank you for allowing me to participate in the care of your patient.        Sincerely,        Chavez Jay MD

## 2021-01-19 NOTE — PROGRESS NOTES
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  Nose healing well. Patient has no other skin complaints today.  Remainder of the HPI, Meds, PMH, Allergies, FH, and SH was reviewed in chart.      Past Medical History:   Diagnosis Date     Actinic keratosis      Basal cell carcinoma      Squamous cell carcinoma        Past Surgical History:   Procedure Laterality Date     CATARACT IOL, RT/LT       RELEASE CARPAL TUNNEL Right 2021    Procedure: Revision open carpal tunnel release.;  Surgeon: Nasir Casper MD;  Location: [facility] OR        Family History   Problem Relation Age of Onset     Melanoma No family hx of        Social History     Socioeconomic History     Marital status:      Spouse name: Not on file     Number of children: Not on file     Years of education: Not on file     Highest education level: Not on file   Occupational History     Employer: RETIRED   Social Needs     Financial resource strain: Not on file     Food insecurity     Worry: Not on file     Inability: Not on file     Transportation needs     Medical: Not on file     Non-medical: Not on file   Tobacco Use     Smoking status: Former Smoker     Types: Pipe     Quit date: 12/3/1999     Years since quittin.1     Smokeless tobacco: Never Used   Substance and Sexual Activity     Alcohol use: Not on file     Drug use: Not on file     Sexual activity: Not on file   Lifestyle     Physical activity     Days per week: Not on file     Minutes per session: Not on file     Stress: Not on file   Relationships     Social connections     Talks on phone: Not on file     Gets together: Not on file     Attends Confucianism service: Not on file     Active member of club or organization: Not on file     Attends meetings of clubs or organizations: Not on file     Relationship status: Not on file     Intimate partner violence      Fear of current or ex partner: Not on file     Emotionally abused: Not on file     Physically abused: Not on file     Forced sexual activity: Not on file   Other Topics Concern     Not on file   Social History Narrative     Not on file       Outpatient Encounter Medications as of 1/19/2021   Medication Sig Dispense Refill     Carboxymethylcellulose Sod PF (REFRESH CELLUVISC) 1 % ophthalmic gel Place 1 drop into both eyes 4 times daily       HYDROcodone-acetaminophen (NORCO) 5-325 MG tablet Take 1-2 tablets by mouth every 4 hours as needed for moderate to severe pain 6 tablet 0     hypromellose-dextran (GENTEAL TEARS) 0.1-0.3 % ophthalmic solution Place 1 drop into both eyes daily as needed for dry eyes       MECLIZINE HCL PO        Multiple Vitamins-Minerals (MENS MULTIVITAMIN PLUS PO)        Multiple Vitamins-Minerals (PRESERVISION AREDS PO) Take by mouth daily       Omega-3 Fatty Acids (FISH OIL) 500 MG CAPS        No facility-administered encounter medications on file as of 1/19/2021.              Review Of Systems  Skin: As above  Eyes: negative  Ears/Nose/Throat: negative  Respiratory: No shortness of breath, dyspnea on exertion, cough, or hemoptysis  Cardiovascular: negative  Gastrointestinal: negative  Genitourinary: negative  Musculoskeletal: negative  Neurologic: negative  Psychiatric: negative  Hematologic/Lymphatic/Immunologic: negative  Endocrine: negative      O:   NAD, WDWN, Alert & Oriented, Mood & Affect wnl, Vitals stable   Here today alone   /78 (BP Location: Left arm)   Pulse 71   SpO2 96%    General appearance normal   Vitals stable   Alert, oriented and in no acute distress     R medial canthus 4mm scaly papule   Eyes: Conjunctivae/lids:Normal     ENT: Lips, buccal mucosa, tongue: normal    MSK:Normal    Cardiovascular: peripheral edema none    Pulm: Breathing Normal    Lymph Nodes: No Head and Neck Lymphadenopathy     Neuro/Psych: Orientation:Alert and Orientedx3 ; Mood/Affect:normal        A/P:  1. R medial canthus squamous cell carcinoma   MOHS:   Location    The rationale for Mohs surgery was discussed with the patient and consent was obtained.  The risks and benefits as well as alternatives to therapy were discussed, in detail.  Specifically, the risks of infection, scarring, bleeding, prolonged wound healing, incomplete removal, allergy to anesthesia, nerve injury and recurrence were addressed.  Indication for Mohs was Location. Prior to the procedure, the treatment site was clearly identified and, if available, confirmed with previous photos and confirmed by the patient   All components of the Universal Protocol/PAUSE rule were completed.  The Mohs surgeon operated in two distinct and integrated capacities as the surgeon and pathologist.      The area was prepped with Betasept.  A rim of normal appearing skin was marked circumferentially around the lesion.  The area was infiltrated with local anesthesia.  The tumor was first debulked to remove all clinically apparent tumor.  An incision following the standard Mohs approach was done and the specimen was oriented,mapped and placed in 3 block(s).  Each specimen was then chromacoded and processed in the Mohs laboratory using standard Mohs technique and submitted for frozen section histology.  Frozen section analysis showed  residual tumor but CLEAR MARGINS.  bc seeno n 2nd and 3rd stage     The tumor was excised using standard Mohs technique in 3 stages(s).  CLEAR MARGINS OBTAINED and Final defect size was 1.7 x 1.3 cm.     We discussed the options for wound management in full with the patient including risks/benefits/ possible outcomes.      REPAIR WITH BUROW'S FLAP: Because of the Because of the proximity to the lid, an advancement flap was planned. After LEC anesthesia and prep, the Burow's triangles were excised. One Burow's triangle was displaced laterally to hide incisions within skin relaxation lines. The advancement flap was raised by  dissection in the deep subcutaneous plane. The remaining wound edges were undermined and hemostasis was obtained. The flap was advanced into the defect with care to avoid distortion and was sutured into place in a layered fashion using Vicryl and Fast Absorbing sutures. Postoperative size was 4.3 x 3 cm.  EBL minimal; complications none; wound care routine.  The patient was discharged in good condition and will return in one week for wound evaluation.  It was a pleasure speaking to Nir Guy today.  Signs and Symptoms of skin cancer discussed with patient.  Patient encouraged to perform monthly skin exams.  UV precautions reviewed with patient.  Risks of non-melanoma skin cancer discussed with patient   Return to clinic 6 months

## 2021-01-19 NOTE — NURSING NOTE
Initial /78 (BP Location: Left arm)   Pulse 71   SpO2 96%  Estimated body mass index is 28.75 kg/m  as calculated from the following:    Height as of 1/18/21: 1.829 m (6').    Weight as of 1/4/21: 96.2 kg (212 lb). .

## 2021-01-19 NOTE — PATIENT INSTRUCTIONS
Sutured Wound Care     [facility]: [phone]    [facility]: [phone]    Under right eye      ? No strenuous activity for 48 hours. Resume moderate activity in 48 hours. No heavy exercising until you are seen for follow up in one week.     ? Take Tylenol as needed for discomfort.                         ? Do not drink alcoholic beverages for 48 hours.     ? Keep the pressure bandage in place for 24 hours. If the bandage becomes blood tinged or loose, reinforce it with gauze and tape.        (Refer to the reverse side of this page for management of bleeding).    ? Remove pressure bandage in 24 hours     ? Leave the flat bandage in place until your follow up appointment.    ? Keep the bandage dry. Wash around it carefully.    ? If the tape becomes soiled or starts to come off, reinforce it with additional paper tape.    ? Do not smoke for 3 weeks; smoking is detrimental to wound healing.    ? It is normal to have swelling and bruising around the surgical site. The bruising will fade in approximately 10-14 days. Elevate the area to reduce swelling.    ? Numbness, itchiness and sensitivity to temperature changes can occur after surgery and may take up to 18 months to normalize.      POSSIBLE COMPLICATIONS    BLEEDIN. Leave the bandage in place.  2. Use tightly rolled up gauze or a cloth to apply direct pressure over the bandage for 20   minutes.  3. Reapply pressure for an additional 20 minutes if necessary  4. Call the office or go to the nearest emergency room if pressure fails to stop the bleeding.  5. Use additional gauze and tape to maintain pressure once the bleeding has stopped.        PAIN:    1. Post operative pain should slowly get better, never worse.  2. A severe increase in pain may indicate a problem. Call the office if this occurs.    In case of emergency phone:Dr Jay [phone]

## 2021-01-20 ENCOUNTER — RECORDS - HEALTHEAST (OUTPATIENT)
Dept: ADMINISTRATIVE | Facility: OTHER | Age: 81
End: 2021-01-20

## 2021-01-27 ENCOUNTER — ALLIED HEALTH/NURSE VISIT (OUTPATIENT)
Dept: DERMATOLOGY | Facility: CLINIC | Age: 81
End: 2021-01-27
Payer: COMMERCIAL

## 2021-01-27 DIAGNOSIS — Z48.00 DRESSING CHANGE: Primary | ICD-10-CM

## 2021-01-27 PROCEDURE — 99207 PR NO CHARGE NURSE ONLY: CPT

## 2021-01-27 NOTE — PATIENT INSTRUCTIONS

## 2021-01-27 NOTE — PROGRESS NOTES
Pt returned to clinic for post surgery 1 week follow up bandage change. Pt has no complaints, denies pain. Bandage removed right nasal sidewall, area cleansed with normal saline. Site is healing and wound edges approximating well. Reapplied new steri strips and paper tape.    Advised to watch for signs/sx of infection; spreading redness, drainage, odor, fever. Call or report promptly to clinic. Pt given written instructions and informed to rtc as needed. Patient verbalized understanding.     Funmilayo Curran, CMA

## 2021-01-29 ENCOUNTER — OFFICE VISIT - HEALTHEAST (OUTPATIENT)
Dept: INTERNAL MEDICINE | Facility: CLINIC | Age: 81
End: 2021-01-29

## 2021-01-29 ENCOUNTER — COMMUNICATION - HEALTHEAST (OUTPATIENT)
Dept: INTERNAL MEDICINE | Facility: CLINIC | Age: 81
End: 2021-01-29

## 2021-01-29 DIAGNOSIS — M79.604 LOW BACK PAIN RADIATING TO BOTH LEGS: ICD-10-CM

## 2021-01-29 DIAGNOSIS — M48.062 SPINAL STENOSIS OF LUMBAR REGION WITH NEUROGENIC CLAUDICATION: ICD-10-CM

## 2021-01-29 DIAGNOSIS — J43.9 PULMONARY EMPHYSEMA, UNSPECIFIED EMPHYSEMA TYPE (H): ICD-10-CM

## 2021-01-29 DIAGNOSIS — M79.605 LOW BACK PAIN RADIATING TO BOTH LEGS: ICD-10-CM

## 2021-01-29 DIAGNOSIS — M54.50 LOW BACK PAIN RADIATING TO BOTH LEGS: ICD-10-CM

## 2021-02-02 ENCOUNTER — AMBULATORY - HEALTHEAST (OUTPATIENT)
Dept: INTERNAL MEDICINE | Facility: CLINIC | Age: 81
End: 2021-02-02

## 2021-02-02 DIAGNOSIS — M79.605 LOW BACK PAIN RADIATING TO BOTH LEGS: ICD-10-CM

## 2021-02-02 DIAGNOSIS — M48.062 SPINAL STENOSIS OF LUMBAR REGION WITH NEUROGENIC CLAUDICATION: ICD-10-CM

## 2021-02-02 DIAGNOSIS — M54.50 LOW BACK PAIN RADIATING TO BOTH LEGS: ICD-10-CM

## 2021-02-02 DIAGNOSIS — M79.604 LOW BACK PAIN RADIATING TO BOTH LEGS: ICD-10-CM

## 2021-02-05 ENCOUNTER — COMMUNICATION - HEALTHEAST (OUTPATIENT)
Dept: INTERNAL MEDICINE | Facility: CLINIC | Age: 81
End: 2021-02-05

## 2021-02-08 ENCOUNTER — COMMUNICATION - HEALTHEAST (OUTPATIENT)
Dept: INTERNAL MEDICINE | Facility: CLINIC | Age: 81
End: 2021-02-08

## 2021-02-10 ENCOUNTER — RECORDS - HEALTHEAST (OUTPATIENT)
Dept: ADMINISTRATIVE | Facility: OTHER | Age: 81
End: 2021-02-10

## 2021-02-17 ENCOUNTER — RECORDS - HEALTHEAST (OUTPATIENT)
Dept: ADMINISTRATIVE | Facility: OTHER | Age: 81
End: 2021-02-17

## 2021-02-21 ENCOUNTER — AMBULATORY - HEALTHEAST (OUTPATIENT)
Dept: SURGERY | Facility: CLINIC | Age: 81
End: 2021-02-21

## 2021-02-21 DIAGNOSIS — Z11.59 ENCOUNTER FOR SCREENING FOR OTHER VIRAL DISEASES: ICD-10-CM

## 2021-03-12 ENCOUNTER — OFFICE VISIT - HEALTHEAST (OUTPATIENT)
Dept: INTERNAL MEDICINE | Facility: CLINIC | Age: 81
End: 2021-03-12

## 2021-03-12 DIAGNOSIS — M79.605 LOW BACK PAIN RADIATING TO BOTH LEGS: ICD-10-CM

## 2021-03-12 DIAGNOSIS — J43.9 PULMONARY EMPHYSEMA, UNSPECIFIED EMPHYSEMA TYPE (H): ICD-10-CM

## 2021-03-12 DIAGNOSIS — Z01.810 PREOPERATIVE CARDIOVASCULAR EXAMINATION: ICD-10-CM

## 2021-03-12 DIAGNOSIS — M79.604 LOW BACK PAIN RADIATING TO BOTH LEGS: ICD-10-CM

## 2021-03-12 DIAGNOSIS — M48.062 SPINAL STENOSIS OF LUMBAR REGION WITH NEUROGENIC CLAUDICATION: ICD-10-CM

## 2021-03-12 DIAGNOSIS — M54.50 LOW BACK PAIN RADIATING TO BOTH LEGS: ICD-10-CM

## 2021-03-12 DIAGNOSIS — M16.11 PRIMARY OSTEOARTHRITIS OF RIGHT HIP: ICD-10-CM

## 2021-03-12 DIAGNOSIS — I45.10 RBBB (RIGHT BUNDLE BRANCH BLOCK): ICD-10-CM

## 2021-03-12 DIAGNOSIS — H35.3132 INTERMEDIATE STAGE NONEXUDATIVE AGE-RELATED MACULAR DEGENERATION OF BOTH EYES: ICD-10-CM

## 2021-03-12 LAB
ANION GAP SERPL CALCULATED.3IONS-SCNC: 8 MMOL/L (ref 5–18)
BUN SERPL-MCNC: 14 MG/DL (ref 8–28)
CALCIUM SERPL-MCNC: 9.1 MG/DL (ref 8.5–10.5)
CHLORIDE BLD-SCNC: 105 MMOL/L (ref 98–107)
CO2 SERPL-SCNC: 29 MMOL/L (ref 22–31)
CREAT SERPL-MCNC: 0.91 MG/DL (ref 0.7–1.3)
GFR SERPL CREATININE-BSD FRML MDRD: >60 ML/MIN/1.73M2
GLUCOSE BLD-MCNC: 90 MG/DL (ref 70–125)
HGB BLD-MCNC: 14.9 G/DL (ref 14–18)
POTASSIUM BLD-SCNC: 4.9 MMOL/L (ref 3.5–5)
SODIUM SERPL-SCNC: 142 MMOL/L (ref 136–145)

## 2021-03-12 ASSESSMENT — MIFFLIN-ST. JEOR: SCORE: 1731.94

## 2021-03-14 ENCOUNTER — COMMUNICATION - HEALTHEAST (OUTPATIENT)
Dept: INTERNAL MEDICINE | Facility: CLINIC | Age: 81
End: 2021-03-14

## 2021-03-18 ENCOUNTER — APPOINTMENT (OUTPATIENT)
Dept: GENERAL RADIOLOGY | Facility: HOSPITAL | Age: 81
End: 2021-03-18
Attending: EMERGENCY MEDICINE
Payer: COMMERCIAL

## 2021-03-18 ENCOUNTER — APPOINTMENT (OUTPATIENT)
Dept: CT IMAGING | Facility: HOSPITAL | Age: 81
End: 2021-03-18
Attending: EMERGENCY MEDICINE
Payer: COMMERCIAL

## 2021-03-18 ENCOUNTER — TRANSFERRED RECORDS (OUTPATIENT)
Dept: HEALTH INFORMATION MANAGEMENT | Facility: HOSPITAL | Age: 81
End: 2021-03-18

## 2021-03-18 ENCOUNTER — HOSPITAL ENCOUNTER (EMERGENCY)
Facility: HOSPITAL | Age: 81
Discharge: ANOTHER HOSPITAL | End: 2021-03-18
Attending: EMERGENCY MEDICINE | Admitting: EMERGENCY MEDICINE
Payer: COMMERCIAL

## 2021-03-18 VITALS
SYSTOLIC BLOOD PRESSURE: 126 MMHG | DIASTOLIC BLOOD PRESSURE: 96 MMHG | RESPIRATION RATE: 8 BRPM | OXYGEN SATURATION: 98 % | HEART RATE: 120 BPM

## 2021-03-18 DIAGNOSIS — I31.39 PERICARDIAL EFFUSION: ICD-10-CM

## 2021-03-18 DIAGNOSIS — R41.0 DELIRIUM: ICD-10-CM

## 2021-03-18 DIAGNOSIS — I95.89 OTHER SPECIFIED HYPOTENSION: ICD-10-CM

## 2021-03-18 LAB
ABO + RH BLD: NORMAL
ABO + RH BLD: NORMAL
ALBUMIN SERPL-MCNC: 3.9 G/DL (ref 3.4–5)
ALP SERPL-CCNC: 53 U/L (ref 40–150)
ALT SERPL W P-5'-P-CCNC: 39 U/L (ref 0–70)
ANION GAP SERPL CALCULATED.3IONS-SCNC: 11 MMOL/L (ref 3–14)
APTT PPP: 26 SEC (ref 22–37)
AST SERPL W P-5'-P-CCNC: 39 U/L (ref 0–45)
BASE DEFICIT BLDV-SCNC: 3.8 MMOL/L
BASOPHILS # BLD AUTO: 0.1 10E9/L (ref 0–0.2)
BASOPHILS NFR BLD AUTO: 0.8 %
BILIRUB SERPL-MCNC: 0.6 MG/DL (ref 0.2–1.3)
BLD GP AB SCN SERPL QL: NORMAL
BLOOD BANK CMNT PATIENT-IMP: NORMAL
BUN SERPL-MCNC: 12 MG/DL (ref 7–30)
CALCIUM SERPL-MCNC: 8.7 MG/DL (ref 8.5–10.1)
CHLORIDE SERPL-SCNC: 106 MMOL/L (ref 94–109)
CO2 SERPL-SCNC: 21 MMOL/L (ref 20–32)
CREAT SERPL-MCNC: 0.98 MG/DL (ref 0.66–1.25)
D DIMER PPP FEU-MCNC: 1.9 UG/ML FEU (ref 0–0.5)
DIFFERENTIAL METHOD BLD: NORMAL
EOSINOPHIL # BLD AUTO: 0.2 10E9/L (ref 0–0.7)
EOSINOPHIL NFR BLD AUTO: 2.2 %
ERYTHROCYTE [DISTWIDTH] IN BLOOD BY AUTOMATED COUNT: 12.6 % (ref 10–15)
ETHANOL SERPL-MCNC: <0.01 G/DL
GFR SERPL CREATININE-BSD FRML MDRD: 72 ML/MIN/{1.73_M2}
GLUCOSE BLDC GLUCOMTR-MCNC: 140 MG/DL (ref 70–99)
GLUCOSE SERPL-MCNC: 192 MG/DL (ref 70–99)
HCO3 BLDV-SCNC: 21 MMOL/L (ref 21–28)
HCT VFR BLD AUTO: 42.9 % (ref 40–53)
HGB BLD-MCNC: 14.9 G/DL (ref 13.3–17.7)
IMM GRANULOCYTES # BLD: 0 10E9/L (ref 0–0.4)
IMM GRANULOCYTES NFR BLD: 0.3 %
INR PPP: 0.99 (ref 0.86–1.14)
LYMPHOCYTES # BLD AUTO: 4.8 10E9/L (ref 0.8–5.3)
LYMPHOCYTES NFR BLD AUTO: 53.3 %
MCH RBC QN AUTO: 31.6 PG (ref 26.5–33)
MCHC RBC AUTO-ENTMCNC: 34.7 G/DL (ref 31.5–36.5)
MCV RBC AUTO: 91 FL (ref 78–100)
MONOCYTES # BLD AUTO: 0.6 10E9/L (ref 0–1.3)
MONOCYTES NFR BLD AUTO: 6.2 %
NEUTROPHILS # BLD AUTO: 3.3 10E9/L (ref 1.6–8.3)
NEUTROPHILS NFR BLD AUTO: 37.2 %
NRBC # BLD AUTO: 0 10*3/UL
NRBC BLD AUTO-RTO: 0 /100
O2/TOTAL GAS SETTING VFR VENT: ABNORMAL %
OXYHGB MFR BLDV: 96 %
PCO2 BLDV: 36 MM HG (ref 40–50)
PH BLDV: 7.37 PH (ref 7.32–7.43)
PLATELET # BLD AUTO: 216 10E9/L (ref 150–450)
PO2 BLDV: 87 MM HG (ref 25–47)
POTASSIUM SERPL-SCNC: 4 MMOL/L (ref 3.4–5.3)
PROT SERPL-MCNC: 7.1 G/DL (ref 6.8–8.8)
RBC # BLD AUTO: 4.71 10E12/L (ref 4.4–5.9)
SODIUM SERPL-SCNC: 138 MMOL/L (ref 133–144)
SPECIMEN EXP DATE BLD: NORMAL
TROPONIN I SERPL-MCNC: <0.015 UG/L (ref 0–0.04)
WBC # BLD AUTO: 9 10E9/L (ref 4–11)

## 2021-03-18 PROCEDURE — 999N001017 HC STATISTIC GLUCOSE BY METER IP

## 2021-03-18 PROCEDURE — 250N000009 HC RX 250: Performed by: EMERGENCY MEDICINE

## 2021-03-18 PROCEDURE — 72131 CT LUMBAR SPINE W/O DYE: CPT

## 2021-03-18 PROCEDURE — 999N000065 XR CHEST PORT 1 VW

## 2021-03-18 PROCEDURE — 31500 INSERT EMERGENCY AIRWAY: CPT

## 2021-03-18 PROCEDURE — 93308 TTE F-UP OR LMTD: CPT | Mod: TC | Performed by: EMERGENCY MEDICINE

## 2021-03-18 PROCEDURE — 85379 FIBRIN DEGRADATION QUANT: CPT | Performed by: EMERGENCY MEDICINE

## 2021-03-18 PROCEDURE — 36415 COLL VENOUS BLD VENIPUNCTURE: CPT | Performed by: EMERGENCY MEDICINE

## 2021-03-18 PROCEDURE — 70450 CT HEAD/BRAIN W/O DYE: CPT

## 2021-03-18 PROCEDURE — 82805 BLOOD GASES W/O2 SATURATION: CPT | Performed by: EMERGENCY MEDICINE

## 2021-03-18 PROCEDURE — 99291 CRITICAL CARE FIRST HOUR: CPT | Mod: 25

## 2021-03-18 PROCEDURE — 96374 THER/PROPH/DIAG INJ IV PUSH: CPT | Mod: XU

## 2021-03-18 PROCEDURE — 85730 THROMBOPLASTIN TIME PARTIAL: CPT | Performed by: EMERGENCY MEDICINE

## 2021-03-18 PROCEDURE — 71275 CT ANGIOGRAPHY CHEST: CPT

## 2021-03-18 PROCEDURE — 93010 ELECTROCARDIOGRAM REPORT: CPT | Performed by: INTERNAL MEDICINE

## 2021-03-18 PROCEDURE — 80053 COMPREHEN METABOLIC PANEL: CPT | Performed by: EMERGENCY MEDICINE

## 2021-03-18 PROCEDURE — 250N000009 HC RX 250

## 2021-03-18 PROCEDURE — 31500 INSERT EMERGENCY AIRWAY: CPT | Performed by: EMERGENCY MEDICINE

## 2021-03-18 PROCEDURE — 72125 CT NECK SPINE W/O DYE: CPT

## 2021-03-18 PROCEDURE — 96361 HYDRATE IV INFUSION ADD-ON: CPT | Mod: XU

## 2021-03-18 PROCEDURE — 72128 CT CHEST SPINE W/O DYE: CPT

## 2021-03-18 PROCEDURE — 86900 BLOOD TYPING SEROLOGIC ABO: CPT | Performed by: EMERGENCY MEDICINE

## 2021-03-18 PROCEDURE — 85025 COMPLETE CBC W/AUTO DIFF WBC: CPT | Performed by: EMERGENCY MEDICINE

## 2021-03-18 PROCEDURE — 86850 RBC ANTIBODY SCREEN: CPT | Performed by: EMERGENCY MEDICINE

## 2021-03-18 PROCEDURE — 93005 ELECTROCARDIOGRAM TRACING: CPT

## 2021-03-18 PROCEDURE — 99291 CRITICAL CARE FIRST HOUR: CPT | Mod: 25 | Performed by: EMERGENCY MEDICINE

## 2021-03-18 PROCEDURE — 999N000157 HC STATISTIC RCP TIME EA 10 MIN

## 2021-03-18 PROCEDURE — 250N000011 HC RX IP 250 OP 636: Performed by: EMERGENCY MEDICINE

## 2021-03-18 PROCEDURE — 93308 TTE F-UP OR LMTD: CPT | Mod: 26 | Performed by: EMERGENCY MEDICINE

## 2021-03-18 PROCEDURE — 85610 PROTHROMBIN TIME: CPT | Performed by: EMERGENCY MEDICINE

## 2021-03-18 PROCEDURE — 84484 ASSAY OF TROPONIN QUANT: CPT | Performed by: EMERGENCY MEDICINE

## 2021-03-18 PROCEDURE — 258N000003 HC RX IP 258 OP 636: Performed by: EMERGENCY MEDICINE

## 2021-03-18 PROCEDURE — 86901 BLOOD TYPING SEROLOGIC RH(D): CPT | Performed by: EMERGENCY MEDICINE

## 2021-03-18 PROCEDURE — 82077 ASSAY SPEC XCP UR&BREATH IA: CPT | Performed by: EMERGENCY MEDICINE

## 2021-03-18 RX ORDER — ETOMIDATE 2 MG/ML
20 INJECTION INTRAVENOUS ONCE
Status: COMPLETED | OUTPATIENT
Start: 2021-03-18 | End: 2021-03-18

## 2021-03-18 RX ORDER — KETAMINE HYDROCHLORIDE 10 MG/ML
70 INJECTION, SOLUTION INTRAMUSCULAR; INTRAVENOUS ONCE
Status: COMPLETED | OUTPATIENT
Start: 2021-03-18 | End: 2021-03-18

## 2021-03-18 RX ORDER — KETAMINE HCL IN NACL, ISO-OSM 100MG/10ML
SYRINGE (ML) INJECTION
Status: DISCONTINUED
Start: 2021-03-18 | End: 2021-03-18 | Stop reason: HOSPADM

## 2021-03-18 RX ORDER — IOPAMIDOL 755 MG/ML
100 INJECTION, SOLUTION INTRAVASCULAR ONCE
Status: COMPLETED | OUTPATIENT
Start: 2021-03-18 | End: 2021-03-18

## 2021-03-18 RX ADMIN — Medication 100 MG: at 11:54

## 2021-03-18 RX ADMIN — SODIUM CHLORIDE 1000 ML: 9 INJECTION, SOLUTION INTRAVENOUS at 11:45

## 2021-03-18 RX ADMIN — KETAMINE HYDROCHLORIDE 70 MG: 10 INJECTION, SOLUTION INTRAMUSCULAR; INTRAVENOUS at 12:01

## 2021-03-18 RX ADMIN — IOPAMIDOL 100 ML: 755 INJECTION, SOLUTION INTRAVENOUS at 12:22

## 2021-03-18 RX ADMIN — ROCURONIUM BROMIDE 100 MG: 10 INJECTION INTRAVENOUS at 11:54

## 2021-03-18 RX ADMIN — ETOMIDATE 20 MG: 2 INJECTION, SOLUTION INTRAVENOUS at 11:53

## 2021-03-18 NOTE — ED NOTES
RT present, patient sating 98 % on high flow NRB mask.  Patient did vomit moderate amounts; patient turned on his side and mouth suctioned.  IV RSI given as follows; MD removed patient's c-collar for intubation.    1153-etomidate 20 mg IV  1154-Rocuronium 100 mg IV  1155-# 8.0 ET tube placed #24 at the lips; patient ventilating well; sats upper 90's with positive colormetric color changes.  1200-Ketamine 70 mg IV given for sedation.    Patient tolerated procedure well.  MD gave verbal order to discontinue c-collar at this time.

## 2021-03-18 NOTE — ED NOTES
Patient on CT table; noticed dropping of BP's 78/64 131 Dr. Stern called.  Lab called to ensure type and screen was done and to get blood ready.

## 2021-03-18 NOTE — ED NOTES
Report called to [facility]ARE[facility].  [phone]  Copy of DI reports to be faxed as soon as available to [phone]

## 2021-03-18 NOTE — ED NOTES
CT is done at this time.  Patient transferred onto [facility] stretcher.  [facility] staff taking over cares of this patient at this time.  Placed the patient on their monitors.  Hand off completed.  1st bolus is complete; second bolus hung on blood tubing by [facility].

## 2021-03-18 NOTE — ED NOTES
Patient to the [facility] pad-patient to [place] for further care/evaluation/treatment.  /96 's and sat 97 % BVM (per [facility])    Patient out of building at 1230.

## 2021-03-18 NOTE — ED NOTES
"While attempting to lay patient flat for xray; he became agitated, pulling at lines \"sit me up, sit me up\" was unable to redirect.  MD at bedside to re-evaluate.  Plan now is to RSI and intubate patient so further evaluation/treatment of patient can proceed.  "

## 2021-03-18 NOTE — PROGRESS NOTES
Called to ER for intubation. Prior to intubation pt started vomiting and was rolled to his side. Pt then intubated with 8.0 ETT, secured tube 24cm at the teeth. Xray, bilat BS and colormetric CO2 detector used to confirm placement. Manually ventilated pt to CT and back. Placed pt on [facility] monitors and assisted with transportation to the helicopter.  No further respiratory interventions done. Vicky Mcclendon RRT

## 2021-03-18 NOTE — ED NOTES
March catheter place; urine sample to lab.  [facility] staff attempting to place OG tube-but unsuccessful.

## 2021-03-18 NOTE — PROGRESS NOTES
This writer attempted to call several emergency contacts AriadneALFONSO , Amy Guy, Nir Guy and Cassandra Mera.  I did speak with Amy who had Nir call back to ER.  I did update Nir to information that his dad had come to ER, was intubated and transferring to [facility] for emergency surgery per helicopter. I also did talk with Cassandra asking her to get ahold of her mother as soon as possible.

## 2021-03-18 NOTE — ED NOTES
"Patient presents via [facility].  Stop light report received.  prehospital reports that the patient was shopping at [employer] when he fell and struck his head.  Initial reports that the patient had a period of unresponsiveness but was awake but confused and not following commands on arrival.  Patient was placed in a c-collar; iv placed and then patient transported to ED for further evaluation and treatment.  On arrival, patient responds to verbal/voice but is unable to answer questions about name//place/time correctly.  Patient is cool and profusely diaphoretic.  Patient does not follow commands.  When trying to lay patient flat, patient became very agitated \"lift me up\" patient continues to try to sit up and roll on his side.  Initial evaluation done by MD; bedside US done ?large pericardial effusion noted.  EKG with RBB noted unclear if this is new or not.  Plan to further evaluate patient with CT and then fly patient to [facility].  Lab in to draw as patient had IV placed prehospital.  Patient placed on VS monitor and cardiac monitor.  "

## 2021-03-19 NOTE — ED PROVIDER NOTES
History     Chief Complaint   Patient presents with     Syncope     HPI  Nir Guy is a 81 year old male who per son has no past medical history, takes a daily aspirin, here brought in by EMS from [facility] after a syncopal event.  Was standing, passed out, fell the ground and hit his head.  On scene, was confused diaphoretic, intermittently agitated, unable to answer any questions appropriately.    Patient unable to answer any questions with me appropriately.    Allergies:  No Known Allergies    Problem List:    There are no active problems to display for this patient.       Past Medical History:    Past Medical History:   Diagnosis Date     Actinic keratosis      Basal cell carcinoma      Squamous cell carcinoma        Past Surgical History:    Past Surgical History:   Procedure Laterality Date     CATARACT IOL, RT/LT       RELEASE CARPAL TUNNEL Right 2021    Procedure: Revision open carpal tunnel release.;  Surgeon: Nasir Casper MD;  Location: [facility] OR       Family History:    Family History   Problem Relation Age of Onset     Melanoma No family hx of        Social History:  Marital Status:   [4]  Social History     Tobacco Use     Smoking status: Former Smoker     Types: Pipe     Quit date: 12/3/1999     Years since quittin.3     Smokeless tobacco: Never Used   Substance Use Topics     Alcohol use: Not on file     Drug use: Not on file        Medications:    Carboxymethylcellulose Sod PF (REFRESH CELLUVISC) 1 % ophthalmic gel  HYDROcodone-acetaminophen (NORCO) 5-325 MG tablet  hypromellose-dextran (GENTEAL TEARS) 0.1-0.3 % ophthalmic solution  MECLIZINE HCL PO  Multiple Vitamins-Minerals (MENS MULTIVITAMIN PLUS PO)  Multiple Vitamins-Minerals (PRESERVISION AREDS PO)  Omega-3 Fatty Acids (FISH OIL) 500 MG CAPS          Review of Systems   Unable to perform ROS: Mental status change   All other systems reviewed and are negative.      Physical Exam   BP: 103/84  Pulse: 103  Resp:  23  SpO2: 97 %      Physical Exam  Vitals signs and nursing note reviewed.   Constitutional:       General: He is in acute distress.      Appearance: He is ill-appearing and diaphoretic.   HENT:      Head: Normocephalic.      Comments: Abrasion to forehead     Right Ear: External ear normal.      Left Ear: External ear normal.      Nose: Nose normal.   Eyes:      Conjunctiva/sclera: Conjunctivae normal.      Pupils: Pupils are equal, round, and reactive to light.   Neck:      Comments: In C-spine  Cardiovascular:      Rate and Rhythm: Regular rhythm. Tachycardia present.      Comments: Decreased left radial pulses compared to right  Pulmonary:      Effort: No respiratory distress.      Breath sounds: Normal breath sounds.   Abdominal:      General: Abdomen is flat.      Palpations: Abdomen is soft.   Musculoskeletal:         General: No swelling, deformity or signs of injury.   Skin:     Capillary Refill: Capillary refill takes 2 to 3 seconds.   Neurological:      Comments: Does not respond to questions appropriately, moves all fours, uncooperative with exam, no obvious motor deficits   Psychiatric:      Comments: Agitated, uncooperative         ED Course        [facility]    -Intubation    Date/Time: 3/19/2021 9:02 AM  Performed by: Nimesh Stern MD  Authorized by: Nimesh Stern MD       PRE-PROCEDURE DETAILS     Patient status:  Awake    Pretreatment meds: etomidate.    Paralytics:  Rocuronium      PROCEDURE DETAILS     Preoxygenation:  Nonrebreather mask    CPR in progress: no      Intubation method:  Oral    Oral intubation technique:  Direct    Laryngoscope blade:  Mac 4    Tube size (mm):  8.0    Tube type:  Cuffed    Number of attempts:  1    Tube visualized through cords: yes      PLACEMENT ASSESSMENT     ETT to lip:  23    ETT to teeth:  24    Tube secured with:  ETT rivera    Breath sounds:  Equal    Placement verification: chest rise, CXR verification, equal breath sounds and ETCO2  detector      CXR findings:  ETT in proper place  PROCEDURE   Patient Tolerance:  Patient tolerated the procedure well with no immediate complications  Describe Procedure: bougie      Results for orders placed during the hospital encounter of 03/18/21   POC US ECHO LIMITED    Impression [facility] Procedure Note      Limited Bedside ED Cardiac Ultrasound:    PROCEDURE: PERFORMED BY: Dr. Nimesh Stern MD  INDICATIONS/SYMPTOM:  Hypotension/shock  PROBE: Cardiac phased array probe  BODY LOCATION: Chest  FINDINGS:   The ultrasound was performed utilizing the subcostal, parasternal long axis, parasternal short axis and apical 4 chamber views.  Cardiac contractility:  Present  Gross estimation of cardiac kinesis: normal  Pericardial Effusion:  Large  RV:LV ratio: LV > RV  INTERPRETATION:     Pericardial effusion was found w/ RV collapse.  IVC visualized and findings indicate unable to estimate.  IMAGE DOCUMENTATION: Images were archived to hard drive.                   EKG Interpretation:      Interpreted by Nimesh Stern MD  Time reviewed:   Symptoms at time of EKG: ams   Rhythm: sinus tachycardia  Rate: Tachycardia  Axis: Right Axis Deviation  Ectopy: premature ventricular contractions (unifocal)  Conduction: right bundle branch block (complete)  ST Segments/ T Waves: Concordance see in 2-3 aVF, ST depression concordance see in V2 V3  Q Waves: III and aVf  Comparison to prior: had rbb pattern w/o any st changes    Clinical Impression: sinus tachycardia                Critical Care time:  was 65 minutes for this patient excluding procedures.               Results for orders placed or performed during the hospital encounter of 03/18/21 (from the past 24 hour(s))   CBC with platelets differential   Result Value Ref Range    WBC 9.0 4.0 - 11.0 10e9/L    RBC Count 4.71 4.4 - 5.9 10e12/L    Hemoglobin 14.9 13.3 - 17.7 g/dL    Hematocrit 42.9 40.0 - 53.0 %    MCV 91 78 - 100 fl    MCH 31.6 26.5 - 33.0 pg    MCHC 34.7  31.5 - 36.5 g/dL    RDW 12.6 10.0 - 15.0 %    Platelet Count 216 150 - 450 10e9/L    Diff Method Automated Method     % Neutrophils 37.2 %    % Lymphocytes 53.3 %    % Monocytes 6.2 %    % Eosinophils 2.2 %    % Basophils 0.8 %    % Immature Granulocytes 0.3 %    Nucleated RBCs 0 0 /100    Absolute Neutrophil 3.3 1.6 - 8.3 10e9/L    Absolute Lymphocytes 4.8 0.8 - 5.3 10e9/L    Absolute Monocytes 0.6 0.0 - 1.3 10e9/L    Absolute Eosinophils 0.2 0.0 - 0.7 10e9/L    Absolute Basophils 0.1 0.0 - 0.2 10e9/L    Abs Immature Granulocytes 0.0 0 - 0.4 10e9/L    Absolute Nucleated RBC 0.0    Comprehensive metabolic panel   Result Value Ref Range    Sodium 138 133 - 144 mmol/L    Potassium 4.0 3.4 - 5.3 mmol/L    Chloride 106 94 - 109 mmol/L    Carbon Dioxide 21 20 - 32 mmol/L    Anion Gap 11 3 - 14 mmol/L    Glucose 192 (H) 70 - 99 mg/dL    Urea Nitrogen 12 7 - 30 mg/dL    Creatinine 0.98 0.66 - 1.25 mg/dL    GFR Estimate 72 >60 mL/min/[1.73_m2]    GFR Estimate If Black 83 >60 mL/min/[1.73_m2]    Calcium 8.7 8.5 - 10.1 mg/dL    Bilirubin Total 0.6 0.2 - 1.3 mg/dL    Albumin 3.9 3.4 - 5.0 g/dL    Protein Total 7.1 6.8 - 8.8 g/dL    Alkaline Phosphatase 53 40 - 150 U/L    ALT 39 0 - 70 U/L    AST 39 0 - 45 U/L   INR   Result Value Ref Range    INR 0.99 0.86 - 1.14   Partial thromboplastin time   Result Value Ref Range    PTT 26 22 - 37 sec   Alcohol ethyl   Result Value Ref Range    Ethanol g/dL <0.01 0.01 g/dL   Blood gas venous and oxyhgb   Result Value Ref Range    Ph Venous 7.37 7.32 - 7.43 pH    PCO2 Venous 36 (L) 40 - 50 mm Hg    PO2 Venous 87 (H) 25 - 47 mm Hg    Bicarbonate Venous 21 21 - 28 mmol/L    FIO2 Unknown     Oxyhemoglobin Venous 96 %    Base Deficit Venous 3.8 mmol/L   Glucose by meter   Result Value Ref Range    Glucose 140 (H) 70 - 99 mg/dL   Troponin I   Result Value Ref Range    Troponin I ES <0.015 0.000 - 0.045 ug/L   D dimer quantitative   Result Value Ref Range    D Dimer 1.9 (H) 0.0 - 0.50 ug/ml FEU    ABO/Rh type and screen   Result Value Ref Range    ABO A     RH(D) Neg     Antibody Screen Neg     Test Valid Only At [facility]        Specimen Expires 03/21/2021    XR Chest Port 1 View    Narrative    PROCEDURE:  XR CHEST PORT 1 VW    HISTORY:  Trauma - Chest Injury.     COMPARISON:  None.    FINDINGS:   There is an endotracheal tube. The tip is approximately 2.5 cm above  the rachel. A poor inspiratory result was achieved. No pneumothorax or  pleural effusion is noted.      Impression    IMPRESSION:  Endotracheal tube with its tip approximately 2.5 cm above  the rachel      YOUNG RAMIREZ MD   CTA Chest Abdomen Pelvis w Contrast    Narrative    CTA CHEST ABDOMEN PELVIS W CONTRAST    CLINICAL HISTORY: Male, age 81 years, Trauma - Chest, Abdomen, and  Pelvis Injury; aortic dissection rule out;    Comparison:  None.    TECHNIQUE:  CT was performed of the chest, abdomen and pelvis  after  the administration of IV  and oral  contrast.   Sagittal, coronal, axial and MIP reconstructions were reviewed.     FINDINGS:  Chest CT:   There is a large lobulated mediastinal mass that encases the main  pulmonary artery as well as the proximal aspects of both the left and  right pulmonary arteries. Mass also extends out into the hilar regions  and along the vascular structures of both the left and right lung.    No evidence of pulmonary embolus. No evidence of aortic dissection.    Moderate to large dense pericardial effusion versus pericardial mass  is also appreciated.    Lungs demonstrate mild emphysema with areas of dependent atelectasis.  A number of spiculated foci are seen within the dependent portions of  both lungs. No evidence of pleural effusion. Degenerative changes are  seen throughout the thoracic spine without evidence of acute fracture.    Abdomen/Pelvis CT:  The liver, gallbladder, spleen, adrenal glands are normal. There is  mild atrophy of the pancreas without acute abnormality. Low dense  foci  seen within the kidney suggest the presence of cortical cysts. The  ureters appear grossly normal. The distal aspects obscured by streak  artifact arising from the right hip arthroplasty. Urinary bladder  catheter is in place. Prostate gland is enlarged. Large and small  bowel history diverticulosis of the colon. Evaluation of the vessels  demonstrates no evidence of dissection. After scattered calcifications  are seen throughout the abdominal aorta extending into the iliac  arteries. Relatively slow flow is suggested within the arterial  structures as a contrast is layering dependently within the vessels.    Bony structures: No acute abnormality. Degenerative changes are seen  throughout the lumbar spine.      Impression    IMPRESSION:   Large mediastinal mass encases and narrows the main pulmonary artery  as well as the right left pulmonary arteries. Mass also extends along  the vessels and bronchial structures into the medial aspects of both  the left and right lung.    Dense pericardial effusion.    Low dense renal lesions suggesting cortical cysts. Diverticulosis of  the distal colon.    Prostate hypertrophy.    No evidence of lymphadenopathy in the upper abdomen.    RANDOLPH GONZALEZ MD   CT Head w/o Contrast    Narrative    PROCEDURE: CT HEAD W/O CONTRAST     HISTORY: Trauma - Head Injury.    COMPARISON: None.    TECHNIQUE:  Helical images of the head from the foramen magnum to the  vertex were obtained without contrast.    FINDINGS: The ventricles and sulci are normal in volume. No acute  intracranial hemorrhage, mass effect, midline shift, hydrocephalus or  basilar cystern effacement are present.    There is some white matter low-density both hemispheres consistent  with small vessel changes.    The calvarium is intact. The mastoid air cells are clear.  The  visualized paranasal sinuses are clear.      Impression    IMPRESSION: No acute brain injury.      YOUNG RAMIREZ MD   CT Cervical Spine w/o  Contrast    Narrative    PROCEDURE: CT CERVICAL SPINE W/O CONTRAST 3/18/2021 12:44 PM    HISTORY: Trauma - C-Spine Injury    COMPARISONS:    Meds/Dose Given:    TECHNIQUE: CT scan of the cervical spine with sagittal coronal  reconstructions    FINDINGS: There are degenerative changes at the middle atlantoaxial  joint. There is decrease in height in the C3-C4 discs with mild  anterior and posterior osteophytes. Uncovertebral joint spurs encroach  on the neural foramina of the C4 nerve roots bilaterally is seen.  There is decrease in height in the C5-C6 disc with mild anterior  osteophytes. Cervical facet joint degenerative changes are noted  predominantly in the upper cervical spine. There are no fractures of  the vertebral bodies or arches. No prevertebral soft tissue swelling  is noted. There is an endotracheal tube in the trachea.         Impression    IMPRESSION: Degenerative changes of the cervical spine. No fracture is  seen    YOUNG RAMIREZ MD   CT Thoracic Spine w/o Contrast    Narrative    CT THORACIC SPINE W/O CONTRAST, 3/18/2021 12:46 PM    History: Male, age 81 years; Trauma - T-Spine Injury    Comparison: None.    TECHNIQUE: CT was performed of the thoracic spine. Sagittal, coronal,  and axial reconstructions were reviewed.    FINDINGS: The  thoracic spine demonstrates normal curvature. No acute  fracture, no acute subluxation. Soft tissues of the chest demonstrate  a large mass within the mediastinum. Large pericardial effusion is  also appreciated. Dependent atelectasis seen within the lungs.  Spiculated masses are also seen in the hilar regions that appear to  surround the vessels in the bronchial structures.      Impression    IMPRESSION:   No evidence of acute or subacute bony abnormality.     Large mediastinal mass and dense pericardial effusion.    Spiculated masses involving the hilar structures appear to surround  the vascular and bronchial structures.    RANDOLPH GONZALEZ MD   CT Lumbar Spine  w/o Contrast    Narrative    PROCEDURE: CT LUMBAR SPINE W/O CONTRAST 3/18/2021 12:48 PM    HISTORY: Trauma - L-Spine Injury    COMPARISONS: None.    Meds/Dose Given:    TECHNIQUE: T scan of the lumbar spine with sagittal coronal  reconstructions    FINDINGS: There are posterior osteophytes seen at T10-T11 flattening  the ventral aspect of the subarachnoid space. There is decrease in  height in the T11-T12 disc. There is decrease in height of the T12-L1  discs with posterior osteophytes.    The L1-L2 disc is normal in height.    At L2-L3 there is gaseous degeneration of the disc broad-based annular  bulging ligamenta flava hypertrophy severe facet joint degenerative  changes this results in severe central spinal stenosis at L2-L3. There  is a posterolateral osteophyte on the left mildly impinging on the  left L2 nerve root in the neural foramina. There is moderate  compression of both L3 nerve roots in the lateral recesses.    At L3-L4 there is broad-based annular bulging and severe facet joint  degenerative changes. There is ligamentum flavum hypertrophy. There is  moderate central spinal stenosis. There is impingement on both L4  nerve roots in the lateral recesses.    There is decrease in height in the L4-L5 disc with developmentally  short pedicles ligamenta flava hypertrophy and broad-based annular  bulging and severe facet joint degenerative changes. This results in  severe central spinal stenosis at L4-L5. There is compression of both  L5 nerve roots in the lateral recesses.    At L5-S1 the disc is normal height without evidence of disc herniation  or protrusion.    Advanced sacroiliac degenerative changes are noted bilaterally    No intradural abnormalities are seen.         Impression    IMPRESSION: Advanced degenerative changes of the lumbar spine with  central spinal stenosis at L2-L3 and L4-L5.    no lumbar spine fractures are noted.    YOUNG RAMIREZ MD       Medications   sodium chloride (PF) 0.9% PF  flush 100 mL (100 mLs Intravenous Given 3/18/21 1223)   iopamidol (ISOVUE-370) solution 100 mL (100 mLs Intravenous Given 3/18/21 1222)   ketamine (KETALAR) injection 70 mg (70 mg Intravenous Given 3/18/21 1201)   etomidate (AMIDATE) injection 20 mg (20 mg Intravenous Given 3/18/21 1153)   rocuronium injection 100 mg (100 mg Intravenous Given 3/18/21 1154)   0.9% sodium chloride BOLUS (0 mLs Intravenous Stopped 3/18/21 1221)       Assessments & Plan (with Medical Decision Making)     I have reviewed the nursing notes.    I have reviewed the findings, diagnosis, plan and need for follow up with the patient.     Critical Care Addendum    My initial assessment, based on my review of prehospital provider report, review of nursing observations, review of vital signs, focused history, physical exam, review of cardiac rhythm monitor and 12 lead ECG analysis, established that Nir Guy has altered mental status, severe hypotension, suspicion of thoracic dissection and suspicion of ruptured abdominal aortic aneurysm, which requires immediate intervention, and therefore he is critically ill.     After the initial assessment, the care team initiated multiple lab tests, initiated IV fluid administration, initiated medication therapy with ketamine, performed advanced airway management, consulted with cardiology, intensivist and performed bedside ultrasound to provide stabilization care. Due to the critical nature of this patient, I reassessed nursing observations, vital signs, physical exam, review of cardiac rhythm monitor, neurologic status and respiratory status multiple times prior to his disposition.     Time also spent performing discussion with family to obtain medical information for decision making, reviewing test results, discussion with consultants and coordination of care.     Critical care time (excluding teaching time and procedures): 65 minutes.     Discharge Medication List as of 3/18/2021  3:15 PM         80-year-old male who came in here with altered mental status, diaphoresis, and EKG concerning for possible STEMI with Scarbossa criteria.    However, patient was incredible diaphoretic, had a pulse deficit, and STEMI usually does not cause syncope, therefore I performed a bedside ultrasound of the heart.    This ultrasound showed large pericardial effusion with tamponade-like physiology.    As a consequence of this, patient was given a 1 L bolus under pressure.    Ever, patient became agitated, was uncooperative with additional attempts at IV placement, therefore decision was made to intubate patient.    Intubation was unremarkable, he then had a chest x-ray which showed a widened mediastinum, I placed orders for CT imaging, pan scan, with special attention looking for aortic dissection.    Patient was getting a scans, became hypotensive, flight nurses had arrived and were assisting with fluid administration while in CT scanner, while on the phone with ICU attending [facility], agreed with transfer, requested as many images as safely possible prior to departure, we discussed pericardiocentesis prior to departure however his systolic went up to 120 after a second liter of fluids, I decided it was in patient's best interest to have this done in interventional radiology suite at [facility] and if his pressure dropped again, flight crew had vasopressors and are credentialed to do pericardiocentesis.    I updated patient's son once with information.  To his knowledge patient is a full code.        Final diagnoses:   Delirium   Pericardial effusion   Other specified hypotension       3/18/2021   [facility]     Nimesh Stern MD  03/19/21 0859       Nimesh Stern MD  03/19/21 [id number]

## 2021-03-22 ENCOUNTER — RECORDS - HEALTHEAST (OUTPATIENT)
Dept: ADMINISTRATIVE | Facility: OTHER | Age: 81
End: 2021-03-22

## 2021-03-22 ENCOUNTER — COMMUNICATION - HEALTHEAST (OUTPATIENT)
Dept: ADMINISTRATIVE | Facility: CLINIC | Age: 81
End: 2021-03-22

## 2021-03-22 ENCOUNTER — AMBULATORY - HEALTHEAST (OUTPATIENT)
Dept: LAB | Facility: CLINIC | Age: 81
End: 2021-03-22

## 2021-03-24 ENCOUNTER — AMBULATORY - HEALTHEAST (OUTPATIENT)
Dept: INTERNAL MEDICINE | Facility: CLINIC | Age: 81
End: 2021-03-24

## 2021-03-25 ENCOUNTER — RECORDS - HEALTHEAST (OUTPATIENT)
Dept: ADMINISTRATIVE | Facility: OTHER | Age: 81
End: 2021-03-25

## 2021-03-26 ENCOUNTER — COMMUNICATION - HEALTHEAST (OUTPATIENT)
Dept: INTERNAL MEDICINE | Facility: CLINIC | Age: 81
End: 2021-03-26

## 2021-03-29 ENCOUNTER — OFFICE VISIT - HEALTHEAST (OUTPATIENT)
Dept: INTERNAL MEDICINE | Facility: CLINIC | Age: 81
End: 2021-03-29

## 2021-03-29 DIAGNOSIS — I31.39 PERICARDIAL EFFUSION: ICD-10-CM

## 2021-03-29 DIAGNOSIS — I26.99 PE (PULMONARY THROMBOEMBOLISM) (H): ICD-10-CM

## 2021-03-29 DIAGNOSIS — R79.82 ELEVATED C-REACTIVE PROTEIN (CRP): ICD-10-CM

## 2021-03-29 DIAGNOSIS — I31.4 PERICARDIAL TAMPONADE: ICD-10-CM

## 2021-03-29 DIAGNOSIS — I48.91 ATRIAL FIBRILLATION, UNSPECIFIED TYPE (H): ICD-10-CM

## 2021-03-29 DIAGNOSIS — R40.20 LOSS OF CONSCIOUSNESS (H): ICD-10-CM

## 2021-03-29 DIAGNOSIS — M48.062 SPINAL STENOSIS OF LUMBAR REGION WITH NEUROGENIC CLAUDICATION: ICD-10-CM

## 2021-03-29 DIAGNOSIS — Z09 HOSPITAL DISCHARGE FOLLOW-UP: ICD-10-CM

## 2021-03-29 DIAGNOSIS — J43.9 PULMONARY EMPHYSEMA, UNSPECIFIED EMPHYSEMA TYPE (H): ICD-10-CM

## 2021-03-29 LAB
ATRIAL RATE - MUSE: 65 BPM
DIASTOLIC BLOOD PRESSURE - MUSE: NORMAL
INTERPRETATION ECG - MUSE: NORMAL
P AXIS - MUSE: -10 DEGREES
PR INTERVAL - MUSE: 168 MS
QRS DURATION - MUSE: 128 MS
QT - MUSE: 560 MS
QTC - MUSE: 582 MS
R AXIS - MUSE: 80 DEGREES
SYSTOLIC BLOOD PRESSURE - MUSE: NORMAL
T AXIS - MUSE: 67 DEGREES
VENTRICULAR RATE- MUSE: 65 BPM

## 2021-04-08 ENCOUNTER — OFFICE VISIT - HEALTHEAST (OUTPATIENT)
Dept: INTERNAL MEDICINE | Facility: CLINIC | Age: 81
End: 2021-04-08

## 2021-04-08 DIAGNOSIS — R79.82 ELEVATED C-REACTIVE PROTEIN (CRP): ICD-10-CM

## 2021-04-08 DIAGNOSIS — I26.99 PE (PULMONARY THROMBOEMBOLISM) (H): ICD-10-CM

## 2021-04-08 DIAGNOSIS — M48.062 SPINAL STENOSIS OF LUMBAR REGION WITH NEUROGENIC CLAUDICATION: ICD-10-CM

## 2021-04-08 DIAGNOSIS — J43.9 PULMONARY EMPHYSEMA, UNSPECIFIED EMPHYSEMA TYPE (H): ICD-10-CM

## 2021-04-08 DIAGNOSIS — I48.91 ATRIAL FIBRILLATION, UNSPECIFIED TYPE (H): ICD-10-CM

## 2021-04-08 DIAGNOSIS — E55.9 VITAMIN D DEFICIENCY: ICD-10-CM

## 2021-04-08 DIAGNOSIS — I31.4 PERICARDIAL TAMPONADE: ICD-10-CM

## 2021-04-08 DIAGNOSIS — I31.39 PERICARDIAL EFFUSION: ICD-10-CM

## 2021-04-08 LAB
ANION GAP SERPL CALCULATED.3IONS-SCNC: 12 MMOL/L (ref 5–18)
BUN SERPL-MCNC: 10 MG/DL (ref 8–28)
C REACTIVE PROTEIN LHE: 0.3 MG/DL (ref 0–0.8)
CALCIUM SERPL-MCNC: 8.9 MG/DL (ref 8.5–10.5)
CHLORIDE BLD-SCNC: 102 MMOL/L (ref 98–107)
CO2 SERPL-SCNC: 25 MMOL/L (ref 22–31)
CREAT SERPL-MCNC: 0.86 MG/DL (ref 0.7–1.3)
ERYTHROCYTE [DISTWIDTH] IN BLOOD BY AUTOMATED COUNT: 12.6 % (ref 11–14.5)
GFR SERPL CREATININE-BSD FRML MDRD: >60 ML/MIN/1.73M2
GLUCOSE BLD-MCNC: 83 MG/DL (ref 70–125)
HCT VFR BLD AUTO: 42.2 % (ref 40–54)
HGB BLD-MCNC: 14.1 G/DL (ref 14–18)
MCH RBC QN AUTO: 31.6 PG (ref 27–34)
MCHC RBC AUTO-ENTMCNC: 33.4 G/DL (ref 32–36)
MCV RBC AUTO: 95 FL (ref 80–100)
PLATELET # BLD AUTO: 296 THOU/UL (ref 140–440)
PMV BLD AUTO: 8.5 FL (ref 7–10)
POTASSIUM BLD-SCNC: 4.4 MMOL/L (ref 3.5–5)
RBC # BLD AUTO: 4.46 MILL/UL (ref 4.4–6.2)
SODIUM SERPL-SCNC: 139 MMOL/L (ref 136–145)
WBC: 7.1 THOU/UL (ref 4–11)

## 2021-04-09 ENCOUNTER — COMMUNICATION - HEALTHEAST (OUTPATIENT)
Dept: INTERNAL MEDICINE | Facility: CLINIC | Age: 81
End: 2021-04-09

## 2021-04-09 ENCOUNTER — COMMUNICATION - HEALTHEAST (OUTPATIENT)
Dept: ADMINISTRATIVE | Facility: CLINIC | Age: 81
End: 2021-04-09

## 2021-04-09 LAB — 25(OH)D3 SERPL-MCNC: 26.7 NG/ML (ref 30–80)

## 2021-04-19 ENCOUNTER — AMBULATORY - HEALTHEAST (OUTPATIENT)
Dept: INTERNAL MEDICINE | Facility: CLINIC | Age: 81
End: 2021-04-19

## 2021-04-19 ENCOUNTER — COMMUNICATION - HEALTHEAST (OUTPATIENT)
Dept: INTERNAL MEDICINE | Facility: CLINIC | Age: 81
End: 2021-04-19

## 2021-04-19 DIAGNOSIS — E55.9 VITAMIN D DEFICIENCY: ICD-10-CM

## 2021-04-19 DIAGNOSIS — I48.91 ATRIAL FIBRILLATION, UNSPECIFIED TYPE (H): ICD-10-CM

## 2021-04-19 DIAGNOSIS — I26.99 PE (PULMONARY THROMBOEMBOLISM) (H): ICD-10-CM

## 2021-04-21 ENCOUNTER — HOSPITAL ENCOUNTER (OUTPATIENT)
Dept: CARDIOLOGY | Facility: CLINIC | Age: 81
Discharge: HOME | End: 2021-04-21
Attending: INTERNAL MEDICINE

## 2021-04-21 DIAGNOSIS — I31.4 PERICARDIAL TAMPONADE: ICD-10-CM

## 2021-04-21 DIAGNOSIS — I31.39 PERICARDIAL EFFUSION: ICD-10-CM

## 2021-04-21 DIAGNOSIS — I48.91 ATRIAL FIBRILLATION, UNSPECIFIED TYPE (H): ICD-10-CM

## 2021-04-21 LAB
AORTIC ROOT: 2.7 CM
AORTIC VALVE MEAN VELOCITY: 94 CM/S
ASCENDING AORTA: 4.8 CM
AV DIMENSIONLESS INDEX VTI: 0.6
AV MEAN GRADIENT: 4 MMHG
AV PEAK GRADIENT: 7.1 MMHG
AV VALVE AREA: 3.1 CM2
AV VELOCITY RATIO: 0.7
BSA FOR ECHO PROCEDURE: 2.18 M2
CV BLOOD PRESSURE: ABNORMAL MMHG
CV ECHO HEIGHT: 71.8 IN
CV ECHO WEIGHT: 208 LBS
DOP CALC AO PEAK VEL: 133 CM/S
DOP CALC AO VTI: 29.3 CM
DOP CALC LVOT AREA: 4.91 CM2
DOP CALC LVOT DIAMETER: 2.5 CM
DOP CALC LVOT PEAK VEL: 98.5 CM/S
DOP CALC LVOT STROKE VOLUME: 91.3 CM3
DOP CALCLVOT PEAK VEL VTI: 18.6 CM
EJECTION FRACTION: 61 % (ref 55–75)
FRACTIONAL SHORTENING: 24.5 % (ref 28–44)
INTERVENTRICULAR SEPTUM IN END DIASTOLE: 1.1 CM (ref 0.6–1)
IVS/PW RATIO: 1.1
LA AREA 1: 13.8 CM2
LA AREA 2: 19.8 CM2
LEFT ATRIUM LENGTH: 3.88 CM
LEFT ATRIUM SIZE: 2.9 CM
LEFT ATRIUM VOLUME INDEX: 27.5 ML/M2
LEFT ATRIUM VOLUME: 59.9 ML
LEFT VENTRICLE CARDIAC INDEX: 2.6 L/MIN/M2
LEFT VENTRICLE CARDIAC OUTPUT: 5.7 L/MIN
LEFT VENTRICLE DIASTOLIC VOLUME INDEX: 51.8 CM3/M2 (ref 34–74)
LEFT VENTRICLE DIASTOLIC VOLUME: 113 CM3 (ref 62–150)
LEFT VENTRICLE HEART RATE: 63 BPM
LEFT VENTRICLE MASS INDEX: 86.3 G/M2
LEFT VENTRICLE SYSTOLIC VOLUME INDEX: 20.2 CM3/M2 (ref 11–31)
LEFT VENTRICLE SYSTOLIC VOLUME: 44 CM3 (ref 21–61)
LEFT VENTRICULAR INTERNAL DIMENSION IN DIASTOLE: 4.9 CM (ref 4.2–5.8)
LEFT VENTRICULAR INTERNAL DIMENSION IN SYSTOLE: 3.7 CM (ref 2.5–4)
LEFT VENTRICULAR MASS: 188.1 G
LEFT VENTRICULAR OUTFLOW TRACT MEAN GRADIENT: 2 MMHG
LEFT VENTRICULAR OUTFLOW TRACT MEAN VELOCITY: 62.5 CM/S
LEFT VENTRICULAR OUTFLOW TRACT PEAK GRADIENT: 4 MMHG
LEFT VENTRICULAR POSTERIOR WALL IN END DIASTOLE: 1 CM (ref 0.6–1)
LV STROKE VOLUME INDEX: 41.9 ML/M2
MITRAL VALVE E/A RATIO: 1.1
MV AVERAGE E/E' RATIO: 10.6 CM/S
MV DECELERATION TIME: 239 MS
MV E'TISSUE VEL-LAT: 6.63 CM/S
MV E'TISSUE VEL-MED: 7.02 CM/S
MV LATERAL E/E' RATIO: 11
MV MEDIAL E/E' RATIO: 10.3
MV PEAK A VELOCITY: 68.6 CM/S
MV PEAK E VELOCITY: 72.6 CM/S
NUC REST DIASTOLIC VOLUME INDEX: 3328 LBS
NUC REST SYSTOLIC VOLUME INDEX: 71.75 IN
TRICUSPID REGURGITATION PEAK PRESSURE GRADIENT: 28.1 MMHG
TRICUSPID VALVE ANULAR PLANE SYSTOLIC EXCURSION: 2.8 CM
TRICUSPID VALVE PEAK REGURGITANT VELOCITY: 265 CM/S

## 2021-04-21 ASSESSMENT — MIFFLIN-ST. JEOR: SCORE: 1677.51

## 2021-04-22 ENCOUNTER — COMMUNICATION - HEALTHEAST (OUTPATIENT)
Dept: INTERNAL MEDICINE | Facility: CLINIC | Age: 81
End: 2021-04-22

## 2021-05-11 ENCOUNTER — OFFICE VISIT - HEALTHEAST (OUTPATIENT)
Dept: INTERNAL MEDICINE | Facility: CLINIC | Age: 81
End: 2021-05-11

## 2021-05-11 DIAGNOSIS — I31.4 PERICARDIAL TAMPONADE: ICD-10-CM

## 2021-05-11 DIAGNOSIS — I31.39 PERICARDIAL EFFUSION: ICD-10-CM

## 2021-05-11 DIAGNOSIS — I48.91 ATRIAL FIBRILLATION, UNSPECIFIED TYPE (H): ICD-10-CM

## 2021-05-11 DIAGNOSIS — I26.99 PE (PULMONARY THROMBOEMBOLISM) (H): ICD-10-CM

## 2021-05-11 DIAGNOSIS — M48.062 SPINAL STENOSIS OF LUMBAR REGION WITH NEUROGENIC CLAUDICATION: ICD-10-CM

## 2021-05-11 LAB
ATRIAL RATE - MUSE: 79 BPM
DIASTOLIC BLOOD PRESSURE - MUSE: NORMAL
INTERPRETATION ECG - MUSE: NORMAL
P AXIS - MUSE: 65 DEGREES
PR INTERVAL - MUSE: 198 MS
QRS DURATION - MUSE: 134 MS
QT - MUSE: 424 MS
QTC - MUSE: 486 MS
R AXIS - MUSE: 75 DEGREES
SYSTOLIC BLOOD PRESSURE - MUSE: NORMAL
T AXIS - MUSE: 51 DEGREES
VENTRICULAR RATE- MUSE: 79 BPM

## 2021-05-20 ENCOUNTER — COMMUNICATION - HEALTHEAST (OUTPATIENT)
Dept: INTERNAL MEDICINE | Facility: CLINIC | Age: 81
End: 2021-05-20

## 2021-05-20 DIAGNOSIS — N52.9 ERECTILE DYSFUNCTION, UNSPECIFIED ERECTILE DYSFUNCTION TYPE: ICD-10-CM

## 2021-05-26 NOTE — PROGRESS NOTES
The patient was counseled and encouraged to consider modifying their diet and eating habits. He was provided with information on recommended healthy diet options.  The patient was provided with written information regarding signs of hearing loss.  Patient's advanced directive was discussed and I am comfortable with the patient's wishes.

## 2021-05-27 VITALS
DIASTOLIC BLOOD PRESSURE: 80 MMHG | OXYGEN SATURATION: 97 % | WEIGHT: 210 LBS | BODY MASS INDEX: 28.48 KG/M2 | HEART RATE: 82 BPM | SYSTOLIC BLOOD PRESSURE: 130 MMHG

## 2021-05-27 NOTE — PROGRESS NOTES
Result is/are normal.  No signs of bacteria and most likely viral as per Dr English.  Please call if any questions or concerns, and if not feeling any better, please return to clinic.

## 2021-05-27 NOTE — PROGRESS NOTES
[facility]  Internal Medicine - Office Visit    Patient: Nir Guy   MRN: [id number]   Date of Service: 04/02/19   Patient Care Team:  German Mcgregor MD as PCP - General (Internal Medicine)  Jacky Zayas MD (Ophthalmology)  Chavez Jay MD as Physician (Dermatology)  Kaushik Hood MD as Physician (Orthopedic Surgery)    ASSESSMENT/PLAN     Nir Guy is an 79-year-old man with recent photodynamic therapy on 3/29 now with fevers/chills/cough x 2 days.     1. Influenza A illness  Fever, chills, cough, and positive influenza A on rapid flu swab today. CXR neg for PNA. I think he has influenza based on this, however because of his recent photodynamic therapy for actinic keratosis and open sores on the face, I did obtain CBC and blood cultures to rule out bacteremia. Plan:    Orders placed: CBC, BMP, blood culture    Tamiflu x 5 days    Discussed rest, hydration, honey. If any worsening symptoms, patient to be re-evaluated for complications of flu    Vannesa English MD  Internal Medicine and Pediatrics  [facility]  Pager [phone]    SUBJECTIVE     Nir Guy is an 79-year-old man who comes in today with fevers and chills.  4 days ago on Friday, March 29, he underwent photodynamic therapy for actinic keratoses on his face and scalp.  That was his second time having it done.  He reports that everything went well, and he has not had any swelling of his face or any drainage from his face or any pain out of proportion to what is expected.  He has been applying Eucerin as directed.  2 days ago on Sunday he developed fevers and chills.  He did not take his temperature, just felt warm.  He also started to develop a cough.  He coughs so hard that sometimes his chest and his abdomen hurts.  He denies any sore throat, nasal congestion, nausea, vomiting, diarrhea.  He is eating and drinking normally.  No body aches.  He is urinating normally.   No sick contacts.  He did get his flu shot this year.    Review of Systems  Pertinent items are noted in HPI    Past Medical/Surgical History  Reviewed and updated as appropriate    Medications    Current Outpatient Medications:      artificial tears,hypromellose, (GENTEAL, HYPROMELLOSE,) 0.3 % Gel, Administer 1 application to both eyes at bedtime. , Disp: , Rfl:      meclizine (ANTIVERT) 25 mg tablet, Take 50 mg by mouth daily. , Disp: , Rfl:      multivitamin with minerals (THERA-M) 9 mg iron-400 mcg Tab tablet, Take 1 tablet by mouth daily., Disp: , Rfl:      OMEGA-3/DHA/EPA/FISH OIL (FISH OIL-OMEGA-3 FATTY ACIDS) 300-1,000 mg capsule, Take 1 g by mouth daily., Disp: , Rfl:      sildenafil, antihypertensive, (REVATIO) 20 mg tablet, Take 3 tablets (60 mg total) by mouth once as needed (Erection)., Disp: 30 tablet, Rfl: 5    Allergies  No Known Allergies    Family History  Reviewed and updated as appropriate.     Social History  Reviewed and updated as appropriate.          OBJECTIVE       /74 (Patient Site: Right Arm, Patient Position: Sitting, Cuff Size: Adult Regular)   Pulse 88   Temp 100.3  F (37.9  C) (Oral)   Wt 165 lb 3.2 oz (74.9 kg)   SpO2 99%   BMI 22.56 kg/m      General Appearance:    Alert, cooperative, no distress, appears stated age   Lungs:     Clear to auscultation bilaterally, respirations unlabored    Heart:    Regular rate and rhythm, S1 and S2 normal, no murmur, rub    or gallop   Abdomen:     Soft, non-tender, bowel sounds active all four quadrants,     no masses, no organomegaly   Skin:   He has open sores on his face that are erythematous, but no weepiness or drainage. Non tender.    Neurologic:   CNII-XII grossly intact, MAEE     Labs/imaging/studies:  CXR clear  Rapid flu + for influenza A

## 2021-05-27 NOTE — TELEPHONE ENCOUNTER
----- Message from Ericka Olea MD sent at 4/8/2019 11:25 AM CDT -----  Result is/are normal.  No signs of bacteria and most likely viral as per Dr English.  Please call if any questions or concerns, and if not feeling any better, please return to clinic.

## 2021-05-30 VITALS — WEIGHT: 215 LBS | BODY MASS INDEX: 28.37 KG/M2

## 2021-05-31 VITALS — BODY MASS INDEX: 28.1 KG/M2 | WEIGHT: 213 LBS

## 2021-05-31 VITALS — BODY MASS INDEX: 28.23 KG/M2 | WEIGHT: 214 LBS

## 2021-05-31 VITALS — BODY MASS INDEX: 27.57 KG/M2 | WEIGHT: 209 LBS

## 2021-05-31 VITALS — WEIGHT: 212 LBS | BODY MASS INDEX: 27.97 KG/M2

## 2021-05-31 VITALS — BODY MASS INDEX: 29.03 KG/M2 | WEIGHT: 220 LBS

## 2021-05-31 NOTE — TELEPHONE ENCOUNTER
New Appointment Needed  What is the reason for the visit:    Patient has been having pain in his right ring finger and pinkie and right hand for a couple weeks, and it hurts the patient is on the schedule for late september.  Provider Preference: PCP only  How soon do you need to be seen?: Thursday the soonest.  Waitlist offered?: Yes  Okay to leave a detailed message:  Yes

## 2021-06-01 VITALS — BODY MASS INDEX: 29.57 KG/M2 | WEIGHT: 218 LBS

## 2021-06-01 VITALS — BODY MASS INDEX: 28.04 KG/M2 | HEIGHT: 72 IN | WEIGHT: 207 LBS

## 2021-06-01 VITALS — WEIGHT: 211 LBS | HEIGHT: 72 IN | BODY MASS INDEX: 28.58 KG/M2

## 2021-06-01 VITALS — HEIGHT: 74 IN | BODY MASS INDEX: 27.23 KG/M2 | WEIGHT: 212.2 LBS

## 2021-06-01 NOTE — PROGRESS NOTES
Injection tendon or ligament  Date/Time: 9/6/2019 5:38 PM  Performed by: German Mcgregor MD  Authorized by: German Mcgregor MD   Comments:     Procedure:  Right trigger ring finger injection.    Indications:  Symptomatic triggering digit.    Description of procedure:  Patient given informed consent prior to proceeding with injection.  Patient agreed to proceed knowing the risks and benefits.  The area near the tendon nodule was cleaned with alcohol.  A needle was introduced encountering the nodule.  It was then withdrawn until no resistance was encountered.  Using a combination of 20 mg of Kenalog and 0.2 ml of lidocaine, the area around the tendon was infiltrated.  Bandage applied and aftercare instructions given.  No immediate complications.

## 2021-06-01 NOTE — PATIENT INSTRUCTIONS - HE
Please follow up if you have any further issues.    You may contact me by phone or Naterahart if you are worsening or if things are not improving.    Thank you for coming in today.

## 2021-06-02 VITALS — WEIGHT: 165.2 LBS | BODY MASS INDEX: 22.56 KG/M2

## 2021-06-02 VITALS — HEIGHT: 72 IN | BODY MASS INDEX: 28.63 KG/M2 | WEIGHT: 211.4 LBS

## 2021-06-03 VITALS
DIASTOLIC BLOOD PRESSURE: 73 MMHG | SYSTOLIC BLOOD PRESSURE: 137 MMHG | BODY MASS INDEX: 28.62 KG/M2 | WEIGHT: 211 LBS | TEMPERATURE: 98.2 F | RESPIRATION RATE: 16 BRPM | OXYGEN SATURATION: 95 % | HEART RATE: 84 BPM

## 2021-06-03 VITALS
DIASTOLIC BLOOD PRESSURE: 74 MMHG | OXYGEN SATURATION: 95 % | BODY MASS INDEX: 29.16 KG/M2 | WEIGHT: 215 LBS | HEART RATE: 74 BPM | SYSTOLIC BLOOD PRESSURE: 136 MMHG

## 2021-06-03 NOTE — PATIENT INSTRUCTIONS - HE
Please follow up if you have any further issues.    You may contact me by phone or MyChart if you are worsening or if things are not improving.    Thank you for coming in today.      ______________________________________________________________________      Future Appointments   Date Time Provider Department Center   11/26/2019 10:30 AM [facility]

## 2021-06-03 NOTE — PROGRESS NOTES
Chief Complaint   Patient presents with     Cough     x 5 days, no sore throat, runny nose, chills on friday, no fever       HPI:  Nir Guy is a 79 y.o. male who complains of moderate rhinorrhea & cough onset 5 days ago. Pt also had chills and a HA 3 days ago for a few hours. Symptoms are constant in duration. Denies fever, sore throat, sinus pressure, CP, SOB, abd pain, N/V/D, rash, or any other symptoms. Patient denies sick contacts.    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      Past Medical History:   Diagnosis Date     AMD (age related macular degeneration)      Former smoker-Quit in 1998      Full code status - 2017 1/6/2017     Hyperlipidemia      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   HENT: Positive for rhinorrhea.    Respiratory: Positive for cough. Negative for shortness of  breath.    Cardiovascular: Negative for chest pain.   Gastrointestinal: Negative for abdominal pain, diarrhea, nausea and vomiting.   Skin: Negative for rash.   All other systems reviewed and are negative.      Vitals:    11/04/19 1201 11/04/19 1204   BP: 148/78 137/73   Pulse: 84    Resp: 16    Temp: 98.2  F (36.8  C)    TempSrc: Oral    SpO2: 95%    Weight: 211 lb (95.7 kg)        Physical Exam   Constitutional: He appears well-developed and well-nourished.  Non-toxic appearance.   HENT:   Head: Normocephalic and atraumatic.   Right Ear: Tympanic membrane, external ear and ear canal normal.   Left Ear: Tympanic membrane, external ear and ear canal normal.   Nose: Nose normal.   Mouth/Throat: Uvula is midline, oropharynx is clear and moist and mucous membranes are normal.   Cardiovascular: Normal rate, regular rhythm and normal heart sounds.   Pulmonary/Chest: Effort normal and breath sounds normal.   Neurological: He is alert.   Skin: Skin is warm and dry.   Psychiatric: He has a normal mood and affect.       Labs:  No results found for this or any previous visit (from the past 72 hour(s)).    Radiology:    No results found.    Clinical Decision Making:  Lungs CTAB, afebrile- c/w viral URI. Recommended Claritin or Zyrtec for rhinorrhea.       At the end of the encounter, I discussed results, diagnosis, medications. Discussed red flags for immediate return to clinic/ER, as well as indications for follow up if no improvement. Patient understood and agreed to plan. Patient was stable for discharge.    1. Viral URI with cough           Return in about 2 weeks (around 11/18/2019) for Follow up w/ primary care provider if not improved.    MARIA G Martinez, [facility]

## 2021-06-03 NOTE — PATIENT INSTRUCTIONS - HE
Please follow up if you have any further issues.    You may contact me by phone or Vdopiahart if you are worsening or if things are not improving.    Thank you for coming in today.

## 2021-06-03 NOTE — TELEPHONE ENCOUNTER
Called patient and informed him the prescription was sent in. He said he would call and schedule an appointment if he felt like he needed.

## 2021-06-03 NOTE — PROGRESS NOTES
Wt Readings from Last 20 Encounters:   11/22/19 215 lb (97.5 kg)   11/13/19 210 lb 1 oz (95.3 kg)   11/04/19 211 lb (95.7 kg)   09/06/19 215 lb (97.5 kg)   04/02/19 165 lb 3.2 oz (74.9 kg)   03/22/19 211 lb 6.4 oz (95.9 kg)   08/02/18 212 lb 3.2 oz (96.3 kg)   02/27/18 218 lb (98.9 kg)   02/21/18 207 lb (93.9 kg)   01/26/18 211 lb (95.7 kg)   01/22/18 213 lb (96.6 kg)   01/11/18 209 lb (94.8 kg)   12/26/17 220 lb (99.8 kg)   11/17/17 214 lb (97.1 kg)   09/21/17 212 lb (96.2 kg)   01/06/17 215 lb (97.5 kg)   01/05/16 210 lb (95.3 kg)   12/16/15 209 lb 1 oz (94.8 kg)   08/24/15 211 lb 1.6 oz (95.8 kg)   04/08/15 214 lb 14.4 oz (97.5 kg)       SpO2 Readings from Last 20 Encounters:   11/22/19 90%   11/13/19 93%   11/04/19 95%   09/06/19 95%   04/02/19 99%   03/22/19 96%   02/27/18 98%   02/23/18 94%   01/26/18 95%   01/22/18 96%   12/26/17 99%   11/17/17 96%   01/06/17 98%   08/24/15 98%   12/11/14 95%

## 2021-06-03 NOTE — TELEPHONE ENCOUNTER
New Appointment Needed  What is the reason for the visit:  Same day/next day  Same Date/Next Day Appt Request  What is the reason for your visit?: Coughing for about three weeks, now has diarrhea  -Declined nurse triage  Provider Preference: PCP only, patient made aware PCP not on schedule for today.  How soon do you need to be seen?: tomorrow  Waitlist offered?: No  Okay to leave a detailed message:  Yes, okay to leave detailed message on both phone numbers. Try cell and home numbers if necessary. Thanks.

## 2021-06-03 NOTE — TELEPHONE ENCOUNTER
Question following Office Visit  When did you see your provider: 11/13/19  What is your question: Patient states he completed antibiotic course but still have cough with sputum sometimes . Patient requesting for cough medication . Please advise   Okay to leave a detailed message: No

## 2021-06-03 NOTE — TELEPHONE ENCOUNTER
I sent in a codeine cough syrup.    He could be scheduled for follow up on Friday if he feels he needs it.    German Mcgregor MD  General Internal Medicine  [facility]  11/19/2019, 10:19 AM

## 2021-06-03 NOTE — TELEPHONE ENCOUNTER
He is being seen today in [place].    German Mcgregor MD  General Internal Medicine  [facility]  11/13/2019, 9:38 AM

## 2021-06-03 NOTE — PROGRESS NOTES
"Assessment/Plan:    Nir Guy is a 79 y.o. male presenting for:    1. Cough  The patient I discussed his symptoms in depth today.  We did discuss doing a chest x-ray but given the patient's low oxygen saturation, physical exam findings and a \"double sickening\" affect I think it is reasonable to start him on antibiotics for pneumonia.  Patient states that he would like to try antibiotics and thus we will defer the chest x-ray today.  If the antibiotics are not helping certainly he can come back to the chest x-ray.  I did discuss with him that he will likely be coughing for the next few weeks however hopefully he will systemically feel a bit better.    He will contact me next week to let me know how he is doing or sooner if things are worsening.  - azithromycin (ZITHROMAX Z-MARTINEZ) 250 MG tablet; Take 2 tablets (500 mg) on  Day 1,  followed by 1 tablet (250 mg) once daily on Days 2 through 5.  Dispense: 6 tablet; Refill: 0        There are no discontinued medications.        Chief Complaint:  Chief Complaint   Patient presents with     Cough     Sxs x 3wks not getting better     Diarrhea     Last 24hrs        Subjective:   Nir Guy is a pleasant 79-year-old gentleman who is overall healthy presenting to the clinic today with concerns of a cough for the past 3 weeks.  About 5 days after the cough started the patient was seen at walk-in care and diagnosed with a upper respiratory infection.  He states that he was actually beginning to feel slightly better for a day or 2 and then he began to get very severe chills and coughing became worse.  He now notes he is coughing up yellowish phlegm.  He states that he does not feel short of breath when he is just sitting but does feel short of breath when he is up and moving around at times.  He is eating less and he notes a 7 pound weight loss however his weight is fairly consistent with what it was 10 days ago.    He did have 2 days of diarrhea as well but that is " "overall resolved.  He does have some sinus pressure as well but that tends to come and go and is benefited when he is able to expectorate some sputum.    No one else at home is been sick.  He is unsure if he had any fevers but states that he has had fairly severe chills to the point where his jaw hurts from clenching it.    12 point review of systems completed and negative except for what has been described above    Social History     Tobacco Use   Smoking Status Never Smoker   Smokeless Tobacco Never Used       Current Outpatient Medications   Medication Sig     artificial tears,hypromellose, (GENTEAL, HYPROMELLOSE,) 0.3 % Gel Administer 1 application to both eyes at bedtime.      meclizine (ANTIVERT) 25 mg tablet Take 50 mg by mouth daily.      multivitamin with minerals (THERA-M) 9 mg iron-400 mcg Tab tablet Take 1 tablet by mouth daily.     OMEGA-3/DHA/EPA/FISH OIL (FISH OIL-OMEGA-3 FATTY ACIDS) 300-1,000 mg capsule Take 1 g by mouth daily.     sildenafil, antihypertensive, (REVATIO) 20 mg tablet Take 3 tablets (60 mg total) by mouth once as needed (Erection).     vit A/vit C/vit E/zinc/copper (ICAPS AREDS ORAL) Take by mouth.     azithromycin (ZITHROMAX Z-MARTINEZ) 250 MG tablet Take 2 tablets (500 mg) on  Day 1,  followed by 1 tablet (250 mg) once daily on Days 2 through 5.         Objective:  Vitals:    11/13/19 0934   BP: 136/80   Pulse: 88   Resp: 20   Temp: 98.4  F (36.9  C)   TempSrc: Oral   SpO2: 93%   Weight: 210 lb 1 oz (95.3 kg)   Height: 5' 11.75\" (1.822 m)       Body mass index is 28.69 kg/m .    Vital signs reviewed and stable  General: No acute distress  Psych: Appropriate affect  HEENT: moist mucous membranes, pupils equal, round, reactive to light and accomodation, posterior oropharynx clear of erythema or exudate, tympanic membranes are pearly grey bilaterally  Lymph: no cervical or supraclavicular lymphadenopathy  Cardiovascular: regular rate and rhythm with no murmur  Pulmonary: Expiratory rhonchi " noted most notably in the right lower lobe with moderate air movement throughout all lung fields and no wheezing noted.  Deep breaths cause coughing  Abdomen: soft, non tender, non distended with normo-active bowel sounds  Extremities: warm and well perfused with no edema  Skin: warm and dry with no rash         This note has been dictated and transcribed using voice recognition software.   Any errors in transcription are unintentional and inherent to the software.

## 2021-06-03 NOTE — TELEPHONE ENCOUNTER
Subjective:   Nir Guy is a pleasant 79-year-old gentleman who is overall healthy presenting to the clinic today with concerns of a cough for the past 3 weeks.  About 5 days after the cough started the patient was seen at walk-in care and diagnosed with a upper respiratory infection.  He states that he was actually beginning to feel slightly better for a day or 2 and then he began to get very severe chills and coughing became worse.  He now notes he is coughing up yellowish phlegm.  He states that he does not feel short of breath when he is just sitting but does feel short of breath when he is up and moving around at times.  He is eating less and he notes a 7 pound weight loss however his weight is fairly consistent with what it was 10 days ago.     He did have 2 days of diarrhea as well but that is overall resolved.  He does have some sinus pressure as well but that tends to come and go and is benefited when he is able to expectorate some sputum.     No one else at home is been sick.  He is unsure if he had any fevers but states that he has had fairly severe chills to the point where his jaw hurts from clenching it.     12 point review of systems completed and negative except for what has been described above      Pt was also prescribed a ZPak

## 2021-06-04 VITALS
OXYGEN SATURATION: 95 % | DIASTOLIC BLOOD PRESSURE: 70 MMHG | SYSTOLIC BLOOD PRESSURE: 122 MMHG | HEART RATE: 69 BPM | WEIGHT: 209 LBS | BODY MASS INDEX: 28.35 KG/M2

## 2021-06-04 VITALS
OXYGEN SATURATION: 96 % | BODY MASS INDEX: 28.62 KG/M2 | HEART RATE: 80 BPM | DIASTOLIC BLOOD PRESSURE: 72 MMHG | WEIGHT: 211 LBS | SYSTOLIC BLOOD PRESSURE: 136 MMHG | TEMPERATURE: 97.7 F

## 2021-06-04 VITALS
OXYGEN SATURATION: 95 % | HEART RATE: 61 BPM | WEIGHT: 215 LBS | BODY MASS INDEX: 29.16 KG/M2 | SYSTOLIC BLOOD PRESSURE: 138 MMHG | DIASTOLIC BLOOD PRESSURE: 82 MMHG

## 2021-06-04 VITALS
HEART RATE: 81 BPM | WEIGHT: 210 LBS | OXYGEN SATURATION: 97 % | DIASTOLIC BLOOD PRESSURE: 78 MMHG | BODY MASS INDEX: 28.48 KG/M2 | SYSTOLIC BLOOD PRESSURE: 136 MMHG

## 2021-06-04 VITALS
DIASTOLIC BLOOD PRESSURE: 76 MMHG | WEIGHT: 215 LBS | BODY MASS INDEX: 29.36 KG/M2 | OXYGEN SATURATION: 90 % | HEART RATE: 87 BPM | TEMPERATURE: 101.1 F | SYSTOLIC BLOOD PRESSURE: 134 MMHG

## 2021-06-04 VITALS
TEMPERATURE: 98.4 F | HEART RATE: 88 BPM | RESPIRATION RATE: 20 BRPM | SYSTOLIC BLOOD PRESSURE: 136 MMHG | WEIGHT: 210.06 LBS | HEIGHT: 72 IN | OXYGEN SATURATION: 93 % | DIASTOLIC BLOOD PRESSURE: 80 MMHG | BODY MASS INDEX: 28.45 KG/M2

## 2021-06-04 VITALS
WEIGHT: 213 LBS | BODY MASS INDEX: 29.09 KG/M2 | DIASTOLIC BLOOD PRESSURE: 80 MMHG | SYSTOLIC BLOOD PRESSURE: 134 MMHG | HEART RATE: 65 BPM | OXYGEN SATURATION: 94 % | TEMPERATURE: 98.2 F

## 2021-06-05 VITALS
OXYGEN SATURATION: 97 % | HEART RATE: 78 BPM | BODY MASS INDEX: 29.29 KG/M2 | DIASTOLIC BLOOD PRESSURE: 72 MMHG | SYSTOLIC BLOOD PRESSURE: 138 MMHG | WEIGHT: 216 LBS

## 2021-06-05 VITALS
HEART RATE: 65 BPM | BODY MASS INDEX: 28.41 KG/M2 | WEIGHT: 208 LBS | OXYGEN SATURATION: 96 % | DIASTOLIC BLOOD PRESSURE: 78 MMHG | SYSTOLIC BLOOD PRESSURE: 132 MMHG

## 2021-06-05 VITALS
WEIGHT: 210 LBS | DIASTOLIC BLOOD PRESSURE: 74 MMHG | SYSTOLIC BLOOD PRESSURE: 138 MMHG | OXYGEN SATURATION: 94 % | BODY MASS INDEX: 28.68 KG/M2 | HEART RATE: 79 BPM

## 2021-06-05 VITALS
HEIGHT: 72 IN | BODY MASS INDEX: 29.24 KG/M2 | TEMPERATURE: 97.1 F | HEART RATE: 67 BPM | WEIGHT: 215.9 LBS | OXYGEN SATURATION: 95 % | DIASTOLIC BLOOD PRESSURE: 80 MMHG | SYSTOLIC BLOOD PRESSURE: 142 MMHG | RESPIRATION RATE: 18 BRPM

## 2021-06-05 VITALS
DIASTOLIC BLOOD PRESSURE: 72 MMHG | SYSTOLIC BLOOD PRESSURE: 132 MMHG | BODY MASS INDEX: 29.02 KG/M2 | OXYGEN SATURATION: 97 % | TEMPERATURE: 97.8 F | HEART RATE: 109 BPM | WEIGHT: 214 LBS

## 2021-06-05 VITALS
BODY MASS INDEX: 29.8 KG/M2 | DIASTOLIC BLOOD PRESSURE: 86 MMHG | OXYGEN SATURATION: 98 % | HEART RATE: 67 BPM | TEMPERATURE: 98.1 F | SYSTOLIC BLOOD PRESSURE: 138 MMHG | WEIGHT: 220 LBS | HEIGHT: 72 IN

## 2021-06-05 VITALS — HEIGHT: 72 IN | BODY MASS INDEX: 28.17 KG/M2 | WEIGHT: 208 LBS

## 2021-06-05 NOTE — TELEPHONE ENCOUNTER
Please call patient -    ______________________________________________________________________     Home phone:  [phone] (home)     Cell phone:   Telephone Information:   Mobile [phone]       Other contacts:  Name Home Phone Work Phone Mobile Phone Relationship Lgl MELITON June [phone] Spouse    DECLINED, PER PT    Declined      ______________________________________________________________________     I got his message about the respiratory symptoms he is having.      He has an appointment with me on Friday and I would prefer seeing him if needed for this.    Please see if there are other issues that need more urgent attention.    German Mcgregor MD  [facility]  1/13/2020, 5:05 PM     Routine Exam - Discussed diagnosis in detail with patient and aunt - No glasses RX needed ta this time - Continue to monitor- RTC 1 year complete

## 2021-06-05 NOTE — TELEPHONE ENCOUNTER
Called and lvm for patient to call back for message from provider.    Please relay and assist as needed.

## 2021-06-05 NOTE — PATIENT INSTRUCTIONS - HE
Please follow up if you have any further issues.    You may contact me by phone or MyChart if you are worsening or if things are not improving.    Thank you for coming in today.      ______________________________________________________________________      Future Appointments   Date Time Provider Department Center   3/23/2020 10:30 AM [facility]

## 2021-06-05 NOTE — PROGRESS NOTES
Wt Readings from Last 20 Encounters:   01/17/20 211 lb (95.7 kg)   11/26/19 213 lb (96.6 kg)   11/22/19 215 lb (97.5 kg)   11/13/19 210 lb 1 oz (95.3 kg)   11/04/19 211 lb (95.7 kg)   09/06/19 215 lb (97.5 kg)   04/02/19 165 lb 3.2 oz (74.9 kg)   03/22/19 211 lb 6.4 oz (95.9 kg)   08/02/18 212 lb 3.2 oz (96.3 kg)   02/27/18 218 lb (98.9 kg)   02/21/18 207 lb (93.9 kg)   01/26/18 211 lb (95.7 kg)   01/22/18 213 lb (96.6 kg)   01/11/18 209 lb (94.8 kg)   12/26/17 220 lb (99.8 kg)   11/17/17 214 lb (97.1 kg)   09/21/17 212 lb (96.2 kg)   01/06/17 215 lb (97.5 kg)   01/05/16 210 lb (95.3 kg)   12/16/15 209 lb 1 oz (94.8 kg)

## 2021-06-06 NOTE — TELEPHONE ENCOUNTER
Please call patient -    ______________________________________________________________________     Home phone:  [phone] (home)     Cell phone:   Telephone Information:   Mobile [phone]       Other contacts:  Name Home Phone Work Phone Mobile Phone Relationship Lgl MELITON June [phone] Spouse    DECLINED, PER PT    Declined      ______________________________________________________________________     I did get his message.    I am going to send in an antibiotic as I am concerned the toe is infected.  I will send a 10 day prescription of keflex.    Let's set him up for follow up with me in clinic on 3/17/20 in the reserved slot.  He is to seek more urgent attention if this is worsening.     I will also send in a refill of pain medication at this time.    German Mcgregor MD  [facility]  3/6/2020, 9:11 AM

## 2021-06-06 NOTE — PATIENT INSTRUCTIONS - HE
Please follow up if you have any further issues.    You may contact me by phone or MyChart if you are worsening or if things are not improving.    Thank you for coming in today.      ______________________________________________________________________      Future Appointments   Date Time Provider Department Center   3/23/2020 10:30 [facility]

## 2021-06-06 NOTE — TELEPHONE ENCOUNTER
Called and relayed message - pt was scheduled he will call back to cancel and reschedule if this is an issue as he was driving and unable to confirm appt.  Informed that he would need to seek alternative care if Sx worsen - pt is understanding of the plan

## 2021-06-07 NOTE — PATIENT INSTRUCTIONS - HE
Please follow up if you have any further issues.    You may contact me by phone or BedyCasahart if you are worsening or if things are not improving.    Thank you for coming in today.

## 2021-06-08 NOTE — PROGRESS NOTES
Wt Readings from Last 20 Encounters:   01/05/16 210 lb (95.3 kg)   12/16/15 209 lb 1 oz (94.8 kg)   08/24/15 211 lb 1.6 oz (95.8 kg)   04/08/15 214 lb 14.4 oz (97.5 kg)   12/11/14 213 lb (96.6 kg)

## 2021-06-10 NOTE — PATIENT INSTRUCTIONS - HE

## 2021-06-12 NOTE — PATIENT INSTRUCTIONS - HE
Please follow up if you have any further issues.    You may contact me by phone or MyChart if you are worsening or if things are not improving.    ______________________________________________________________________     Please remember that you can call [phone] to schedule an appointment with me.     You can schedule appointments 24 hours a day, 7 days a week.  Sometimes the best time to schedule an appointment is after clinic hours when less people are calling in.  Weekends are another option for calling in to schedule appointments.          ______________________________________________________________________      No future appointments.

## 2021-06-12 NOTE — TELEPHONE ENCOUNTER
Please call patient -    ______________________________________________________________________     Home phone:  [phone] (home)     Cell phone:   Telephone Information:   Mobile [phone]       Other contacts:  Name Home Phone Work Phone Mobile Phone Relationship Lgl MELITON June [phone] Spouse      ______________________________________________________________________     I did fax off a copy of the report of his x-ray to [facility].    If they want the actual films, he should call [phone] to get a copy.    German Mcgregor MD  [facility]  10/20/2020, 8:01 AM

## 2021-06-13 NOTE — TELEPHONE ENCOUNTER
New Appointment Needed  What is the reason for the visit:    Pre-Op Appt Request  When is the surgery? :  1/04/21  Where is the surgery?:   [facility]  Who is the surgeon? :  Dr Nasir Casper  What type of surgery is being done?: right hand carpal tunnnel  Provider Preference: prefers Dr Mcgregor but can see one of his partners if needed.  How soon do you need to be seen?: this week or next  Waitlist offered?: No  Okay to leave a detailed message:  Yes

## 2021-06-14 NOTE — TELEPHONE ENCOUNTER
He is okay to proceed with surgery.as planned.  There are no restrictions.    I called Ariadne in relationship to this at the hospital.    German Mcgregor MD  General Internal Medicine  [facility]  1/4/2021, 1:02 PM

## 2021-06-14 NOTE — TELEPHONE ENCOUNTER
Per Dr. Mcgregor if pt would like he can be seen today 1/29/21 at 2:30 for pain in legs.    Pt agrees and thanked for the call.

## 2021-06-14 NOTE — PROGRESS NOTES
[facility]  [address], [address]  [facility] [address]  Dept: [phone]  Dept Fax: [phone]  Primary Provider: German Mcgregor MD  Pre-op Performing Provider: HELLEN STEVENSON    PREOPERATIVE EVALUATION:  Today's date: 12/28/2020    Nir Guy is a 80 y.o. male who presents for a preoperative evaluation.    Surgical Information:  Surgery/Procedure: carpal tunnel Right hand  Surgery Location: [facility]  Surgeon: unsure   Surgery Date: 01/04/20  Time of Surgery: 2:30  Where patient plans to recover: At home with family  Fax number for surgical facility: Note does not need to be faxed, will be available electronically in Epic.    Type of Anesthesia Anticipated: General    Subjective     HPI related to upcoming procedure: significant other Ariadne is taking him to the procedure. No recent f/s/c, chest pain or shortness of breath. Notes that he will sedation - conscious sedation. He is right handed.     Preop Questions 12/27/2020   Have you ever had a heart attack or stroke? No   Have you ever had surgery on your heart or blood vessels, such as a stent placement, a coronary artery bypass, or surgery on an artery in your head, neck, heart, or legs? No   Do you have chest pain with activity? No   Do you have a history of  heart failure? No   Do you currently have a cold, bronchitis or symptoms of other infection? No   Do you have a cough, shortness of breath, or wheezing? No   Do you or anyone in your family have previous history of blood clots? No   Do you or does anyone in your family have a serious bleeding problem such as prolonged bleeding following surgeries or cuts? No   Have you ever had problems with anemia or been told to take iron pills? No   Have you had any abnormal blood loss such as black, tarry or bloody stools? No   Have you ever had a blood transfusion? No   Are you willing to have a blood transfusion if it is medically needed before, during, or  after your surgery? Yes   Have you or any of your relatives ever had problems with anesthesia? No   Do you have sleep apnea, excessive snoring or daytime drowsiness? No   Do you have any artifical heart valves or other implanted medical devices like a pacemaker, defibrillator, or continuous glucose monitor? No   Do you have artificial joints? YES - Right total hip   Are you allergic to latex? No     Health Care Directive:  Patient has a Health Care Directive on file.     Preoperative Review of :    reviewed - no record of controlled substances prescribed.    Review of Systems  Constitutional, neuro, ENT, endocrine, pulmonary, cardiac, gastrointestinal, genitourinary, musculoskeletal, integument and psychiatric systems are negative, except as otherwise noted.      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      Osteoarthritis of left patellofemoral joint 01/06/2017     Full code status - 2017 01/06/2017     Former smoker - Quit in 1998      Osteoarthritis of shoulder      HLD (hyperlipidemia)      AMD (age related macular degeneration)      Vertigo      SNHL (sensorineural hearing loss)      Tinnitus      Past Medical History:   Diagnosis Date     AMD (age related macular degeneration)      Former smoker-Quit in 1998      Full code status - 2017 1/6/2017     Hyperlipidemia      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      Osteoarthritis of left patellofemoral joint 1/6/2017     Osteoarthritis of shoulder      S/P colonoscopy 4/18/11     SNHL (sensorineural hearing loss)      Squamous cell carcinoma of skin      Tinnitus      Vertigo      Past Surgical History:   Procedure Laterality Date     BASAL CELL CARCINOMA EXCISION  1999    Nose and scalp     CARPAL TUNNEL RELEASE Right   "    CATARACT EXTRACTION, BILATERAL  2018     AR TOTAL HIP ARTHROPLASTY Right 2/21/2018    Procedure:  RIGHT TOTAL HIP ARTHROPLASTY DIRECT ANTERIOR APPROACH;  Surgeon: Kaushik Hood MD;  Location: [facility];  Service: Orthopedics     VASECTOMY  1980's     Current Outpatient Medications   Medication Sig Dispense Refill     artificial tears,hypromellose, (GENTEAL, HYPROMELLOSE,) 0.3 % Gel Administer 1 application to both eyes at bedtime.        meclizine (ANTIVERT) 25 mg tablet Take 50 mg by mouth daily.        multivitamin with minerals (THERA-M) 9 mg iron-400 mcg Tab tablet Take 1 tablet by mouth daily.       OMEGA-3/DHA/EPA/FISH OIL (FISH OIL-OMEGA-3 FATTY ACIDS) 300-1,000 mg capsule Take 1 g by mouth daily.       vit A/vit C/vit E/zinc/copper (ICAPS AREDS ORAL) Take by mouth.       codeine-guaiFENesin (GUAIFENESIN AC)  mg/5 mL liquid Take 5-10 mL by mouth every 4 (four) hours as needed for cough. 240 mL 1     FLUAD QUAD 2020-21,65Y UP,,PF, 60 mcg (15 mcg x 4)/0.5 mL Syrg Vaccine Administration       No current facility-administered medications for this visit.      No Known Allergies    Social History     Tobacco Use     Smoking status: Never Smoker     Smokeless tobacco: Never Used   Substance Use Topics     Alcohol use: Yes     Alcohol/week: 8.0 standard drinks     Types: 7 Standard drinks or equivalent, 1 Glasses of wine per week     Comment: nightly      Family History   Problem Relation Age of Onset     Cancer Mother         GYN     Cancer Father         Lung     Heart disease Father      Social History     Substance and Sexual Activity   Drug Use No        Objective     /80 (Patient Site: Right Arm, Patient Position: Sitting, Cuff Size: Adult Large)   Pulse 67   Temp 97.1  F (36.2  C) (Tympanic)   Resp 18   Ht 5' 11.75\" (1.822 m)   Wt 215 lb 14.4 oz (97.9 kg)   SpO2 95%   BMI 29.49 kg/m    Physical Exam    GENERAL APPEARANCE: healthy, alert and no distress     RESP: lungs clear " to auscultation - no rales, rhonchi or wheezes     CV: regular rates and rhythm, normal S1 S2, no S3 or S4 and no murmur, click or rub     ABDOMEN:  soft, nontender, no HSM or masses and bowel sounds normal     MS: extremities normal- no gross deformities noted     NEURO: Normal strength and tone, sensory exam grossly normal, mentation intact and speech normal     PSYCH: mentation appears normal. and affect normal/bright  Recent Labs   Lab Test 07/31/20  1342 11/22/19  1548   HGB 15.3 14.2    329    138   K 4.2 4.3   CREATININE 0.93 0.79      PRE-OP Diagnostics:   Recent Results (from the past 48 hour(s))   Electrocardiogram Perform and Read    Collection Time: 12/28/20  9:23 AM   Result Value Ref Range    SYSTOLIC BLOOD PRESSURE      DIASTOLIC BLOOD PRESSURE      VENTRICULAR RATE 60 BPM    ATRIAL RATE 60 BPM    P-R INTERVAL 190 ms    QRS DURATION 120 ms    Q-T INTERVAL 442 ms    QTC CALCULATION (BEZET) 442 ms    P Axis 60 degrees    R AXIS 42 degrees    T AXIS 55 degrees    MUSE DIAGNOSIS       Normal sinus rhythm  Right bundle branch block  Abnormal ECG  When compared with ECG of 26-JAN-2018 16:30,  Right bundle branch block is now Present     REVISED CARDIAC RISK INDEX (RCRI)   The patient has the following serious cardiovascular risks for perioperative complications:   - No serious cardiac risks = 0 points  RCRI INTERPRETATION: 0 points: Class I (very low risk - 0.4% complication rate)     Assessment & Plan   The proposed surgical procedure is considered LOW risk.    Pre-op exam  Carpal tunnel syndrome of right wrist  No wrist surgery with no significant medical problems.  Awaiting cardiology final review of EKG from today with concern for potential partial right fascicular block.  No medications regarding chronic medications and not due for any labs today.  - Electrocardiogram Perform and Read     Risks and Recommendations:  The patient has the following additional risks and recommendations for  perioperative complications:   - No identified additional risk factors other than previously addressed    Medication Instructions:  Patient is on no chronic medications    RECOMMENDATION:  APPROVAL GIVEN to proceed with proposed procedure, without further diagnostic evaluation.    Signed Electronically by: Flaquito Prasad MD    Copy of this evaluation report is provided to requesting physician.    Preop [facility] Preop Guidelines    Revised Cardiac Risk Index

## 2021-06-14 NOTE — PATIENT INSTRUCTIONS - HE
Call [facility] to get this scheduled if they do not contact you in the next 2 business days.  Their phone number is [phone].    They are located in the [facility] on the main level.  Their address is:    [address] [place] [address]  [id number]       ______________________________________________________________________     We are working hard to begin vaccinating more people against COVID-19.     Currently, we are now vaccinating individuals age 75 and older.  Vaccine availability is very limited.    If you are 75 or older, please log in to [facility] using this link to schedule an appointment.  If there are no appointments left, you will be unable to schedule and need to check back later.  I    Vaccine appointments are being added as they become available. Please check your [facility] account frequently for availability. If you have technical difficulty using [facility], call [phone] for assistance.    You can learn more about the [facility]'s phased approach to administering the vaccine, with details on each phase, [email or web address].      Aa vaccine supply increases and we are able to open appointments to more groups, we will share that information on our website     [email or web address]. Check this website to stay up to date on COVID-19 vaccination information.

## 2021-06-14 NOTE — TELEPHONE ENCOUNTER
Reason for Call: Request for an order or referral:  Pre-op clearance    Order or referral being requested: Pre-op 12/28/20    Date needed: as soon as possible.  Patient has the procedure scheduled for 1/4/21 at 1.30pm    Has the patient been seen by the PCP for this problem? YES    Additional comments: Please clarified that patient has clearance from Cardiology.      Assessment & Plan   The proposed surgical procedure is considered LOW risk.     Pre-op exam  Carpal tunnel syndrome of right wrist  No wrist surgery with no significant medical problems.  Awaiting cardiology final review of EKG from today with concern for potential partial right fascicular block.  No medications regarding chronic medications and not due for any labs today.  - Electrocardiogram Perform and Read       Phone number Patient can be reached at:    Home number on file [phone] (home), Cell number on file:    Telephone Information:   Mobile [phone]    and Other phone number:  [phone]    Best Time:  As soon as possible    Can we leave a detailed message on this number?  Yes    Call taken on 1/4/2021 at 11:34 AM by Fior East

## 2021-06-15 PROBLEM — Z78.9 FULL CODE STATUS: Status: ACTIVE | Noted: 2017-01-06

## 2021-06-15 PROBLEM — M17.12 OSTEOARTHRITIS OF LEFT PATELLOFEMORAL JOINT: Status: ACTIVE | Noted: 2017-01-06

## 2021-06-15 NOTE — PATIENT INSTRUCTIONS - HE
Please follow up if you have any further issues.    You may contact me by phone or MyChart if you are worsening or if things are not improving.    ______________________________________________________________________     Please remember that you can call [phone] to schedule an appointment.     You can schedule appointments 24 hours a day, 7 days a week.  Sometimes the best time to schedule an appointment is after clinic hours when less people are calling in.  Weekends are another option for calling in to schedule appointments.

## 2021-06-15 NOTE — TELEPHONE ENCOUNTER
Reviewed and noted.    German Mcgregor MD  General Internal Medicine  [facility]  2/5/2021, 2:49 PM

## 2021-06-15 NOTE — TELEPHONE ENCOUNTER
Pt left message about requesting MRI results.    Informed pt of Dr. Mcgregor's message.  The MRI scan does show a significant amount of narrowing in the spine which I think is causing your back and leg pain.     I would recommend the followin.  Schedule an appointment with Dr. Rehan Cota at [facility] for a consult related to your spine.   2.  Schedule a follow up with me in the next 2-3 weeks to review.     Please also update me on how you are doing especially if you are worsening.     I placed a referral.     To schedule an appointment with [facility], please call [phone].     German Mcgregor MD   General Internal Medicine   [facility]   2021, 12:11 PM     Pt states he is not doing well, he is still having a hard time getting around.    Offered to schedule appointment with Dr. Mcgregor pt states he will schedule an appointment with Dr. Cota and then call back to schedule an appointment with Dr. Mcgregor.

## 2021-06-16 PROBLEM — M48.062 SPINAL STENOSIS OF LUMBAR REGION WITH NEUROGENIC CLAUDICATION: Status: ACTIVE | Noted: 2021-05-11

## 2021-06-16 PROBLEM — I31.4 PERICARDIAL TAMPONADE: Status: ACTIVE | Noted: 2021-03-18

## 2021-06-16 PROBLEM — H35.3132 INTERMEDIATE STAGE NONEXUDATIVE AGE-RELATED MACULAR DEGENERATION OF BOTH EYES: Status: ACTIVE | Noted: 2018-09-05

## 2021-06-16 PROBLEM — R40.20 LOSS OF CONSCIOUSNESS (H): Status: ACTIVE | Noted: 2021-03-29

## 2021-06-16 PROBLEM — I48.91 ATRIAL FIBRILLATION (H): Status: ACTIVE | Noted: 2021-03-29

## 2021-06-16 PROBLEM — I31.39 PERICARDIAL EFFUSION: Status: ACTIVE | Noted: 2021-03-29

## 2021-06-16 NOTE — PATIENT INSTRUCTIONS - HE
Future Appointments   Date Time Provider Department Center   4/8/2021 10:00 AM [facility]

## 2021-06-16 NOTE — ANESTHESIA CARE TRANSFER NOTE
Last vitals:   Vitals:    02/21/18 0857   BP:    Pulse:    Resp:    Temp: 37  C (98.6  F)   SpO2:      Patient's level of consciousness is awake  Spontaneous respirations: yes  Maintains airway independently: yes  Dentition unchanged: yes  Oropharynx: oropharynx clear of all foreign objects    QCDR Measures:  ASA# 20 - Surgical Safety Checklist: WHO surgical safety checklist completed prior to induction  PQRS# 430 - Adult PONV Prevention: 4558F - Pt received => 2 anti-emetic agents (different classes) preop & intraop  ASA# 8 - Peds PONV Prevention: NA - Not pediatric patient, not GA or 2 or more risk factors NOT present  PQRS# 424 - Donna-op Temp Management: 4559F - At least one body temp DOCUMENTED => 35.5C or 95.9F within required timeframe  PQRS# 426 - PACU Transfer Protocol: - Transfer of care checklist used  ASA# 14 - Acute Post-op Pain: ASA14B - Patient did NOT experience pain >= 7 out of 10

## 2021-06-16 NOTE — PATIENT INSTRUCTIONS - HE
Your follow up echocardiogram will be at [facility] at 11 am on Wednesday, April 21st.

## 2021-06-16 NOTE — TELEPHONE ENCOUNTER
Please call patient -    ______________________________________________________________________     Home phone:  [phone] (home)     Cell phone:   Telephone Information:   Mobile [phone]       Other contacts:  Name Home Phone Work Phone Mobile Phone Relationship Lgl MELITON June [phone] Spouse      ______________________________________________________________________     (he has not looked at the results on [facility] and needs them communicated to him)    Your kidney tests are normal.  Your electrolytes are also normal.  There is no signs of diabetes.     C-reactive protein (CRP) has returned to the normal range - this suggests the inflammation process has resolved especially as it relates to the heart.    Your blood counts are normal and you are not anemic.    Vitamin D level is minimally low.  Supplementation likely would not help him, but if he does want to try 1000 IU of VIT D should be enough.    German Mcgregor MD  [facility]  4/9/2021, 2:07 PM      Component      Latest Ref Rng & Units 4/8/2021   Sodium      136 - 145 mmol/L 139   Potassium      3.5 - 5.0 mmol/L 4.4   Chloride      98 - 107 mmol/L 102   CO2      22 - 31 mmol/L 25   Anion Gap, Calculation      5 - 18 mmol/L 12   Glucose      70 - 125 mg/dL 83   Calcium      8.5 - 10.5 mg/dL 8.9   BUN      8 - 28 mg/dL 10   Creatinine      0.70 - 1.30 mg/dL 0.86   GFR MDRD Af Amer      >60 mL/min/1.73m2 >60   GFR MDRD Non Af Amer      >60 mL/min/1.73m2 >60   WBC      4.0 - 11.0 thou/uL 7.1   RBC      4.40 - 6.20 mill/uL 4.46   Hemoglobin      14.0 - 18.0 g/dL 14.1   Hematocrit      40.0 - 54.0 % 42.2   MCV      80 - 100 fL 95   MCH      27.0 - 34.0 pg 31.6   MCHC      32.0 - 36.0 g/dL 33.4   RDW      11.0 - 14.5 % 12.6   Platelets      140 - 440 thou/uL 296   MPV      7.0 - 10.0 fL 8.5   CRP      0.0 - 0.8 mg/dL 0.3   Vitamin D, Total (25-Hydroxy)      30.0 - 80.0 ng/mL 26.7 (L)

## 2021-06-16 NOTE — TELEPHONE ENCOUNTER
Please call patient -    ______________________________________________________________________     Home phone:  [phone] (home)     Cell phone:   Telephone Information:   Mobile [phone]       Other contacts:  Name Home Phone Work Phone Mobile Phone Relationship Lgl Kenneth   MELITON QUEEN [phone] Spouse      ______________________________________________________________________     Please notify him that we sent a refill of hi apixaban (Eliquis) to [facility].    German Mcgregor MD  [facility]  4/19/2021, 12:11 PM

## 2021-06-16 NOTE — ANESTHESIA PROCEDURE NOTES
Spinal Block    Patient location during procedure: OR  Start time: 2/21/2018 7:34 AM  End time: 2/21/2018 7:39 AM  Reason for block: primary anesthetic    Staffing:  Performing  Anesthesiologist: DAWIT LOZA    Preanesthetic Checklist  Completed: patient identified, risks, benefits, and alternatives discussed, timeout performed, consent obtained, airway assessed, oxygen available, suction available, emergency drugs available and hand hygiene performed  Spinal Block  Patient position: sitting  Prep: ChloraPrep  Patient monitoring: heart rate, cardiac monitor, continuous pulse ox and blood pressure  Approach: midline  Location: L3-4  Injection technique: single-shot  Needle type: pencil-tip   Needle gauge: 24 G

## 2021-06-16 NOTE — TELEPHONE ENCOUNTER
Patient calling back in regards to VM left below.  Message from Dr Mcgregor relayed.  Patient scheduled follow up apt for Tuesday 5/11/21 at 9:55am.

## 2021-06-16 NOTE — TELEPHONE ENCOUNTER
Left message to call back.    If pt calls back please inform pt of Dr. Mcgregor's message and assist with scheduling an appointment.

## 2021-06-16 NOTE — TELEPHONE ENCOUNTER
Pt left message on Dr. Mcgregor's voice mail that pt will see Dr. Mcgregor on Monday at 12:45.    Called pt to inform him he has appointment with Dr. Mcgregor on Tuesday 3/30/21 at 1:00 not Monday 3/29/21.

## 2021-06-16 NOTE — ANESTHESIA POSTPROCEDURE EVALUATION
Patient: Nir Guy   RIGHT TOTAL HIP ARTHROPLASTY DIRECT ANTERIOR APPROACH  Anesthesia type: spinal    Patient location: PACU  Last vitals:   Vitals:    02/21/18 1000   BP: (!) 147/97   Pulse: 76   Resp: 16   Temp: 36.7  C (98.1  F)   SpO2: 98%     Post vital signs: stable  Level of consciousness: awake and responds to simple questions  Post-anesthesia pain: pain controlled  Post-anesthesia nausea and vomiting: no  Pulmonary: unassisted, return to baseline  Cardiovascular: stable and blood pressure at baseline  Hydration: adequate  Anesthetic events: no    QCDR Measures:  ASA# 11 - Donna-op Cardiac Arrest: ASA11B - Patient did NOT experience unanticipated cardiac arrest  ASA# 12 - Donna-op Mortality Rate: ASA12B - Patient did NOT die  ASA# 13 - PACU Re-Intubation Rate: NA - No ETT / LMA used for case  ASA# 10 - Composite Anes Safety: ASA10A - No serious adverse event    Additional Notes:

## 2021-06-16 NOTE — TELEPHONE ENCOUNTER
Please call patient -    ______________________________________________________________________     Home Phone:  [phone] (home)     Cell phone:   Telephone Information:   Mobile [phone]     ______________________________________________________________________     His echocardiogram shows no severe effusion present at this time.  He has a mild effusion in the cardiac sac but this is likely residual from the drainage.    There is a lot of smaller changes related to age and echocardiogram and we can review this in the future.    Please schedule the patient in the next 2 to 3 weeks for follow-up.    German Mcgregor MD  [facility]  4/22/2021, 9:01 AM   ______________________________________________________________________    Pertinent radiology for this visit includes the following:    Echo Complete    Result Date: 4/21/2021  1. The left ventricle is normal in size. Left ventricular systolic performance is mildly reduced. The ejection fraction is estimated to be 45%. 2. There is mild global reduction in left ventricular systolic performance. 3. No significant valvular heart disease is identified on this study. 4. Mild right ventricular enlargement with low normal right ventricular systolic performance. 5. There is mild right atrial enlargement. 6. There is mild to moderate enlargement of the proximal ascending aorta. 7. There is a small pericardial effusion. There is no evidence of significant intraventricular dependence/tamponade physiology.      ______________________________________________________________________

## 2021-06-16 NOTE — TELEPHONE ENCOUNTER
PT just wants to let Dr. Mcgregor know that he had a medical emergency last week - he remains in [place] at [facility]    He is scheduled for surgery this Wednesday - just wants to make sure that Dr. Mcgregor is aware and in the loop.    Pt can be reached at

## 2021-06-16 NOTE — TELEPHONE ENCOUNTER
I called the patient back related to his recent hospital stay.    He gave me verbal consent to look into his records from [facility].  He had initially been seen at [facility].    He had a pericardial effusion and has a large mediastinal mass.    He has had bronchoscopy with biopsy which did not show anything.  His pericardial fluid did not show anything as well.    It sounds like he will be going through mediastinoscopy to look at this further.    We can expedite a follow-up after his hospital stay to determine if anything else needs to be done and arrange follow up care.    German Mcgregor MD  General Internal Medicine  [facility]  3/22/2021, 4:47 PM

## 2021-06-16 NOTE — ANESTHESIA PREPROCEDURE EVALUATION
Anesthesia Evaluation      Patient summary reviewed   No history of anesthetic complications     Airway   Mallampati: II  Neck ROM: full   Pulmonary - negative ROS and normal exam                          Cardiovascular - negative ROS and normal exam  (+) , hypercholesterolemia,      Neuro/Psych - negative ROS     Endo/Other - negative ROS      GI/Hepatic/Renal - negative ROS           Dental - normal exam   (+) lower dentures                       Anesthesia Plan  Planned anesthetic: spinal    ASA 2     Anesthetic plan and risks discussed with: patient    Post-op plan: routine recovery

## 2021-06-16 NOTE — TELEPHONE ENCOUNTER
Carly  Insurance   - Carly states this is her 2nd Post Discharge Call    Carly is the  reaching out to PCP to determine if he feels the patient needs additional support as a result of his emergency hospitalization in [place], [place]    Please contact Carly and advise if pt is needing additional support services

## 2021-06-17 NOTE — TELEPHONE ENCOUNTER
Telephone Encounter by Roge Guzman CMA at 5/20/2021 12:36 PM     Author: Roge Guzman CMA Service: -- Author Type: Certified Medical Assistant    Filed: 5/20/2021 12:41 PM Encounter Date: 5/20/2021 Status: Signed    : Roge Guzman CMA (Certified Medical Assistant)       Pharmacy requesting refill of Sildenafil.  Pt last seen 5/11/21  Next appointment 7/13/21  Plan:      1. Continue Eliquis for 2 months and then consider stopping.  2. Consider ISRA monitor in the future if needed for A. fib.  No indications at this time.  3. Discussed risk for recurrence of all the issues as noted above.  4. Consider CT scan of the chest with contrast if recurrent symptoms.  5. Back is doing well at this time and currently not needing intervention.  Will forward on a note for this to Dr. Cota as well.  6. Continue current medications otherwise.  7. Follow up sooner if issues.           German Mcgregor MD  General Internal Medicine  [facility]     Return in about 9 weeks (around 7/13/2021), or if symptoms worsen or fail to improve, for visit and blood work.

## 2021-06-17 NOTE — PATIENT INSTRUCTIONS - HE
Patient Instructions by German Mcgregor MD at 3/22/2019  9:15 AM     Author: German Mcgregor MD Service: -- Author Type: Physician    Filed: 3/22/2019  8:46 PM Encounter Date: 3/22/2019 Status: Signed    : German Mcgregor MD (Physician)         Patient Education   Understanding USDA MyPlate  The USDA (US Department of Agriculture) has guidelines to help you make healthy food choices. These are called MyPlate. MyPlate shows the food groups that make up healthy meals using the image of a place setting. Before you eat, think about the healthiest choices for what to put onto your plate or into your cup or bowl. To learn more about building a healthy plate, visit www.choosemyplate.gov.       The Food Groups    Fruits: Any fruit or 100% fruit juice counts as part of the Fruit Group. Fruits may be fresh, canned, frozen, or dried, and may be whole, cut-up, or pureed. Make half your plate fruits and vegetables.    Vegetables: Any vegetable or 100% vegetable juice counts as a member of the Vegetable Group. Vegetables may be fresh, frozen, canned, or dried. They can be served raw or cooked and may be whole, cut-up, or mashed. Make half your plate fruits and vegetables.     Grains: All foods made from grains are part of the Grains Group. These include wheat, rice, oats, cornmeal, and barley such as bread, pasta, oatmeal, cereal, tortillas, and grits. Grains should be no more than a quarter of your plate. At least half of your grains should be whole grains.    Protein: This group includes meat, poultry, seafood, beans and peas, eggs, processed soy products (like tofu), nuts (including nut butters), and seeds. Make protein choices no more than a quarter of your plate. Meat and poultry choices should be lean or low fat.    Dairy: All fluid milk products and foods made from milk that contain calcium, like yogurt and cheese are part of the Dairy Group. (Foods that have little calcium, such as cream, butter, and cream  cheese, are not part of the group.) Most dairy choices should be low-fat or fat-free.    Oils: These are fats that are liquid at room temperature. They include canola, corn, olive, soybean, and sunflower oil. Foods that are mainly oil include mayonnaise, certain salad dressings, and soft margarines. You should have only 5 to 7 teaspoons of oils a day. You probably already get this much from the food you eat.  Use [employer] to Help Build Your Meals  The [employer] can help you plan and track your meals and activity. You can look up individual foods to see or compare their nutritional value. You can get guidelines for what and how much you should eat. You can compare your food choices. And you can assess personal physical activities and see ways you can improve. Go to www.[employer].gov/Mang?rKartcker/.    3328-1185 [employer]. [address] [place] [address]. All rights reserved. This information is not intended as a substitute for professional medical care. Always follow your healthcare professional's instructions.           Patient Education   Signs of Hearing Loss  Hearing loss is a problem shared by many people. In fact, it is one of the most common health conditions, particularly as people age. Most people over age 65 have some hearing loss, and by age 80, almost everyone does. Because hearing loss usually occurs slowly over the years, you may not realize your hearing ability has gotten worse.       Have your hearing checked  Contact your [facility] care provider if you:    Have to strain to hear normal conversation.    Have to watch other peoples faces very carefully to follow what theyre saying.    Need to ask people to repeat what theyve said.    Often misunderstand what people are saying.    Turn the volume of the television or radio up so high that others complain.    Feel that people are mumbling when theyre talking to you.    Find that the effort to hear leaves you feeling tired  and irritated.    Notice, when using the phone, that you hear better with 1 ear than the other.    8198-5526 The [employer] ([employer]). [address] [place] [address], [place] [address]. All rights reserved. This information is not intended as a substitute for professional medical care. Always follow your healthcare professional's instructions.           Advance Directive  Patients advance directive was discussed and I am comfortable with the patients wishes.  Patient Education   Personalized Prevention Plan  You are due for the preventive services outlined below.  Your care team is available to assist you in scheduling these services.  If you have already completed any of these items, please share that information with your care team to update in your medical record.  Health Maintenance   Topic Date Due   ? FALL RISK ASSESSMENT  03/22/2020   ? TD 18+ HE  03/11/2021   ? ADVANCE DIRECTIVES DISCUSSED WITH PATIENT  01/07/2022   ? PNEUMOCOCCAL POLYSACCHARIDE VACCINE AGE 65 AND OVER  Completed   ? INFLUENZA VACCINE RULE BASED  Completed   ? PNEUMOCOCCAL CONJUGATE VACCINE FOR ADULTS (PCV13 OR PREVNAR)  Completed   ? ZOSTER VACCINES  Completed

## 2021-06-17 NOTE — PATIENT INSTRUCTIONS - HE
Please follow up if you have any further issues.    You may contact me by phone or MyChart if you are worsening or if things are not improving.    ______________________________________________________________________     Please remember that you can call [phone] to schedule an appointment.     You can schedule appointments 24 hours a day, 7 days a week.  Sometimes the best time to schedule an appointment is after clinic hours when less people are calling in.  Weekends are another option for calling in to schedule appointments.      Future Appointments   Date Time Provider Department [place]   7/13/2021  3:30 PM German Mcgregor MD [facility]

## 2021-06-18 NOTE — PATIENT INSTRUCTIONS - HE
Patient Instructions by Flaquito Prasad MD at 12/28/2020  9:20 AM     Author: Flaquito Prasad MD Service: -- Author Type: Physician    Filed: 12/28/2020  9:42 AM Encounter Date: 12/28/2020 Status: Signed    : Flaquito Prasad MD (Physician)       Patient Education     Right Bundle Branch Block    Right bundle branch block is a problem in the hearts electrical system. Your heart uses electrical signals to cause the heart muscle to squeeze and keep pumping normally. Normal heartbeats are made in the upper right heart chamber. This is called the right atrium. The electrical signals are passed to the ventricles, the main heart pumping chambers. The signals travel over special fiber pathways called bundle branches.   There are 2 main bundle branches. One is in the right ventricle and the other is in the left ventricle. The signals usually move at the same speed down each branch. This causes the right and left ventricles to contract at the same time. In right bundle branch block, the right bundle branch doesn't conduct electricity well. That causes a delay or a block. The left and right ventricles don't squeeze at the same time. Instead, the right ventricle squeezes just a little later.  What causes right bundle branch block?  Right bundle branch block can be caused by a number of conditions, such as:    Heart disease from high blood pressure    Chronic obstructive lung disease (COPD)    Blood clot in the lung (pulmonary embolism)    Cardiomyopathy    Myocarditis    Heart attack    Congenital heart disease    Surgery or other procedures on the heart  In some cases, the cause of right bundle branch block is not known. The heart otherwise looks normal.  What are the symptoms of right bundle branch block?  Right bundle branch block doesn't cause symptoms on its own. It may make symptoms of other heart problems worse, such as heart failure.  How is right bundle branch block  diagnosed?  Right bundle branch block is diagnosed with an electrocardiogram (EKG). This test looks at the hearts rhythm. The condition is often found during an EKG for some other reason. Your healthcare provider may want to check you for other health problems. He or she may ask about your medical history and give you a physical exam. You may also have other tests, such as:    Echocardiogram, to look at your hearts motion and blood flow through the heart    Testing to check the health and function of your lungs    Blood tests to look at your overall health  How is right bundle branch block treated?  If you have no symptoms and no other heart conditions, you don't need treatment. If you have heart disease, your healthcare provider will want to keep track of your heart health. In certain cases of severe heart failure, or other conduction disease, you may need a pacemaker. It can help restore the normal squeezing pattern of the heart.   Living with right bundle branch block  Your healthcare provider may give you more instructions about how to manage your overall heart health. You might need to make lifestyle changes, such as:    Losing weight    Quitting smoking    Improving your diet    Keep track of any heart symptoms you have. See your healthcare provider regularly, even if you dont have any symptoms. Make sure all your healthcare providers know about your right bundle branch block.  When should I call my healthcare provider?  Call your healthcare provider right away if you have any of these:    New lightheadedness or dizziness    Chest pain    Fainting    Shortness of breath  \

## 2021-06-19 NOTE — LETTER
Letter by Vannesa English MD at      Author: Vannesa English MD Service: -- Author Type: --    Filed:  Encounter Date: 4/3/2019 Status: (Other)         Nir Guy  [address] [facility] [address]             April 3, 2019         Dear Mr. Guy,    Below are the results from your recent visit:       Blood counts, electrolytes, and kidney were all normal. Blood culture still in process, but if that comes back positive we will let you know - Dr. Oneida Ma Orders   Influenza A/B Rapid Test- Nasal Swab   Result Value Ref Range    Influenza  A, Rapid Antigen Influenza A antigen detected (!) No Influenza A antigen detected    Influenza B, Rapid Antigen No Influenza B antigen detected No Influenza B antigen detected    Narrative    A negative result does not exclude an influenza infection.  FluMist ? will cause false positive results.   Basic Metabolic Panel   Result Value Ref Range    Sodium 136 136 - 145 mmol/L    Potassium 4.0 3.5 - 5.0 mmol/L    Chloride 102 98 - 107 mmol/L    CO2 24 22 - 31 mmol/L    Anion Gap, Calculation 10 5 - 18 mmol/L    Glucose 103 70 - 125 mg/dL    Calcium 8.7 8.5 - 10.5 mg/dL    BUN 13 8 - 28 mg/dL    Creatinine 0.92 0.70 - 1.30 mg/dL    GFR MDRD Af Amer >60 >60 mL/min/1.73m2    GFR MDRD Non Af Amer >60 >60 mL/min/1.73m2    Narrative    Fasting Glucose reference range is 70-99 mg/dL per  American Diabetes Association (ADA) guidelines.   HM1 (CBC with Diff)   Result Value Ref Range    WBC 6.9 4.0 - 11.0 thou/uL    RBC 4.66 4.40 - 6.20 mill/uL    Hemoglobin 14.8 14.0 - 18.0 g/dL    Hematocrit 44.0 40.0 - 54.0 %    MCV 94 80 - 100 fL    MCH 31.8 27.0 - 34.0 pg    MCHC 33.6 32.0 - 36.0 g/dL    RDW 11.1 11.0 - 14.5 %    Platelets 148 140 - 440 thou/uL    MPV 6.8 (L) 7.0 - 10.0 fL    Neutrophils % 80 (H) 50 - 70 %    Lymphocytes % 12 (L) 20 - 40 %    Monocytes % 6 2 - 10 %    Eosinophils % 1 0 - 6 %    Basophils % 0 0 - 2 %    Neutrophils Absolute 5.5 2.0  - 7.7 thou/uL    Lymphocytes Absolute 0.8 0.8 - 4.4 thou/uL    Monocytes Absolute 0.4 0.0 - 0.9 thou/uL    Eosinophils Absolute 0.1 0.0 - 0.4 thou/uL    Basophils Absolute 0.0 0.0 - 0.2 thou/uL         Please call with questions or contact us using [employer].    Sincerely,        Electronically signed by Vannesa English MD

## 2021-06-21 NOTE — LETTER
Letter by German Mcgregor MD at      Author: German Mcgregor MD Service: -- Author Type: --    Filed:  Encounter Date: 3/14/2021 Status: (Other)         3/14/2021    Nir Guy   [address] [facility] [address]       Dear Nir,    It was good to see you in clinic.  I hope your questions were answered at the time of your visit.  The results of your tests from your visit were as follows:    Resulted Orders   Hemoglobin   Result Value Ref Range    Hemoglobin 14.9 14.0 - 18.0 g/dL   Basic Metabolic Panel   Result Value Ref Range    Sodium 142 136 - 145 mmol/L    Potassium 4.9 3.5 - 5.0 mmol/L    Chloride 105 98 - 107 mmol/L    CO2 29 22 - 31 mmol/L    Anion Gap, Calculation 8 5 - 18 mmol/L    Glucose 90 70 - 125 mg/dL    Calcium 9.1 8.5 - 10.5 mg/dL    BUN 14 8 - 28 mg/dL    Creatinine 0.91 0.70 - 1.30 mg/dL    GFR MDRD Af Amer >60 >60 mL/min/1.73m2    GFR MDRD Non Af Amer >60 >60 mL/min/1.73m2    Narrative    Fasting Glucose reference range is 70-99 mg/dL per  American Diabetes Association (ADA) guidelines.     Your kidney tests are normal.  Your electrolytes are also normal.  There is no signs of diabetes.  Your blood counts are normal and you are not anemic.     Best wishes on your upcoming surgery.         Sincerely,       German Mcgregor MD  General Internal Medicine  [facility]

## 2021-06-25 NOTE — PROGRESS NOTES
Progress Notes by German Mcgregor MD at 11/17/2017  1:25 PM     Author: German Mcgregor MD Service: -- Author Type: Physician    Filed: 11/24/2017  7:57 AM Encounter Date: 11/17/2017 Status: Signed    : German Mcgregor MD (Physician)              [facility]    Comprehensive and complex medical care - Chronic disease management - Shared decision making - Care coordination - Compassionate care    Patient advocacy - Rational deprescribing - Minimally disruptive medicine - Ethical focus - Customized care    ______________________________________________________________________     Date of Service: 11/17/2017  Primary Provider: German Mcgregor MD    Patient Care Team:  German Mcgregor MD as PCP - General (Internal Medicine)  Jacky Zayas MD (Ophthalmology)  Chavez Jay MD as Physician (Dermatology)     ______________________________________________________________________     Patient's Pharmacy:    [place] [address] [facility] [address]  Phone: [phone] Fax: [phone]     Patient's Contacts:  Name Home Phone Work Phone Mobile Phone Relationship Lgl Grd     Patient's Insurance:    Payor: [employer] / Plan: [employer] FREEDOM / Product Type: COST PLAN /     ______________________________________________________________________     Nir Guy is 77 y.o. male who comes in today for:    Chief Complaint   Patient presents with   ? Follow-up     leg pain, now both legs hurt       Patient Active Problem List   Diagnosis   ? Osteoarthritis of shoulder   ? HLD (hyperlipidemia)   ? AMD (age related macular degeneration)   ? Vertigo   ? SNHL (sensorineural hearing loss)   ? Tinnitus   ? Former smoker-Quit in 1998   ? Osteoarthritis of left patellofemoral joint   ? Full code status - 2017     Current Outpatient Prescriptions   Medication Sig Note   ? artificial tears,hypromellose, (GENTEAL,  HYPROMELLOSE,) 0.3 % Gel Apply or instill  into both eyes daily at bedtime. 4/8/2015: Received from: [facility]   ? aspirin 81 MG EC tablet QOD 4/8/2015: Received from: [facility]   ? garlic 1,000 mg cap Take 1,000 mg by mouth Daily at 8:00 am.. 1/6/2017: Received from: [facility] Received Sig: Take 1 Tab by mouth daily.   ? meclizine (ANTIVERT) 25 mg tablet Take 50 mg by mouth Daily at 8:00 am..    ? multivitamin therapeutic (THERAGRAN) tablet Take 1 tablet by mouth daily.    ? multivitamin with minerals (VITAMINS & MINERALS) tablet TEBS - eye vit for macular degeneration - 1 po BID 4/8/2015: Received from: [facility]   ? omega 3-dha-epa-fish oil (FISH OIL) 60- mg cap capsule Take 1,000 mg by mouth Daily at 8:00 am.. 1/6/2017: Received from: [facility] Received Sig:    ? peg 400-propylene glycol (SYSTANE) 0.4-0.3 % Drop 1-2 drops. 4/8/2015: Received from: [facility]   ? sildenafil (VIAGRA) 50 MG tablet Take 1 tablet (50 mg total) by mouth as needed for erectile dysfunction.    ? predniSONE (DELTASONE) 5 MG tablet Take 3 tablets (15 mg total) by mouth daily for 14 days.      Social History     Social History Narrative    Lives in [place].  Has significant other.        Patient of Dr. Mcgregor since 2013.      ______________________________________________________________________     History of present illness:    Patient comes in today because of bilateral leg pain which he continues to have.  We had given him some anti-inflammatories at the last visit without much help.  He had x-rays at that time as well which were pretty good.    He has it in both legs and can barely walk upstairs with it.  He gets aching in both legs.  It is in the front and the sides of his legs.  If he stands or goes for a while his legs get stiff.  It seems to get worse as the day goes on.  Is walking more gingerly.  He can lay on his right side without any problem.  He is generally doing well otherwise.  He  denies any fevers or chills associated with it.  Nothing otherwise seems to make it better or worse.  It has been going on at least for 3-4 months now.    He denies any other pains at this time.    On review of systems, the patient denies any chest pain or shortness of breath.    ______________________________________________________________________    Exam:    Wt Readings from Last 3 Encounters:   11/17/17 214 lb (97.1 kg)   09/21/17 212 lb (96.2 kg)   01/06/17 215 lb (97.5 kg)     BP Readings from Last 3 Encounters:   11/17/17 128/74   09/21/17 136/72   01/06/17 116/64     /74  Pulse 83  Wt 214 lb (97.1 kg)  SpO2 96%  BMI 27.48 kg/m2   The patient is comfortable, no acute distress.  Mood good.  Insight good.  Eyes are nonicteric.  Neck is supple without mass.  No cervical adenopathy.  No thyromegaly. Heart regular rate and rhythm.  Lungs clear to auscultation bilaterally.  Respiratory effort is good.  Abdomen soft and nontender.  No hepatosplenomegaly.  Extremities no edema.  He walks well overall.  His Nigel's maneuvers are negative.  His strength and reflexes are good in the lower extremities.  There is no tenderness over his trochanteric bursa.      ______________________________________________________________________    Diagnostics:    Results for orders placed or performed in visit on 11/17/17   Sedimentation Rate   Result Value Ref Range    Sed Rate 4 0 - 15 mm/hr   C-Reactive Protein   Result Value Ref Range    CRP 0.5 0.0 - 0.8 mg/dL   CK Total   Result Value Ref Range    CK, Total 124 30 - 190 U/L   Comprehensive Metabolic Panel   Result Value Ref Range    Sodium 141 136 - 145 mmol/L    Potassium 4.1 3.5 - 5.0 mmol/L    Chloride 105 98 - 107 mmol/L    CO2 28 22 - 31 mmol/L    Anion Gap, Calculation 8 5 - 18 mmol/L    Glucose 94 70 - 125 mg/dL    BUN 13 8 - 28 mg/dL    Creatinine 1.02 0.70 - 1.30 mg/dL    GFR MDRD Af Amer >60 >60 mL/min/1.73m2    GFR MDRD Non Af Amer >60 >60 mL/min/1.73m2     Bilirubin, Total 0.4 0.0 - 1.0 mg/dL    Calcium 9.5 8.5 - 10.5 mg/dL    Protein, Total 6.9 6.0 - 8.0 g/dL    Albumin 3.9 3.5 - 5.0 g/dL    Alkaline Phosphatase 62 45 - 120 U/L    AST 21 0 - 40 U/L    ALT 20 0 - 45 U/L   HM2(CBC w/o Differential)   Result Value Ref Range    WBC 8.4 4.0 - 11.0 thou/uL    RBC 4.60 4.40 - 6.20 mill/uL    Hemoglobin 15.0 14.0 - 18.0 g/dL    Hematocrit 42.9 40.0 - 54.0 %    MCV 93 80 - 100 fL    MCH 32.6 27.0 - 34.0 pg    MCHC 34.9 32.0 - 36.0 g/dL    RDW 11.7 11.0 - 14.5 %    Platelets 197 140 - 440 thou/uL    MPV 6.8 (L) 7.0 - 10.0 fL        ______________________________________________________________________    Assessment:    1. Muscle pain    2. Bilateral leg pain       ______________________________________________________________________      PHQ-2 Total Score: 0 (9/21/2017 10:00 AM)  No Data Recorded  ______________________________________________________________________    Plan:    1. We might try him on a course of prednisone.  2. This still could be coming from the back or other places but no obvious signs of this at this time.  However, if he is not improving I still would consider MRI of his back.  3. Blood work checked today.  4. He has a physical scheduled with me in January.    German Mcgregor MD  General Internal Medicine  [facility]     Return if symptoms worsen or fail to improve.     25 minutes or greater were spent with the patient today with greater than 50% of the times spent in counseling and coordination of care.     Future Appointments  Date Time Provider Department Center   1/9/2018 9:15 AM [facility]

## 2021-06-25 NOTE — PROGRESS NOTES
Progress Notes by German Mcgregor MD at 9/21/2017 10:30 AM     Author: German Mcgregor MD Service: -- Author Type: Physician    Filed: 9/21/2017 12:42 PM Encounter Date: 9/21/2017 Status: Signed    : German Mcgregor MD (Physician)              [facility]    Comprehensive and complex medical care - Chronic disease management - Shared decision making - Care coordination - Compassionate care    Patient advocacy - Rational deprescribing - Minimally disruptive medicine - Ethical focus - Customized care    Date of Service: 9/21/2017  Primary Provider: German Mcgregor MD    Patient Care Team:  German Mcgregor MD as PCP - General (Internal Medicine)  Jacky Zayas MD (Ophthalmology)  Chavez Jay MD as Physician (Dermatology)     ______________________________________________________________________     Patient's Pharmacy:    [address] [address]  Phone: [phone] Fax: [phone]     Patient's Insurance:    Payor: [employer] / Plan: [employer] [place] / Product Type: COST PLAN /     ______________________________________________________________________     Nir Guy is 77 y.o. male who comes in today for:    Chief Complaint   Patient presents with   ? Pain       Patient Active Problem List   Diagnosis   ? Osteoarthritis of shoulder   ? HLD (hyperlipidemia)   ? AMD (age related macular degeneration)   ? Vertigo   ? SNHL (sensorineural hearing loss)   ? Tinnitus   ? Former smoker-Quit in 1998   ? Osteoarthritis of left patellofemoral joint   ? Full code status - 2017     Current Outpatient Prescriptions   Medication Sig Note   ? artificial tears,hypromellose, (GENTEAL, HYPROMELLOSE,) 0.3 % Gel Apply or instill  into both eyes daily at bedtime. 4/8/2015: Received from: [facility]   ? aspirin 81 MG EC tablet QOD 4/8/2015: Received from: [facility]   ? garlic 1,000 mg  cap Take 1,000 mg by mouth Daily at 8:00 am.. 1/6/2017: Received from: [facility] Received Sig: Take 1 Tab by mouth daily.   ? meclizine (ANTIVERT) 25 mg tablet Take 50 mg by mouth Daily at 8:00 am..    ? multivitamin therapeutic (THERAGRAN) tablet Take 1 tablet by mouth daily.    ? multivitamin with minerals (VITAMINS & MINERALS) tablet TEBS - eye vit for macular degeneration - 1 po BID 4/8/2015: Received from: [facility]   ? nabumetone (RELAFEN) 750 MG tablet Take 1 tablet (750 mg total) by mouth 2 (two) times a day as needed for pain.    ? omega 3-dha-epa-fish oil (FISH OIL) 60- mg cap capsule Take 1,000 mg by mouth Daily at 8:00 am.. 1/6/2017: Received from: [facility] Received Sig:    ? peg 400-propylene glycol (SYSTANE) 0.4-0.3 % Drop 1-2 drops. 4/8/2015: Received from: [facility]   ? sildenafil (VIAGRA) 50 MG tablet Take 1 tablet (50 mg total) by mouth as needed for erectile dysfunction.      Social History     Social History Narrative    Lives in [place].  Has significant other.        Patient of Dr. Mcgregor since 2013.      ______________________________________________________________________     History of present illness:    Patient comes in today for bilateral leg pain of one-month duration.  It is worse in the right leg than the left.  It is in the anterior leg that he notes it mostly.  It can go over the lateral leg as well.  It is worse with standing.  He denies any other worsening joint pains at this time.  There is no morning stiffness associated with it.  It is not necessarily worse at the end of the day.  It is really just with standing that he notes it.  He denies any back pain or buttock pain with this.  He denies any numbness down the leg.  He feels it is mostly irritant in nature.  He has used some watkens liniment for this and ibuprofen but it is not really helping a whole lot for this.  He denies any weakness associated with this.  He has a little bit of difficulty  with putting on his shoe on the right side.  He does walk with a bit of a limp when he gets bad.    We reviewed his left knee osteoarthritis.  He has had a bit more swelling here.  It is not giving him pain at this point.    On review of systems, the patient denies any fevers or chills.  No bone pain    ______________________________________________________________________    Exam:    Wt Readings from Last 3 Encounters:   09/21/17 212 lb (96.2 kg)   01/06/17 215 lb (97.5 kg)   01/05/16 210 lb (95.3 kg)     BP Readings from Last 3 Encounters:   09/21/17 136/72   01/06/17 116/64   01/05/16 138/74     /72  Pulse 88  Temp 98.3  F (36.8  C)  Wt 212 lb (96.2 kg)  BMI 27.22 kg/m2   The patient is comfortable, no acute distress.  Mood good.  Insight good.  Eyes are nonicteric.  Neck is supple without mass.  No cervical adenopathy.  No thyromegaly. Heart regular rate and rhythm.  Lungs clear to auscultation bilaterally.  Respiratory effort is good.   Extremities no edema.  His gait is a little bit stiff possibly related to his hips.  His back reveals no muscle tightness.  His Nigel's maneuver is minimally positive on the right worse than the left.  He does have a mild to moderate effusion of his left knee.  No abnormality of the right knee is noted.      ______________________________________________________________________    Diagnostics:    Results for orders placed or performed in visit on 01/06/17   Lipid Cascade   Result Value Ref Range    Cholesterol 224 (H) <=199 mg/dL    Triglycerides 164 (H) <=149 mg/dL    HDL Cholesterol 57 >=40 mg/dL    LDL Calculated 134 (H) <=129 mg/dL    Patient Fasting > 8hrs? Yes    Basic Metabolic Panel   Result Value Ref Range    Sodium 141 136 - 145 mmol/L    Potassium 4.6 3.5 - 5.0 mmol/L    Chloride 105 98 - 107 mmol/L    CO2 27 22 - 31 mmol/L    Anion Gap, Calculation 9 5 - 18 mmol/L    Glucose 97 70 - 125 mg/dL    Calcium 9.4 8.5 - 10.5 mg/dL    BUN 13 8 - 28 mg/dL     Creatinine 0.94 0.70 - 1.30 mg/dL    GFR MDRD Af Amer >60 >60 mL/min/1.73m2    GFR MDRD Non Af Amer >60 >60 mL/min/1.73m2      ______________________________________________________________________    Pertinent radiology for this visit includes the following:    Xr Pelvis W 2 Vw Hips Bilateral    Result Date: 9/21/2017  XR PELVIS W 2 VW HIPS BILATERAL 9/21/2017 10:55 AM INDICATION: Right leg pain. COMPARISON: None. FINDINGS: No acute fracture or dislocation. Mild degenerative change right hip joint with mild joint space narrowing superolaterally, and slight marginal spurring. Small heterotopic bone fragment projects lateral to the right acetabulum. Minimal marginal  spurring left hip joint. Degenerative change lumbar spine. This report was electronically interpreted by: Dr. German Renteria MD ON 09/21/2017 at 12:38      ______________________________________________________________________      ______________________________________________________________________    Assessment:    1. Bilateral leg pain    2. Osteoarthritis of left patellofemoral joint       ______________________________________________________________________      PHQ-2 Total Score: 0 (9/21/2017 10:00 AM)  No Data Recorded    Plan:    1. May use a same day or reserved slot for appointment if needed for follow up.  2. Symptoms might be coming more from the back than the hips.  3. Continue home PT.  4. Consider MRI of the back if worsening.  Discussed with patient.  5. Nabumetone for pain 2 weeks and then prn.    German Mcgregor MD  General Internal Medicine  [facility]     Return in about 4 months (around 1/21/2018), or if symptoms worsen or fail to improve.

## 2021-06-25 NOTE — PROGRESS NOTES
Progress Notes by German Mcgregor MD at 1/6/2017  9:15 AM     Author: German Mcgregor MD Service: -- Author Type: Physician    Filed: 1/6/2017  8:04 PM Encounter Date: 1/6/2017 Status: Signed    : German Mcgregor MD (Physician)         [facility]       [facility]    Comprehensive and complex medical care - Chronic disease management - Shared decision making - Care coordination - Compassionate care    Date of Service: 1/6/2017  Primary Provider: German Mcgregor MD    Patient Care Team:  German Mcgregor MD as PCP - General (Internal Medicine)  Jacky Zayas MD (Ophthalmology)  Chavez Jay MD as Physician (Dermatology)     ______________________________________________________________________     Patient's Pharmacy:    [address] [address]  Phone: [phone] Fax: [phone]     Patient's Insurance:    Payor: [employer] / Plan: [employer] / Product Type: COST PLAN /     ________________________________________________________________    Health Maintenance   Topic Date Due   ? ADVANCE DIRECTIVES DISCUSSED WITH PATIENT  01/09/1958   ? ZOSTER VACCINE  01/09/1999   ? FALL RISK ASSESSMENT  01/06/2018   ? TD 18+ HE  03/11/2021   ? PNEUMOCOCCAL POLYSACCHARIDE VACCINE AGE 65 AND OVER  Completed   ? INFLUENZA VACCINE RULE BASED  Completed   ? PNEUMOCOCCAL CONJUGATE VACCINE FOR ADULTS (PCV13 OR PREVNAR)  Completed          ______________________________________________________________________     Routine physical - male, age 65 and older.    Nir Guy is a 76 y.o. male coming in today for a routine physical.  He does not have other issues outside the physical to discuss as well.    Past Medical History   Diagnosis Date   ? AMD (age related macular degeneration)    ? Former smoker-Quit in 1998    ? Full code status - 2017 1/6/2017   ? Hyperlipidemia    ? Osteoarthritis  of left patellofemoral joint 1/6/2017   ? Osteoarthritis of shoulder    ? S/P colonoscopy 4/18/11   ? SNHL (sensorineural hearing loss)    ? Tinnitus    ? Vertigo      Social History     Social History   ? Marital status: Single     Spouse name: N/A   ? Number of children: 4   ? Years of education: N/A     Occupational History   ? Lithographer Retired     Social History Main Topics   ? Smoking status: Never Smoker   ? Smokeless tobacco: None   ? Alcohol use 3.5 oz/week     7 drink(s) per week   ? Drug use: None   ? Sexual activity: Not Asked     Other Topics Concern   ? None     Social History Narrative    Lives in [place].  Has significant other.        Patient of Dr. Mcgregor since 2013.       Family History   Problem Relation Age of Onset   ? Cancer Mother      GYN   ? Cancer Father      Lung   ? Heart disease Father      Family history is otherwise noncontributory.    Current Outpatient Prescriptions   Medication Sig Note   ? artificial tears,hypromellose, (GENTEAL, HYPROMELLOSE,) 0.3 % Gel Apply or instill  into both eyes daily at bedtime. 4/8/2015: Received from: [facility]   ? aspirin 81 MG EC tablet QOD 4/8/2015: Received from: [facility]   ? garlic 1,000 mg cap Take 1,000 mg by mouth Daily at 8:00 am.. 1/6/2017: Received from: [facility] Received Sig: Take 1 Tab by mouth daily.   ? meclizine (ANTIVERT) 25 mg tablet Take 50 mg by mouth Daily at 8:00 am..    ? multivitamin therapeutic (THERAGRAN) tablet Take 1 tablet by mouth daily.    ? multivitamin with minerals (VITAMINS & MINERALS) tablet TEBS - eye vit for macular degeneration - 1 po BID 4/8/2015: Received from: [facility]   ? omega 3-dha-epa-fish oil (FISH OIL) 60- mg cap capsule Take 1,000 mg by mouth Daily at 8:00 am.. 1/6/2017: Received from: [facility] Received Sig:    ? peg 400-propylene glycol (SYSTANE) 0.4-0.3 % Drop 1-2 drops. 4/8/2015: Received from: [facility]     Immunization History   Administered Date(s)  Administered   ? Influenza, inj, historic 09/06/2013, 09/23/2016   ? Pneumo Conj 13-V (2010&after) 12/28/2007   ? Pneumo Polysac 23-V 12/28/2007   ? Td, historic 11/11/2003   ? Tdap 03/11/2011   ? Varicella 12/12/1998      ______________________________________________________________________     History of present illness:    Patient comes in today for routine physical.  He has been doing quite well over the last year.  He has followed up with his eye doctor and has seen his dermatologist in [facility].    We reviewed his vertigo and he continues take meclizine 2 tablets a day to help with this.  He takes 2 tablets in the morning and this seems to keep it at bay for him.  He has had no major issues with it over the last year.    We reviewed his osteoarthritis of his shoulders and his left patellofemoral joint.  He has lateral joint line pain on the left knee with bending his knee to put his shoes on.  He denies any hip or buttock pain.  His right shoulder gives him the most issues with his shoulder.    Reviewed his hyperlipidemia and will be checking up on this again today.  He is on no medication.  We talked about positives and negatives and we reviewed this with him today.    He has tinnitus and hearing loss but this is no change for him.    He does plan and expressed a desire for full CODE STATUS at this time.  However, he would not want to be maintained if things were not going well.     10 point review of systems are per the health history form.  ______________________________________________________________________    Exam:  Wt Readings from Last 3 Encounters:   01/06/17 215 lb (97.5 kg)   01/05/16 210 lb (95.3 kg)   12/16/15 209 lb 1 oz (94.8 kg)     BP Readings from Last 3 Encounters:   01/06/17 116/64   01/05/16 138/74   12/16/15 142/84     Visit Vitals   ? /64   ? Pulse 75   ? Wt 215 lb (97.5 kg)   ? SpO2 98%   ? BMI 27.6 kg/m2      The patient is in no acute distress.  Mood good.  Insight   "good.  No skin lesions or nodules of concern.  He has actinic change on his left face which has recently been treated.  Ears are clear.  Nose is clear.  Throat is clear.  Neck is supple and there is no thyromegaly.  No carotid bruits.  No cervical, epitrochlear or axillary adenopathy.  Heart regular rate and rhythm.   no murmur present.  Lungs clear to auscultation bilaterally.  Respiratory effort is good.  Abdomen is soft, nontender.  No hepatosplenomegaly.  No hernia appreciated.  No aortic aneurysm appreciated.  Extremities show  no edema, distal pulses strong.  Neuro exam shows normal strength in all muscle groups and normal reflexes.     ______________________________________________________________________     Health maintenance issues:    BMI<25:  Estimated body mass index is 27.6 kg/(m^2) as calculated from the following:    Height as of 12/16/15: 6' 2\" (1.88 m).    Weight as of this encounter: 215 lb (97.5 kg).     Tobacco abuse:    History   Smoking Status   ? Never Smoker   Smokeless Tobacco   ? Not on file     Health Maintenance Due   Topic Date Due   ? ADVANCE DIRECTIVES DISCUSSED WITH PATIENT  01/09/1958   ? ZOSTER VACCINE  01/09/1999     USPSTF does not recommend PSA screening.    ______________________________________________________________________    Recent Results (from the past 168 hour(s))   Lipid Cascade   Result Value Ref Range    Cholesterol 224 (H) <=199 mg/dL    Triglycerides 164 (H) <=149 mg/dL    HDL Cholesterol 57 >=40 mg/dL    LDL Calculated 134 (H) <=129 mg/dL    Patient Fasting > 8hrs? Yes    Basic Metabolic Panel   Result Value Ref Range    Sodium 141 136 - 145 mmol/L    Potassium 4.6 3.5 - 5.0 mmol/L    Chloride 105 98 - 107 mmol/L    CO2 27 22 - 31 mmol/L    Anion Gap, Calculation 9 5 - 18 mmol/L    Glucose 97 70 - 125 mg/dL    Calcium 9.4 8.5 - 10.5 mg/dL    BUN 13 8 - 28 mg/dL    Creatinine 0.94 0.70 - 1.30 mg/dL    GFR MDRD Af Amer >60 >60 mL/min/1.73m2    GFR MDRD Non Af Amer " >60 >60 mL/min/1.73m2       ______________________________________________________________________   ______________________________________________________________________    Assessment:    1. Routine physical examination     2. HLD (hyperlipidemia)  Lipid Cascade    Basic Metabolic Panel   3. Osteoarthritis of left patellofemoral joint     4. Osteoarthritis of shoulder     5. Vertigo     6. Former smoker     7. Tinnitus, bilateral     8. SNHL (sensorineural hearing loss)     9. AMD (age related macular degeneration)     10. Full code status - 2017        ______________________________________________________________________     PHQ-2 Total Score: 0 (1/6/2017  9:00 AM)  No Data Recorded     Body mass index is 27.6 kg/(m^2).  The following high BMI interventions were performed this visit: encouragement to exercise     Plan:    1. Check blood work today.  2. Continue healthy lifestyle.  3. No additional treatments at this time.  4. Consider PSA and prostate cancer check in the future per the patient's wishes.    German Mcgregor MD  General Internal Medicine  [facility]    Return in about 1 year (around 1/6/2018) for visit and blood work.

## 2021-06-26 NOTE — PROGRESS NOTES
Progress Notes by German Mcgregor MD at 1/22/2018 10:30 AM     Author: German Mcgregor MD Service: -- Author Type: Physician    Filed: 1/22/2018  2:51 PM Encounter Date: 1/22/2018 Status: Signed    : German Mcgregor MD (Physician)              [facility]    Comprehensive and complex medical care - Chronic disease management - Shared decision making - Care coordination - Compassionate care    Patient advocacy - Rational deprescribing - Minimally disruptive medicine - Ethical focus - Customized care    ______________________________________________________________________     Date of Service: 1/22/2018  Primary Provider: German Mcgregor MD    Patient Care Team:  German Mcgregor MD as PCP - General (Internal Medicine)  Jacky Zayas MD (Ophthalmology)  Chavez Jay MD as Physician (Dermatology)     ______________________________________________________________________     Patient's Pharmacy:    [facility] [address] [address] [address] [facility] [address]  Phone: [phone] Fax: [phone]     Patient's Contacts:  Name Home Phone Work Phone Mobile Phone Relationship LgMELITON Cantu [phone]   Spouse      Patient's Insurance:    Payor: [employer] / Plan: [employer] / Product Type: COST PLAN /     ______________________________________________________________________     Nir Guy is 78 y.o. male who comes in today for:    Chief Complaint   Patient presents with   ? Follow-up     hip pain worse       Patient Active Problem List   Diagnosis   ? Osteoarthritis of shoulder   ? HLD (hyperlipidemia)   ? AMD (age related macular degeneration)   ? Vertigo   ? SNHL (sensorineural hearing loss)   ? Tinnitus   ? Former smoker-Quit in 1998   ? Osteoarthritis of left patellofemoral joint   ? Full code status - 2017      Current Outpatient Prescriptions   Medication Sig Note   ? artificial  tears,hypromellose, (GENTEAL, HYPROMELLOSE,) 0.3 % Gel Apply or instill  into both eyes daily at bedtime. 4/8/2015: Received from: [facility]   ? aspirin 81 MG EC tablet QOD 4/8/2015: Received from: [facility]   ? ibuprofen (ADVIL,MOTRIN) 800 MG tablet Take 1 tablet (800 mg total) by mouth every 6 (six) hours as needed for pain.    ? LUTEIN EXTRACT/ZEAXANTHIN EXT (LUTEIN-ZEAXANTHIN ORAL)  12/13/2017: Received from: Patient   ? meclizine (ANTIVERT) 25 mg tablet Take 50 mg by mouth Daily at 8:00 am..    ? multivitamin with minerals (VITAMINS & MINERALS) tablet TEBS - eye vit for macular degeneration - 1 po BID 4/8/2015: Received from: [facility]   ? omega 3-dha-epa-fish oil (FISH OIL) 60- mg cap capsule Take 1,000 mg by mouth Daily at 8:00 am.. 1/6/2017: Received from: [facility] Received Sig:    ? peg 400-propylene glycol (SYSTANE) 0.4-0.3 % Drop 1-2 drops. 4/8/2015: Received from: [facility]   ? azithromycin (ZITHROMAX Z-MARTINEZ) 250 MG tablet 2 tablets today and then 1 pill a day for 4 days.    ? codeine-guaiFENesin (GUAIFENESIN AC)  mg/5 mL liquid Take 5-10 mL by mouth every 4 (four) hours as needed for cough.    ? sildenafil (REVATIO) 20 mg tablet Take 3 tablets (60 mg total) by mouth once as needed (Erection).    ? traMADol (ULTRAM) 50 mg tablet Take 1 tablet (50 mg total) by mouth every 6 (six) hours as needed for pain.      Social History     Social History Narrative    Lives in [place].  Has significant other.        Patient of Dr. Mcgregor since 2013.      ______________________________________________________________________     History of present illness:    Patient comes in today for follow-up.    He is going to stay in the [place] area while he is working through his back and hip issues.  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    The injection into the right hip initially did not help with his hip.   The injection to his back really did not help either.   he is still having a lot of pain in relationship to the right hip.  He has difficulty getting a sock on on his right side.  This has been going on for a year and a half.  He understands that I am not sure that this is definitely from the hip but I suspect it is so.  He did not respond to a trial of low-dose prednisone in the past at 15 mg a day.  I am going to have him go to orthopedics to sort this out further and have them coordinate care in relationship to this.  I suspect that the issue is coming from his hip and I told the patient this but I want the specialist to come to some agreement on this.  His pain is mainly over the anterior part of his thigh.  He denies any numbness with this at this point.    We reviewed his respiratory infection that he had at the last visit.  He has cleared up entirely from this and his breathing is doing much better.    We reviewed his elevated PSA at his visit.  His risk for prostate cancer is roughly 13% for high-grade cancer and 27% for low-grade.  He is at a 60% chance of not having prostate cancer.  Right now this is not going to be pursued because of the hip pain and leg pain.  We will continue to follow-up on this though, however.    On review of systems, the patient denies any chest pain or shortness of breath.    ______________________________________________________________________    Exam:    Wt Readings from Last 3 Encounters:   01/22/18 213 lb (96.6 kg)   01/11/18 209 lb (94.8 kg)   12/26/17 220 lb (99.8 kg)     BP Readings from Last 3 Encounters:   01/22/18 136/82   01/11/18 134/78   12/26/17 134/76     /82  Pulse 78  Wt 213 lb (96.6 kg)  SpO2 96%  BMI 27.35 kg/m2    The patient is comfortable, no acute distress.  Mood good.  Insight good.  Eyes are nonicteric.  Neck is supple without mass.  No cervical adenopathy.  No thyromegaly. Heart regular rate and rhythm.  Lungs clear to auscultation bilaterally.   Respiratory effort is good.  Extremities no edema.  He has no active trochanteric bursitis at this visit.  He did have some signs of it at the last visit.  His Nigel's maneuver on the right is good.  His gait appears to be more related to the right hip and then it is to the back.  He does have a limp associated with this.    ______________________________________________________________________    Diagnostics:    Component      Latest Ref Rng & Units 1/11/2018   Sodium      136 - 145 mmol/L 137   Potassium      3.5 - 5.0 mmol/L 4.9   Chloride      98 - 107 mmol/L 99   CO2      22 - 31 mmol/L 28   Anion Gap, Calculation      5 - 18 mmol/L 10   Glucose      70 - 125 mg/dL 76   BUN      8 - 28 mg/dL 14   Creatinine      0.70 - 1.30 mg/dL 0.83   GFR MDRD Af Amer      >60 mL/min/1.73m2 >60   GFR MDRD Non Af Amer      >60 mL/min/1.73m2 >60   Bilirubin, Total      0.0 - 1.0 mg/dL 0.5   Calcium      8.5 - 10.5 mg/dL 9.5   Protein, Total      6.0 - 8.0 g/dL 7.0   ALBUMIN      3.5 - 5.0 g/dL 3.4 (L)   Alkaline Phosphatase      45 - 120 U/L 65   AST      0 - 40 U/L 24   ALT      0 - 45 U/L 49 (H)   WBC      4.0 - 11.0 thou/uL 15.4 (H)   RBC      4.40 - 6.20 mill/uL 4.66   Hemoglobin      14.0 - 18.0 g/dL 15.2   Hematocrit      40.0 - 54.0 % 43.7   MCV      80 - 100 fL 94   MCH      27.0 - 34.0 pg 32.6   MCHC      32.0 - 36.0 g/dL 34.7   RDW      11.0 - 14.5 % 11.4   Platelets      140 - 440 thou/uL 328   MPV      7.0 - 10.0 fL 6.5 (L)      ______________________________________________________________________    Pertinent radiology for this visit includes the following:    Mr Lumbar Spine Without Contrast    Result Date: 12/22/2017  MR LUMBAR SPINE WO CONTRAST 12/21/2017 7:56 PM INDICATION: Lower back pain radiating down both legs greater than 3 months. TECHNIQUE: Unenhanced. COMPARISON: None. FINDINGS: 5 lumbar vertebrae are assumed. Mild convex left lumbar curve. There is mild degenerative change of both hips. Right hip  joint is distended laterally. Small renal cysts. Ectasia abdominal aorta. Colonic diverticula. T12-L1: Disc dehydration. Mild disc space loss. Small Schmorl's nodes. Mild interbody spurring. Mild annular bulge. Tiny left paracentral disc protrusion. Mild degenerative facet arthropathy. No central canal stenosis. No foraminal stenosis. L1-L2: Mild disc dehydration. Normal disc height. Mild anterior interbody spurring. Mild degenerative facet arthropathy. No central canal stenosis. No foraminal stenosis. L2-L3: Disc dehydration. Moderate disc space loss. Mild interbody spurring. Annular bulge. Mild to moderate degenerative facet arthropathy. Ligamentum flavum thickening. Mild central canal stenosis and left worse than right lateral recess stenosis. Mild bilateral foraminal stenosis. L3-L4: Disc dehydration. Mild disc space loss. Annular bulge. Advanced degenerative facet arthropathy. Ligamentum flavum thickening. Moderate to severe central canal stenosis and bilateral lateral recess stenosis. Mild bilateral foraminal stenosis. L4-L5: Disc dehydration. Moderate disc space loss. Mild interbody spurring. Diffuse annular bulge. Small shallow right paracentral disc protrusion. Moderate degenerative facet arthropathy. Moderate central canal stenosis and right worse than left bilateral lateral recess stenosis. Moderate right and mild left foraminal stenosis. L5-S1: Normal disc signal and height. Mild degenerative facet arthropathy. Small complex synovial cyst posterior to the left facet joint. No central canal stenosis. No foraminal stenosis. 1.2 cm rounded intermediate T1 and low T2 signal lesion in the S1 segment of the sacrum. Probable small hemangioma in the T12 vertebral body. No other significant marrow signal abnormality. Conus negative. Mild degenerative change of the SI joints. No significant paravertebral soft tissue abnormality.     CONCLUSION: 1.  Degenerative changes in the lumbar spine as described above. 2.   At L3-L4 there is moderate to severe central canal stenosis, bilateral lateral recess stenosis, and mild bilateral foraminal stenosis. 3.  At L4-L5 there is moderate central canal stenosis, right worse than left bilateral lateral recess stenosis, and moderate right and mild left foraminal stenosis. 4.  At L2-L3 there is mild central canal stenosis and bilateral foraminal stenosis. 5.  1.2 cm lesion in the S1 sacral body is indeterminate but more likely benign. Bone island or hemangioma suspected. Recommend 3 month follow-up MRI to confirm stability. Alternatively, CT could provide more immediate evaluation if desired clinically.    Xr Aspiration Or Injection Major Joint    Result Date: 12/28/2017  EXAM DATE:         12/28/2017 [facility] GEE 1.  RIGHT HIP JOINT ASPIRATION 2.  RIGHT HIP JOINT INJECTION 3.  FLUOROSCOPIC GUIDANCE 12/28/2017 1:00 PM INDICATION: Hip osteoarthritis. PROCEDURE: Procedure and risks explained and consent received. The skin was prepped and draped in sterile fashion. 2 mL 1% lidocaine infused in local soft tissues. Under direct fluoroscopic guidance, serial 20 and 18-gauge needles were introduced into the joint space. Despite multiple attempts at repositioning the needle, no fluid could be retrieved. Given the low likelihood of infection, decision was made to proceed with the right hip joint injection. Position was confirmed with injection of 1 mL Isovue-200 contrast material from a single-use vial, 9 mL discarded. INJECTION: 2 mL of Kenalog-40 from a multi-use vial. 4 mL of 0.5% ropivacaine from single-use vial 26 mL discarded. COMPLICATIONS: None. RADIOLOGIC SUPERVISION AND INTERPRETATION: Fluoroscopy demonstrates intraarticular needle tip position. FLUOROSCOPIC TIME: 16 seconds NUMBER OF FILMS: 1 CONCLUSION: Fluoroscopic-guided right hip joint aspiration. No fluid could be retrieved. Fluoroscopic-guided right hip joint steroid injection.     Mr Hip Without Contrast Right    Result  Date: 12/22/2017  MRI RIGHT HIP/PELVIS WO CONTRAST 12/21/2017 7:52 PM INDICATION: Right hip pain and upper leg pain 3 months, worsening. TECHNIQUE: Routine. COMPARISON: Plain films 9/21/2017. FINDINGS: RIGHT HIP: Extensive degenerative tearing of the right acetabular labrum. There is likely grade 3-4 chondromalacia humeral head superiorly and posterosuperiorly and grade 2-3 chondromalacia of the acetabulum. Mild subchondral marrow edema femoral head. There is distention of the right hip joint capsule laterally with debris, likely synovial proliferation, and at least one loose body. Small effusion. Marginal spurring. LEFT HIP: Mild degenerative change. TENDONS: There is chronic likely high-grade partial tearing involving the origin of the right rectus femoris at the anteroinferior iliac spine. Proximal left rectus femoris is intact and negative. Mild distal gluteal tendinopathy at both greater trochanters, no focal tearing. Mild proximal hamstring tendinopathy. Low-grade partial tearing of the proximal left hamstring tendons. Distal iliopsoas tendons are intact with probably mild tendinopathy on the right. BONES AND SOFT TISSUES: 1.2 cm rounded marrow-replacing lesion in the S1 segment of sacrum is further described on the MRI lumbar spine of the same date. No other marrow signal abnormality. Nodular prostatic hypertrophy. Colonic diverticula.     CONCLUSION: 1.  Moderate degenerative arthritis of the right hip joint. There is distention of the right hip joint laterally with debris, likely small loose bodies, and probably synovial proliferation, nonspecific. Small effusion. 2.  Milder degenerative change of the left hip joint. 3.  Chronic partial tearing involving the proximal right rectus femoris. 4.  Indeterminate lesion in the S1 segment of the sacrum. See MRI lumbar spine for further details.    Ir Lumbar Epidural Steroid Injection    Result Date: 1/9/2018  EXAM DATE:         01/09/2018 [facility]  [place] RIGHT L3-L4 AND RIGHT L4-L5 TRANSFORAMINAL EPIDURAL STEROID INJECTIONS UNDER FLUOROSCOPIC GUIDANCE. 1/9/2018 8:00 AM INDICATION: Right lower extremity radiculopathy. COMPARISON: None FLUOROSCOPIC TIME: 1 minute 22 seconds. TECHNIQUE: Informed consent was obtained. The risk of nerve root injury and infection were specifically discussed by Dr. Mota. This conversation was witnessed by the attending technologist. The patient was placed prone on the fluoroscopic table. Using sterile technique and under fluoroscopic guidance, a 22 -gauge spinal needle was advanced into the periphery of the right L3-L4 neural foramen.  A total of 5 mL of 1% preservative-free lidocaine was used for local anesthesia. Needle position was confirmed fluoroscopically and with a 1 mL injection of Omnipaque 180. Care was taken to ensure there was not vascular uptake. 1.0 mL of 1% preservative-free lidocaine and 10 mg (10 mg/mL) of dexamethasone were injected into the epidural space. Next, again utilizing sterile technique and under fluoroscopic guidance a second 22-gauge spinal needle was advanced into the periphery of the right L4-L5 neural foramen. A total of 5 mL 1% preservative-free lidocaine was used for local anesthesia. Needle position was confirmed fluoroscopically with a 1 mL injection of Omnipaque 180. Care was taken to ensure that there was not vascular uptake. 1 mg of 1% preservative-free lidocaine and 10 mg (10 mg/cc) of dexamethasone were injected into the epidural space. Following the procedure both needles were removed from the patient. There were no immediate complications. Total number of images: 3 CONCLUSION: 1.  Technically successful right L3-L4 and right L4-L5 epidural steroid injections. 2.  The patient's symptoms were reproduced during the injection of lidocaine and steroid. 3.  Preprocedure pain was 7 out of 10. Postprocedure pain was 0 out of 10.         ______________________________________________________________________    ______________________________________________________________________    Assessment:    1. Osteoarthritis of right hip    2. Foraminal stenosis of lumbar region    3. Elevated PSA       ______________________________________________________________________     PHQ-2 Total Score: 0 (1/11/2018  9:00 AM)  No Data Recorded  ______________________________________________________________________       Plan:    1. Referral to orthopedics.  I sent him to both Dr. Hood and Dr. Victor.  I think his pain is coming more from the hip that it is the back, but I hope that the 2 of them can help sort this out further.  He was not particularly helped by injection in either area.  2. I suspect he will need surgery to help with his symptoms.  3. I did a letter for him so that he can hopefully recoup some of his cost for his place in [place].  He is going to stay up in the [place] during this time.  4. We will need to address elevated PSA in the future, but not at this time.  We will likely recheck his blood work at the time of his visit.  5. His respiratory infection is doing a lot better at this time without issue.       German Mcgregor MD  General Internal Medicine  [facility]     Personal office fax - [phone]   Voice mail - [phone]  E-mail - [email or web address]    25 minutes or greater were spent with the patient today with greater than 50% of the times spent in counseling and coordination of care.    Return if symptoms worsen or fail to improve.     No future appointments.

## 2021-06-26 NOTE — PROGRESS NOTES
Progress Notes by German Mcgregor MD at 1/26/2018  3:55 PM     Author: German Mcgregor MD Service: -- Author Type: Physician    Filed: 1/26/2018 11:08 PM Encounter Date: 1/26/2018 Status: Signed    : German Mcgregor MD (Physician)            [facility]    Comprehensive and complex medical care - Chronic disease management - Shared decision making - Care coordination - Compassionate care    Patient advocacy - Rational deprescribing - Minimally disruptive medicine - Ethical focus - Customized care    ______________________________________________________________________     Date of Service: 1/26/2018  Primary Provider: German Mcgregor MD    Patient Care Team:  German Mcgregor MD as PCP - General (Internal Medicine)  Jacky Zayas MD (Ophthalmology)  Chavez Jay MD as Physician (Dermatology)  Kaushik Hood MD as Physician (Orthopedic Surgery)     ______________________________________________________________________     Patient's Pharmacy:    [place] [address] [address] [address] [facility] [address]  Phone: [phone] Fax: [phone]     Patient's Contacts:  Name Home Phone Work Phone Mobile Phone Relationship Lg MELITON June [phone]   Spouse      Patient's Insurance:    Payor: [employer] / Plan: [employer] FREEDOM / Product Type: COST PLAN /     ______________________________________________________________________     Preoperative examination    Nir Guy is a 78 y.o. male (1940) who I am asked to see by his surgeon regarding his fitness for upcoming surgery.      Chief Complaint   Patient presents with   ? Pre-op Exam     2/21/18, right hip, Rose pina     ______________________________________________________________________     History of present illness:    Patient comes in today for preoperative evaluation prior to his planned right hip surgery by Dr. Hood at  [facility].  He has it scheduled for 21 February but hopes to get it sooner.  Dr. Desai feels that his pain is more generating from his hips then from his back.  We did send him records related to his tests and he did look at the x-rays as well.  The patient is prepared to go ahead with this and looks forward to getting this moving.  He has been dealing with the pain for a while.    We reviewed his foraminal stenosis as well.  He will see Dr. Cota down the line.  He does not worry about anything related to this.  He denies any numbness down his leg at this point.    He had an elevated PSA at his recent physical.  We are going to follow-up on this at this point.  He did have a respiratory infection at that time with wheezing.  This has cleared up as well for him.    His vertigo has been doing well.  He does have evidence of osteoarthritis in other areas as well.    We reviewed the plan going forward for him in relationship to these things.    We reviewed his other issues noted in the assessment but not specifically addressed in the HPI above.     ______________________________________________________________________     Patient Active Problem List   Diagnosis   ? Osteoarthritis of shoulder   ? HLD (hyperlipidemia)   ? AMD (age related macular degeneration)   ? Vertigo   ? SNHL (sensorineural hearing loss)   ? Tinnitus   ? Former smoker-Quit in 1998   ? Osteoarthritis of left patellofemoral joint   ? Full code status - 2017     Past Surgical History:   Procedure Laterality Date   ? BASAL CELL CARCINOMA EXCISION  1999    Nose and scalp   ? CARPAL TUNNEL RELEASE Right    ? VASECTOMY  1980's      Social History     Social History   ? Marital status: Single     Spouse name: N/A   ? Number of children: 4   ? Years of education: N/A     Occupational History   ? Lithographer Retired     Social History Main Topics   ? Smoking status: Never Smoker   ? Smokeless tobacco: Never Used   ? Alcohol use 3.5 oz/week     7  Standard drinks or equivalent per week   ? Drug use: None   ? Sexual activity: Not Asked     Other Topics Concern   ? None     Social History Narrative    Lives in [place].  Has significant other.        Patient of Dr. Mcgregor since 2013.       Family History   Problem Relation Age of Onset   ? Cancer Mother      GYN   ? Cancer Father      Lung   ? Heart disease Father       Family history is noncontributory otherwise.    Allergies: Review of patient's allergies indicates no known allergies.     Current medications:  Current Outpatient Prescriptions   Medication Sig Note   ? artificial tears,hypromellose, (GENTEAL, HYPROMELLOSE,) 0.3 % Gel Apply or instill  into both eyes daily at bedtime. 4/8/2015: Received from: [facility]   ? aspirin 81 MG EC tablet QOD 4/8/2015: Received from: [facility]   ? ibuprofen (ADVIL,MOTRIN) 800 MG tablet Take 1 tablet (800 mg total) by mouth every 6 (six) hours as needed for pain.    ? LUTEIN EXTRACT/ZEAXANTHIN EXT (LUTEIN-ZEAXANTHIN ORAL)  12/13/2017: Received from: Patient   ? meclizine (ANTIVERT) 25 mg tablet Take 50 mg by mouth Daily at 8:00 am..    ? multivitamin with minerals (VITAMINS & MINERALS) tablet TEBS - eye vit for macular degeneration - 1 po BID 4/8/2015: Received from: [facility]   ? omega 3-dha-epa-fish oil (FISH OIL) 60- mg cap capsule Take 1,000 mg by mouth Daily at 8:00 am.. 1/6/2017: Received from: [facility] Received Sig:    ? peg 400-propylene glycol (SYSTANE) 0.4-0.3 % Drop 1-2 drops. 4/8/2015: Received from: [facility]       Surgery specific questions:    Anesthesia/family history of complications: No  History of abnormal bleeding: No  Can patient walk a flight of stairs without stopping: Yes    Review of systems:    On ROS, the patient denies any chest pain or pressure.  No shortness of breath.   ______________________________________________________________________     Exam:    Wt Readings from Last 3 Encounters:   01/26/18 211 lb  (95.7 kg)   01/22/18 213 lb (96.6 kg)   01/11/18 209 lb (94.8 kg)     BP Readings from Last 3 Encounters:   01/26/18 138/82   01/22/18 136/82   01/11/18 134/78     /82  Pulse 69  Temp 98.1  F (36.7  C) (Oral)   Ht 6' (1.829 m)  Wt 211 lb (95.7 kg)  SpO2 95%  BMI 28.62 kg/m2   Patient is in no acute distress.  Mood good.  Insight good.  Eyes nonicteric.  Pupils equal.  Ears clear.  Nose clear.  Throat clear.  Neck is supple.  No cervical adenopathy.  No thyromegaly.  Heart is regular rate and rhythm.  Lungs clear to auscultation bilaterally.  Respiratory effort is good.  Abdomen is soft, nontender, no hepatosplenomegaly.  Extremities no edema.  He walks with a limp on his right side related to his hip.  He protects the right hip.    ______________________________________________________________________    Diagnostics:    Results for orders placed or performed in visit on 01/26/18   HM2(CBC w/o Differential)   Result Value Ref Range    WBC 7.2 4.0 - 11.0 thou/uL    RBC 4.59 4.40 - 6.20 mill/uL    Hemoglobin 15.1 14.0 - 18.0 g/dL    Hematocrit 43.3 40.0 - 54.0 %    MCV 94 80 - 100 fL    MCH 32.8 27.0 - 34.0 pg    MCHC 34.8 32.0 - 36.0 g/dL    RDW 11.4 11.0 - 14.5 %    Platelets 208 140 - 440 thou/uL    MPV 6.4 (L) 7.0 - 10.0 fL   Sedimentation Rate   Result Value Ref Range    Sed Rate 12 0 - 15 mm/hr   C-Reactive Protein   Result Value Ref Range    CRP 0.8 0.0 - 0.8 mg/dL   PSA, Diagnostic (Prostatic-Specific Antigen)   Result Value Ref Range    PSA 3.5 0.0 - 6.5 ng/mL   Urinalysis Macro & Micro   Result Value Ref Range    Color, UA Yellow Colorless, Yellow, Straw, Light Yellow    Clarity, UA Clear Clear    Glucose, UA Negative Negative    Bilirubin, UA Negative Negative    Ketones, UA Negative Negative    Specific Gravity, UA 1.015 1.005 - 1.030    Blood, UA Negative Negative    pH, UA 6.0 5.0 - 8.0    Protein, UA Negative Negative mg/dL    Urobilinogen, UA 0.2 E.U./dL 0.2 E.U./dL, 1.0 E.U./dL    Nitrite,  UA Negative Negative    Leukocytes, UA Negative Negative    Bacteria, UA None Seen None Seen hpf    RBC, UA None Seen None Seen, 0-2 hpf    WBC, UA None Seen None Seen, 0-5 hpf    Squam Epithel, UA None Seen None Seen, 0-5 lpf   Basic Metabolic Panel   Result Value Ref Range    Sodium 142 136 - 145 mmol/L    Potassium 4.5 3.5 - 5.0 mmol/L    Chloride 102 98 - 107 mmol/L    CO2 30 22 - 31 mmol/L    Anion Gap, Calculation 10 5 - 18 mmol/L    Glucose 81 70 - 125 mg/dL    Calcium 9.4 8.5 - 10.5 mg/dL    BUN 13 8 - 28 mg/dL    Creatinine 0.92 0.70 - 1.30 mg/dL    GFR MDRD Af Amer >60 >60 mL/min/1.73m2    GFR MDRD Non Af Amer >60 >60 mL/min/1.73m2   Electrocardiogram Perform and Read   Result Value Ref Range    SYSTOLIC BLOOD PRESSURE  mmHg    DIASTOLIC BLOOD PRESSURE  mmHg    VENTRICULAR RATE 59 BPM    ATRIAL RATE 59 BPM    P-R INTERVAL 168 ms    QRS DURATION 94 ms    Q-T INTERVAL 414 ms    QTC CALCULATION (BEZET) 409 ms    P Axis 43 degrees    R AXIS 33 degrees    T AXIS 58 degrees    MUSE DIAGNOSIS       Sinus bradycardia with sinus arrhythmia  Otherwise normal ECG  No previous ECGs available        ______________________________________________________________________     Impression:    1. Preoperative cardiovascular examination    2. Osteoarthritis of right hip    3. Foraminal stenosis of lumbar region    4. Elevated PSA    5. Joint pain    6. Full code status - 2017         ______________________________________________________________________     PHQ-2 Total Score: 0 (1/11/2018  9:00 AM)  No Data Recorded  ______________________________________________________________________     Recommendations:    1. Patient is okay to proceed with surgery as planned.  2. He has been instructed by Dr. Hood to go ahead and take his aspirin up until his surgery.  3. He will follow-up with spine surgery after this surgery.  4. We did blood work as noted above.  His PSA is doing better as well as his inflammatory markers.  It is  possible that he could have had a low-grade prostate infection along with his respiratory infection when he was in recently.  5. I gave him a prescription for a rolling walker.  He will get this filled closer to [facility].  6. I also gave him a form for disability parking.    25 minutes or greater were spent with the patient today with greater than 50% of the times spent in counseling and coordination of care.      German Mcgregor MD  General Internal Medicine  [facility]     Personal office fax - [phone]   Voice mail - [phone]  E-mail - [email or web address]

## 2021-06-26 NOTE — PROGRESS NOTES
Progress Notes by German Mcgregor MD at 1/11/2018  9:30 AM     Author: German Mcgregor MD Service: -- Author Type: Physician    Filed: 1/17/2018 10:29 PM Encounter Date: 1/11/2018 Status: Signed    : German Mcgregor MD (Physician)              [facility]    Comprehensive and complex medical care - Chronic disease management - Shared decision making - Care coordination - Compassionate care    Patient advocacy - Rational deprescribing - Minimally disruptive medicine - Ethical focus - Customized care    ______________________________________________________________________     Date of Service: 1/11/2018  Primary Provider: German Mcgregor MD    Patient Care Team:  German Mcgregor MD as PCP - General (Internal Medicine)  Jacky Zayas MD (Ophthalmology)  Chavez Jay MD as Physician (Dermatology)     ______________________________________________________________________     Patient's Pharmacy:    [facility] [address] [address] [address] [facility] [address]  Phone: [phone] Fax: [phone]     Patient's Contacts:  Name Home Phone Work Phone Mobile Phone Relationship LgMELITON Cantu [phone]   Spouse      Patient's Insurance:    Payor: [employer] / Plan: [employer] FREEDOM / Product Type: COST PLAN /     ______________________________________________________________________     Routine physical - male, age 65 and older    Nir Guy is a 78 y.o. male coming in today for a routine physical.  He does have other issues outside the physical to discuss as well.    Active Problem List:  Problem List as of 1/11/2018  Reviewed: 1/11/2018  9:55 AM by German Mcgregor MD       High    Former smoker-Quit in 1998    Full code status - 2017    Vertigo       Medium    Osteoarthritis of left patellofemoral joint    Osteoarthritis of shoulder       Low    AMD (age related macular degeneration)    HLD (hyperlipidemia)     SNHL (sensorineural hearing loss)    Tinnitus           Past Medical History:   Diagnosis Date   ? AMD (age related macular degeneration)    ? Former smoker-Quit in 1998    ? Full code status - 2017 1/6/2017   ? Hyperlipidemia    ? Osteoarthritis of left patellofemoral joint 1/6/2017   ? Osteoarthritis of shoulder    ? S/P colonoscopy 4/18/11   ? SNHL (sensorineural hearing loss)    ? Squamous cell carcinoma of skin    ? Tinnitus    ? Vertigo      Past Surgical History:   Procedure Laterality Date   ? BASAL CELL CARCINOMA EXCISION  1999    Nose and scalp   ? CARPAL TUNNEL RELEASE Right    ? VASECTOMY  1980's     Social History     Social History   ? Marital status: Single     Spouse name: N/A   ? Number of children: 4   ? Years of education: N/A     Occupational History   ? Lithographer Retired     Social History Main Topics   ? Smoking status: Never Smoker   ? Smokeless tobacco: None   ? Alcohol use 3.5 oz/week     7 drink(s) per week   ? Drug use: None   ? Sexual activity: Not Asked     Other Topics Concern   ? None     Social History Narrative    Lives in [place].  Has significant other.        Patient of Dr. Mcgregor since 2013.       Family History   Problem Relation Age of Onset   ? Cancer Mother      GYN   ? Cancer Father      Lung   ? Heart disease Father      Family history is otherwise noncontributory.    Current Outpatient Prescriptions   Medication Sig Note   ? artificial tears,hypromellose, (GENTEAL, HYPROMELLOSE,) 0.3 % Gel Apply or instill  into both eyes daily at bedtime. 4/8/2015: Received from: [facility]   ? aspirin 81 MG EC tablet QOD 4/8/2015: Received from: [facility]   ? azithromycin (ZITHROMAX Z-MARTINEZ) 250 MG tablet 2 tablets today and then 1 pill a day for 4 days.    ? codeine-guaiFENesin (GUAIFENESIN AC)  mg/5 mL liquid Take 5-10 mL by mouth every 4 (four) hours as needed for cough.    ? ibuprofen (ADVIL,MOTRIN) 800 MG tablet Take 1 tablet (800 mg total) by mouth every 6  (six) hours as needed for pain.    ? LUTEIN EXTRACT/ZEAXANTHIN EXT (LUTEIN-ZEAXANTHIN ORAL)  12/13/2017: Received from: Patient   ? meclizine (ANTIVERT) 25 mg tablet Take 50 mg by mouth Daily at 8:00 am..    ? multivitamin with minerals (VITAMINS & MINERALS) tablet TEBS - eye vit for macular degeneration - 1 po BID 4/8/2015: Received from: [id number] [employer]   ? omega 3-dha-epa-fish oil (FISH OIL) 60- mg cap capsule Take 1,000 mg by mouth Daily at 8:00 am.. 1/6/2017: Received from: [facility] Received Sig:    ? peg 400-propylene glycol (SYSTANE) 0.4-0.3 % Drop 1-2 drops. 4/8/2015: Received from: [employer]   ? sildenafil (REVATIO) 20 mg tablet Take 3 tablets (60 mg total) by mouth once as needed (Erection).    ? traMADol (ULTRAM) 50 mg tablet Take 1 tablet (50 mg total) by mouth every 6 (six) hours as needed for pain.      Allergies: Review of patient's allergies indicates no known allergies.     Immunization History   Administered Date(s) Administered   ? Influenza [id number], 01/04/2010   ? Influenza high dose, seasonal 09/18/2012, 09/06/2013, 10/29/2015, 09/22/2016, 09/15/2017   ? Influenza, inj, historic,unspecified 11/05/1999, 11/01/2001, 11/11/2002, 11/11/2003, 11/04/2004, 11/03/2005, 10/25/2007, 10/21/2008, 09/21/2009, 09/21/2010, 11/11/2011, 09/06/2013, 09/23/2016   ? Influenza,seasonal, Inj IIV3 11/05/1999, 11/01/2001, 11/11/2002, 11/11/2003, 11/04/2004, 11/03/2005, 10/25/2007, 10/21/2008, 09/21/2009, 09/21/2010, 11/11/2011   ? Pneumo Conj 13-V (2010&after) 12/28/2007, 12/28/2014   ? Pneumo Polysac 23-V 03/18/1993, 12/28/2007   ? Td, Adult, Absorbed 03/18/1993, 11/11/2003   ? Td,adult,historic,unspecified 11/11/2003   ? Tdap 03/11/2011   ? Varicella 12/12/1998   ? ZOSTER 12/28/2014      ______________________________________________________________________     History of present illness:    Nir Guy comes in on his own today for routine physical.    We reviewed his recent issues with hip and  right leg pain.  We initially did a right hip injection due to right hip osteoarthritis.  This was not successful for helping his pain.  He just did have an epidural 2 days ago at [facility] and this is not obviously helped yet.  His pain is mainly over the lateral hip and down the side of his leg.  He is going to see how this works over time.  The pain is particularly bad when he first gets up and starts to walk.  This was noticed today when walking him from the entrance to his exam room.  He has not noticed any problems with laying or sitting with his hips.  We are going to see how this goes for him today.    We reviewed some sinus issues he has been having.  This is newer for him in the last week or so.  It is more like 10 days.  He has had sinus pressure and drainage.  He has had a cough.  He is coughing a lot at night.  He is noticed some wheezing.  He has not had any shortness of breath.  He denies any fevers or chills at this time.  He feels like he is making progress in the right direction.  He would like something for it though.    We reviewed his pain and he would like ibuprofen and something else for this if needed.    We reviewed his foraminal stenosis and he denies any numbness or weakness in his legs.    We reviewed his blood work from the past.  He was found to have an elevated PSA after his visit.    We reviewed his other issues noted in the assessment but not specifically addressed in the HPI above.      The 10-system review of systems and health history form for [facility] was completed by patient, reviewed by me, and pertinent problems are reviewed above.   ______________________________________________________________________    Exam:  Wt Readings from Last 3 Encounters:   01/11/18 209 lb (94.8 kg)   12/26/17 220 lb (99.8 kg)   11/17/17 214 lb (97.1 kg)     BP Readings from Last 3 Encounters:   01/11/18 134/78   12/26/17 134/76   11/17/17 128/74     /78  Pulse 84  Wt 209 lb (94.8  kg)  BMI 26.83 kg/m2   The patient is in no acute distress.  Mood good.  Insight good.  No skin lesions or nodules of concern.  Ears are clear except for a left-sided serous effusion.  Nose shows moderate rhinitis..  Throat is clear.  Neck is supple and there is no thyromegaly.  No carotid bruits.  No cervical, epitrochlear or axillary adenopathy.  Heart regular rate and rhythm.  No murmur present.  Lungs show mild wheezing on auscultation bilaterally.  Respiratory effort is good.  Abdomen is soft, nontender.  No hepatosplenomegaly.  No hernia appreciated.  No aortic aneurysm appreciated.  Extremities show no edema, distal pulses strong.  Neuro exam shows normal strength in all muscle groups and normal reflexes.  Testicular exam shows no abnormality.  Rectal exam shows 1 + prostate and no rectal mass.  There is tenderness over the trochanteric bursa on the right.  He does initially get up out of a chair and has a hard time getting around.    ______________________________________________________________________    Diagnostics:    Results for orders placed or performed in visit on 01/11/18   PSA, Annual Screen (Prostatic-Specific Antigen)   Result Value Ref Range    PSA 7.5 (H) 0.0 - 6.5 ng/mL   Comprehensive Metabolic Panel   Result Value Ref Range    Sodium 137 136 - 145 mmol/L    Potassium 4.9 3.5 - 5.0 mmol/L    Chloride 99 98 - 107 mmol/L    CO2 28 22 - 31 mmol/L    Anion Gap, Calculation 10 5 - 18 mmol/L    Glucose 76 70 - 125 mg/dL    BUN 14 8 - 28 mg/dL    Creatinine 0.83 0.70 - 1.30 mg/dL    GFR MDRD Af Amer >60 >60 mL/min/1.73m2    GFR MDRD Non Af Amer >60 >60 mL/min/1.73m2    Bilirubin, Total 0.5 0.0 - 1.0 mg/dL    Calcium 9.5 8.5 - 10.5 mg/dL    Protein, Total 7.0 6.0 - 8.0 g/dL    Albumin 3.4 (L) 3.5 - 5.0 g/dL    Alkaline Phosphatase 65 45 - 120 U/L    AST 24 0 - 40 U/L    ALT 49 (H) 0 - 45 U/L   HM2(CBC w/o Differential)   Result Value Ref Range    WBC 15.4 (H) 4.0 - 11.0 thou/uL    RBC 4.66 4.40 -  6.20 mill/uL    Hemoglobin 15.2 14.0 - 18.0 g/dL    Hematocrit 43.7 40.0 - 54.0 %    MCV 94 80 - 100 fL    MCH 32.6 27.0 - 34.0 pg    MCHC 34.7 32.0 - 36.0 g/dL    RDW 11.4 11.0 - 14.5 %    Platelets 328 140 - 440 thou/uL    MPV 6.5 (L) 7.0 - 10.0 fL   Sedimentation Rate   Result Value Ref Range    Sed Rate 38 (H) 0 - 15 mm/hr   C-Reactive Protein   Result Value Ref Range    CRP 4.5 (H) 0.0 - 0.8 mg/dL   Lipid Cascade   Result Value Ref Range    Cholesterol 190 <=199 mg/dL    Triglycerides 107 <=149 mg/dL    HDL Cholesterol 55 >=40 mg/dL    LDL Calculated 114 <=129 mg/dL    Patient Fasting > 8hrs? Yes       ______________________________________________________________________     Assessment:    1. Routine physical examination    2. Screening for prostate cancer    3. Joint pain    4. Lipid screening    5. Osteoarthritis of right hip    6. Foraminal stenosis of lumbar region    7. Rhinosinusitis    8. Elevated PSA    9. Elevated sedimentation rate    10. Wheezing       ______________________________________________________________________     ______________________________________________________________________    QUALITY REVIEW      There are no preventive care reminders to display for this patient.    History   Smoking Status   ? Never Smoker   Smokeless Tobacco   ? Not on file        PHQ-2 Total Score: 0 (1/11/2018  9:00 AM)  No Data Recorded     ______________________________________________________________________       Plan:    1. Zithromax and guaifenesin for cough.  2. Ibuprofen and tramadol for pain.  3. Consider injection of the right trochanteric bursa.  4. Blood work done today.  5. Multiple things going on and he will follow-up in the next 10 days to review this.  6. Review risk related to prostate cancer given his elevated PSA.    German Mcgregor MD  General Internal Medicine  [facility]    Return in about 10 days (around 1/21/2018) for follow up visit.    Future  Appointments  Date Time Provider Department Center   1/22/2018 10:30 AM [facility] Mcgregor MD [facility]

## 2021-06-26 NOTE — PROGRESS NOTES
Progress Notes by German Mcgregor MD at 8/2/2018 12:50 PM     Author: German Mcgregor MD Service: -- Author Type: Physician    Filed: 8/2/2018  9:45 PM Encounter Date: 8/2/2018 Status: Signed    : German Mcgregor MD (Physician)            [facility]    Comprehensive and complex medical care - Chronic disease management - Shared decision making - Care coordination - Compassionate care    Patient advocacy - Rational deprescribing - Minimally disruptive medicine - Ethical focus - Customized care    ______________________________________________________________________     Date of Service: 8/2/2018  Primary Provider: German Mcgregor MD    Patient Care Team:  German Mcgregor MD as PCP - General (Internal Medicine)  Jacky Zayas MD (Ophthalmology)  Chavez Jay MD as Physician (Dermatology)  Kaushik GUALLPA MD as Physician (Orthopedic Surgery)     ______________________________________________________________________     Patient's Pharmacy:    [facility] [address] [address] [address] [facility] [address]  Phone: [phone] Fax: [phone]     Patient's Contacts:  Name Home Phone Work Phone Mobile Phone Relationship Lg MELITON June [phone] Spouse      Patient's Insurance:    Payor: [employer] / Plan: [employer]OM / Product Type: COST PLAN /     ______________________________________________________________________     Preoperative examination    Nir Guy is a 78 y.o. male (1940) who I am asked to see by his surgeon regarding his fitness for upcoming surgery.      Chief Complaint   Patient presents with   ? Pre-op Exam     Eye (R) surgery on 8/7/18 [facility] -cataract     Scheduled Procedure: Cataract removal                   Surgery Date:  8/7/18 for the right eye                     8/28/18 for the Left Eye  Surgery Location: [facility]    Surgeon:   Chana  ______________________________________________________________________     History of present illness:    Patient comes in today for preoperative medical evaluation prior to his planned bilateral cataract surgery  by 2 weeks.  He has had worsening vision in the last 6 months and scatter in his eyes with light that is bright.  His vision has worsened.  He is looking forward to having this taken care of.    Reviewed his right hip pain and osteoarthritis.  He had a right hip replacement and this continues to do very well for him.  He is getting around well without issue.  He has no major concerns in relationship to this.    We reviewed his hyperlipidemia and he has had no history of unstable or stable angina symptoms.    He does have a problem with recurrent vertigo and has had some issues with it since last Monday.  He is doing better but is still a little foggy/unstable.  This has been recurrent for a number years but he usually does not get more than one episode a year.    Reviewed his other areas of osteoarthritis and things are stable here.    ______________________________________________________________________     Patient Active Problem List   Diagnosis   ? Osteoarthritis of shoulder   ? HLD (hyperlipidemia)   ? AMD (age related macular degeneration)   ? Vertigo   ? SNHL (sensorineural hearing loss)   ? Tinnitus   ? Former smoker-Quit in 1998   ? Osteoarthritis of left patellofemoral joint   ? Full code status - 2017   ? 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     Past Surgical History:   Procedure Laterality Date   ? BASAL CELL CARCINOMA EXCISION  1999    Nose and scalp   ? CARPAL TUNNEL RELEASE Right    ? [place] TOTAL HIP ARTHROPLASTY Right 2/21/2018    Procedure:  RIGHT TOTAL HIP ARTHROPLASTY DIRECT ANTERIOR APPROACH;  Surgeon: Kaushik Hood MD;  Location: [facility];  Service: Orthopedics   ? VASECTOMY  1980's      Social History     Social History   ? Marital status:  Single     Spouse name: N/A   ? Number of children: 4   ? Years of education: N/A     Occupational History   ? Lithographer Retired     Social History Main Topics   ? Smoking status: Never Smoker   ? Smokeless tobacco: Never Used   ? Alcohol use 4.8 oz/week     7 Standard drinks or equivalent, 1 Glasses of wine per week      Comment: nightly   ? Drug use: No   ? Sexual activity: Not Asked     Other Topics Concern   ? None     Social History Narrative    Lives in [place].  Has significant other.        Patient of Dr. Mcgregor since 2013.       Family History   Problem Relation Age of Onset   ? Cancer Mother      GYN   ? Cancer Father      Lung   ? Heart disease Father       Family history is noncontributory otherwise.    Allergies: Review of patient's allergies indicates no known allergies.     Current medications:  Current Outpatient Prescriptions   Medication Sig   ? artificial tears,hypromellose, (GENTEAL, HYPROMELLOSE,) 0.3 % Gel Administer 1 application to both eyes at bedtime.    ? LUTEIN ORAL Take 1 tablet by mouth daily.   ? meclizine (ANTIVERT) 25 mg tablet Take 50 mg by mouth daily.    ? multivitamin with minerals (THERA-M) 9 mg iron-400 mcg Tab tablet Take 1 tablet by mouth daily.   ? OMEGA-3/DHA/EPA/FISH OIL (FISH OIL-OMEGA-3 FATTY ACIDS) 300-1,000 mg capsule Take 1 g by mouth daily.   ? aspirin 81 MG EC tablet Take 1 tablet (81 mg total) by mouth daily.       Surgery specific questions:    Anesthesia/family history of complications: No  History of abnormal bleeding: No  Can patient walk a flight of stairs without stopping: Yes    Review of systems:    On ROS, the patient denies any chest pain or pressure.  No shortness of breath.   ______________________________________________________________________     Exam:    Wt Readings from Last 3 Encounters:   08/02/18 212 lb 3.2 oz (96.3 kg)   02/27/18 218 lb (98.9 kg)   02/21/18 207 lb (93.9 kg)     BP Readings from Last 3 Encounters:   08/02/18 126/70  "  02/27/18 132/78   02/23/18 138/80     /70  Pulse 71  Ht 6' 1.5\" (1.867 m)  Wt 212 lb 3.2 oz (96.3 kg)  BMI 27.62 kg/m2   Patient is in no acute distress.  Mood good.  Insight good.  Eyes nonicteric.  Pupils equal.  Ears clear.  Nose clear.  Throat clear.  Neck is supple.  No cervical adenopathy.  No thyromegaly.  Heart is regular rate and rhythm.  Lungs clear to auscultation bilaterally.  Respiratory effort is good.  Abdomen is soft, nontender, no hepatosplenomegaly.  Extremities no edema.     ______________________________________________________________________    Diagnostics:    Results for orders placed or performed during the hospital encounter of 02/21/18   Creatinine   Result Value Ref Range    Creatinine 0.80 0.70 - 1.30 mg/dL    GFR MDRD Af Amer >60 >60 mL/min/1.73m2    GFR MDRD Non Af Amer >60 >60 mL/min/1.73m2   Platelet Count   Result Value Ref Range    Platelets 202 140 - 440 thou/uL   Hemoglobin - Daily x 2   Result Value Ref Range    Hemoglobin 11.5 (L) 14.0 - 18.0 g/dL   Potassium - Tomorrow AM   Result Value Ref Range    Potassium 4.9 3.5 - 5.0 mmol/L      ______________________________________________________________________     Impression:    1. Preoperative cardiovascular examination    2. Nuclear sclerotic cataract of both eyes    3. OA (osteoarthritis)    4. Vertigo    5. AMD (age related macular degeneration)    6. HLD (hyperlipidemia)      ______________________________________________________________________     PHQ-2 Total Score: 0 (1/11/2018  9:00 AM)  No Data Recorded    Estimated body mass index is 27.62 kg/(m^2) as calculated from the following:    Height as of this encounter: 6' 1.5\" (1.867 m).    Weight as of this encounter: 212 lb 3.2 oz (96.3 kg).     ______________________________________________________________________     Recommendations:    1. Patient is okay to proceed with surgery as planned.  2. No contraindications to surgery.  3. Continue current medications as " is.  4. Follow-up if issues and otherwise yearly.       German Mcgregor MD  General Internal Medicine  [facility]     Personal office fax - [phone]   Voice mail - [phone]  E-mail - [email or web address]      Return in about 1 year (around 8/2/2019), or if symptoms worsen or fail to improve.     No future appointments.     ______________________________________________________________________     Relevant ICD-10 codes and order associations:      ICD-10-CM    1. Preoperative cardiovascular examination Z01.810    2. Nuclear sclerotic cataract of both eyes H25.13    3. OA (osteoarthritis) M19.90    4. Vertigo R42    5. AMD (age related macular degeneration) H35.30    6. HLD (hyperlipidemia) E78.5

## 2021-06-26 NOTE — PROGRESS NOTES
Progress Notes by German Mcgregor MD at 2/27/2018  1:25 PM     Author: German Mcgregor MD Service: -- Author Type: Physician    Filed: 2/28/2018 11:03 AM Encounter Date: 2/27/2018 Status: Signed    : German Mcgregor MD (Physician)              [facility]    Comprehensive and complex medical care - Chronic disease management - Shared decision making - Care coordination - Compassionate care    Patient advocacy - Rational deprescribing - Minimally disruptive medicine - Ethical focus - Customized care    ______________________________________________________________________     Date of Service: 2/27/2018  Primary Provider: German Mcgregor MD    Patient Care Team:  German Mcgregor MD as PCP - General (Internal Medicine)  Jacky Zayas MD (Ophthalmology)  Chavez Jay MD as Physician (Dermatology)  Kaushik Hood MD as Physician (Orthopedic Surgery)     ______________________________________________________________________     Patient's Pharmacy:    [facility] [facility] [address] [address] [address] [facility] [address]  Phone: [phone] Fax: [phone]     Patient's Contacts:  Name Home Phone Work Phone Mobile Phone Relationship Lg MELITON June [phone] Spouse      Patient's Insurance:    Payor: [employer] / Plan: [employer]EDOM / Product Type: COST PLAN /     ______________________________________________________________________     Nir Guy is 78 y.o. male who comes in today for:    Chief Complaint   Patient presents with   ? Medication Refill     pain med   ? Follow-up     hip replacement       Patient Active Problem List   Diagnosis   ? Osteoarthritis of shoulder   ? HLD (hyperlipidemia)   ? AMD (age related macular degeneration)   ? Vertigo   ? SNHL (sensorineural hearing loss)   ? Tinnitus   ? Former smoker-Quit in 1998   ? Osteoarthritis of left patellofemoral joint   ? Full code  status - 2017   ? Degenerative joint disease (DJD) of hip      Current Outpatient Prescriptions   Medication Sig   ? acetaminophen (TYLENOL) 500 MG tablet Take 2 tablets (1,000 mg total) by mouth 3 (three) times a day. Max 3000mg in 24 hours   ? artificial tears,hypromellose, (GENTEAL, HYPROMELLOSE,) 0.3 % Gel Administer 1 application to both eyes at bedtime.    ? aspirin 325 MG EC tablet Take 1 tablet (325 mg total) by mouth daily.   ? LUTEIN ORAL Take 1 tablet by mouth daily.   ? meclizine (ANTIVERT) 25 mg tablet Take 50 mg by mouth daily.    ? multivitamin with minerals (THERA-M) 9 mg iron-400 mcg Tab tablet Take 1 tablet by mouth daily.   ? OMEGA-3/DHA/EPA/FISH OIL (FISH OIL-OMEGA-3 FATTY ACIDS) 300-1,000 mg capsule Take 1 g by mouth daily.   ? oxyCODONE (ROXICODONE) 5 MG immediate release tablet Take 1-2 tablets (5-10 mg total) by mouth every 3 (three) hours as needed.   ? peg 400-propylene glycol (SYSTANE) 0.4-0.3 % Drop Administer 1 drop to both eyes every morning.    ? senna-docusate (PERICOLACE) 8.6-50 mg tablet Take 1 tablet by mouth 2 (two) times a day.   ? ibuprofen (ADVIL,MOTRIN) 800 MG tablet Take 1 tablet (800 mg total) by mouth every 6 (six) hours as needed for pain.     Social History     Social History Narrative    Lives in [place].  Has significant other.        Patient of Dr. Mcgregor since 2013.      ______________________________________________________________________     History of present illness:    Accompanied by wife today.     Right hip is recovering well.  He can tell it will go well according to him.  Walking well at this time.  Taking pain med 6 times a day and hopefully will be able to lengthen out with time.  Had some swelling in the right leg post op but this is improving.  Some bruising.  Using a DARIANA sock.    Reviewed his spinal stenosis and foraminal stenosis.  No new or worsening symptoms.    Reviewed his pain and he denies any other pain at this time.    Reviewed the  patient's osteoarthritis and things are stable here.     On review of systems, the patient denies any chest pain or shortness of breath.    ______________________________________________________________________    Exam:    Wt Readings from Last 3 Encounters:   02/27/18 218 lb (98.9 kg)   02/21/18 207 lb (93.9 kg)   01/26/18 211 lb (95.7 kg)     BP Readings from Last 3 Encounters:   02/27/18 132/78   02/23/18 138/80   01/26/18 138/82     /78  Pulse 92  Wt 218 lb (98.9 kg)  SpO2 98%  BMI 29.57 kg/m2    The patient is comfortable, no acute distress.  Mood good.  Insight good.  Eyes are nonicteric.  Neck is supple without mass.  No cervical adenopathy.  No thyromegaly. Heart regular rate and rhythm.  Lungs clear to auscultation bilaterally.  Respiratory effort is good.  Abdomen soft and nontender.  No hepatosplenomegaly.  Extremities trace-1+ RLE edema.  Gait is good overall.    ______________________________________________________________________    Diagnostics:    Results for orders placed or performed during the hospital encounter of 02/21/18   Creatinine   Result Value Ref Range    Creatinine 0.80 0.70 - 1.30 mg/dL    GFR MDRD Af Amer >60 >60 mL/min/1.73m2    GFR MDRD Non Af Amer >60 >60 mL/min/1.73m2   Platelet Count   Result Value Ref Range    Platelets 202 140 - 440 thou/uL   Hemoglobin - Daily x 2   Result Value Ref Range    Hemoglobin 11.5 (L) 14.0 - 18.0 g/dL   Potassium - Tomorrow AM   Result Value Ref Range    Potassium 4.9 3.5 - 5.0 mmol/L      ______________________________________________________________________    Assessment:    1. Status post total replacement of right hip    2. OA (osteoarthritis)    3. Spinal stenosis of lumbar region    4. Foraminal stenosis of lumbar region    5. Pain       ______________________________________________________________________     PHQ-2 Total Score: 0 (1/11/2018  9:00 AM)  No Data  Recorded  ______________________________________________________________________       Plan:    1. Appreciate care rendered by Dr. Hood.  2. IF pain not improving after right hip replacement then consider reeval with Dr. Rehan Victor.  3. Pain management through ortho at this time but may reassume in the future if needed (hopefully not).  4. Follow up prn.       German Mcgregor MD  General Internal Medicine  [facility]     Personal office fax - [phone]   Voice mail - [phone]  E-mail - [email or web address]      Return if symptoms worsen or fail to improve.     No future appointments.

## 2021-06-26 NOTE — PROGRESS NOTES
Progress Notes by German Mcgregor MD at 12/26/2017  1:25 PM     Author: German Mcgregor MD Service: -- Author Type: Physician    Filed: 12/28/2017  9:30 PM Encounter Date: 12/26/2017 Status: Signed    : German Mcgregor MD (Physician)              [facility]    Comprehensive and complex medical care - Chronic disease management - Shared decision making - Care coordination - Compassionate care    Patient advocacy - Rational deprescribing - Minimally disruptive medicine - Ethical focus - Customized care    ______________________________________________________________________     Date of Service: 12/26/2017  Primary Provider: German Mcgregor MD    Patient Care Team:  German Mcgregor MD as PCP - General (Internal Medicine)  Jacky Zayas MD (Ophthalmology)  Chavez Jay MD as Physician (Dermatology)     ______________________________________________________________________     Patient's Pharmacy:    [place] [address] [address] [address] [facility] [address]  Phone: [phone] Fax: [phone]     Patient's Contacts:  Name Home Phone Work Phone Mobile Phone Relationship LgMELITON Cantu [phone]   Spouse      Patient's Insurance:    Payor: [employer] / Plan: [employer] / Product Type: COST PLAN /     ______________________________________________________________________     Nir Guy is 77 y.o. male who comes in today for:    Chief Complaint   Patient presents with   ? Results     MRI       Patient Active Problem List   Diagnosis   ? Osteoarthritis of shoulder   ? HLD (hyperlipidemia)   ? AMD (age related macular degeneration)   ? Vertigo   ? SNHL (sensorineural hearing loss)   ? Tinnitus   ? Former smoker-Quit in 1998   ? Osteoarthritis of left patellofemoral joint   ? Full code status - 2017     Current Outpatient Prescriptions   Medication Sig Note   ? artificial tears,hypromellose,  (GENTEAL, HYPROMELLOSE,) 0.3 % Gel Apply or instill  into both eyes daily at bedtime. 4/8/2015: Received from: [facility]   ? aspirin 81 MG EC tablet QOD 4/8/2015: Received from: [facility]   ? LUTEIN EXTRACT/ZEAXANTHIN EXT (LUTEIN-ZEAXANTHIN ORAL)  12/13/2017: Received from: Patient   ? meclizine (ANTIVERT) 25 mg tablet Take 50 mg by mouth Daily at 8:00 am..    ? multivitamin with minerals (VITAMINS & MINERALS) tablet TEBS - eye vit for macular degeneration - 1 po BID 4/8/2015: Received from: [facility]   ? omega 3-dha-epa-fish oil (FISH OIL) 60- mg cap capsule Take 1,000 mg by mouth Daily at 8:00 am.. 1/6/2017: Received from: [facility] Received Sig:    ? peg 400-propylene glycol (SYSTANE) 0.4-0.3 % Drop 1-2 drops. 4/8/2015: Received from: [facility]   ? sildenafil (REVATIO) 20 mg tablet Take 3 tablets (60 mg total) by mouth once as needed (Erection).      Social History     Social History Narrative    Lives in [place].  Has significant other.        Patient of Dr. Mcgregor since 2013.      ______________________________________________________________________     History of present illness:    Patient comes in today with his wife to follow-up his MRI tests.    We reviewed his pain again.  He now has it mainly in his right leg and right upper leg.  It is mainly worse with standing or walking.  It can be on the side of the leg going down the leg and can go a little bit towards her groin.  He has not been using the treadmill due to the pain.  The pain is described as a toothache.  It is not giving out on him.  He denies any numbness down the leg.  He can lay on his side without issue.  Stairs can be a difficult thing for him and he can limp on them.  He has been using ibuprofen or extra strength Tylenol for the pain.  He has been able to sleep okay.  He has been doing some stretching exercises for this.    We reviewed different options were in relationship to this today.  We described  anatomy and what is likely going on.  We reviewed with him the different options for treatment in relationship to this as well.    On review of systems, the patient denies any issues with the left leg or any other joint that is particularly bothersome at this time.  No fevers or chills.    ______________________________________________________________________    Exam:    Wt Readings from Last 3 Encounters:   12/26/17 220 lb (99.8 kg)   11/17/17 214 lb (97.1 kg)   09/21/17 212 lb (96.2 kg)     BP Readings from Last 3 Encounters:   12/26/17 134/76   11/17/17 128/74   09/21/17 136/72     /76  Pulse 71  Wt 220 lb (99.8 kg)  SpO2 99%  BMI 28.25 kg/m2   The patient is comfortable, no acute distress.  Mood good.  Insight good.  Eyes are nonicteric.  Neck is supple without mass.  No cervical adenopathy.  No thyromegaly. Heart regular rate and rhythm.  Lungs clear to auscultation bilaterally.  Respiratory effort is good.  Extremities no edema.  His strength in the lower extremities are equal in all muscle groups.  He has a positive Nigel's maneuver which is referrable to the right lateral groin.  There is no trochanteric bursa pain.  His reflexes are equal in lower extremities.  His gait initially is stiff when he gets up but it does get better after that.  There is no other joint swelling noted.      ______________________________________________________________________    Diagnostics:    Results for orders placed or performed in visit on 11/17/17   Sedimentation Rate   Result Value Ref Range    Sed Rate 4 0 - 15 mm/hr   C-Reactive Protein   Result Value Ref Range    CRP 0.5 0.0 - 0.8 mg/dL   CK Total   Result Value Ref Range    CK, Total 124 30 - 190 U/L   Comprehensive Metabolic Panel   Result Value Ref Range    Sodium 141 136 - 145 mmol/L    Potassium 4.1 3.5 - 5.0 mmol/L    Chloride 105 98 - 107 mmol/L    CO2 28 22 - 31 mmol/L    Anion Gap, Calculation 8 5 - 18 mmol/L    Glucose 94 70 - 125 mg/dL    BUN 13 8  - 28 mg/dL    Creatinine 1.02 0.70 - 1.30 mg/dL    GFR MDRD Af Amer >60 >60 mL/min/1.73m2    GFR MDRD Non Af Amer >60 >60 mL/min/1.73m2    Bilirubin, Total 0.4 0.0 - 1.0 mg/dL    Calcium 9.5 8.5 - 10.5 mg/dL    Protein, Total 6.9 6.0 - 8.0 g/dL    Albumin 3.9 3.5 - 5.0 g/dL    Alkaline Phosphatase 62 45 - 120 U/L    AST 21 0 - 40 U/L    ALT 20 0 - 45 U/L   HM2(CBC w/o Differential)   Result Value Ref Range    WBC 8.4 4.0 - 11.0 thou/uL    RBC 4.60 4.40 - 6.20 mill/uL    Hemoglobin 15.0 14.0 - 18.0 g/dL    Hematocrit 42.9 40.0 - 54.0 %    MCV 93 80 - 100 fL    MCH 32.6 27.0 - 34.0 pg    MCHC 34.9 32.0 - 36.0 g/dL    RDW 11.7 11.0 - 14.5 %    Platelets 197 140 - 440 thou/uL    MPV 6.8 (L) 7.0 - 10.0 fL      ______________________________________________________________________    Pertinent radiology for this visit includes the following:    Mr Lumbar Spine Without Contrast    Result Date: 12/22/2017  MR LUMBAR SPINE WO CONTRAST 12/21/2017 7:56 PM INDICATION: Lower back pain radiating down both legs greater than 3 months. TECHNIQUE: Unenhanced. COMPARISON: None. FINDINGS: 5 lumbar vertebrae are assumed. Mild convex left lumbar curve. There is mild degenerative change of both hips. Right hip joint is distended laterally. Small renal cysts. Ectasia abdominal aorta. Colonic diverticula. T12-L1: Disc dehydration. Mild disc space loss. Small Schmorl's nodes. Mild interbody spurring. Mild annular bulge. Tiny left paracentral disc protrusion. Mild degenerative facet arthropathy. No central canal stenosis. No foraminal stenosis. L1-L2: Mild disc dehydration. Normal disc height. Mild anterior interbody spurring. Mild degenerative facet arthropathy. No central canal stenosis. No foraminal stenosis. L2-L3: Disc dehydration. Moderate disc space loss. Mild interbody spurring. Annular bulge. Mild to moderate degenerative facet arthropathy. Ligamentum flavum thickening. Mild central canal stenosis and left worse than right lateral  recess stenosis. Mild bilateral foraminal stenosis. L3-L4: Disc dehydration. Mild disc space loss. Annular bulge. Advanced degenerative facet arthropathy. Ligamentum flavum thickening. Moderate to severe central canal stenosis and bilateral lateral recess stenosis. Mild bilateral foraminal stenosis. L4-L5: Disc dehydration. Moderate disc space loss. Mild interbody spurring. Diffuse annular bulge. Small shallow right paracentral disc protrusion. Moderate degenerative facet arthropathy. Moderate central canal stenosis and right worse than left bilateral lateral recess stenosis. Moderate right and mild left foraminal stenosis. L5-S1: Normal disc signal and height. Mild degenerative facet arthropathy. Small complex synovial cyst posterior to the left facet joint. No central canal stenosis. No foraminal stenosis. 1.2 cm rounded intermediate T1 and low T2 signal lesion in the S1 segment of the sacrum. Probable small hemangioma in the T12 vertebral body. No other significant marrow signal abnormality. Conus negative. Mild degenerative change of the SI joints. No significant paravertebral soft tissue abnormality.     CONCLUSION: 1.  Degenerative changes in the lumbar spine as described above. 2.  At L3-L4 there is moderate to severe central canal stenosis, bilateral lateral recess stenosis, and mild bilateral foraminal stenosis. 3.  At L4-L5 there is moderate central canal stenosis, right worse than left bilateral lateral recess stenosis, and moderate right and mild left foraminal stenosis. 4.  At L2-L3 there is mild central canal stenosis and bilateral foraminal stenosis. 5.  1.2 cm lesion in the S1 sacral body is indeterminate but more likely benign. Bone island or hemangioma suspected. Recommend 3 month follow-up MRI to confirm stability. Alternatively, CT could provide more immediate evaluation if desired clinically.    Mr Hip Without Contrast Right    Result Date: 12/22/2017  MRI RIGHT HIP/PELVIS WO CONTRAST 12/21/2017  7:52 PM INDICATION: Right hip pain and upper leg pain 3 months, worsening. TECHNIQUE: Routine. COMPARISON: Plain films 9/21/2017. FINDINGS: RIGHT HIP: Extensive degenerative tearing of the right acetabular labrum. There is likely grade 3-4 chondromalacia humeral head superiorly and posterosuperiorly and grade 2-3 chondromalacia of the acetabulum. Mild subchondral marrow edema femoral head. There is distention of the right hip joint capsule laterally with debris, likely synovial proliferation, and at least one loose body. Small effusion. Marginal spurring. LEFT HIP: Mild degenerative change. TENDONS: There is chronic likely high-grade partial tearing involving the origin of the right rectus femoris at the anteroinferior iliac spine. Proximal left rectus femoris is intact and negative. Mild distal gluteal tendinopathy at both greater trochanters, no focal tearing. Mild proximal hamstring tendinopathy. Low-grade partial tearing of the proximal left hamstring tendons. Distal iliopsoas tendons are intact with probably mild tendinopathy on the right. BONES AND SOFT TISSUES: 1.2 cm rounded marrow-replacing lesion in the S1 segment of sacrum is further described on the MRI lumbar spine of the same date. No other marrow signal abnormality. Nodular prostatic hypertrophy. Colonic diverticula.     CONCLUSION: 1.  Moderate degenerative arthritis of the right hip joint. There is distention of the right hip joint laterally with debris, likely small loose bodies, and probably synovial proliferation, nonspecific. Small effusion. 2.  Milder degenerative change of the left hip joint. 3.  Chronic partial tearing involving the proximal right rectus femoris. 4.  Indeterminate lesion in the S1 segment of the sacrum. See MRI lumbar spine for further details.      ______________________________________________________________________    ______________________________________________________________________    Assessment:    1.  Osteoarthritis of right hip    2. Lumbar spinal stenosis       ______________________________________________________________________      PHQ-2 Total Score: 0 (9/21/2017 10:00 AM)  No Data Recorded  ______________________________________________________________________    Plan:    1. Right now I think that most of his pain is coming from the right hip joint rather than the back.  We will attempt aspiration and injection of the right hip to see if this helps.  This was ordered under fluoroscopy.  2. We will contact him after this or he will contact us to determine if we need to do anything more.  We would likely attempt a foraminal injection on the right L4-5 region as well if the hip injection is not helpful.  Next  3. He may need to see orthopedics both hip and spine.  4. This was reviewed with him and his wife.  We talked about proactive referral to orthopedics, but they declined this at this time.    25 minutes or greater were spent with the patient today with greater than 50% of the times spent in counseling and coordination of care.     German Mcgregor MD  General Internal Medicine  [facility]     Return if symptoms worsen or fail to improve.     Future Appointments  Date Time Provider Department Center   1/9/2018 9:15 AM German Mcgregor MD [facility]

## 2021-06-27 NOTE — PROGRESS NOTES
Progress Notes by German Mcgregor MD at 3/22/2019  9:15 AM     Author: German Mcgregor MD Service: -- Author Type: Physician    Filed: 3/22/2019  8:58 PM Encounter Date: 3/22/2019 Status: Signed    : German Mcgregor MD (Physician)            [facility]    Comprehensive and complex medical care - Chronic disease management - Shared decision making - Care coordination - Compassionate care    Patient advocacy - Rational deprescribing - Minimally disruptive medicine - Ethical focus - Customized care    ______________________________________________________________________     Date of Service: 3/22/2019  Primary Provider: German Mcgregor MD    Patient Care Team:  German Mcgregor MD as PCP - General (Internal Medicine)  Jacky Zayas MD (Ophthalmology)  Chavez Jay MD as Physician (Dermatology)  Kaushik Hood MD as Physician (Orthopedic Surgery)   ______________________________________________________________________     Patient's Pharmacy:    [facility] [address] [address]  Phone: [phone] Fax: [phone]     Patient's Contacts:  Name Home Phone Work Phone Mobile Phone Relationship Lgl MELITON June [phone] Spouse      Patient's Insurance:    Payor: MEDICARE / Plan: MEDICARE A AND B / Product Type: Medicare /     Subjective:     History of present illness:    Nir Guy is an 79 y.o. male here for an annual wellness visit.    Patient comes in today for annual wellness visit and other issues.  His insurance no longer covers physicals.    We reviewed his hyperlipidemia and will check up on this today.  He has generally been doing well with this.  We reviewed the limited effects of aspirin and the potential risk.  I advised him to stop it.  He is going to do so.    We reviewed colon cancer screening with him.  He had a  diminutive single tubular adenoma in 2011.  We offered repeat colonoscopy at this time.  We reviewed current guidelines.  He elects to not do colonoscopy unless he would have symptoms in the future.    Reviewed his right hip osteoarthritis in his right hip replacement has gone very well for him.  He recently had his 1 year follow-up and things look good related to this.    His cataract surgeries went well for him.    He has a mild knee effusion on the left but denies any significant pain or swelling in the knee.  Orthopedics is aware of this.    He has had worsening hearing.  He had some acute hearing loss of his left ear previous.  He has asymmetric hearing loss.  He wants to consider hearing aids.  I noticed that an audiologist at [facility] has recently joined that group and is known for excellent care.  I will refer him to her.    He has some hammertoe deformities.  This is in both feet.  He does stretch the front of his feet to help with this.  He does have some numbness in the bottoms of his feet.    We reviewed his other issues noted in the assessment but not specifically addressed in the HPI above.     Review of Systems:  The 10-system review of systems and health history [facility] was completed by patient, reviewed by me, and pertinent problems are reviewed above.     ______________________________________________________________________     Active Problem List:  Problem List as of 3/22/2019 Reviewed: 3/22/2019  8:46 PM by German Mcgregor MD       High    Former smoker - Quit in 1998    Full code status - 2017    Vertigo       Medium    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    Osteoarthritis of left patellofemoral joint    Osteoarthritis of shoulder       Low    AMD (age related macular degeneration)    HLD (hyperlipidemia)    SNHL (sensorineural hearing loss)    Tinnitus       Unprioritized    Intermediate stage nonexudative age-related macular degeneration of both eyes            Past Medical History:   Diagnosis Date   ? AMD (age related macular degeneration)    ? Former smoker-Quit in 1998    ? Full code status - 2017 1/6/2017   ? Hyperlipidemia    ? 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    ? Osteoarthritis of left patellofemoral joint 1/6/2017   ? Osteoarthritis of shoulder    ? S/P colonoscopy 4/18/11   ? SNHL (sensorineural hearing loss)    ? Squamous cell carcinoma of skin    ? Tinnitus    ? Vertigo       Past Surgical History:   Procedure Laterality Date   ? BASAL CELL CARCINOMA EXCISION  1999    Nose and scalp   ? CARPAL TUNNEL RELEASE Right    ? CATARACT EXTRACTION, BILATERAL  2018   ? GA TOTAL HIP ARTHROPLASTY Right 2/21/2018    Procedure:  RIGHT TOTAL HIP ARTHROPLASTY DIRECT ANTERIOR APPROACH;  Surgeon: Kaushik Hood MD;  Location: [facility] OR;  Service: Orthopedics   ? VASECTOMY  1980's      Family History   Problem Relation Age of Onset   ? Cancer Mother         GYN   ? Cancer Father         Lung   ? Heart disease Father      Family history is otherwise noncontributory.     Social History     Socioeconomic History   ? Marital status: Single     Spouse name: None   ? Number of children: 4   ? Years of education: None   ? Highest education level: None   Occupational History   ? Occupation: Lithographer     Employer: RETIRED   Social Needs   ? Financial resource strain: None   ? Food insecurity:     Worry: None     Inability: None   ? Transportation needs:     Medical: None     Non-medical: None   Tobacco Use   ? Smoking status: Never Smoker   ? Smokeless tobacco: Never Used   Substance and Sexual Activity   ? Alcohol use: Yes     Alcohol/week: 4.8 oz     Types: 7 Standard drinks or equivalent, 1 Glasses of wine per week     Comment: nightly   ? Drug use: No   ? Sexual activity: None   Lifestyle   ? Physical activity:     Days per week: None     Minutes per session: None   ? Stress: None   Relationships   ? Social connections:     Talks on phone: None      Gets together: None     Attends Scientologist service: None     Active member of club or organization: None     Attends meetings of clubs or organizations: None     Relationship status: None   ? Intimate partner violence:     Fear of current or ex partner: None     Emotionally abused: None     Physically abused: None     Forced sexual activity: None   Other Topics Concern   ? None   Social History Narrative    Lives in [place].  Has significant other.        Patient of Dr. Mcgregor since 2013.       Current Outpatient Medications   Medication Sig   ? artificial tears,hypromellose, (GENTEAL, HYPROMELLOSE,) 0.3 % Gel Administer 1 application to both eyes at bedtime.    ? meclizine (ANTIVERT) 25 mg tablet Take 50 mg by mouth daily.    ? multivitamin with minerals (THERA-M) 9 mg iron-400 mcg Tab tablet Take 1 tablet by mouth daily.   ? OMEGA-3/DHA/EPA/FISH OIL (FISH OIL-OMEGA-3 FATTY ACIDS) 300-1,000 mg capsule Take 1 g by mouth daily.   ? sildenafil, antihypertensive, (REVATIO) 20 mg tablet Take 3 tablets (60 mg total) by mouth once as needed (Erection).      Allergies: Patient has no known allergies.     Immunization History   Administered Date(s) Administered   ? Influenza [id number], 01/04/2010   ? Influenza high dose, seasonal 09/18/2012, 09/06/2013, 10/29/2015, 09/22/2016, 09/15/2017   ? Influenza, inj, historic,unspecified 11/05/1999, 11/01/2001, 11/11/2002, 11/11/2003, 11/04/2004, 11/03/2005, 10/25/2007, 10/21/2008, 09/21/2009, 09/21/2010, 11/11/2011, 09/06/2013, 09/23/2016, 09/02/2018   ? Influenza,seasonal, Inj IIV3 11/05/1999, 11/01/2001, 11/11/2002, 11/11/2003, 11/04/2004, 11/03/2005, 10/25/2007, 10/21/2008, 09/21/2009, 09/21/2010, 11/11/2011   ? Pneumo Conj 13-V (2010&after) 12/28/2007, 12/28/2014   ? Pneumo Polysac 23-V 03/18/1993, 12/28/2007   ? Td, Adult, Absorbed 03/18/1993, 11/11/2003   ? Td,adult,historic,unspecified 11/11/2003   ? Tdap 03/11/2011   ? Varicella 12/12/1998   ? ZOSTER, LIVE 12/28/2014  "  ? ZOSTER, RECOMBINANT, BAIRON 05/29/2018, 08/02/2018      Objective:     Vital Signs:   Visit Vitals  /82   Pulse 73   Ht 5' 11.75\" (1.822 m)   Wt 211 lb 6.4 oz (95.9 kg)   SpO2 96%   BMI 28.87 kg/m       Wt Readings from Last 3 Encounters:   03/22/19 211 lb 6.4 oz (95.9 kg)   08/02/18 212 lb 3.2 oz (96.3 kg)   02/27/18 218 lb (98.9 kg)     BP Readings from Last 3 Encounters:   03/22/19 124/82   08/02/18 126/70   02/27/18 132/78     Vision Screening:  No exam data present     PHYSICAL EXAM  The patient is comfortable, no acute distress.  Mood good.  Insight is good.  No skin lesions or nodules of concern.  Ears clear.  Eyes are nonicteric.  Pupils equal and reactive.  Throat is clear.  Neck is supple without mass, no thyromegaly. No cervical or epitrochlear adenopathy.  Heart regular rate and rhythm.  Lungs clear to auscultation bilaterally.  Respiratory effort good.  Abdomen soft and nontender.  No hepatosplenomegaly.  No aortic aneurysm detected.  Extremities show no edema. There are hammertoe deformities in both feet, more prominent in the second and 3rd toes.  No sores or irritation of the toes.  He has a left knee effusion with a little warmth to it as well.  No limp or other pain in the knee.     Assessment Results 3/22/2019   Activities of Daily Living No help needed   Instrumental Activities of Daily Living No help needed   Mini Cog Total Score 5   Some recent data might be hidden     A Mini-Cog score of 0-2 suggests the possibility of dementia, score of 3-5 suggests no dementia    Diagnostics:     Recent Results (from the past 240 hour(s))   Lipid [place] FASTING   Result Value Ref Range    Cholesterol 218 (H) <=199 mg/dL    Triglycerides 135 <=149 mg/dL    HDL Cholesterol 59 >=40 mg/dL    LDL Calculated 132 (H) <=129 mg/dL    Patient Fasting > 8hrs? Unknown    Basic Metabolic Panel   Result Value Ref Range    Sodium 141 136 - 145 mmol/L    Potassium 4.9 3.5 - 5.0 mmol/L    Chloride 103 98 - 107 mmol/L "    CO2 28 22 - 31 mmol/L    Anion Gap, Calculation 10 5 - 18 mmol/L    Glucose 97 70 - 125 mg/dL    Calcium 9.4 8.5 - 10.5 mg/dL    BUN 14 8 - 28 mg/dL    Creatinine 0.96 0.70 - 1.30 mg/dL    GFR MDRD Af Amer >60 >60 mL/min/1.73m2    GFR MDRD Non Af Amer >60 >60 mL/min/1.73m2   HM2(CBC w/o Differential)   Result Value Ref Range    WBC 6.8 4.0 - 11.0 thou/uL    RBC 5.05 4.40 - 6.20 mill/uL    Hemoglobin 16.0 14.0 - 18.0 g/dL    Hematocrit 47.2 40.0 - 54.0 %    MCV 93 80 - 100 fL    MCH 31.6 27.0 - 34.0 pg    MCHC 33.8 32.0 - 36.0 g/dL    RDW 11.5 11.0 - 14.5 %    Platelets 214 140 - 440 thou/uL    MPV 7.0 7.0 - 10.0 fL        ______________________________________________________________________    HEAD MRI/MRA WITHOUT IV CONTRAST  3/27/2014 9:38 AM    INDICATION: Recurrent vertigo with dizziness and intractable vomiting.  TECHNIQUE: Head MRI/MRA without intravenous contrast.  COMPARISON: Brain MRI 04/25/2011 performed at [facility] and brain   MRA performed 01/10/2008 at [facility]    FINDINGS:   MRI brain: There is no restricted diffusion. Mild mucosal thickening within   the paranasal sinuses. Mastoid air cells appear free from significant   disease. Intraorbital contents are unremarkable. Ventricles are within   normal limits in size for the patient's age. Intracranial flow voids are   intact. There is no mass effect, midline shift, or extraaxial collection.   T2 signal hyperintensity within the lower medulla on axial T2-weighted   image 4. This is felt to represent artifact as it is not visible on the   coronal T2 or axial FLAIR images. Scattered foci of T2 and FLAIR   hyperintensity within the supratentorial white matter presumably reflect   the sequela of chronic small vessel ischemic injury. No evidence for acute   or chronic intracranial blood products.            MRA head: No proximal vessel stenosis or occlusion. No high flow vascular   lesion. There is a less than 1 mm posteriorly  projecting outpouching from   the left A1/A2 junction (series 2 image 119) which could represent a tiny   aneurysm or infundibulum related to a nonvisualized branch vessel.    CONCLUSION:  MRI brain:  1.  No acute intracranial findings. No evidence for recent ischemia,   intracranial hemorrhage, or mass.  2.  A few scattered foci of T2 hyperintensity within the suprasternal white   matter presumably reflect the sequela of chronic small vessel ischemic   injury.  3.  Mild inflammatory changes in the paranasal sinuses.    MRA head:  1.  No proximal vessel occlusion or intracranial stenosis. No high flow   vascular lesion.  2.  Tiny, less than 1 mm, aneurysm versus infundibulum in the anterior   communicating artery region as above.    ______________________________________________________________________     Cassandra Melo AU.D. - 05/02/2014 5:50 PM CDT     Subjective: Nir Guy is a 74 yr male male seen for audiometric evaluationat the request of Dr. Chun. He reports having a vertiginous episode on 3/26/14 subsequent to URI. Since that time the dizziness has largely resolved with the exception of lying on his left side. However; he does report a constant state of disequilibrium since the original vertigo attack. He also reports a constant tinnitus left ear greater than right since that time (3/26/14). He denies any recent ear infections, trauma or ear surgery. He denies any subjective sensation of hearing loss. His history is positive for noise exposure including 42 years working around [employer]. Family history is positive for hearing loss including his father. The patient denies having any recent hearing evaluations therefore today's findings will serve as his baseline.    Objective: Otoscopic examination waswithin normal limits and no obstructions were noted for either ear. Pure tone test results reveal normal hearing thresholds from 250-1000 Hz, with a mild to moderate, sensorineural loss  from 4942-6937 Hz, left ear greater than right. The left ear shows a 15-30 dB asymmetry when compared to the right ear from 5505-3288 Hz. Word identification scores were excellent at 100% and 92% at presentation levels of 40 dB HL for the right and 50 dB HL for the left ears. Impedance testing revealed normal tympanic membrane mobility, bilaterally. Acoustic reflexes were absent for the right ear therefore acoustic reflex decay could not be evaluated. Left ear acoustic reflexes were present without decay.    Assessment: Dizziness/disequilibrium provoking on left side. Increasing left ear tinnitus over the past 1-2 months. Mild to severe, high-frequency,sensorineural loss from 9925-9833 Hz, left ear greater than right. Absent acoustic reflexes for the right ear which could not be explained on the basis of his hearing levels.    Plan: The above findings were reviewed with the patient. Based on the history of dizziness, tinnitus and the asymmetric hearing loss, left ear greater than right an ENT consultation was recommended. A repeat audiogram is also recommended in approximately 6 months to monitor for any further hearing threshold decline. He should of course return sooner if he notices an increase in tinnitus, the recurrence of dizziness or any further decline in his hearing thresholds. He is also strongly advised to use ear protection for any future exposures to industrial or recreational noise exposure and he understands the importance of this.     ______________________________________________________________________     PHQ-2 Total Score: 0 (3/22/2019  9:00 AM)    No Data Recorded  ______________________________________________________________________      Assessment:     1. Medicare annual wellness visit, subsequent    2. Lipid screening    3. Screening for metabolic disorder    4. Mixed hyperlipidemia    5. Primary osteoarthritis of right hip    6. Osteoarthritis of left patellofemoral joint    7.  Osteoarthritis of shoulder, unspecified laterality, unspecified osteoarthritis type    8. Sensorineural hearing loss (SNHL) of left ear with restricted hearing of right ear    9. Tinnitus of both ears    10. Knee effusion, left    11. Hammertoe, bilateral         Plan:     1. Check blood work today.  See relevant orders and diagnosis associations at the bottom of this note.   2. I referred him to Bre Meza at [facility] for repeat audiology and hearing aid if needed.  May see otolaryngology (ENT) if needed.  3. Old results as noted above.  4. Follow up with orthopedics if the left knee worsens.  5. Discussed management of hammertoes.         German Mcgregor MD  General Internal Medicine  [facility]     Personal office fax - [phone]   Voice mail - [phone]  E-mail - [email or web address]     Return in about 1 year (around 3/22/2020) for visit and blood work.     No future appointments.    ______________________________________________________________________     Relevant ICD-10 codes and order associations:      ICD-10-CM    1. Medicare annual wellness visit, subsequent Z00.00    2. Lipid screening Z13.220    3. Screening for metabolic disorder Z13.228    4. Mixed hyperlipidemia E78.2 Lipid [place] FASTING     Basic Metabolic Panel     HM2(CBC w/o Differential)   5. Primary osteoarthritis of right hip M16.11    6. Osteoarthritis of left patellofemoral joint M17.12    7. Osteoarthritis of shoulder, unspecified laterality, unspecified osteoarthritis type M19.019    8. Sensorineural hearing loss (SNHL) of left ear with restricted hearing of right ear H90.A22    9. Tinnitus of both ears H93.13    10. Knee effusion, left M25.462    11. Hammertoe, bilateral M20.41     M20.42         Identified Health Risks:     A personalized health plan based on the identified health risks was provided to the patient on the AVS.    ______________________________________________________________________      The following health maintenance schedule was reviewed with the patient and provided in printed form in the after visit summary:     Health Maintenance   Topic Date Due   ? FALL RISK ASSESSMENT  03/22/2020   ? TD 18+ HE  03/11/2021   ? ADVANCE DIRECTIVES DISCUSSED WITH PATIENT  01/07/2022   ? PNEUMOCOCCAL POLYSACCHARIDE VACCINE AGE 65 AND OVER  Completed   ? INFLUENZA VACCINE RULE BASED  Completed   ? PNEUMOCOCCAL CONJUGATE VACCINE FOR ADULTS (PCV13 OR PREVNAR)  Completed   ? ZOSTER VACCINES  Completed        ______________________________________________________________________

## 2021-06-28 NOTE — PROGRESS NOTES
Progress Notes by German Mcgregor MD at 3/17/2020  2:15 PM     Author: German Mcgregor MD Service: -- Author Type: Physician    Filed: 3/19/2020 10:09 AM Encounter Date: 3/17/2020 Status: Signed    : German Mcgregor MD (Physician)            [facility]  Dr. German Mcgregor             Date of Service: 3/17/2020  Primary Provider: German Mcgregor MD    Patient Care Team:  German Mcgregor MD as PCP - General (Internal Medicine)  Jacky Zayas MD (Ophthalmology)  Chavez Jay MD as Physician (Dermatology)  Kaushik Hood MD as Physician (Orthopedic Surgery)  German Mcgregor MD as Assigned PCP     ______________________________________________________________________     Patient's Pharmacy:    [facility] - [address] [address]  Phone: [phone] Fax: [phone]     Patient's Contacts:  Name Home Phone Work Phone Mobile Phone Relationship Lgl MELITON June [phone] Spouse    DECLINED, PER PT    Declined      Patient's Insurance:    Payor: MEDICARE / Plan: MEDICARE A AND B / Product Type: Medicare /            Nir Guy is a 80 y.o. male who comes in today for:    Chief Complaint   Patient presents with   ? Follow-up     toe   ? Test Results     CT       Active Problem List:  Problem List as of 3/17/2020 Reviewed: 3/2/2020  4:13 PM by German Mcgregor MD       High    Former smoker - Quit in 1998    Full code status - 2017    Vertigo       Medium    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    Osteoarthritis of left patellofemoral joint    Osteoarthritis of shoulder       Low    AMD (age related macular degeneration)    HLD (hyperlipidemia)    SNHL (sensorineural hearing loss)    Tinnitus       Unprioritized    Acute gout of left foot, unspecified cause    Intermediate stage nonexudative age-related macular degeneration of  both eyes    Osteoarthritis of fingers of both hands           Current Outpatient Medications   Medication Sig   ? artificial tears,hypromellose, (GENTEAL, HYPROMELLOSE,) 0.3 % Gel Administer 1 application to both eyes at bedtime.    ? codeine-guaiFENesin (GUAIFENESIN AC)  mg/5 mL liquid Take 5-10 mL by mouth every 4 (four) hours as needed for cough.   ? HYDROcodone-acetaminophen 5-325 mg per tablet Take 1 tablet by mouth every 4 (four) hours as needed for pain.   ? meclizine (ANTIVERT) 25 mg tablet Take 50 mg by mouth daily.    ? multivitamin with minerals (THERA-M) 9 mg iron-400 mcg Tab tablet Take 1 tablet by mouth daily.   ? OMEGA-3/DHA/EPA/FISH OIL (FISH OIL-OMEGA-3 FATTY ACIDS) 300-1,000 mg capsule Take 1 g by mouth daily.   ? vit A/vit C/vit E/zinc/copper (ICAPS AREDS ORAL) Take by mouth.     Social History     Social History Narrative    Lives in [place].  Has significant other.        Patient of Dr. Mcgregor since 2013.      Subjective:     Patient comes in today for follow-up of issues.    We first reviewed his cellulitis of his fourth toe on the left.  This is doing better after antibiotics.  He did not have to use the hydrocodone.  He did have a corn on this toe and this is doing better.  It is healing.  We reviewed this with him.  We reviewed different options related to treating this.  He will need to follow-up if not doing well related to this.  He has no other symptoms of gout even though this was the initial diagnosis by an outside provider.    We reviewed his CAT scan which showed emphysema.  He is scheduled for PFTs.  We reviewed this with him.  He has had recurrent bronchitis issues.  We will likely continue to do steroids and antibiotics on hand given his significant infections.  We also want to look at inhalers for him.  He is not particularly interested inhalers at this point.    We reviewed his other issues noted in the assessment but not specifically addressed in the HPI above.      On review of systems, the patient denies any chest pain or shortness of breath.    Objective:     Wt Readings from Last 3 Encounters:   03/17/20 209 lb (94.8 kg)   03/02/20 215 lb (97.5 kg)   01/17/20 211 lb (95.7 kg)     BP Readings from Last 3 Encounters:   03/17/20 122/70   03/02/20 138/82   01/17/20 136/72     /70   Pulse 69   Wt 209 lb (94.8 kg)   SpO2 95%   BMI 28.54 kg/m     The patient is comfortable, no acute distress.  Mood good.  Insight good.  Eyes are nonicteric.  Neck is supple without mass.  No cervical adenopathy.  No thyromegaly. Heart regular rate and rhythm.  Lungs clear to auscultation bilaterally.  Respiratory effort is good.  Abdomen soft and nontender.  No hepatosplenomegaly.  Extremities no edema.  The left fourth toe shows a significantly improved corn over this side.  There is just a little small 2 mm area of hyperkeratosis at this point.  There is a little postinflammatory change in the toe but no signs of aristeo cellulitis.      Diagnostics:     Results for orders placed or performed in visit on 11/22/19   Comprehensive Metabolic Panel   Result Value Ref Range    Sodium 138 136 - 145 mmol/L    Potassium 4.3 3.5 - 5.0 mmol/L    Chloride 99 98 - 107 mmol/L    CO2 27 22 - 31 mmol/L    Anion Gap, Calculation 12 5 - 18 mmol/L    Glucose 102 70 - 125 mg/dL    BUN 8 8 - 28 mg/dL    Creatinine 0.79 0.70 - 1.30 mg/dL    GFR MDRD Af Amer >60 >60 mL/min/1.73m2    GFR MDRD Non Af Amer >60 >60 mL/min/1.73m2    Bilirubin, Total 0.4 0.0 - 1.0 mg/dL    Calcium 8.3 (L) 8.5 - 10.5 mg/dL    Protein, Total 5.7 (L) 6.0 - 8.0 g/dL    Albumin 2.9 (L) 3.5 - 5.0 g/dL    Alkaline Phosphatase 62 45 - 120 U/L    AST 26 0 - 40 U/L    ALT 28 0 - 45 U/L   C-Reactive Protein   Result Value Ref Range    CRP 8.1 (H) 0.0 - 0.8 mg/dL   Culture, Blood    Specimen: Vein, Peripheral; Blood   Result Value Ref Range    Anaerobic Blood Culture Bottle No Growth No Growth, No organisms seen, bottle returned to  instrument, Specimen not received, No Growth at 24 hours, No Growth at 48 hours, No Growth at 72 hours, No Growth at 96 hours, No Growth at 120 hours    Aerobic Blood Culture Bottle No Growth No Growth, No organisms seen, bottle returned to instrument, Specimen not received, No Growth at 24 hours, No Growth at 120 hours, No Growth at 48 hours, No Growth at 72 hours, No Growth at 96 hours   Procalcitonin   Result Value Ref Range    Procalcitonin 0.04 0.00 - 0.49 ng/mL   HM1 (CBC with Diff)   Result Value Ref Range    WBC 13.4 (H) 4.0 - 11.0 thou/uL    RBC 4.43 4.40 - 6.20 mill/uL    Hemoglobin 14.2 14.0 - 18.0 g/dL    Hematocrit 42.1 40.0 - 54.0 %    MCV 95 80 - 100 fL    MCH 32.0 27.0 - 34.0 pg    MCHC 33.7 32.0 - 36.0 g/dL    RDW 10.9 (L) 11.0 - 14.5 %    Platelets 329 140 - 440 thou/uL    MPV 6.4 (L) 7.0 - 10.0 fL    Neutrophils % 79 (H) 50 - 70 %    Lymphocytes % 13 (L) 20 - 40 %    Monocytes % 5 2 - 10 %    Eosinophils % 3 0 - 6 %    Basophils % 1 0 - 2 %    Neutrophils Absolute 10.5 (H) 2.0 - 7.7 thou/uL    Lymphocytes Absolute 1.7 0.8 - 4.4 thou/uL    Monocytes Absolute 0.7 0.0 - 0.9 thou/uL    Eosinophils Absolute 0.4 0.0 - 0.4 thou/uL    Basophils Absolute 0.1 0.0 - 0.2 thou/uL       ______________________________________________________________________    Pertinent radiology for this visit includes the following:    CT Chest High Resolution Without Contrast  Narrative: EXAM: CT CHEST HIGH RESOLUTION WO CONTRAST  LOCATION: [facility]  DATE/TIME: 3/13/2020 9:12 AM    INDICATION: Dyspnea on exertion Chronic cough.  Recurrent pulmonary infections.  SOB.  History of smoking.  COMPARISON: No prior cross-sectional imaging of the chest; chest radiography 11/22/2019 and 4/2/2019  TECHNIQUE: High resolution images were obtained through the chest during inspiration with select expiratory views. Prone imaging was performed. Multiplanar reformats were obtained. Dose reduction techniques were used.  IV CONTRAST:  None.    FINDINGS:   LUNGS AND PLEURA:   Upper lung zone predominant emphysema. Mild symmetric subpleural apical scarring. Limited peripheral interstitial opacities are present in the inferior right middle lobe and lingula consistent with mild parenchymal scarring. No subpleural reticulation is   present in the posterior costophrenic sulci. A solitary focus of peribronchovascular soft tissue thickening in the medial basal right lower lobe measures between 5 and 6 mm (series 5, image 206). There are no airspace opacities. No geographic lung   attenuation on expiratory images to suggest regional air trapping.    Mild central airway wall thickening is present. Mild cylindrical bronchiectasis of third/fourth generation subsegmental airways which are relatively removed from the subpleural lung.    A subsegmental pulmonary artery in the lateral basal left lower lobe is focally dilated and higher attenuation, potentially isolated chronic thrombus (series 5, image 273).    MEDIASTINUM: Cardiac chambers are normal in size. Three-vessel atheromatous coronary calcifications. No pericardial effusion. The main pulmonary artery is normal in size. Normal caliber thoracic aorta. Mild arch and proximal great vessel atheromatous   calcifications.    No enlarged mediastinal or hilar lymph nodes.    Small hiatal hernia. The esophagus is decompressed.    UPPER ABDOMEN: No actionable findings in the imaged upper abdomen.    MUSCULOSKELETAL: Small thoracic spine degenerative osteophytes. No fractures or bone lesions are present.  Impression: 1.  There are no findings of a diffuse fibrosing lung disorder or obstructive small airways process.  2.  Emphysema.  3.  Small focus of nodular peribronchial soft tissue thickening in the medial right lower lobe. Follow-up chest CT in 12 months is suggested to reevaluate this indeterminate finding.  4.  Three-vessel atheromatous coronary calcifications.  5.  Small hiatal  hernia      ______________________________________________________________________    Assessment:     1. Cellulitis of fourth toe of left foot    2. Pulmonary emphysema, unspecified emphysema type (H)    3. Corn of toe    4. History of recurrent pulmonary infection        Quality review:     PHQ-2 Total Score: 0 (1/17/2020  2:00 PM)    No data recorded  ______________________________________________________________________     BMI Readings from Last 1 Encounters:   03/17/20 28.54 kg/m      Plan:     1. If the patient worsens, he will need to be seen in the clinic.  2. Consider sed rate, CRP, CBC if he is worsening.  3. Consider MRI if worsening.  4. Await PFTs.  5. Will have low threshold to treat worsening respiratory symptoms with antibiotics and steroids.      25 minutes or greater were spent with the patient today with greater than 50% of the times spent in counseling and coordination of care.  This involved going over diagnostic studies done at or before the visit and instructions for management of diseases above in the assessment.        Greman Mcgregor MD  General Internal Medicine  [facility]       Return in about 4 months (around 7/17/2020), or if symptoms worsen or fail to improve, for physical.     No future appointments.      ______________________________________________________________________     Relevant ICD-10 codes and order associations:      ICD-10-CM    1. Cellulitis of fourth toe of left foot  L03.032    2. Pulmonary emphysema, unspecified emphysema type (H)  J43.9    3. Corn of toe  L84    4. History of recurrent pulmonary infection  Z86.19

## 2021-06-28 NOTE — PROGRESS NOTES
Progress Notes by German Mcgregor MD at 11/26/2019 10:30 AM     Author: German Mcgregor MD Service: -- Author Type: Physician    Filed: 11/27/2019 11:13 AM Encounter Date: 11/26/2019 Status: Signed    : German Mcgregor MD (Physician)              [facility]    Comprehensive and complex medical care - Chronic disease management - Shared decision making - Care coordination - Compassionate care    Patient advocacy - Rational deprescribing - Minimally disruptive medicine - Ethical focus - Customized care          Date of Service: 11/26/2019  Primary Provider: German Mcgregor MD    Patient Care Team:  German Mcgregor MD as PCP - General (Internal Medicine)  Jacky Zayas MD (Ophthalmology)  Chavez Jay MD as Physician (Dermatology)  Kaushik Hood MD as Physician (Orthopedic Surgery)  German Mcgregor MD as Assigned PCP     ______________________________________________________________________     Patient's Pharmacy:    [facility] [address] [address]  Phone: [phone] Fax: [phone]     Patient's Contacts:  Name Home Phone Work Phone Mobile Phone Relationship Lgl MELITON June [phone] Spouse    DECLINED, PER PT    Declined      Patient's Insurance:    Payor: MEDICARE / Plan: MEDICARE A AND B / Product Type: Medicare /           Nir Guy is a 79 y.o. male who comes in today for:    Chief Complaint   Patient presents with   ? Follow-up     cough and diarrhea, is feeling a little better, how did he get walking pneumonia       Active Problem List:  Problem List as of 11/26/2019 Reviewed: 11/25/2019  7:13 AM by German Mcgregor MD       High    Former smoker - Quit in 1998    Full code status - 2017    Vertigo       Medium    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    Osteoarthritis of left patellofemoral  joint    Osteoarthritis of shoulder       Low    AMD (age related macular degeneration)    HLD (hyperlipidemia)    SNHL (sensorineural hearing loss)    Tinnitus       Unprioritized    Intermediate stage nonexudative age-related macular degeneration of both eyes    Osteoarthritis of fingers of both hands           Current Outpatient Medications   Medication Sig   ? artificial tears,hypromellose, (GENTEAL, HYPROMELLOSE,) 0.3 % Gel Administer 1 application to both eyes at bedtime.    ? codeine-guaiFENesin (GUAIFENESIN AC)  mg/5 mL liquid Take 5-10 mL by mouth every 4 (four) hours as needed for cough.   ? levoFLOXacin (LEVAQUIN) 500 MG tablet Take 1 tablet (500 mg total) by mouth daily for 10 days.   ? meclizine (ANTIVERT) 25 mg tablet Take 50 mg by mouth daily.    ? multivitamin with minerals (THERA-M) 9 mg iron-400 mcg Tab tablet Take 1 tablet by mouth daily.   ? OMEGA-3/DHA/EPA/FISH OIL (FISH OIL-OMEGA-3 FATTY ACIDS) 300-1,000 mg capsule Take 1 g by mouth daily.   ? predniSONE (DELTASONE) 20 MG tablet Take 40 mg by mouth daily for 5 days.   ? sildenafil, antihypertensive, (REVATIO) 20 mg tablet Take 3 tablets (60 mg total) by mouth once as needed (Erection).   ? vit A/vit C/vit E/zinc/copper (ICAPS AREDS ORAL) Take by mouth.     Social History     Social History Narrative    Lives in [place].  Has significant other.        Patient of Dr. Mcgregor since 2013.        Subjective:     Mainly in for follow up of cough and fever and hypoxia.  He was started on prednisone and levofloxacin (Levaquin) at the last visit.  He is doing better at this time.  Breathing is better and not coughing as much and cough is more loose and not as tight.  No fever or chills later.  Started to feel better about 2 days after starting the medications.  He has not had any side effects from the medications.  Would like a prescription for prednisone in case her worsens again in the meantime.      We discussed his recurrent cough and we  discussed further work-up once he has improved.  He would like to move forward with this.      We reviewed his other issues noted in the assessment but not specifically addressed in the HPI above.     On review of systems, the patient denies any chest pain or shortness of breath.    Objective:     Wt Readings from Last 3 Encounters:   11/26/19 213 lb (96.6 kg)   11/22/19 215 lb (97.5 kg)   11/13/19 210 lb 1 oz (95.3 kg)     BP Readings from Last 3 Encounters:   11/26/19 134/80   11/22/19 134/76   11/13/19 136/80     /80   Pulse 65   Temp 98.2  F (36.8  C) (Oral)   Wt 213 lb (96.6 kg)   SpO2 94%   BMI 29.09 kg/m     Patient is comfortable, no apparent distress.  Mood good.  Insight good.  Ears - clear.  Nose exam shows mild rhinitis.  Throat - clear.  Neck is supple, there is no cervical adenopathy.  No thyromegaly.  Heart regular rate and rhythm.  Lungs show occasional wheeze on auscultation bilaterally.  Respiratory effort is good.  No lower extremity edema.    Diagnostics:     Results for orders placed or performed in visit on 11/22/19   Comprehensive Metabolic Panel   Result Value Ref Range    Sodium 138 136 - 145 mmol/L    Potassium 4.3 3.5 - 5.0 mmol/L    Chloride 99 98 - 107 mmol/L    CO2 27 22 - 31 mmol/L    Anion Gap, Calculation 12 5 - 18 mmol/L    Glucose 102 70 - 125 mg/dL    BUN 8 8 - 28 mg/dL    Creatinine 0.79 0.70 - 1.30 mg/dL    GFR MDRD Af Amer >60 >60 mL/min/1.73m2    GFR MDRD Non Af Amer >60 >60 mL/min/1.73m2    Bilirubin, Total 0.4 0.0 - 1.0 mg/dL    Calcium 8.3 (L) 8.5 - 10.5 mg/dL    Protein, Total 5.7 (L) 6.0 - 8.0 g/dL    Albumin 2.9 (L) 3.5 - 5.0 g/dL    Alkaline Phosphatase 62 45 - 120 U/L    AST 26 0 - 40 U/L    ALT 28 0 - 45 U/L   C-Reactive Protein   Result Value Ref Range    CRP 8.1 (H) 0.0 - 0.8 mg/dL   Procalcitonin   Result Value Ref Range    Procalcitonin 0.04 0.00 - 0.49 ng/mL   HM1 (CBC with Diff)   Result Value Ref Range    WBC 13.4 (H) 4.0 - 11.0 thou/uL    RBC 4.43  4.40 - 6.20 mill/uL    Hemoglobin 14.2 14.0 - 18.0 g/dL    Hematocrit 42.1 40.0 - 54.0 %    MCV 95 80 - 100 fL    MCH 32.0 27.0 - 34.0 pg    MCHC 33.7 32.0 - 36.0 g/dL    RDW 10.9 (L) 11.0 - 14.5 %    Platelets 329 140 - 440 thou/uL    MPV 6.4 (L) 7.0 - 10.0 fL    Neutrophils % 79 (H) 50 - 70 %    Lymphocytes % 13 (L) 20 - 40 %    Monocytes % 5 2 - 10 %    Eosinophils % 3 0 - 6 %    Basophils % 1 0 - 2 %    Neutrophils Absolute 10.5 (H) 2.0 - 7.7 thou/uL    Lymphocytes Absolute 1.7 0.8 - 4.4 thou/uL    Monocytes Absolute 0.7 0.0 - 0.9 thou/uL    Eosinophils Absolute 0.4 0.0 - 0.4 thou/uL    Basophils Absolute 0.1 0.0 - 0.2 thou/uL       Assessment:     1. Respiratory infection    2. Cough    3. Fever, unspecified fever cause    4. Low oxygen saturation    5. Recurrent cough      ______________________________________________________________________     PHQ-2 Total Score: 0 (9/6/2019  3:00 PM)    No data recorded  ______________________________________________________________________     BMI Readings from Last 1 Encounters:   11/26/19 29.09 kg/m        Plan:     1. Given a refill of prednisone in case things worsen.  2. Finish course of levofloxacin (Levaquin)   3. Follow up in January if doing okay sooner if issues.  4. Plan pulmonary function tests (PFTs) and possibly high resolution CT scan (HRCT) to be scheduled in January.  5. Discussed pulmonary referral but patient okay with me managing at this time.      German Mcgregor MD  General Internal Medicine  [facility]    Return in about 2 months (around 1/26/2020) for annual wellness visit.     No future appointments.      ______________________________________________________________________     Relevant ICD-10 codes and order associations:      ICD-10-CM    1. Respiratory infection J98.8    2. Cough R05 predniSONE (DELTASONE) 20 MG tablet   3. Fever, unspecified fever cause R50.9 predniSONE (DELTASONE) 20 MG tablet   4. Low oxygen  saturation R79.81 predniSONE (DELTASONE) 20 MG tablet   5. Recurrent cough R05

## 2021-06-28 NOTE — PROGRESS NOTES
Progress Notes by German Mcgregor MD at 3/2/2020 10:05 AM     Author: German Mcgregor MD Service: -- Author Type: Physician    Filed: 3/2/2020  4:23 PM Encounter Date: 3/2/2020 Status: Signed    : German Mcgregor MD (Physician)              [facility]    Comprehensive and complex medical care - Chronic disease management - Shared decision making - Care coordination - Compassionate care    Patient advocacy - Rational deprescribing - Minimally disruptive medicine - Ethical focus - Customized care          Date of Service: 3/2/2020  Primary Provider: German Mcgregor MD    Patient Care Team:  German Mcgregor MD as PCP - General (Internal Medicine)  Jacky Zayas MD (Ophthalmology)  Chavez Jay MD as Physician (Dermatology)  Kaushik Hood MD as Physician (Orthopedic Surgery)  German Mcgregor MD as Assigned PCP     ______________________________________________________________________     Patient's Pharmacy:    [facility] [address] [address]  Phone: [phone] Fax: [phone]     Patient's Contacts:  Name Home Phone Work Phone Mobile Phone Relationship Lgl MELITON June [phone] Spouse    DECLINED, PER PT    Declined      Patient's Insurance:    Payor: MEDICARE / Plan: MEDICARE A AND B / Product Type: Medicare /           Nir Guy is a 80 y.o. male who comes in today for:    Chief Complaint   Patient presents with   ? Toe Pain     LEFT FOOT 2ND TO THE LITTLE TOE       Active Problem List:  Problem List as of 3/2/2020 Reviewed: 3/2/2020  4:13 PM by German Mcgregor MD       High    Former smoker - Quit in 1998    Full code status - 2017    Vertigo       Medium    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    Osteoarthritis of left patellofemoral joint    Osteoarthritis of shoulder       Low    AMD (age  related macular degeneration)    HLD (hyperlipidemia)    SNHL (sensorineural hearing loss)    Tinnitus       Unprioritized    Acute gout of left foot, unspecified cause    Intermediate stage nonexudative age-related macular degeneration of both eyes    Osteoarthritis of fingers of both hands           Current Outpatient Medications   Medication Sig   ? artificial tears,hypromellose, (GENTEAL, HYPROMELLOSE,) 0.3 % Gel Administer 1 application to both eyes at bedtime.    ? codeine-guaiFENesin (GUAIFENESIN AC)  mg/5 mL liquid Take 5-10 mL by mouth every 4 (four) hours as needed for cough.   ? HYDROcodone-acetaminophen 5-325 mg per tablet Take 1 tablet by mouth every 4 (four) hours as needed for pain.   ? meclizine (ANTIVERT) 25 mg tablet Take 50 mg by mouth daily.    ? multivitamin with minerals (THERA-M) 9 mg iron-400 mcg Tab tablet Take 1 tablet by mouth daily.   ? OMEGA-3/DHA/EPA/FISH OIL (FISH OIL-OMEGA-3 FATTY ACIDS) 300-1,000 mg capsule Take 1 g by mouth daily.   ? predniSONE (DELTASONE) 20 MG tablet Take 40 mg by mouth daily for 5 days.   ? vit A/vit C/vit E/zinc/copper (ICAPS AREDS ORAL) Take by mouth.     Social History     Social History Narrative    Lives in [place].  Has significant other.        Patient of Dr. Mcgregor since 2013.        Subjective:     Comes in today for follow up.  Was in [place] but now back.    While in [place] developed swelling - sausage digit - in the left 4th toe.  Very painful.  Pain and redness and swelling extended to the dorsum of the foot.  Seen in a local clinic and diagnosed as gout.  No history of gout and no history of metatarsophalangeal (MTP) joint pain of concern.  No fever or chills.  Has a sore on the 4th toe as well.  No drainage from the sore.  No other joint pain.  He called me Friday about this and we tried prednisone and had him follow up today.  The pain in the foot and the toe have improved 90% with this.  He was having issues with walking but this is  much improved.    We reviewed his chronic cough and recurrent pulmonary infections.  We have discussed working this up further and he agrees to do so before his physical.  He did not have any further episodes in [place] since January.  He has some dyspnea on exertion (MARCH) and we would like to see if there is anything else to be done.    We reviewed his other issues noted in the assessment but not specifically addressed in the HPI above.     On review of systems, the patient denies any chest pain or shortness of breath.    Objective:     Wt Readings from Last 3 Encounters:   03/02/20 215 lb (97.5 kg)   01/17/20 211 lb (95.7 kg)   11/26/19 213 lb (96.6 kg)     BP Readings from Last 3 Encounters:   03/02/20 138/82   01/17/20 136/72   11/26/19 134/80     /82   Pulse 61   Wt 215 lb (97.5 kg)   SpO2 95%   BMI 29.36 kg/m     The patient is comfortable, no acute distress.  Mood good.  Insight good.  Eyes are nonicteric.  Neck is supple without mass.  No cervical adenopathy.  No thyromegaly. Heart regular rate and rhythm.  Lungs clear to auscultation bilaterally. Barrel chested.   Respiratory effort is good.  Abdomen soft and nontender.  No hepatosplenomegaly.  Extremities no edema.  There is no clubbing.  The left 4th toe shows a corn between the 4th and 5th toe.  There is no signs of infection with this.  The 4th toe is mildly pink compared with the other toes.  Not warm.  Gait is good.  No warmth or pain of the foot dorsum.      Diagnostics:     Results for orders placed or performed in visit on 11/22/19   Comprehensive Metabolic Panel   Result Value Ref Range    Sodium 138 136 - 145 mmol/L    Potassium 4.3 3.5 - 5.0 mmol/L    Chloride 99 98 - 107 mmol/L    CO2 27 22 - 31 mmol/L    Anion Gap, Calculation 12 5 - 18 mmol/L    Glucose 102 70 - 125 mg/dL    BUN 8 8 - 28 mg/dL    Creatinine 0.79 0.70 - 1.30 mg/dL    GFR MDRD Af Amer >60 >60 mL/min/1.73m2    GFR MDRD Non Af Amer >60 >60 mL/min/1.73m2    Bilirubin,  Total 0.4 0.0 - 1.0 mg/dL    Calcium 8.3 (L) 8.5 - 10.5 mg/dL    Protein, Total 5.7 (L) 6.0 - 8.0 g/dL    Albumin 2.9 (L) 3.5 - 5.0 g/dL    Alkaline Phosphatase 62 45 - 120 U/L    AST 26 0 - 40 U/L    ALT 28 0 - 45 U/L   C-Reactive Protein   Result Value Ref Range    CRP 8.1 (H) 0.0 - 0.8 mg/dL   Culture, Blood   Result Value Ref Range    Anaerobic Blood Culture Bottle No Growth No Growth, No organisms seen, bottle returned to instrument, Specimen not received, No Growth at 24 hours, No Growth at 48 hours, No Growth at 72 hours, No Growth at 96 hours, No Growth at 120 hours    Aerobic Blood Culture Bottle No Growth No Growth, No organisms seen, bottle returned to instrument, Specimen not received, No Growth at 24 hours, No Growth at 120 hours, No Growth at 48 hours, No Growth at 72 hours, No Growth at 96 hours   Procalcitonin   Result Value Ref Range    Procalcitonin 0.04 0.00 - 0.49 ng/mL   HM1 (CBC with Diff)   Result Value Ref Range    WBC 13.4 (H) 4.0 - 11.0 thou/uL    RBC 4.43 4.40 - 6.20 mill/uL    Hemoglobin 14.2 14.0 - 18.0 g/dL    Hematocrit 42.1 40.0 - 54.0 %    MCV 95 80 - 100 fL    MCH 32.0 27.0 - 34.0 pg    MCHC 33.7 32.0 - 36.0 g/dL    RDW 10.9 (L) 11.0 - 14.5 %    Platelets 329 140 - 440 thou/uL    MPV 6.4 (L) 7.0 - 10.0 fL    Neutrophils % 79 (H) 50 - 70 %    Lymphocytes % 13 (L) 20 - 40 %    Monocytes % 5 2 - 10 %    Eosinophils % 3 0 - 6 %    Basophils % 1 0 - 2 %    Neutrophils Absolute 10.5 (H) 2.0 - 7.7 thou/uL    Lymphocytes Absolute 1.7 0.8 - 4.4 thou/uL    Monocytes Absolute 0.7 0.0 - 0.9 thou/uL    Eosinophils Absolute 0.4 0.0 - 0.4 thou/uL    Basophils Absolute 0.1 0.0 - 0.2 thou/uL       Assessment:     1. Pain of toe of left foot    2. Left foot pain    3. Acute gout of left foot, unspecified cause    4. Chronic cough    5. SOB (shortness of breath)    6. History of recurrent pulmonary infection    7. Former smoker - Quit in 1998    8. Corn of toe        Quality review:     PHQ-2 Total  Score: 0 (1/17/2020  2:00 PM)    No data recorded  ______________________________________________________________________     BMI Readings from Last 1 Encounters:   03/02/20 29.36 kg/m        Plan:     1. Finish out prednisone.  2. Spacer between toes to help corn to heal.  3. High resolution CT scan (HRCT) of the lungs.  4. Pulmonary function tests (PFTs) to check for chronic obstructive pulmonary disease (COPD) in particular.  6 minute walk test.         German Mcgregor MD  General Internal Medicine  [facility]    Personal office fax - [phone]   Voice mail - [phone]  E-mail - [email or web address]     Return in about 3 weeks (around 3/23/2020) for annual wellness visit.     Future Appointments   Date Time Provider Department Center   3/23/2020 10:30 AM German Mcgregor MD [facility]         ______________________________________________________________________     Relevant ICD-10 codes and order associations:      ICD-10-CM    1. Pain of toe of left foot M79.675    2. Left foot pain M79.672    3. Acute gout of left foot, unspecified cause M10.9    4. Chronic cough R05 CT Chest High Resolution Without Contrast     PFT Complete     PFT 6 Minute Walk Test   5. SOB (shortness of breath) R06.02 CT Chest High Resolution Without Contrast     PFT Complete     PFT 6 Minute Walk Test   6. History of recurrent pulmonary infection Z86.19 CT Chest High Resolution Without Contrast     PFT Complete     PFT 6 Minute Walk Test   7. Former smoker - Quit in 1998 Z87.891 CT Chest High Resolution Without Contrast     PFT Complete     PFT 6 Minute Walk Test   8. Corn of toe L84

## 2021-06-28 NOTE — PROGRESS NOTES
Progress Notes by German Mcgregor MD at 1/17/2020  1:50 PM     Author: German Mcgregor MD Service: -- Author Type: Physician    Filed: 1/18/2020  9:55 PM Encounter Date: 1/17/2020 Status: Signed    : German Mcgregor MD (Physician)              [facility]    Comprehensive and complex medical care - Chronic disease management - Shared decision making - Care coordination - Compassionate care    Patient advocacy - Rational deprescribing - Minimally disruptive medicine - Ethical focus - Customized care          Date of Service: 1/17/2020  Primary Provider: German Mcgregor MD    Patient Care Team:  German Mcgregor MD as PCP - General (Internal Medicine)  Jacky Zayas MD (Ophthalmology)  Chavez Jay MD as Physician (Dermatology)  Kaushik Hood MD as Physician (Orthopedic Surgery)  German Mcgregor MD as Assigned PCP     ______________________________________________________________________     Patient's Pharmacy:    [facility] [address] [address]  Phone: [phone] Fax: [phone]     Patient's Contacts:  Name Home Phone Work Phone Mobile Phone Relationship Lgl MELITON June [phone] Spouse    DECLINED, PER PT    Declined      Patient's Insurance:    Payor: MEDICARE / Plan: MEDICARE A AND B / Product Type: Medicare /           Nir Guy is a 80 y.o. male who comes in today for:    Chief Complaint   Patient presents with   ? Illness     drainage, was having productive cough       Active Problem List:  Problem List as of 1/17/2020 Reviewed: 11/27/2019 11:08 AM by German Mcgregor MD       High    Former smoker - Quit in 1998    Full code status - 2017    Vertigo       Medium    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    Osteoarthritis of left patellofemoral joint    Osteoarthritis of shoulder       Low     AMD (age related macular degeneration)    HLD (hyperlipidemia)    SNHL (sensorineural hearing loss)    Tinnitus       Unprioritized    Intermediate stage nonexudative age-related macular degeneration of both eyes    Osteoarthritis of fingers of both hands           Current Outpatient Medications   Medication Sig   ? artificial tears,hypromellose, (GENTEAL, HYPROMELLOSE,) 0.3 % Gel Administer 1 application to both eyes at bedtime.    ? meclizine (ANTIVERT) 25 mg tablet Take 50 mg by mouth daily.    ? multivitamin with minerals (THERA-M) 9 mg iron-400 mcg Tab tablet Take 1 tablet by mouth daily.   ? OMEGA-3/DHA/EPA/FISH OIL (FISH OIL-OMEGA-3 FATTY ACIDS) 300-1,000 mg capsule Take 1 g by mouth daily.   ? vit A/vit C/vit E/zinc/copper (ICAPS AREDS ORAL) Take by mouth.   ? codeine-guaiFENesin (GUAIFENESIN AC)  mg/5 mL liquid Take 5-10 mL by mouth every 4 (four) hours as needed for cough.   ? levoFLOXacin (LEVAQUIN) 500 MG tablet Take 1 tablet (500 mg total) by mouth daily for 10 days.   ? predniSONE (DELTASONE) 20 MG tablet Take 40 mg by mouth daily for 5 days.     Social History     Social History Narrative    Lives in [place].  Has significant other.        Patient of Dr. Mcgregor since 2013.        Subjective:     Comes in today with a cough of about 5 days duration.    Concerned because he has had 2 significant respiratory infections in the past and will be going to [place] soon and wants to make sure he is doing well before the trip.    Symptoms are improving and are mild.  He has resolved completely from his illness in November.  No fever or chills.  Some cough and sore throat.  No shortness of breath.  No severe sinus symptoms.    Reviewed his recurrent cough and we discussed work-up previously and at this time.  He does not want to do this now but would like to consider at the next visit.    We reviewed his other issues noted in the assessment but not specifically addressed in the HPI above.     On review  of systems, the patient denies any chest pain or shortness of breath.    Objective:     Wt Readings from Last 3 Encounters:   01/17/20 211 lb (95.7 kg)   11/26/19 213 lb (96.6 kg)   11/22/19 215 lb (97.5 kg)     BP Readings from Last 3 Encounters:   01/17/20 136/72   11/26/19 134/80   11/22/19 134/76     /72   Pulse 80   Temp 97.7  F (36.5  C) (Oral)   Wt 211 lb (95.7 kg)   SpO2 96%   BMI 28.82 kg/m     The patient is comfortable, no acute distress.  Mood good.  Insight good.  Eyes are nonicteric. Nose reveals mild rhinitis.  Throat is clear.  Neck is supple without mass.  No cervical adenopathy.  No thyromegaly. Heart regular rate and rhythm.  Lungs clear to auscultation bilaterally.  Respiratory effort is good.  Abdomen soft and nontender.  No hepatosplenomegaly.  Extremities no edema.      Diagnostics:     Results for orders placed or performed in visit on 11/22/19   Comprehensive Metabolic Panel   Result Value Ref Range    Sodium 138 136 - 145 mmol/L    Potassium 4.3 3.5 - 5.0 mmol/L    Chloride 99 98 - 107 mmol/L    CO2 27 22 - 31 mmol/L    Anion Gap, Calculation 12 5 - 18 mmol/L    Glucose 102 70 - 125 mg/dL    BUN 8 8 - 28 mg/dL    Creatinine 0.79 0.70 - 1.30 mg/dL    GFR MDRD Af Amer >60 >60 mL/min/1.73m2    GFR MDRD Non Af Amer >60 >60 mL/min/1.73m2    Bilirubin, Total 0.4 0.0 - 1.0 mg/dL    Calcium 8.3 (L) 8.5 - 10.5 mg/dL    Protein, Total 5.7 (L) 6.0 - 8.0 g/dL    Albumin 2.9 (L) 3.5 - 5.0 g/dL    Alkaline Phosphatase 62 45 - 120 U/L    AST 26 0 - 40 U/L    ALT 28 0 - 45 U/L   C-Reactive Protein   Result Value Ref Range    CRP 8.1 (H) 0.0 - 0.8 mg/dL   Culture, Blood   Result Value Ref Range    Anaerobic Blood Culture Bottle No Growth No Growth, No organisms seen, bottle returned to instrument, Specimen not received, No Growth at 24 hours, No Growth at 48 hours, No Growth at 72 hours, No Growth at 96 hours, No Growth at 120 hours    Aerobic Blood Culture Bottle No Growth No Growth, No  organisms seen, bottle returned to instrument, Specimen not received, No Growth at 24 hours, No Growth at 120 hours, No Growth at 48 hours, No Growth at 72 hours, No Growth at 96 hours   Procalcitonin   Result Value Ref Range    Procalcitonin 0.04 0.00 - 0.49 ng/mL   HM1 (CBC with Diff)   Result Value Ref Range    WBC 13.4 (H) 4.0 - 11.0 thou/uL    RBC 4.43 4.40 - 6.20 mill/uL    Hemoglobin 14.2 14.0 - 18.0 g/dL    Hematocrit 42.1 40.0 - 54.0 %    MCV 95 80 - 100 fL    MCH 32.0 27.0 - 34.0 pg    MCHC 33.7 32.0 - 36.0 g/dL    RDW 10.9 (L) 11.0 - 14.5 %    Platelets 329 140 - 440 thou/uL    MPV 6.4 (L) 7.0 - 10.0 fL    Neutrophils % 79 (H) 50 - 70 %    Lymphocytes % 13 (L) 20 - 40 %    Monocytes % 5 2 - 10 %    Eosinophils % 3 0 - 6 %    Basophils % 1 0 - 2 %    Neutrophils Absolute 10.5 (H) 2.0 - 7.7 thou/uL    Lymphocytes Absolute 1.7 0.8 - 4.4 thou/uL    Monocytes Absolute 0.7 0.0 - 0.9 thou/uL    Eosinophils Absolute 0.4 0.0 - 0.4 thou/uL    Basophils Absolute 0.1 0.0 - 0.2 thou/uL     ______________________________________________________________________    Pertinent radiology for this visit includes the following:    XR Chest 2 Views  EXAM DATE:         11/22/2019    EXAM: X-RAY CHEST, 2 VIEWS, FRONTAL AND LATERAL  LOCATION: [facility]  DATE/TIME: 11/22/2019 4:15 PM    INDICATION: Cough Fever, unspecified Abnormal blood-gas level  COMPARISON: 04/02/2019    IMPRESSION: Peribronchiolar opacities are noted throughout the right lower lobe  suggestive of a bronchitis and likely adjacent pneumonitis. No consolidating  pneumonia.    Left lung is clear. Heart and pulmonary vascularity are normal. No effusion.      ______________________________________________________________________      Assessment:     1. Cough    2. Former smoker - Quit in 1998    3. Respiratory infection    4. Recurrent cough        Quality review:     PHQ-2 Total Score: 0 (1/17/2020  2:00 PM)    No data  recorded  ______________________________________________________________________     BMI Readings from Last 1 Encounters:   01/17/20 28.82 kg/m        Plan:     1. No evidence of serious problem at this time.  2. We discussed further work-up of his lung situation at this time but he would rather wait until returning from [place] at this time.  3. Plan high resolution CT scan (HRCT) and pulmonary function tests (PFTs).  4. Given prescription for prednisone, levofloxacin (Levaquin) and codeine cough syrup in case his breathing and cough worsen.  I suspect he has chronic bronchitis.         German Mcgregor MD  General Internal Medicine  [facility]    Personal office fax - [phone]   Voice mail - [phone]  E-mail - [email or web address]     Return in about 2 months (around 3/17/2020) for follow up visit.     Future Appointments   Date Time Provider Department Center   3/23/2020 10:30 AM German Mcgregor MD [facility]         ______________________________________________________________________     Relevant ICD-10 codes and order associations:      ICD-10-CM    1. Cough R05 levoFLOXacin (LEVAQUIN) 500 MG tablet     predniSONE (DELTASONE) 20 MG tablet     codeine-guaiFENesin (GUAIFENESIN AC)  mg/5 mL liquid   2. Former smoker - Quit in 1998 Z87.891    3. Respiratory infection J98.8    4. Recurrent cough R05

## 2021-06-28 NOTE — PROGRESS NOTES
Progress Notes by German Mcgregor MD at 11/22/2019  3:05 PM     Author: German Mcgregor MD Service: -- Author Type: Physician    Filed: 11/25/2019  7:18 AM Encounter Date: 11/22/2019 Status: Signed    : German Mcgregor MD (Physician)              [facility]    Comprehensive and complex medical care - Chronic disease management - Shared decision making - Care coordination - Compassionate care    Patient advocacy - Rational deprescribing - Minimally disruptive medicine - Ethical focus - Customized care          Date of Service: 11/22/2019  Primary Provider: German Mcgregor MD    Patient Care Team:  German Mcgregor MD as PCP - General (Internal Medicine)  Jacky Zayas MD (Ophthalmology)  Chavez Jay MD as Physician (Dermatology)  Kaushik Hood MD as Physician (Orthopedic Surgery)  German Mcgregor MD as Assigned PCP     ______________________________________________________________________     Patient's Pharmacy:    [facility] [address] [address]  Phone: [phone] Fax: [phone]     Patient's Contacts:  Name Home Phone Work Phone Mobile Phone Relationship Lgl MELITON June [phone] Spouse    DECLINED, PER PT    Declined      Patient's Insurance:    Payor: MEDICARE / Plan: MEDICARE A AND B / Product Type: Medicare /           Nir Guy is a 79 y.o. male who comes in today for:    Chief Complaint   Patient presents with   ? Follow-up     cough productive and dry, sweating at 1-2am, last few days getting phlegm, no sore throat, no fever, has 3 doses of cough syrup, always cold in late afternoon, a few weeks had diarrhea x 3-4 days   ? Foot Swelling     both feet x a few days       Active Problem List:  Problem List as of 11/22/2019 Reviewed: 9/6/2019  9:19 PM by German Mcgregor MD High    Former smoker -  Quit in 1998    Full code status - 2017    Vertigo       Medium    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    Osteoarthritis of left patellofemoral joint    Osteoarthritis of shoulder       Low    AMD (age related macular degeneration)    HLD (hyperlipidemia)    SNHL (sensorineural hearing loss)    Tinnitus       Unprioritized    Intermediate stage nonexudative age-related macular degeneration of both eyes    Osteoarthritis of fingers of both hands           Current Outpatient Medications   Medication Sig   ? artificial tears,hypromellose, (GENTEAL, HYPROMELLOSE,) 0.3 % Gel Administer 1 application to both eyes at bedtime.    ? meclizine (ANTIVERT) 25 mg tablet Take 50 mg by mouth daily.    ? multivitamin with minerals (THERA-M) 9 mg iron-400 mcg Tab tablet Take 1 tablet by mouth daily.   ? OMEGA-3/DHA/EPA/FISH OIL (FISH OIL-OMEGA-3 FATTY ACIDS) 300-1,000 mg capsule Take 1 g by mouth daily.   ? sildenafil, antihypertensive, (REVATIO) 20 mg tablet Take 3 tablets (60 mg total) by mouth once as needed (Erection).   ? vit A/vit C/vit E/zinc/copper (ICAPS AREDS ORAL) Take by mouth.   ? codeine-guaiFENesin (GUAIFENESIN AC)  mg/5 mL liquid Take 5-10 mL by mouth every 4 (four) hours as needed for cough.   ? levoFLOXacin (LEVAQUIN) 500 MG tablet Take 1 tablet (500 mg total) by mouth daily for 10 days.   ? predniSONE (DELTASONE) 20 MG tablet Take 40 mg by mouth daily for 5 days.     Social History     Social History Narrative    Lives in [place].  Has significant other.        Patient of Dr. Mcgregor since 2013.        Subjective:     Patient comes in today with a cough that is not going away.    He started having the cough the last week of October.  It was mostly a dry cough.  But he has had progressive symptoms related to this.  He denies any sore throat.  He has had no flulike symptoms with muscle aches or otherwise.  He was seen by walk-in care and by the [facility] [facility].  He was placed on antibiotics.   He did help a little bit, but did not resolve issues.    He did have some diarrhea in he had it for 3 to 4 days.  His wife had a stomach flu during this time with similar symptoms.  She had gotten it while on vacation.    He denies any production to the cough it is generally dry.  He may be getting some phlegm in the last 2 days, but he cannot bring it up.  He does feel little bit of short of breath with exertion.  He had some sweating last night at 1 AM.    We reviewed his other issues noted in the assessment but not specifically addressed in the HPI above.     On review of systems, the patient denies any chest pain or nausea or vomiting.    Objective:     Wt Readings from Last 3 Encounters:   11/22/19 215 lb (97.5 kg)   11/13/19 210 lb 1 oz (95.3 kg)   11/04/19 211 lb (95.7 kg)     BP Readings from Last 3 Encounters:   11/22/19 134/76   11/13/19 136/80   11/04/19 137/73     /76   Pulse 87   Temp (!) 101.1  F (38.4  C) (Oral)   Wt 215 lb (97.5 kg)   SpO2 90%   BMI 29.36 kg/m     Patient is comfortable, no apparent distress.  Mood good.  Insight good.  Ears -clear.  Nose exam shows no rhinitis.  Throat -clear.  Neck is supple, there is no cervical adenopathy.  No thyromegaly.  Heart regular rate and rhythm.  Lungs show slightly decreased airway movement on auscultation bilaterally.  Respiratory effort is good.  No lower extremity edema    Diagnostics:     Results for orders placed or performed in visit on 11/22/19   Comprehensive Metabolic Panel   Result Value Ref Range    Sodium 138 136 - 145 mmol/L    Potassium 4.3 3.5 - 5.0 mmol/L    Chloride 99 98 - 107 mmol/L    CO2 27 22 - 31 mmol/L    Anion Gap, Calculation 12 5 - 18 mmol/L    Glucose 102 70 - 125 mg/dL    BUN 8 8 - 28 mg/dL    Creatinine 0.79 0.70 - 1.30 mg/dL    GFR MDRD Af Amer >60 >60 mL/min/1.73m2    GFR MDRD Non Af Amer >60 >60 mL/min/1.73m2    Bilirubin, Total 0.4 0.0 - 1.0 mg/dL    Calcium 8.3 (L) 8.5 - 10.5 mg/dL    Protein, Total 5.7 (L)  6.0 - 8.0 g/dL    Albumin 2.9 (L) 3.5 - 5.0 g/dL    Alkaline Phosphatase 62 45 - 120 U/L    AST 26 0 - 40 U/L    ALT 28 0 - 45 U/L   C-Reactive Protein   Result Value Ref Range    CRP 8.1 (H) 0.0 - 0.8 mg/dL   Procalcitonin   Result Value Ref Range    Procalcitonin 0.04 0.00 - 0.49 ng/mL   HM1 (CBC with Diff)   Result Value Ref Range    WBC 13.4 (H) 4.0 - 11.0 thou/uL    RBC 4.43 4.40 - 6.20 mill/uL    Hemoglobin 14.2 14.0 - 18.0 g/dL    Hematocrit 42.1 40.0 - 54.0 %    MCV 95 80 - 100 fL    MCH 32.0 27.0 - 34.0 pg    MCHC 33.7 32.0 - 36.0 g/dL    RDW 10.9 (L) 11.0 - 14.5 %    Platelets 329 140 - 440 thou/uL    MPV 6.4 (L) 7.0 - 10.0 fL    Neutrophils % 79 (H) 50 - 70 %    Lymphocytes % 13 (L) 20 - 40 %    Monocytes % 5 2 - 10 %    Eosinophils % 3 0 - 6 %    Basophils % 1 0 - 2 %    Neutrophils Absolute 10.5 (H) 2.0 - 7.7 thou/uL    Lymphocytes Absolute 1.7 0.8 - 4.4 thou/uL    Monocytes Absolute 0.7 0.0 - 0.9 thou/uL    Eosinophils Absolute 0.4 0.0 - 0.4 thou/uL    Basophils Absolute 0.1 0.0 - 0.2 thou/uL     ______________________________________________________________________    Pertinent radiology for this visit includes the following:    Xr Chest 2 Views    Result Date: 11/22/2019  EXAM DATE:         11/22/2019 EXAM: X-RAY CHEST, 2 VIEWS, FRONTAL AND LATERAL LOCATION: [facility] DATE/TIME: 11/22/2019 4:15 PM INDICATION: Cough Fever, unspecified Abnormal blood-gas level COMPARISON: 04/02/2019 IMPRESSION: Peribronchiolar opacities are noted throughout the right lower lobe suggestive of a bronchitis and likely adjacent pneumonitis. No consolidating pneumonia. Left lung is clear. Heart and pulmonary vascularity are normal. No effusion.       ______________________________________________________________________      Assessment:     1. Cough    2. Fever, unspecified fever cause    3. Low oxygen saturation    4. Respiratory infection    5. Diarrhea, unspecified type       ______________________________________________________________________     PHQ-2 Total Score: 0 (9/6/2019  3:00 PM)    No data recorded  ______________________________________________________________________     BMI Readings from Last 1 Encounters:   11/22/19 29.36 kg/m        Plan:     1. I suspect the patient has underlying chronic obstructive pulmonary disease (COPD).  Will likely recommend testing in the future related to this - at a minimum pulmonary function tests (PFTs).  2. Given levofloxacin (Levaquin) and prednisone for treatment.  3. Codeine cough syrup for symptoms.  4. Close follow up.      German Mcgregor MD  General Internal Medicine  [facility]    Return in about 4 days (around 11/26/2019).     Future Appointments   Date Time Provider Department Center   11/26/2019 10:30 AM German Mcgregor MD [facility]         ______________________________________________________________________     Relevant ICD-10 codes and order associations:      ICD-10-CM    1. Cough R05 XR Chest 2 Views     Comprehensive Metabolic Panel     C-Reactive Protein     Culture, Blood     HM1(CBC and Differential)     Procalcitonin     HM1 (CBC with Diff)     levoFLOXacin (LEVAQUIN) 500 MG tablet     predniSONE (DELTASONE) 20 MG tablet     codeine-guaiFENesin (GUAIFENESIN AC)  mg/5 mL liquid     CANCELED: HM1(CBC and Differential)     CANCELED: C-Reactive Protein     CANCELED: Comprehensive Metabolic Panel     CANCELED: Procalcitonin     CANCELED: Culture, Blood     CANCELED: HM1 (CBC with Diff)   2. Fever, unspecified fever cause R50.9 XR Chest 2 Views     Comprehensive Metabolic Panel     C-Reactive Protein     Culture, Blood     HM1(CBC and Differential)     Procalcitonin     HM1 (CBC with Diff)     levoFLOXacin (LEVAQUIN) 500 MG tablet     predniSONE (DELTASONE) 20 MG tablet     codeine-guaiFENesin (GUAIFENESIN AC)  mg/5 mL liquid     CANCELED: HM1(CBC and Differential)      CANCELED: C-Reactive Protein     CANCELED: Comprehensive Metabolic Panel     CANCELED: Procalcitonin     CANCELED: Culture, Blood     CANCELED: HM1 (CBC with Diff)   3. Low oxygen saturation R79.81 XR Chest 2 Views     Comprehensive Metabolic Panel     C-Reactive Protein     Culture, Blood     HM1(CBC and Differential)     Procalcitonin     HM1 (CBC with Diff)     levoFLOXacin (LEVAQUIN) 500 MG tablet     predniSONE (DELTASONE) 20 MG tablet     codeine-guaiFENesin (GUAIFENESIN AC)  mg/5 mL liquid     CANCELED: HM1(CBC and Differential)     CANCELED: C-Reactive Protein     CANCELED: Comprehensive Metabolic Panel     CANCELED: Procalcitonin     CANCELED: Culture, Blood     CANCELED: HM1 (CBC with Diff)   4. Respiratory infection J98.8    5. Diarrhea, unspecified type R19.7

## 2021-06-28 NOTE — PROGRESS NOTES
Progress Notes by German Mcgregor MD at 9/6/2019  2:40 PM     Author: German Mcgregor MD Service: -- Author Type: Physician    Filed: 9/6/2019  9:24 PM Encounter Date: 9/6/2019 Status: Signed    : German Mcgregor MD (Physician)              [facility]    Comprehensive and complex medical care - Chronic disease management - Shared decision making - Care coordination - Compassionate care    Patient advocacy - Rational deprescribing - Minimally disruptive medicine - Ethical focus - Customized care    ______________________________________________________________________     Date of Service: 9/6/2019  Primary Provider: German Mcgregor MD    Patient Care Team:  German Mcgregor MD as PCP - General (Internal Medicine)  Jacky Zayas MD (Ophthalmology)  Chavez Jay MD as Physician (Dermatology)  Kaushik Hood MD as Physician (Orthopedic Surgery)  German Mcgregor MD as Assigned PCP     ______________________________________________________________________     Patient's Pharmacy:    [facility] - [address] [address]  Phone: [phone] Fax: [phone]     Patient's Contacts:  Name Home Phone Work Phone Mobile Phone Relationship Lgl MELITON June [phone] Spouse      Patient's Insurance:    Payor: MEDICARE / Plan: MEDICARE A AND B / Product Type: Medicare /   ______________________________________________________________________   ______________________________________________________________________     Nir Guy is a 79 y.o. male who comes in today for:    Chief Complaint   Patient presents with   ? Hand Pain     right hand x 1-1.5 months, right hand ring finger and little finger hard to close when wakes up       Current Outpatient Medications   Medication Sig   ? artificial tears,hypromellose, (GENTEAL, HYPROMELLOSE,) 0.3 %  Gel Administer 1 application to both eyes at bedtime.    ? meclizine (ANTIVERT) 25 mg tablet Take 50 mg by mouth daily.    ? multivitamin with minerals (THERA-M) 9 mg iron-400 mcg Tab tablet Take 1 tablet by mouth daily.   ? OMEGA-3/DHA/EPA/FISH OIL (FISH OIL-OMEGA-3 FATTY ACIDS) 300-1,000 mg capsule Take 1 g by mouth daily.   ? sildenafil, antihypertensive, (REVATIO) 20 mg tablet Take 3 tablets (60 mg total) by mouth once as needed (Erection).     ______________________________________________________________________     History of present illness:    Comes in today for issues with his right hand ring finger in the last 4-6 weeks.  Over the dorsal and palmar surface of the finger.  Hard to cyclobenzaprine (Flexeril) and straighten out.  Can catch but not often.  Has had bony changes consistent with osteoarthritis (OA) noted previously.  No swelling associated with this.    On review of systems, the patient has no other joint pain or metacarpophalangeal (MCP) joint swelling.    ______________________________________________________________________      Exam:    Wt Readings from Last 3 Encounters:   09/06/19 215 lb (97.5 kg)   04/02/19 165 lb 3.2 oz (74.9 kg)   03/22/19 211 lb 6.4 oz (95.9 kg)     BP Readings from Last 3 Encounters:   09/06/19 136/74   04/02/19 130/74   03/22/19 124/82      /74   Pulse 74   Wt 215 lb (97.5 kg)   SpO2 95%   BMI 29.36 kg/m     In general, the patient is comfortable, no apparent distress.  Mood good.  Insight good.  There are osteoarthritis (OA) changes in the  fingers of both hands.  There is a tendon nodule of the palmar hand which is tender.  No synovitis.  No wrist synovitis.  No contracture.    ______________________________________________________________________    Diagnostics:    Results for orders placed or performed in visit on 04/02/19   Influenza A/B Rapid Test- Nasal Swab   Result Value Ref Range    Influenza  A, Rapid Antigen Influenza A antigen detected (!) No  Influenza A antigen detected    Influenza B, Rapid Antigen No Influenza B antigen detected No Influenza B antigen detected   Basic Metabolic Panel   Result Value Ref Range    Sodium 136 136 - 145 mmol/L    Potassium 4.0 3.5 - 5.0 mmol/L    Chloride 102 98 - 107 mmol/L    CO2 24 22 - 31 mmol/L    Anion Gap, Calculation 10 5 - 18 mmol/L    Glucose 103 70 - 125 mg/dL    Calcium 8.7 8.5 - 10.5 mg/dL    BUN 13 8 - 28 mg/dL    Creatinine 0.92 0.70 - 1.30 mg/dL    GFR MDRD Af Amer >60 >60 mL/min/1.73m2    GFR MDRD Non Af Amer >60 >60 mL/min/1.73m2   Culture, Blood   Result Value Ref Range    Anaerobic Blood Culture Bottle No Growth No Growth, No organisms seen, bottle returned to instrument, Specimen not received    Aerobic Blood Culture Bottle No Growth No Growth, No organisms seen, bottle returned to instrument, Specimen not received   HM1 (CBC with Diff)   Result Value Ref Range    WBC 6.9 4.0 - 11.0 thou/uL    RBC 4.66 4.40 - 6.20 mill/uL    Hemoglobin 14.8 14.0 - 18.0 g/dL    Hematocrit 44.0 40.0 - 54.0 %    MCV 94 80 - 100 fL    MCH 31.8 27.0 - 34.0 pg    MCHC 33.6 32.0 - 36.0 g/dL    RDW 11.1 11.0 - 14.5 %    Platelets 148 140 - 440 thou/uL    MPV 6.8 (L) 7.0 - 10.0 fL    Neutrophils % 80 (H) 50 - 70 %    Lymphocytes % 12 (L) 20 - 40 %    Monocytes % 6 2 - 10 %    Eosinophils % 1 0 - 6 %    Basophils % 0 0 - 2 %    Neutrophils Absolute 5.5 2.0 - 7.7 thou/uL    Lymphocytes Absolute 0.8 0.8 - 4.4 thou/uL    Monocytes Absolute 0.4 0.0 - 0.9 thou/uL    Eosinophils Absolute 0.1 0.0 - 0.4 thou/uL    Basophils Absolute 0.0 0.0 - 0.2 thou/uL      ______________________________________________________________________     Assessment:    1. Trigger ring finger of right hand    2. Osteoarthritis of fingers of both hands      ______________________________________________________________________      PHQ-2 Total Score: 0 (9/6/2019  3:00 PM)    No data  recorded  ______________________________________________________________________     BMI Readings from Last 1 Encounters:   09/06/19 29.36 kg/m      ______________________________________________________________________    Plan:    1. Corticosteroid injection to the trigger finger today.  2. If not improving consider hand xray or other evaluation.      German Mcgregor MD  General Internal Medicine  [facility]     Return in about 6 months (around 3/6/2020) for physical.     No future appointments.      ______________________________________________________________________     Relevant ICD-10 codes and order associations:      ICD-10-CM    1. Trigger ring finger of right hand M65.341 Injection tendon or ligament   2. Osteoarthritis of fingers of both hands M19.041     M19.042

## 2021-06-29 NOTE — PROGRESS NOTES
Progress Notes by German Mcgregor MD at 10/19/2020 12:50 PM     Author: German Mcgregor MD Service: -- Author Type: Physician    Filed: 10/20/2020  8:12 AM Encounter Date: 10/19/2020 Status: Signed    : German Mcgregor MD (Physician)                [facility]    Comprehensive and complex medical care - Chronic disease management - Shared decision making - Care coordination - Compassionate care    Patient advocacy - Rational deprescribing - Minimally disruptive medicine - Ethical focus - Customized care          Date of Service: 10/19/2020  Primary Provider: German Mcgregor MD    Patient Care Team:  German Mcgregor MD as PCP - General (Internal Medicine)  Jacky Zayas MD (Ophthalmology)  Chavez Jay MD as Physician (Dermatology)  Kaushik Hood MD as Physician (Orthopedic Surgery)  German Mcgregor MD as Assigned PCP     ______________________________________________________________________     Patient's Pharmacy:      [facility] [address] [address]  Phone: [phone] Fax: [phone]     Patient's Contacts:  Name Home Phone Work Phone Mobile Phone Relationship Lgl MELITON June [phone] Spouse        Patient's Insurance:    Payor/Plan Subscr  Sex Relation Sub. Ins. ID Effective Group Num   1. HEALTHPARTNER* VINICIUS GIBBONS 1940 Male  [id number] Not Eff [id number]                                   [address]   2. MEDICARE - ME* VINICIUS GIBBONS 1940 Male Self [id number]                                    [place], [address]            Vinicius Gibbons is a 80 y.o. male who comes in today for:    Chief Complaint   Patient presents with   ? Carpal Tunnel     right hand cant close. would like a carpal tunnel test        Current Outpatient Medications   Medication Sig   ? artificial tears,hypromellose, (GENTEAL, HYPROMELLOSE,) 0.3 %  Gel Administer 1 application to both eyes at bedtime.    ? codeine-guaiFENesin (GUAIFENESIN AC)  mg/5 mL liquid Take 5-10 mL by mouth every 4 (four) hours as needed for cough.   ? HYDROcodone-acetaminophen 5-325 mg per tablet Take 1 tablet by mouth every 4 (four) hours as needed for pain.   ? meclizine (ANTIVERT) 25 mg tablet Take 50 mg by mouth daily.    ? multivitamin with minerals (THERA-M) 9 mg iron-400 mcg Tab tablet Take 1 tablet by mouth daily.   ? OMEGA-3/DHA/EPA/FISH OIL (FISH OIL-OMEGA-3 FATTY ACIDS) 300-1,000 mg capsule Take 1 g by mouth daily.   ? vit A/vit C/vit E/zinc/copper (ICAPS AREDS ORAL) Take by mouth.   ? FLUAD QUAD 2020-21,65Y UP,,PF, 60 mcg (15 mcg x 4)/0.5 mL Syrg Vaccine Administration   ? predniSONE (DELTASONE) 10 mg tablet Take 20 mg by mouth daily for 7 days, THEN 10 mg daily for 7 days.       Subjective:      Patient comes in today for persistent right hand pain.    He previously had carpal tunnel surgery by Dr. Twan Ross in 2010.  This was on the right.    He is concerned that he is having recurrent carpal tunnel.    His main concern is inability to fully flex the fingers in the morning and this can wait for 2 hours before becomes more limber.  He has been taking Aleve 2 tabs twice daily without help.    He has not really had any swelling with this.  He has noticed some numbness in the second through fourth fingers.  He has not had EMG for a while.  He had an x-ray at the last visit and blood work as noted below.    We reviewed his COPD.  He has questions related to traveling to [place] for 1 month in the winter.  We answered this.  His breathing is stable.    We reviewed his arthritis in general.    We reviewed his back pain.  He has pain that goes down the back of his legs with ambulation.  This is not severe at this time.  He does not want to do anything more with this.  His neck feels okay.    On ROS, the patient denies any chest pain or pressure.  No shortness of breath.      Objective:     Wt Readings from Last 3 Encounters:   10/19/20 214 lb (97.1 kg)   07/31/20 210 lb (95.3 kg)   03/17/20 209 lb (94.8 kg)     BP Readings from Last 3 Encounters:   10/19/20 132/72   07/31/20 136/78   03/17/20 122/70      /72 (Patient Site: Right Arm, Patient Position: Sitting, Cuff Size: Adult Regular) Comment (Cuff Size): Long  Pulse (!) 109   Temp 97.8  F (36.6  C) (Oral)   Wt 214 lb (97.1 kg)   SpO2 97%   BMI 29.23 kg/m     In general, the patient is comfortable, no apparent distress.  Mood good.  Insight good.  The hand shows no obvious synovitis and no synovitis in the wrists.  He does not have any issues with the left hand either.  His heart is regular rate and rhythm.  His lungs are clear to auscultation bilaterally.  His gait is good.  He has a tendon nodule at least over the middle finger.    Diagnostics:     Results for orders placed or performed in visit on 07/31/20   C-Reactive Protein(CRP)   Result Value Ref Range    CRP 0.3 0.0 - 0.8 mg/dL   Sedimentation Rate   Result Value Ref Range    Sed Rate 2 0 - 15 mm/hr   CCP Antibodies   Result Value Ref Range    CCP IgG Antibodies <0.5 <=4.9 U/mL   Rheumatoid Factor Quant   Result Value Ref Range    Rheumatoid Factor Quantitative 16.3 0 - 30 IU/mL   HM2(CBC w/o Differential)   Result Value Ref Range    WBC 7.0 4.0 - 11.0 thou/uL    RBC 4.77 4.40 - 6.20 mill/uL    Hemoglobin 15.3 14.0 - 18.0 g/dL    Hematocrit 44.0 40.0 - 54.0 %    MCV 92 80 - 100 fL    MCH 32.0 27.0 - 34.0 pg    MCHC 34.8 32.0 - 36.0 g/dL    RDW 11.1 11.0 - 14.5 %    Platelets 187 140 - 440 thou/uL    MPV 7.6 7.0 - 10.0 fL   Comprehensive Metabolic Panel   Result Value Ref Range    Sodium 140 136 - 145 mmol/L    Potassium 4.2 3.5 - 5.0 mmol/L    Chloride 103 98 - 107 mmol/L    CO2 27 22 - 31 mmol/L    Anion Gap, Calculation 10 5 - 18 mmol/L    Glucose 127 (H) 70 - 125 mg/dL    BUN 14 8 - 28 mg/dL    Creatinine 0.93 0.70 - 1.30 mg/dL    GFR MDRD Af Amer >60 >60  mL/min/1.73m2    GFR MDRD Non Af Amer >60 >60 mL/min/1.73m2    Bilirubin, Total 0.4 0.0 - 1.0 mg/dL    Calcium 9.4 8.5 - 10.5 mg/dL    Protein, Total 6.7 6.0 - 8.0 g/dL    Albumin 4.0 3.5 - 5.0 g/dL    Alkaline Phosphatase 74 45 - 120 U/L    AST 20 0 - 40 U/L    ALT 20 0 - 45 U/L      ______________________________________________________________________    Pertinent radiology for this visit includes the following:    XR Hands Bilateral PA VW  EXAM DATE:         07/31/2020    EXAM: X-RAY HANDS BILATERAL, TWO VIEWS  LOCATION: [facility]  DATE/TIME: 7/31/2020 2:15 PM    INDICATION: Hand pain and stiffness.  COMPARISON: None.    IMPRESSION: There is mild degenerative changes in some of the IP and MCP joints  of the fingers, and the thumb CMC joints. No erosions. No acute fracture.      ______________________________________________________________________    Assessment:     1. Pain of right hand    2. H/O carpal tunnel repair    3. Osteoarthritis of fingers of both hands    4. Pulmonary emphysema, unspecified emphysema type (H)    5. Low back pain radiating to both legs        Quality review:     PHQ-2 Total Score: 0 (7/31/2020  1:00 PM)      No data recorded  ______________________________________________________________________     BMI Readings from Last 1 Encounters:   10/19/20 29.23 kg/m        Plan:     1. Referral to Dr. Nasir Casper with orthopedics.  2. Further testing to be driven by him.  3. Trial of prednisone 20 mg a day for 7 days and then 10 mg a day.  The patient is to update me on his situation.  4. Patient has received flu shot.  5. Further work-up of the back if things worsen over time.  6. Follow-up sooner if issues.    German Mcgregor MD  General Internal Medicine  [facility]    Return in about 6 months (around 4/19/2021), or if symptoms worsen or fail to improve, for follow up visit.     No future appointments.       ______________________________________________________________________     Relevant ICD-10 codes and order associations:      ICD-10-CM    1. Pain of right hand  M79.641 predniSONE (DELTASONE) 10 mg tablet     Ambulatory referral to Orthopedics   2. H/O carpal tunnel repair  Z98.890    3. Osteoarthritis of fingers of both hands  M19.041     M19.042    4. Pulmonary emphysema, unspecified emphysema type (H)  J43.9    5. Low back pain radiating to both legs  M54.5

## 2021-06-29 NOTE — PROGRESS NOTES
Progress Notes by German Mcgregor MD at 7/31/2020  1:00 PM     Author: German Mcgregor MD Service: -- Author Type: Physician    Filed: 8/3/2020  7:59 AM Encounter Date: 7/31/2020 Status: Signed    : German Mcgregor MD (Physician)            [facility]  Dr. German Mcgregor             Date of Service: 7/31/2020  Primary Provider: German Mcgregor MD    Patient Care Team:  German Mcgregor MD as PCP - General (Internal Medicine)  Jacky Zayas MD (Ophthalmology)  Chavez Jay MD as Physician (Dermatology)  Kaushik Hood MD as Physician (Orthopedic Surgery)  German Mcgregor MD as Assigned PCP     ______________________________________________________________________     Patient's Pharmacy:    [facility] - [address] [address]  Phone: [phone] Fax: [phone]     Patient's Contacts:  Name Home Phone Work Phone Mobile Phone Relationship Lgl MELITON June [phone] Spouse    DECLINED, PER PT    Declined        Patient's Insurance:    Payor: MEDICARE / Plan: MEDICARE A AND B / Product Type: Medicare /            Nir Guy is a 80 y.o. male who comes in today for:    Chief Complaint   Patient presents with   ? Follow-up     CELLULITIS OF 4TH TOE ON THE LEFT FOOT, EMPHYSEMA   ? 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 artificial tears,hypromellose, (GENTEAL, HYPROMELLOSE,) 0.3 % Gel Administer 1 application to both eyes at bedtime.    ? codeine-guaiFENesin (GUAIFENESIN AC)  mg/5 mL liquid Take 5-10 mL by mouth every 4 (four) hours as needed for cough.   ? HYDROcodone-acetaminophen 5-325 mg per tablet Take 1 tablet by mouth every 4 (four) hours as needed for pain.   ? meclizine (ANTIVERT) 25 mg tablet Take 50 mg by mouth daily.    ? multivitamin with minerals (THERA-M) 9 mg iron-400 mcg Tab tablet Take 1 tablet by mouth daily.   ? OMEGA-3/DHA/EPA/FISH OIL (FISH OIL-OMEGA-3 FATTY ACIDS) 300-1,000 mg capsule Take 1 g by mouth daily.   ? vit A/vit C/vit E/zinc/copper (ICAPS AREDS ORAL) Take by mouth.     Social History     Social History Narrative    Lives in [place].  Has significant other.        Patient of Dr. Mcgregor since 2013.        Subjective:     Patient comes in today for follow-up of a number of issues.We mainly reviewed his right hand pain. He's been having increasing problems with hand pain over the last 2 to 3 weeks. It is doing better at this time.  He had some tingling in the middle 3 fingers with it.  He is concerned because he had carpal tunnel with surgery in October 2010.  It is doing better at this time but he wanted to have it evaluated and determine next steps related to this.    We reviewed his left knee pain and he has not had any further issues with this.  He still has swelling in the left knee, however.    He has a wrist abnormality with a small cystic-like change on the dorsum of the wrist.    Reviewed his emphysema and he has had no recent respiratory issues.    We reviewed his other issues noted in the assessment but not specifically addressed in the HPI above.     On review  of systems, the patient denies any chest pain or shortness of breath.    Objective:     Wt Readings from Last 3 Encounters:   07/31/20 210 lb (95.3 kg)   03/17/20 209 lb (94.8 kg)   03/02/20 215 lb (97.5 kg)       BP Readings from Last 3 Encounters:   07/31/20 136/78   03/17/20 122/70   03/02/20 138/82       /78   Pulse 81   Wt 210 lb (95.3 kg)   SpO2 97%   BMI 28.68 kg/m     The patient is comfortable, no acute distress.  Mood good.  Insight good.  Eyes are nonicteric.  Neck is supple without mass.  No cervical adenopathy.  No thyromegaly. Heart regular rate and rhythm.  Lungs clear to auscultation bilaterally.  Respiratory effort is good.  Abdomen soft and nontender.  No hepatosplenomegaly.  Extremities no edema.  There is no active synovitis of the right hand or wrist.  There is an effusion of the left knee which is moderate.  It is not warm at this time.  He has a very minimal ganglion cyst of the right wrist noted.      Diagnostics:     Results for orders placed or performed in visit on 07/31/20   C-Reactive Protein(CRP)   Result Value Ref Range    CRP 0.3 0.0 - 0.8 mg/dL   Sedimentation Rate   Result Value Ref Range    Sed Rate 2 0 - 15 mm/hr   Rheumatoid Factor Quant   Result Value Ref Range    Rheumatoid Factor Quantitative 16.3 0 - 30 IU/mL   HM2(CBC w/o Differential)   Result Value Ref Range    WBC 7.0 4.0 - 11.0 thou/uL    RBC 4.77 4.40 - 6.20 mill/uL    Hemoglobin 15.3 14.0 - 18.0 g/dL    Hematocrit 44.0 40.0 - 54.0 %    MCV 92 80 - 100 fL    MCH 32.0 27.0 - 34.0 pg    MCHC 34.8 32.0 - 36.0 g/dL    RDW 11.1 11.0 - 14.5 %    Platelets 187 140 - 440 thou/uL    MPV 7.6 7.0 - 10.0 fL   Comprehensive Metabolic Panel   Result Value Ref Range    Sodium 140 136 - 145 mmol/L    Potassium 4.2 3.5 - 5.0 mmol/L    Chloride 103 98 - 107 mmol/L    CO2 27 22 - 31 mmol/L    Anion Gap, Calculation 10 5 - 18 mmol/L    Glucose 127 (H) 70 - 125 mg/dL    BUN 14 8 - 28 mg/dL    Creatinine 0.93 0.70 - 1.30 mg/dL     GFR MDRD Af Amer >60 >60 mL/min/1.73m2    GFR MDRD Non Af Amer >60 >60 mL/min/1.73m2    Bilirubin, Total 0.4 0.0 - 1.0 mg/dL    Calcium 9.4 8.5 - 10.5 mg/dL    Protein, Total 6.7 6.0 - 8.0 g/dL    Albumin 4.0 3.5 - 5.0 g/dL    Alkaline Phosphatase 74 45 - 120 U/L    AST 20 0 - 40 U/L    ALT 20 0 - 45 U/L       ______________________________________________________________________    Pertinent radiology for this visit includes the following:    XR Hands Bilateral PA VW  EXAM DATE:         07/31/2020    EXAM: X-RAY HANDS BILATERAL, TWO VIEWS  LOCATION: [facility]  DATE/TIME: 7/31/2020 2:15 PM    INDICATION: Hand pain and stiffness.  COMPARISON: None.    IMPRESSION: There is mild degenerative changes in some of the IP and MCP joints  of the fingers, and the thumb CMC joints. No erosions. No acute fracture.      ______________________________________________________________________      Assessment:     1. Pain of right hand    2. Elevated C-reactive protein (CRP)    3. Synovial cyst of wrist, right    4. Effusion of left knee joint    5. Pulmonary emphysema, unspecified emphysema type (H)    6. Primary osteoarthritis of right hand        Quality review:     PHQ-2 Total Score: 0 (7/31/2020  1:00 PM)      No data recorded  ______________________________________________________________________     BMI Readings from Last 1 Encounters:   07/31/20 28.68 kg/m        Plan:     1. Patient had blood work and x-rays done today.  2. He is going to hold off on PFTs at this time with the current coronavirus issue.  3. We will check x-rays of his hand.  4. Possible causes for the right hand pain include pseudogout exacerbation or palindromic rheumatism.  5. Follow-up sooner if issues.  Consider steroid course or NSAID course if hand or other joints work up on him.         German Mcgregor MD  General Internal Medicine  [facility]       Return in about 6 months (around 1/31/2021) for  follow up visit.     No future appointments.      ______________________________________________________________________     Relevant ICD-10 codes and order associations:      ICD-10-CM    1. Pain of right hand  M79.641 C-Reactive Protein(CRP)     Sedimentation Rate     CCP Antibodies     Rheumatoid Factor Quant     HM2(CBC w/o Differential)     Comprehensive Metabolic Panel     XR Hands Bilateral PA VW     XR Hands Bilateral PA VW   2. Elevated C-reactive protein (CRP)  R79.82 XR Hands Bilateral PA VW     XR Hands Bilateral PA VW   3. Synovial cyst of wrist, right  M71.331    4. Effusion of left knee joint  M25.462    5. Pulmonary emphysema, unspecified emphysema type (H)  J43.9    6. Primary osteoarthritis of right hand  M19.041

## 2021-06-30 NOTE — PROGRESS NOTES
Progress Notes by German Mcgregor MD at 3/29/2021  9:05 AM     Author: German Mcgregor MD Service: -- Author Type: Physician    Filed: 3/29/2021  4:12 PM Encounter Date: 3/29/2021 Status: Signed    : German Mcgregor MD (Physician)              [facility]    Comprehensive and complex medical care - Chronic disease management - Shared decision making - Care coordination - Compassionate care    Patient advocacy - Rational deprescribing - Minimally disruptive medicine - Ethical focus - Customized care          Date of Service: 3/29/2021  Primary Provider: German Mcgregor MD    Patient Care Team:  German Mcgregor MD as PCP - General (Internal Medicine)  Jacky Zayas MD (Ophthalmology)  Chavez Jay MD as Physician (Dermatology)  Kaushik Hood MD as Physician (Orthopedic Surgery)  German Mcgregor MD as Assigned PCP  Nasir Casper MD as Physician (Orthopedic Surgery)  Rehan Victor MD as Physician (Orthopedic Surgery)     ______________________________________________________________________     Patient's Pharmacy:    [facility] - [address] [address]  Phone: [phone] Fax: [phone]     Patient's Contacts:  Name Home Phone Work Phone Mobile Phone Relationship Lgl MELITON June [phone] Spouse      Patient's Insurance:    Payor/Plan Subscr  Sex Relation Sub. Ins. ID Effective Group Num   1. HEALTHPARTNER* VINICIUS GIBBONS [place] 1940 Male  [id number] Not Eff [id number]                                   [address]   2. MEDICARE - ME* SHAIVINICIUS DOWNING 1940 Male Self [id number]                                    [place], [address]           Vinicius Gibbons is a 81 y.o. male who comes in today for:    Chief Complaint   Patient presents with   ? Hospital Visit Follow Up     afib, pericardial effusion   ? Advice Only      TTE       Active Problem List:  Problem List as of 3/29/2021 Reviewed: 3/29/2021 12:02 PM by German Mcgregor MD       High    Former smoker - Quit in 1998    COPD (chronic obstructive pulmonary disease) (H)    Full code status - 2017       Medium    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    Osteoarthritis of left patellofemoral joint    Osteoarthritis of shoulder    PE (pulmonary thromboembolism) (H)    Vertigo       Low    AMD (age related macular degeneration)    HLD (hyperlipidemia)    RBBB (right bundle branch block)    SNHL (sensorineural hearing loss)    Tinnitus       Unprioritized    Atrial fibrillation (H)    Intermediate stage nonexudative age-related macular degeneration of both eyes    Loss of consciousness (H)    Pericardial effusion    Pericardial tamponade           Current Outpatient Medications   Medication Sig   ? amiodarone (PACERONE) 200 MG tablet Take 2 tablets by mouth twice a day for 4 days, then take 2 tablets once daily for 2 weeks, then stop.   ? amoxicillin (AMOXIL) 500 MG capsule Take 1 Capsule by mouth three times a day for 14 days. Indications Infection   ? artificial tears,hypromellose, (GENTEAL, HYPROMELLOSE,) 0.3 % Gel Administer 1 application to both eyes at bedtime.    ? ELIQUIS 5 mg Tab tablet    ? meclizine (ANTIVERT) 25 mg tablet Take 50 mg by mouth daily.    ? multivitamin with minerals (THERA-M) 9 mg iron-400 mcg Tab tablet Take 1 tablet by mouth daily.   ? OMEGA-3/DHA/EPA/FISH OIL (FISH OIL-OMEGA-3 FATTY ACIDS) 300-1,000 mg capsule Take 1 g by mouth daily.   ? vit A/vit C/vit E/zinc/copper (ICAPS AREDS ORAL) Take by mouth.       Social History     Social History Narrative    Lives in [place].  Has significant other.        Patient of Dr. Mcgregor since 2013.          Subjective:     Patient comes in today with significant other to follow-up his hospital stay at [facility] in [place].    He was at a [employer] in [facility] when he passed  out suddenly.  He had been feeling okay prior to this.  He had not had any problems with chest pain or sudden problems with breathing.  He does not remember anything after this.    He was [facility] to [facility] and had emergent CT of the chest abdomen and pelvis with contrast.  There was evidence of a significant cardiac effusion with likely tamponade.  Patient also had what appeared to be mediastinal fullness which subsequently on further scanning was negative for underlying malignancy.    He was transferred to [facility] and had pericardiocentesis done.  A tube was left in place.  Approximately 250 cc of bloody fluid was obtained on the initial pericardiocentesis.  He improved during his hospital stay.  He was there for a week.    He did have bronchoscopy as well because of the concerns about malignancy and this was negative.  A follow-up CT did not show any signs of mediastinal mass as was initially thought present.    About 2 days into his hospital stay, he developed atrial fibrillation with rapid ventricular response.  He is on amiodarone at this time.    Near discharge, he had a follow-up CT scan done and he was found to have some right lower lobe pulmonary emboli.  He had not had this on previous tests.  He is on Eliquis at this time.  This also helps with the A. fib.  He does have a little bit of right sided lower chest wall and abdominal pain.  He denies any fevers or chills or eating issues.  His bowels have been doing well.      They had recommended that he have a follow-up echocardiogram in the next 2 to 4 weeks to make sure the effusion is not coming back.    We reviewed his other issues noted in the assessment but not specifically addressed in the HPI above.     Objective:     Wt Readings from Last 3 Encounters:   03/29/21 210 lb (95.3 kg)   03/12/21 220 lb (99.8 kg)   01/29/21 216 lb (98 kg)     BP Readings from Last 3 Encounters:   03/29/21 138/74   03/12/21 138/86   01/29/21 138/72      /74   Pulse 79   Wt 210 lb (95.3 kg)   SpO2 94%   BMI 28.68 kg/m     The patient is comfortable, no acute distress.  Mood good.  Insight good.  Eyes are nonicteric.  Neck is supple without mass.  No cervical adenopathy.  No thyromegaly. Heart regular rate and rhythm.  Lungs clear to auscultation bilaterally.  Respiratory effort is good.  Abdomen soft and nontender.  No hepatosplenomegaly.  Extremities no edema.      Diagnostics:     Results for orders placed or performed in visit on 03/29/21   Electrocardiogram Perform and Read   Result Value Ref Range    SYSTOLIC BLOOD PRESSURE      DIASTOLIC BLOOD PRESSURE      VENTRICULAR RATE 65 BPM    ATRIAL RATE 65 BPM    P-R INTERVAL 168 ms    QRS DURATION 128 ms    Q-T INTERVAL 560 ms    QTC CALCULATION (BEZET) 582 ms    P Axis -10 degrees    R AXIS 80 degrees    T AXIS 67 degrees    MUSE DIAGNOSIS       Sinus rhythm with Premature atrial complexes  Right bundle branch block  Abnormal ECG  When compared with ECG of 28-DEC-2020 09:23,  Premature atrial complexes are now Present  QT has lengthened  Confirmed by GUSTAVO HUGHES MD LOC:[facility] ([id number]) on 3/29/2021 12:35:05 PM         Assessment:     1. Pericardial tamponade    2. Pericardial effusion    3. Atrial fibrillation, unspecified type (H)    4. Loss of consciousness (H)    5. PE (pulmonary thromboembolism) (H)    6. Elevated C-reactive protein (CRP)    7. Spinal stenosis of lumbar region with neurogenic claudication    8. Pulmonary emphysema, unspecified emphysema type (H)    9. Hospital discharge follow-up        Quality review:     PHQ-2 Total Score: 0 (3/12/2021 12:00 PM)    No data recorded  ______________________________________________________________________     BMI Readings from Last 1 Encounters:   03/29/21 28.68 kg/m          Plan:     1. We will schedule an echocardiogram in the next 2 to 3 weeks.  2. He has no symptomatology related to this at this time.  3. He is to follow-up if he has  worsening.  4. The atrial fibrillation is new but currently gone.  This may have been due to the heart issues with the pericarditis.  He may need a heart monitor down the line.  He will taper off the amiodarone as instructed.  5. There is been no further signs of loss of consciousness.  6. He will continue on Eliquis for the A. fib and for the pulmonary embolism.  Ideally continue the Eliquis for 3 months.  I believe that this was initiated by his hospital stay.  7. No signs of the emphysema worsening.  8. His back pain is currently doing well.  He does not need as emergent of surgery at this time.  Continue to monitor.  9. Blood work next visit to include CRP, CBC, Chem-8.  10. Continue current medications otherwise.  11. Follow up sooner if issues.    40 minutes or greater was spent today on the patient's care on the day of service.      This includes time for chart preparation, reviewing medical tests done before or during the visit, talking with the patient, review of quality indicators, required documentation, and other elements of care.       German Mcgregor MD  General Internal Medicine  [facility]    Return in about 10 days (around 4/8/2021), or if symptoms worsen or fail to improve, for visit and blood work.     Future Appointments   Date Time Provider Department Center   4/8/2021 10:00 AM German Mcgregor MD [facility]         ______________________________________________________________________     Relevant ICD-10 codes and order associations:      ICD-10-CM    1. Pericardial tamponade  I31.4 Echo Complete   2. Pericardial effusion  I31.3 Echo Complete   3. Atrial fibrillation, unspecified type (H)  I48.91 Electrocardiogram Perform and Read     Echo Complete   4. Loss of consciousness (H)  R40.20    5. PE (pulmonary thromboembolism) (H)  I26.99    6. Elevated C-reactive protein (CRP)  R79.82    7. Spinal stenosis of lumbar region with neurogenic claudication   M48.062    8. Pulmonary emphysema, unspecified emphysema type (H)  J43.9    9. Hospital discharge follow-up  Z09

## 2021-06-30 NOTE — PROGRESS NOTES
Progress Notes by German Mcgregor MD at 3/12/2021 11:45 AM     Author: German Mcgregor MD Service: -- Author Type: Physician    Filed: 3/12/2021  3:08 PM Encounter Date: 3/12/2021 Status: Signed    : German Mcgregor MD (Physician)       Preop    Question 3/7/2021  4:18 PM CST - Filed by Patient   I understand that completing this form is intended to provide my doctor and/or care team with helpful information for my upcoming clinic visit. It is not to notify my doctor and/or care team of medical matters requiring urgent attention. If I have an urgent medical matter, I should call 911 or my doctor's office. Acknowledge   YES and UNKNOWN responses will be further discussed at your visit.    Have you ever had a heart attack or stroke? No   Have you ever had surgery on your heart or blood vessels, such as a stent placement, a coronary artery bypass, or surgery on an artery in your head, neck, heart, or legs? No   Do you have chest pain with activity? No   Do you have a history of  heart failure? No   Do you currently have a cold, bronchitis or symptoms of other infection? No   Do you have a cough, shortness of breath, or wheezing? No   Do you or anyone in your family have previous history of blood clots? No   Do you or does anyone in your family have a serious bleeding problem such as prolonged bleeding following surgeries or cuts? No   Have you ever had problems with anemia or been told to take iron pills? No   Have you had any abnormal blood loss such as black, tarry or bloody stools? No   Have you ever had a blood transfusion? No   Are you willing to have a blood transfusion if it is medically needed before, during, or after your surgery? Yes   Have you or any of your relatives ever had problems with anesthesia? No   Do you have sleep apnea, excessive snoring or daytime drowsiness? No   Do you have any artifical heart valves or other implanted medical devices like a pacemaker, defibrillator, or continuous  glucose monitor? No   Do you have artificial joints? YES - RIGHT HIP   Are you allergic to latex? No       ______________________________________________________________________        [facility]    Care coordination - Customized care -  Comprehensive and complex medical care            Date of Service: 3/12/2021  Primary Provider: German Mcgregor MD    Patient Care Team:  German Mcgregor MD as PCP - General (Internal Medicine)  Jacky Zayas MD (Ophthalmology)  Chavez Jay MD as Physician (Dermatology)  Kaushik Hood MD as Physician (Orthopedic Surgery)  German Mcgregor MD as Assigned PCP  Nasir Casper MD as Physician (Orthopedic Surgery)     ______________________________________________________________________     Patient's Pharmacy:      [facility] [address] [address]  Phone: [phone] Fax: [phone]     Patient's Contacts:  Name Home Phone Work Phone Mobile Phone Relationship Lgl MELITON June [phone] Spouse        Patient's Insurance:    Payor: [employer] / Plan: [employer] MEDICARE ADVANTAGE / Product Type: MEDICARE ADVANTAGE /          Preoperative examination    Nir Guy is a 81 y.o. male (1940) who I am asked to see by his surgeon regarding his fitness for upcoming surgery.      Chief Complaint   Patient presents with   ? Pre-op Exam     [facility]-3 LUMBAR 3-4 AND LUMBAR 4-5 LAMINECTOMY AND DECOMPRESSION WITH EXCISION OF EXTRADURAL MASS/CYST LUMBAR 3-4       Subjective:     Patient comes in today for preoperative evaluation prior to his planned lumbar decompression as noted above.  He [place] with [place] [facility]    He continues to have a lot of pain with this.  He cannot wait to get through with the surgery.  He has been taking Advil  dual action for this.  We talked about him making sure when he needs to stop the NSAID related to this.    He does note when he bends forward that his back seems to do better.    We reviewed his COPD and his breathing seems to be doing okay at this time without issue.    His arthritis is otherwise doing okay.    We reviewed his recent carpal tunnel surgery and this is stable at this point.  He is doing well from this.    We reviewed his other issues noted in the assessment but not specifically addressed in the HPI above.     ______________________________________________________________________     Patient Active Problem List   Diagnosis   ? Osteoarthritis of shoulder   ? HLD (hyperlipidemia)   ? AMD (age related macular degeneration)   ? Vertigo   ? SNHL (sensorineural hearing loss)   ? Tinnitus   ? Former smoker - Quit in 1998   ? Osteoarthritis of left patellofemoral joint   ? Full code status - 2017   ? 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 Intermediate stage nonexudative age-related macular degeneration of both eyes   ? COPD (chronic obstructive pulmonary disease) (H)   ? RBBB (right bundle branch block)       Past Surgical History:   Procedure Laterality Date   ? BASAL CELL CARCINOMA EXCISION  1999    Nose and scalp   ? CARPAL TUNNEL RELEASE Right    ? CARPAL TUNNEL RELEASE Right 01/2021    REVISION   ? CATARACT EXTRACTION, BILATERAL  2018   ? [place] TOTAL HIP ARTHROPLASTY Right 02/21/2018    Procedure:  RIGHT TOTAL HIP ARTHROPLASTY DIRECT ANTERIOR APPROACH;  Surgeon: Kaushik Hood MD;  [facility]: [facility];  Service: Orthopedics   ? VASECTOMY  1980's      Social History     Occupational History   ? Occupation: Lithographer     Employer: RETIRED   Tobacco Use   ? Smoking status: Never Smoker   ? Smokeless tobacco: Never Used   Substance and Sexual Activity   ? Alcohol use: Yes     Alcohol/week: 8.0 standard drinks     Types: 7 Standard drinks or equivalent, 1 Glasses of wine per week  "    Comment: nightly   ? Drug use: No   ? Sexual activity: Not on file      Social History     Social History Narrative    Lives in [place].  Has significant other.        Patient of Dr. Mcgregor since 2013.       Family History   Problem Relation Age of Onset   ? Cancer Mother         GYN   ? Cancer Father         Lung   ? Heart disease Father       Family history is noncontributory otherwise.    Allergies: Patient has no known allergies.     Current medications:  Current Outpatient Medications   Medication Sig   ? artificial tears,hypromellose, (GENTEAL, HYPROMELLOSE,) 0.3 % Gel Administer 1 application to both eyes at bedtime.    ? meclizine (ANTIVERT) 25 mg tablet Take 50 mg by mouth daily.    ? multivitamin with minerals (THERA-M) 9 mg iron-400 mcg Tab tablet Take 1 tablet by mouth daily.   ? OMEGA-3/DHA/EPA/FISH OIL (FISH OIL-OMEGA-3 FATTY ACIDS) 300-1,000 mg capsule Take 1 g by mouth daily.   ? vit A/vit C/vit E/zinc/copper (ICAPS AREDS ORAL) Take by mouth.       Objective:     Wt Readings from Last 3 Encounters:   03/12/21 220 lb (99.8 kg)   01/29/21 216 lb (98 kg)   12/28/20 215 lb 14.4 oz (97.9 kg)     BP Readings from Last 3 Encounters:   03/12/21 138/86   01/29/21 138/72   12/28/20 142/80     /86   Pulse 67   Temp 98.1  F (36.7  C) (Oral)   Ht 5' 11.75\" (1.822 m)   Wt 220 lb (99.8 kg)   SpO2 98%   BMI 30.05 kg/m     Patient is in no acute distress.  Mood good.  Insight good.  Eyes nonicteric.  Pupils equal.  Ears clear.  Nose clear.  Throat clear.  Neck is supple.  No cervical adenopathy.  No thyromegaly.  Heart is regular rate and rhythm.  Lungs clear to auscultation bilaterally.  Respiratory effort is good.  Abdomen is soft, nontender, no hepatosplenomegaly.  Extremities no edema.       Diagnostics:     Results for orders placed or performed in visit on 03/12/21   Hemoglobin   Result Value Ref Range    Hemoglobin 14.9 14.0 - 18.0 g/dL     Most Recent EKG     Units 12/28/20  0923 "   VENTRATE BPM 60   ATRIALRATE BPM 60   QRSDURATION ms 120   QTINTERVAL ms 442   QTCCALC ms 442   P [place] degrees 60   RAXIS degrees 42   TAXIS degrees 55   MUSEDX  Normal sinus rhythm  Right bundle branch block  Abnormal ECG  When compared with ECG of 26-JAN-2018 16:30,  Right bundle branch block is now Present  Confirmed by IRIS OSPINA MD LOC:[facility] ([id number]) on 12/29/2020 2:24:47 PM        CHEM 8 pending at this time.    Impression:     1. Preoperative cardiovascular examination    2. Spinal stenosis of lumbar region with neurogenic claudication    3. Low back pain radiating to both legs    4. Pulmonary emphysema, unspecified emphysema type (H)    5. Primary osteoarthritis of right hip    6. Intermediate stage nonexudative age-related macular degeneration of both eyes    7. RBBB (right bundle branch block)        Quality review:     PHQ-2 Total Score: 0 (3/12/2021 12:00 PM)    No data recorded  ______________________________________________________________________     BMI Readings from Last 1 Encounters:   03/12/21 30.05 kg/m        Plan:     1. Okay to proceed with surgery as planned.  2. No concerns in relationship to the surgery as planned.  3. Check blood work today.  See relevant orders and diagnosis associations at the bottom of this note.   4. Follow up with me as needed after the surgery.    30 minutes or greater was spent today on the patient's care on the day of service.      This includes time for chart preparation, reviewing medical tests done before or during the visit, talking with the patient, review of quality indicators, required documentation, and other elements of care.        German Mcgregor MD  General Internal Medicine  [facility]    Return in about 1 year (around 3/12/2022), or if symptoms worsen or fail to improve, for annual wellness visit.     No future appointments.      ______________________________________________________________________     Relevant  ICD-10 codes and order associations:      ICD-10-CM    1. Preoperative cardiovascular examination  Z01.810 Hemoglobin     Basic Metabolic Panel   2. Spinal stenosis of lumbar region with neurogenic claudication  M48.062    3. Low back pain radiating to both legs  M54.5    4. Pulmonary emphysema, unspecified emphysema type (H)  J43.9    5. Primary osteoarthritis of right hip  M16.11    6. Intermediate stage nonexudative age-related macular degeneration of both eyes  H35.3132    7. RBBB (right bundle branch block)  I45.10

## 2021-06-30 NOTE — PROGRESS NOTES
Progress Notes by German Mcgregor MD at 2021  2:30 PM     Author: German Mcgregor MD Service: -- Author Type: Physician    Filed: 2021  3:18 PM Encounter Date: 2021 Status: Signed    : German Mcgregor MD (Physician)              [facility]    Comprehensive and complex medical care - Chronic disease management - Shared decision making - Care coordination - Compassionate care    Patient advocacy - Rational deprescribing - Minimally disruptive medicine - Ethical focus - Customized care          Date of Service: 2021  Primary Provider: German Mcgregor MD    Patient Care Team:  German Mcgregor MD as PCP - General (Internal Medicine)  Jacky Zayas MD (Ophthalmology)  Chavez Jay MD as Physician (Dermatology)  Kaushik Hood MD as Physician (Orthopedic Surgery)  German Mcgregor MD as Assigned PCP  Nasir Casper MD as Physician (Orthopedic Surgery)     ______________________________________________________________________     Patient's Pharmacy:    [facility] - [address] [address]  Phone: [phone] Fax: [phone]     Patient's Contacts:  Name Home Phone Work Phone Mobile Phone Relationship Lgl MELITON June [phone] Spouse      Patient's Insurance:    Payor/Plan Subscr  Sex Relation Sub. Ins. ID Effective Group Num   1. HEALTHPARTNER* VINICIUS GIBBONS 1940 Male  [id number] Not Eff [id number]                                   [address]   2. MEDICARE - ME* VINICIUS GIBBONS 1940 Male Self [id number]                                    [place], [address]           Vinicius Gibbons is a 81 y.o. male who comes in today for:    Chief Complaint   Patient presents with   ? Leg Pain     BOTH LEGS KNEE AND UP X 2 WEEKS NO KNOWN INJURY, HURT VERY BAD WHEN WOKE UP SAT FOR 30 MIN AND GOT BETTER       Active Problem  List:  Problem List as of 1/29/2021 Reviewed: 10/20/2020  8:03 AM by German Mcgregor MD       High    Former smoker - Quit in 1998    COPD (chronic obstructive pulmonary disease) (H)    Full code status - 2017    Vertigo       Medium    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    Osteoarthritis of left patellofemoral joint    Osteoarthritis of shoulder       Low    AMD (age related macular degeneration)    HLD (hyperlipidemia)    SNHL (sensorineural hearing loss)    Tinnitus       Unprioritized    Intermediate stage nonexudative age-related macular degeneration of both eyes           Current Outpatient Medications   Medication Sig   ? artificial tears,hypromellose, (GENTEAL, HYPROMELLOSE,) 0.3 % Gel Administer 1 application to both eyes at bedtime.    ? meclizine (ANTIVERT) 25 mg tablet Take 50 mg by mouth daily.    ? multivitamin with minerals (THERA-M) 9 mg iron-400 mcg Tab tablet Take 1 tablet by mouth daily.   ? OMEGA-3/DHA/EPA/FISH OIL (FISH OIL-OMEGA-3 FATTY ACIDS) 300-1,000 mg capsule Take 1 g by mouth daily.   ? vit A/vit C/vit E/zinc/copper (ICAPS AREDS ORAL) Take by mouth.       Social History     Social History Narrative    Lives in [place].  Has significant other.        Patient of Dr. Mcgregor since 2013.        Subjective:     Patient comes in today for follow-up.  He is mainly in today for back pain radiating down both the backs of his legs to behind the knees.    He has had a long history of back pain.  He has been seeing a chiropractor up in the [place] area for this.  This has been a long-term issue for him.    Of note, we looked into this back in 2017 when he had severe right hip pain as well.  He did have epidural spinal injection around that time as well.    In the last 2 weeks it has escalated again.  It is down the back of both of his legs.  It started going down the back of the left leg first.  He denies any numbness with this.  It is worse with standing and with walking.   It does affect his sleep at night.  He does not know of any sudden injury with this.  He has been going to his chiropractor and it has not really helped.  Today, he had very severe pain for about 30 minutes in the morning.  His legs are not giving way on him.  He denies any fevers, chills, night sweats.    His lungs have been doing okay.    He denies any changes in his bowel or bladder function.    We reviewed his other issues noted in the assessment but not specifically addressed in the HPI above.     Objective:     Wt Readings from Last 3 Encounters:   01/29/21 216 lb (98 kg)   12/28/20 215 lb 14.4 oz (97.9 kg)   10/19/20 214 lb (97.1 kg)     BP Readings from Last 3 Encounters:   01/29/21 138/72   12/28/20 142/80   10/19/20 132/72     /72   Pulse 78   Wt 216 lb (98 kg)   SpO2 97%   BMI 29.50 kg/m     The patient is comfortable, no acute distress.  Mood good.  Insight good.  Eyes are nonicteric.  Neck is supple without mass.  No cervical adenopathy.  No thyromegaly. Heart regular rate and rhythm.  Lungs clear to auscultation bilaterally.  Respiratory effort is good.  Extremities no edema.  His back shows some muscle tightness.  His straight leg raises are negative.  His reflexes are hypoactive.  His strength in the lower extremities is full.      Diagnostics:     Results for orders placed or performed in visit on 12/28/20   Electrocardiogram Perform and Read   Result Value Ref Range    SYSTOLIC BLOOD PRESSURE      DIASTOLIC BLOOD PRESSURE      VENTRICULAR RATE 60 BPM    ATRIAL RATE 60 BPM    P-R INTERVAL 190 ms    QRS DURATION 120 ms    Q-T INTERVAL 442 ms    QTC CALCULATION (BEZET) 442 ms    P Axis 60 degrees    R AXIS 42 degrees    T AXIS 55 degrees    MUSE DIAGNOSIS       Normal sinus rhythm  Right bundle branch block  Abnormal ECG  When compared with ECG of 26-JAN-2018 16:30,  Right bundle branch block is now Present  Confirmed by IRIS OSPINA MD LOC:[facility] ([id number]) on 12/29/2020 2:24:47 PM          ______________________________________________________________________     MR LUMBAR SPINE WO CONTRAST  12/21/2017 7:56 PM      INDICATION: Lower back pain radiating down both legs greater than 3 months.  TECHNIQUE: Unenhanced.  COMPARISON: None.     FINDINGS: 5 lumbar vertebrae are assumed. Mild convex left lumbar curve.     There is mild degenerative change of both hips. Right hip joint is distended laterally. Small renal cysts. Ectasia abdominal aorta. Colonic diverticula.     T12-L1: Disc dehydration. Mild disc space loss. Small Schmorl's nodes. Mild interbody spurring. Mild annular bulge. Tiny left paracentral disc protrusion. Mild degenerative facet arthropathy. No central canal stenosis. No foraminal stenosis.     L1-L2: Mild disc dehydration. Normal disc height. Mild anterior interbody spurring. Mild degenerative facet arthropathy. No central canal stenosis. No foraminal stenosis.     L2-L3: Disc dehydration. Moderate disc space loss. Mild interbody spurring. Annular bulge. Mild to moderate degenerative facet arthropathy. Ligamentum flavum thickening. Mild central canal stenosis and left worse than right lateral recess stenosis. Mild   bilateral foraminal stenosis.     L3-L4: Disc dehydration. Mild disc space loss. Annular bulge. Advanced degenerative facet arthropathy. Ligamentum flavum thickening. Moderate to severe central canal stenosis and bilateral lateral recess stenosis. Mild bilateral foraminal stenosis.     L4-L5: Disc dehydration. Moderate disc space loss. Mild interbody spurring. Diffuse annular bulge. Small shallow right paracentral disc protrusion. Moderate degenerative facet arthropathy. Moderate central canal stenosis and right worse than left   bilateral lateral recess stenosis. Moderate right and mild left foraminal stenosis.     L5-S1: Normal disc signal and height. Mild degenerative facet arthropathy. Small complex synovial cyst posterior to the left facet joint. No central canal  stenosis. No foraminal stenosis.     1.2 cm rounded intermediate T1 and low T2 signal lesion in the S1 segment of the sacrum. Probable small hemangioma in the T12 vertebral body. No other significant marrow signal abnormality.     Conus negative. Mild degenerative change of the SI joints. No significant paravertebral soft tissue abnormality.     IMPRESSION:   CONCLUSION:  1.  Degenerative changes in the lumbar spine as described above.  2.  At L3-L4 there is moderate to severe central canal stenosis, bilateral lateral recess stenosis, and mild bilateral foraminal stenosis.  3.  At L4-L5 there is moderate central canal stenosis, right worse than left bilateral lateral recess stenosis, and moderate right and mild left foraminal stenosis.  4.  At L2-L3 there is mild central canal stenosis and bilateral foraminal stenosis.  5.  1.2 cm lesion in the S1 sacral body is indeterminate but more likely benign. Bone island or hemangioma suspected. Recommend 3 month follow-up MRI to confirm stability. Alternatively, CT could provide more immediate evaluation if desired clinically.    Quality review:     PHQ-2 Total Score: 0 (7/31/2020  1:00 PM)    No data recorded  ______________________________________________________________________     BMI Readings from Last 1 Encounters:   01/29/21 29.50 kg/m        Assessment and Plan:     1. Low back pain radiating to both legs  I suspect that this is due to worsening central spinal stenosis.  We will plan on repeating his [facility] at [facility].  He could consider physical therapy for this.  I do not feel like an injection will help him.  We might refer him to Dr. Rehan Cota at [facility] orthopedics.    - MR Lumbar Spine Without Contrast; Future  - MR Lumbar Spine Without Contrast    2. Spinal stenosis of lumbar region with neurogenic claudication  He is not asking for pain medications at this point.    3. Pulmonary emphysema, unspecified emphysema type (H)  This is stable at  this time.          German Mcgregor MD  General Internal Medicine  [facility]      No follow-ups on file.     Future Appointments   Date Time Provider Department Center   2/1/2021  8:15 AM [facility]         HCC issues resolved at this visit.

## 2021-06-30 NOTE — PROGRESS NOTES
Progress Notes by German Mcgregor MD at 2021  9:55 AM     Author: German Mcgregor MD Service: -- Author Type: Physician    Filed: 2021 11:46 AM Encounter Date: 2021 Status: Signed    : German Mcgregor MD (Physician)       40 minutes or greater was spent today on the patient's care on the day of service.      This includes time for chart preparation, reviewing medical tests done before or during the visit, talking with the patient, review of quality indicators, required documentation, and other elements of care.       ______________________________________________________________________            [facility]    Comprehensive and complex medical care - Chronic disease management - Shared decision making - Care coordination - Compassionate care    Patient advocacy - Rational deprescribing - Minimally disruptive medicine - Ethical focus - Customized care          Date of Service: 2021  Primary Provider: German Mcgregor MD    Patient Care Team:  German Mcgregor MD as PCP - General (Internal Medicine)  Jacky Zayas MD (Ophthalmology)  Chavez Jay MD as Physician (Dermatology)  Kaushik Hood MD as Physician (Orthopedic Surgery)  German Mcgregor MD as Assigned PCP  Nasir Casper MD as Physician (Orthopedic Surgery)  Rehan Victor MD as Physician (Orthopedic Surgery)     ______________________________________________________________________     Patient's Pharmacy:    [facility] - [address] [address]  Phone: [phone] Fax: [phone]     Patient's Contacts:  Name Home Phone Work Phone Mobile Phone Relationship Lgl GrMELITON Mccann [phone] Spouse      Patient's Insurance:    Payor/Plan Subscr  Sex Relation Sub. Ins. ID Effective Group Num   1. HEALTHPARTNER* VINICIUS GIBBONS 1940 Male  [id number]  Not Eff [id number]                                   [address]   2. MEDICARE - ME* VINICIUS GUY 1940 Male Self [id number] [birth year]                                    [place], [address]           Vinicius Guy is a 81 y.o. male who comes in today for:    Chief Complaint   Patient presents with   ? Follow-up     echocardiogram   ? Shortness of Breath     hurt to breath when was working in the garage while looking up.        Active Problem List:  Problem List as of 5/11/2021 Reviewed: 5/11/2021 11:41 AM by German Mcgregor MD       High    Former smoker - Quit in 1998    COPD (chronic obstructive pulmonary disease) (H)    Full code status - 2017       Medium    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    Osteoarthritis of left patellofemoral joint    Osteoarthritis of shoulder    PE (pulmonary thromboembolism) (H)    Vertigo       Low    AMD (age related macular degeneration)    HLD (hyperlipidemia)    RBBB (right bundle branch block)    SNHL (sensorineural hearing loss)    Tinnitus       Unprioritized    Atrial fibrillation (H)    Intermediate stage nonexudative age-related macular degeneration of both eyes    Loss of consciousness (H)    Pericardial effusion    Pericardial tamponade    Spinal stenosis of lumbar region with neurogenic claudication           Current Outpatient Medications   Medication Sig   ? apixaban ANTICOAGULANT (ELIQUIS) 5 mg Tab tablet Take 1 tablet (5 mg total) by mouth 2 (two) times a day.   ? artificial tears,hypromellose, (GENTEAL, HYPROMELLOSE,) 0.3 % Gel Administer 1 application to both eyes at bedtime.    ? meclizine (ANTIVERT) 25 mg tablet Take 50 mg by mouth daily.    ? multivitamin with minerals (THERA-M) 9 mg iron-400 mcg Tab tablet Take 1 tablet by mouth daily.   ? OMEGA-3/DHA/EPA/FISH OIL (FISH OIL-OMEGA-3 FATTY ACIDS) 300-1,000 mg capsule Take 1 g by mouth daily.       Social History     Social History Narrative    Lives in [place].  Has significant other.         Patient of Dr. Mcgregor since 2013.          Subjective:     Patient comes in today for follow-up.    Roomed by: jackie sarmiento    Accompanied by Other    Refills needed? No    Do you have any forms that need to be filled out? No       He is generally been doing much better than he was even a month ago.    We reviewed his atrial fibrillation which was likely set off by his acute illness.  He is off the amiodarone and has had no recurrence.  We did check an EKG today.  This looked well.    We reviewed his pericardial effusion.  He has had no recurrent symptoms related to this.    We reviewed his echocardiogram in relationship to this and this looked good.  We will continue to monitor his symptoms.  We talked about doing a CT scan possibly with contrast in the future if he were to have recurrent symptoms to look at the blood clots and his pericardial effusion.  This could be done quicker than an echo could be done at this point.    He remains on anticoagulation for his hospital related pulmonary embolism and is doing okay with the blood thinner.  We reviewed the Eliquis and reasoning for this.    He did note some chest discomfort in the joint area of his upper chest in the parasternal areas in particular.  This occurred on Saturday when he was working on overhead items for about 6 to 7 hours.  It is doing better at this time but took another day or 2 to gradually dissipate.  He had no other symptoms with it.  His COPD is stable and he has a little of cough.  We had previously discussed PFTs, but were putting this on hold at this point.    We reviewed his other issues noted in the assessment but not specifically addressed in the HPI above.     Objective:     Wt Readings from Last 3 Encounters:   05/11/21 210 lb (95.3 kg)   04/21/21 208 lb (94.3 kg)   04/08/21 208 lb (94.3 kg)     BP Readings from Last 3 Encounters:   05/11/21 130/80   04/21/21 (!) 161/100   04/08/21 132/78     /80   Pulse 82   Wt 210 lb (95.3 kg)    SpO2 97%   BMI 28.68 kg/m     The patient is comfortable, no acute distress.  Mood good.  Insight good.  Eyes are nonicteric.  Neck is supple without mass.  No cervical adenopathy.  No thyromegaly. Heart regular rate and rhythm.  Lungs clear to auscultation bilaterally.  Respiratory effort is good.  Abdomen soft and nontender.  No hepatosplenomegaly.  Extremities no edema.      Diagnostics:     Results for orders placed or performed in visit on 05/11/21   Electrocardiogram Perform and Read   Result Value Ref Range    SYSTOLIC BLOOD PRESSURE      DIASTOLIC BLOOD PRESSURE      VENTRICULAR RATE 79 BPM    ATRIAL RATE 79 BPM    P-R INTERVAL 198 ms    QRS DURATION 134 ms    Q-T INTERVAL 424 ms    QTC CALCULATION (BEZET) 486 ms    P Axis 65 degrees    R AXIS 75 degrees    T AXIS 51 degrees    MUSE DIAGNOSIS       Normal sinus rhythm  Right bundle branch block  Abnormal ECG  When compared with ECG of 29-MAR-2021 09:21,  Premature atrial complexes are no longer Present  Nonspecific T wave abnormality now evident in Anterior leads  QT has shortened       Results for orders placed during the hospital encounter of 04/21/21   Echo Complete [ECH10] 04/21/2021    Narrative 1. The left ventricle is normal in size. Left ventricular systolic   performance is mildly reduced. The ejection fraction is estimated to be   45%.   2. There is mild global reduction in left ventricular systolic   performance.   3. No significant valvular heart disease is identified on this study.   4. Mild right ventricular enlargement with low normal right ventricular   systolic performance.  5. There is mild right atrial enlargement.   6. There is mild to moderate enlargement of the proximal ascending aorta.  7. There is a small pericardial effusion. There is no evidence of   significant intraventricular dependence/tamponade physiology.     Assessment:     1. Atrial fibrillation, unspecified type (H)    2. PE (pulmonary thromboembolism) (H)    3.  Pericardial effusion    4. Pericardial tamponade    5. Spinal stenosis of lumbar region with neurogenic claudication        Quality review:     PHQ-2 Total Score: 0 (3/12/2021 12:00 PM)    No data recorded  ______________________________________________________________________     BMI Readings from Last 1 Encounters:   05/11/21 28.68 kg/m          Plan:     1. Continue Eliquis for 2 months and then consider stopping.  2. Consider ISRA monitor in the future if needed for A. fib.  No indications at this time.  3. Discussed risk for recurrence of all the issues as noted above.  4. Consider CT scan of the chest with contrast if recurrent symptoms.  5. Back is doing well at this time and currently not needing intervention.  Will forward on a note for this to Dr. Cota as well.  6. Continue current medications otherwise.  7. Follow up sooner if issues.         German Mcgregor MD  General Internal Medicine  [facility]    Return in about 9 weeks (around 7/13/2021), or if symptoms worsen or fail to improve, for visit and blood work.     Future Appointments   Date Time Provider Department Center   7/13/2021  3:30 PM German Mcgregor MD [facility]         ______________________________________________________________________     Relevant ICD-10 codes and order associations:      ICD-10-CM    1. Atrial fibrillation, unspecified type (H)  I48.91 Electrocardiogram Perform and Read     apixaban ANTICOAGULANT (ELIQUIS) 5 mg Tab tablet   2. PE (pulmonary thromboembolism) (H)  I26.99 apixaban ANTICOAGULANT (ELIQUIS) 5 mg Tab tablet   3. Pericardial effusion  I31.3    4. Pericardial tamponade  I31.4    5. Spinal stenosis of lumbar region with neurogenic claudication  M48.062

## 2021-06-30 NOTE — PROGRESS NOTES
Progress Notes by German Mcgregor MD at 2021 10:00 AM     Author: German Mcgregor MD Service: -- Author Type: Physician    Filed: 2021  4:08 PM Encounter Date: 2021 Status: Signed    : German Mcgregor MD (Physician)              [facility]    Comprehensive and complex medical care - Chronic disease management - Shared decision making - Care coordination - Compassionate care    Patient advocacy - Rational deprescribing - Minimally disruptive medicine - Ethical focus - Customized care          Date of Service: 2021  Primary Provider: German Mcgregor MD    Patient Care Team:  German Mcgregor MD as PCP - General (Internal Medicine)  Jacky Zayas MD (Ophthalmology)  Chavez Jay MD as Physician (Dermatology)  Kaushik Hood MD as Physician (Orthopedic Surgery)  German Mcgregor MD as Assigned PCP  Nasir Casper MD as Physician (Orthopedic Surgery)  Rehan Victor MD as Physician (Orthopedic Surgery)     ______________________________________________________________________     Patient's Pharmacy:    [facility] - [address] [address]  Phone: [phone] Fax: [phone]     Patient's Contacts:  Name Home Phone Work Phone Mobile Phone Relationship Lgl MELITON June [phone] Spouse      Patient's Insurance:    Payor/Plan Subscr  Sex Relation Sub. Ins. ID Effective Group Num   1. HEALTHPARTNER* VINICIUS GIBBONS SALINA 1940 Male  [id number] Not Eff [id number]                                   [address]   2. MEDICARE - ME* SHAIVINICIUS DOWNING 1940 Male Self [id number]                                    [place], [address]           Vinicius Gibbons is a 81 y.o. male who comes in today for:    Chief Complaint   Patient presents with   ? Follow-up     effusion, afib   ? Labs Only     vit d   ? Appointment     has not  heard anything from cardiology       Active Problem List:  Problem List as of 4/8/2021 Reviewed: 4/8/2021  4:04 PM by German Mcgregor MD       High    Former smoker - Quit in 1998    COPD (chronic obstructive pulmonary disease) (H)    Full code status - 2017       Medium    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    Osteoarthritis of left patellofemoral joint    Osteoarthritis of shoulder    PE (pulmonary thromboembolism) (H)    Vertigo       Low    AMD (age related macular degeneration)    HLD (hyperlipidemia)    RBBB (right bundle branch block)    SNHL (sensorineural hearing loss)    Tinnitus       Unprioritized    Atrial fibrillation (H)    Intermediate stage nonexudative age-related macular degeneration of both eyes    Loss of consciousness (H)    Pericardial effusion    Pericardial tamponade           Current Outpatient Medications   Medication Sig   ? amiodarone (PACERONE) 200 MG tablet Take 2 tablets by mouth twice a day for 4 days, then take 2 tablets once daily for 2 weeks, then stop.   ? amoxicillin (AMOXIL) 500 MG capsule Take 1 Capsule by mouth three times a day for 14 days. Indications Infection   ? artificial tears,hypromellose, (GENTEAL, HYPROMELLOSE,) 0.3 % Gel Administer 1 application to both eyes at bedtime.    ? ELIQUIS 5 mg Tab tablet    ? meclizine (ANTIVERT) 25 mg tablet Take 50 mg by mouth daily.    ? multivitamin with minerals (THERA-M) 9 mg iron-400 mcg Tab tablet Take 1 tablet by mouth daily.   ? OMEGA-3/DHA/EPA/FISH OIL (FISH OIL-OMEGA-3 FATTY ACIDS) 300-1,000 mg capsule Take 1 g by mouth daily.   ? vit A/vit C/vit E/zinc/copper (ICAPS AREDS ORAL) Take by mouth.       Social History     Social History Narrative    Lives in [place].  Has significant other.        Patient of Dr. Mcgregor since 2013.          Subjective:     Patient comes in today for a number of issues.    We first reviewed his pericarditis and pericardial effusion.  He has had no recurrence of symptoms.  We  reviewed this in detail with his significant other.  We talked about the difficulties in diagnosing at times.  We reviewed the bloody pericardial effusion.  His cytology was negative.  His sed rate was okay but his CRP was elevated and we will follow-up on this.  His echocardiogram was not scheduled to this point and we reviewed this with him.    We reviewed his atrial fibrillation has not noticed any irregular heartbeat at this point.  He is on amiodarone tapering dose and he is on anticoagulation at this point.    We reviewed his pulmonary embolism and his right lower side pain is doing better at this point.  He denies any new shortness of breath.    Reviewed his COPD in relationship to these issues.    We reviewed his other issues noted in the assessment but not specifically addressed in the HPI above.     Objective:     Wt Readings from Last 3 Encounters:   04/08/21 208 lb (94.3 kg)   03/29/21 210 lb (95.3 kg)   03/12/21 220 lb (99.8 kg)     BP Readings from Last 3 Encounters:   04/08/21 132/78   03/29/21 138/74   03/12/21 138/86     /78   Pulse 65   Wt 208 lb (94.3 kg)   SpO2 96%   BMI 28.41 kg/m     The patient is comfortable, no acute distress.  Mood good.  Insight good.  Eyes are nonicteric.  Neck is supple without mass.  No cervical adenopathy.  No thyromegaly. Heart regular rate and rhythm.  Lungs clear to auscultation bilaterally.  Respiratory effort is good.  Abdomen soft and nontender.  No hepatosplenomegaly.  Extremities no edema.      Diagnostics:     Results for orders placed or performed in visit on 04/08/21   HM2(CBC w/o Differential)   Result Value Ref Range    WBC 7.1 4.0 - 11.0 thou/uL    RBC 4.46 4.40 - 6.20 mill/uL    Hemoglobin 14.1 14.0 - 18.0 g/dL    Hematocrit 42.2 40.0 - 54.0 %    MCV 95 80 - 100 fL    MCH 31.6 27.0 - 34.0 pg    MCHC 33.4 32.0 - 36.0 g/dL    RDW 12.6 11.0 - 14.5 %    Platelets 296 140 - 440 thou/uL    MPV 8.5 7.0 - 10.0 fL       Assessment:     1. Pericardial  tamponade    2. Pericardial effusion    3. Atrial fibrillation, unspecified type (H)    4. Vitamin D deficiency    5. PE (pulmonary thromboembolism) (H)    6. Elevated C-reactive protein (CRP)    7. Pulmonary emphysema, unspecified emphysema type (H)    8. Spinal stenosis of lumbar region with neurogenic claudication        Quality review:     PHQ-2 Total Score: 0 (3/12/2021 12:00 PM)    No data recorded  ______________________________________________________________________     BMI Readings from Last 1 Encounters:   04/08/21 28.41 kg/m          Plan:     1. Blood work done today.  2. Echocardiogram not previously scheduled.  I called personally to get this scheduled for the patient.  This did take a good amount of time and is involved in the time calculation today.  3. Anticipate he will continue to do well.  4. EKG next visit prior to me seeing the patient.  5. Continue current medications as is.  6. Continue anticoagulation at least for a total of 3 months.  7. Back currently doing well at this time.  8. Continue current medications otherwise.  9. Follow up sooner if issues.    40 minutes or greater was spent today on the patient's care on the day of service.      This includes time for chart preparation, reviewing medical tests done before or during the visit, talking with the patient, review of quality indicators, required documentation, and other elements of care.        German Mcgregor MD  General Internal Medicine  [facility]    Return in about 3 weeks (around 4/29/2021), or if symptoms worsen or fail to improve, for follow up visit.     Future Appointments   Date Time Provider Department Center   4/21/2021 11:00 AM [facility] ECHO OP 2 [facility]         ______________________________________________________________________     Relevant ICD-10 codes and order associations:      ICD-10-CM    1. Pericardial tamponade  I31.4 Echo Complete     C-Reactive Protein(CRP)     HM2(CBC w/o  Differential)     Basic Metabolic Panel   2. Pericardial effusion  I31.3 Echo Complete     C-Reactive Protein(CRP)     HM2(CBC w/o Differential)     Basic Metabolic Panel   3. Atrial fibrillation, unspecified type (H)  I48.91 Echo Complete   4. Vitamin D deficiency  E55.9 Vitamin D, Total (25-Hydroxy)   5. PE (pulmonary thromboembolism) (H)  I26.99    6. Elevated C-reactive protein (CRP)  R79.82    7. Pulmonary emphysema, unspecified emphysema type (H)  J43.9    8. Spinal stenosis of lumbar region with neurogenic claudication  M48.062

## 2021-07-13 ENCOUNTER — OFFICE VISIT (OUTPATIENT)
Dept: INTERNAL MEDICINE | Facility: CLINIC | Age: 81
End: 2021-07-13
Payer: COMMERCIAL

## 2021-07-13 VITALS
BODY MASS INDEX: 28 KG/M2 | SYSTOLIC BLOOD PRESSURE: 136 MMHG | HEART RATE: 74 BPM | OXYGEN SATURATION: 96 % | WEIGHT: 205 LBS | DIASTOLIC BLOOD PRESSURE: 84 MMHG

## 2021-07-13 DIAGNOSIS — J44.9 CHRONIC OBSTRUCTIVE PULMONARY DISEASE, UNSPECIFIED COPD TYPE (H): Primary | ICD-10-CM

## 2021-07-13 DIAGNOSIS — I31.39 PERICARDIAL EFFUSION: ICD-10-CM

## 2021-07-13 DIAGNOSIS — Z87.891 FORMER SMOKER: ICD-10-CM

## 2021-07-13 DIAGNOSIS — M48.062 SPINAL STENOSIS OF LUMBAR REGION WITH NEUROGENIC CLAUDICATION: ICD-10-CM

## 2021-07-13 DIAGNOSIS — J41.1 BRONCHITIS, MUCOPURULENT RECURRENT (H): ICD-10-CM

## 2021-07-13 DIAGNOSIS — J47.9 RECURRENT BRONCHIECTASIS (H): ICD-10-CM

## 2021-07-13 DIAGNOSIS — I26.99 PE (PULMONARY THROMBOEMBOLISM) (H): ICD-10-CM

## 2021-07-13 PROBLEM — Z78.9 FULL CODE STATUS: Chronic | Status: ACTIVE | Noted: 2017-01-06

## 2021-07-13 PROCEDURE — 99214 OFFICE O/P EST MOD 30 MIN: CPT | Performed by: INTERNAL MEDICINE

## 2021-07-13 RX ORDER — CHLORAL HYDRATE 500 MG
2 CAPSULE ORAL DAILY
COMMUNITY

## 2021-07-13 RX ORDER — POLYETHYLENE GLYCOL 400 AND PROPYLENE GLYCOL 4; 3 MG/ML; MG/ML
1 SOLUTION/ DROPS OPHTHALMIC
COMMUNITY

## 2021-07-14 NOTE — PROGRESS NOTES
[facility]    Comprehensive and complex medical care - Chronic disease management - Shared decision making - Care coordination - Compassionate care    Patient advocacy - Rational deprescribing - Minimally disruptive medicine - Ethical focus - Customized care          Date of Service: 7/13/2021  Primary Provider: German Mcgregor    Patient Care Team:  German Mcgregor MD as PCP - General (Internal Medicine)  Chavez Jay MD as Assigned Surgical Provider    ______________________________________________________________________     Patient's Pharmacy:    [facility] - [address] [address]  Phone: [phone] Fax: [phone]     Patient's Contacts:  Name Home Phone Work Phone Mobile Phone Relationship Lgl SarojMELITON Mccann [phone]   Significant*    MELITON QUEEN [phone] Spouse      Patient's Insurance:    Payor: MEDICARE / Plan: MEDICARE / Product Type: Medicare /            Nir Guy is a 81 year old male who comes in today for:    Patient presents with:  RECHECK: AFIB, SOB  Counseling: can eliquis or a medication cause a very bad smell, not happening as much  Recheck Medication: Eliquis, has 9 days left is he going to stay on it       Active Problem List:  Problem List as of 7/13/2021 Reviewed: 1/4/2021  2:44 PM by Ning Irving, NICOL       High    Full code status - 2017       Medium    Former smoker - Quit in 1998    Osteoarthritis of shoulder    HLD (hyperlipidemia)    AMD (age related macular degeneration)    Vertigo    SNHL (sensorineural hearing loss)    Tinnitus    Osteoarthritis of left patellofemoral joint    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 Authorizing Provider   fish oil-omega-3 fatty acids 1000 MG capsule Take 2 g by mouth daily   Yes Reported, Patient   hypromellose-dextran (GENTEAL TEARS) 0.1-0.3 % ophthalmic solution Place 1 drop into both eyes daily as needed for dry eyes    Yes Reported, Patient   MECLIZINE HCL PO Take 25 mg by mouth    Yes Reported, Patient   Multiple Vitamins-Minerals (MENS MULTIVITAMIN PLUS PO)    Yes Reported, Patient   Multiple Vitamins-Minerals (PRESERVISION AREDS PO) Take by mouth daily   Yes Reported, Patient   polyethylene glycol-propylene glycol PF (SYSTANE HYDRATION PF) 0.4-0.3 % SOLN opthalmic solution Apply 1 drop to eye   Yes Reported, Patient      Social History     Social History Narrative     Not on file       Subjective:     Accompanied by SO at this time.    Doing well overall.  Most significantly his back remains very good at this time.  No major concerns at this time with this.    On apixaban (Eliquis) for hospitalization related pulmonary embolism (PE).  We discussed going off and he is in agreement.    Reviewed his shortness of breath and this continues to be a mild issue.  Doing better than in the past.  No chest pain or pressure.    No symptoms of recurrence of his pericardial effusion.    Discussed his chronic obstructive pulmonary disease (COPD) and possible management of this.    We reviewed his other issues noted in the assessment but not specifically addressed in the HPI above.     Objective:     Wt Readings from Last 3 Encounters:   07/13/21 93 kg (205 lb)   05/11/21 95.3 kg (210 lb)   04/08/21 94.3 kg (208 lb)     BP Readings from Last 3 Encounters:   07/13/21 136/84   05/11/21 130/80   04/08/21 132/78     /84    Pulse 74   Wt 93 kg (205 lb)   SpO2 96%   BMI 28.00 kg/m     The patient is comfortable, no acute distress.  Mood good.  Insight good.  Eyes are nonicteric.  Neck is supple without mass.  No cervical adenopathy.  No thyromegaly. Heart regular rate and rhythm.  Lungs clear to auscultation bilaterally.  Respiratory effort is good.  Abdomen soft and nontender.  No hepatosplenomegaly.  Extremities no edema.    Assessment:     1. Chronic obstructive pulmonary disease, unspecified COPD type (H)    2. Former smoker    3. Recurrent bronchiectasis (H)    4. Bronchitis, mucopurulent recurrent (H)    5. Pericardial effusion    6. PE (pulmonary thromboembolism) (H)    7. Spinal stenosis of lumbar region with neurogenic claudication       Plan:     1. We will have him discontinue the apixaban (Eliquis).  2. He will monitor his symptoms.  3. Ordered pulmonary function tests (PFTs).  4. We reviewed possible stress imaging related to his shortness of breath, but he wishes to follow at this time.  5. Continue current medications otherwise.  6. Follow up sooner if issues.      German Mcgregor MD  General Internal Medicine  [facility]    Return in about 6 months (around 1/13/2022), or if symptoms worsen or fail to improve, for follow up visit.     Future Appointments   Date Time Provider Department Center   7/27/2021 10:00 AM Chavez Jay MD [facility]

## 2021-07-14 NOTE — PATIENT INSTRUCTIONS
Please follow up if you have any further issues.    You may contact me by phone or MyChart if you are worsening or if things are not improving.    ______________________________________________________________________     Please remember that you can call [phone] to schedule an appointment.     You can schedule appointments 24 hours a day, 7 days a week.  Sometimes the best time to schedule an appointment is after clinic hours when less people are calling in.  Weekends are another option for calling in to schedule appointments.

## 2021-07-27 ENCOUNTER — OFFICE VISIT (OUTPATIENT)
Dept: DERMATOLOGY | Facility: CLINIC | Age: 81
End: 2021-07-27
Payer: COMMERCIAL

## 2021-07-27 VITALS — OXYGEN SATURATION: 96 % | HEART RATE: 77 BPM | BODY MASS INDEX: 27.8 KG/M2 | HEIGHT: 72 IN

## 2021-07-27 DIAGNOSIS — D18.01 ANGIOMA OF SKIN: ICD-10-CM

## 2021-07-27 DIAGNOSIS — D23.9 DERMAL NEVUS: ICD-10-CM

## 2021-07-27 DIAGNOSIS — L57.0 AK (ACTINIC KERATOSIS): ICD-10-CM

## 2021-07-27 DIAGNOSIS — L81.4 LENTIGO: Primary | ICD-10-CM

## 2021-07-27 DIAGNOSIS — C44.42 SQUAMOUS CELL CARCINOMA OF SCALP: ICD-10-CM

## 2021-07-27 DIAGNOSIS — L82.1 SEBORRHEIC KERATOSIS: ICD-10-CM

## 2021-07-27 DIAGNOSIS — Z85.828 HISTORY OF SKIN CANCER: ICD-10-CM

## 2021-07-27 PROCEDURE — 17000 DESTRUCT PREMALG LESION: CPT | Performed by: DERMATOLOGY

## 2021-07-27 PROCEDURE — 11103 TANGNTL BX SKIN EA SEP/ADDL: CPT | Performed by: DERMATOLOGY

## 2021-07-27 PROCEDURE — 88331 PATH CONSLTJ SURG 1 BLK 1SPC: CPT | Mod: 59 | Performed by: DERMATOLOGY

## 2021-07-27 PROCEDURE — 17311 MOHS 1 STAGE H/N/HF/G: CPT | Performed by: DERMATOLOGY

## 2021-07-27 PROCEDURE — 17003 DESTRUCT PREMALG LES 2-14: CPT | Performed by: DERMATOLOGY

## 2021-07-27 PROCEDURE — 99213 OFFICE O/P EST LOW 20 MIN: CPT | Mod: 25 | Performed by: DERMATOLOGY

## 2021-07-27 PROCEDURE — 11102 TANGNTL BX SKIN SINGLE LES: CPT | Mod: 59 | Performed by: DERMATOLOGY

## 2021-07-27 NOTE — NURSING NOTE
Chief Complaint   Patient presents with     Skin Check       Vitals:    07/27/21 1002   Pulse: 77   SpO2: 96%   Height: 1.829 m (6')     Wt Readings from Last 1 Encounters:   07/13/21 93 kg (205 lb)       Kath Smith LPN.................7/27/2021

## 2021-07-27 NOTE — PROGRESS NOTES
Nir Guy is an extremely pleasant 81 year old year old male patient here today for tender spots on scalp and face.   .   Patient states this has been present for some time.  Patient reports the following symptoms:  Scale and tender.  Patient reports the following previous treatments none.  These treatments did not work.  Patient reports the following modifying factors none.  Associated symptoms: none.  Patient has no other skin complaints today.  Remainder of the HPI, Meds, PMH, Allergies, FH, and SH was reviewed in chart.      Past Medical History:   Diagnosis Date     Actinic keratosis      AMD (age related macular degeneration)      Basal cell carcinoma      Former smoker      Full code status 1/6/2017     Hyperlipidemia      OA (osteoarthritis)      Osteoarthritis of left patellofemoral joint 1/6/2017     Osteoarthritis of shoulder      RBBB (right bundle branch block)      S/P colonoscopy 4/18/11     SNHL (sensorineural hearing loss)      Squamous cell carcinoma      Squamous cell carcinoma of skin      Tinnitus      Vertigo        Past Surgical History:   Procedure Laterality Date     BASAL CELL CARCINOMA EXCISION  1999    Nose and scalp     C TOTAL HIP ARTHROPLASTY Right 02/21/2018    Procedure:  RIGHT TOTAL HIP ARTHROPLASTY DIRECT ANTERIOR APPROACH;  Surgeon: Kaushik Hood MD;  Location: [facility];  Service: Orthopedics     CATARACT EXTRACTION, BILATERAL  2018     CATARACT IOL, RT/LT       IR LUMBAR EPIDURAL STEROID INJECTION  1/9/2018     RELEASE CARPAL TUNNEL Right 1/4/2021    Procedure: Revision open carpal tunnel release.;  Surgeon: Nasir Casper MD;  Location: [facility]     RELEASE CARPAL TUNNEL Right      RELEASE CARPAL TUNNEL Right 01/2021    REVISION     VASECTOMY  1980's        Family History   Problem Relation Age of Onset     Melanoma No family hx of      Cancer Mother         GYN     Cancer Father         Lung     Heart Disease Father        Social History      Socioeconomic History     Marital status:      Spouse name: Not on file     Number of children: Not on file     Years of education: Not on file     Highest education level: Not on file   Occupational History     Employer: RETIRED   Tobacco Use     Smoking status: Never Smoker     Smokeless tobacco: Never Used   Substance and Sexual Activity     Alcohol use: Yes     Alcohol/week: 8.0 standard drinks     Comment: Alcoholic Drinks/day: nightly     Drug use: No     Sexual activity: Not on file   Other Topics Concern     Not on file   Social History Narrative     Not on file     Social Determinants of Health     Financial Resource Strain:      Difficulty of Paying Living Expenses:    Food Insecurity:      Worried About Running Out of Food in the Last Year:      Ran Out of Food in the Last Year:    Transportation Needs:      Lack of Transportation (Medical):      Lack of Transportation (Non-Medical):    Physical Activity:      Days of Exercise per Week:      Minutes of Exercise per Session:    Stress:      Feeling of Stress :    Social Connections:      Frequency of Communication with Friends and Family:      Frequency of Social Gatherings with Friends and Family:      Attends Jainism Services:      Active Member of Clubs or Organizations:      Attends Club or Organization Meetings:      Marital Status:    Intimate Partner Violence:      Fear of Current or Ex-Partner:      Emotionally Abused:      Physically Abused:      Sexually Abused:        Outpatient Encounter Medications as of 7/27/2021   Medication Sig Dispense Refill     fish oil-omega-3 fatty acids 1000 MG capsule Take 2 g by mouth daily       hypromellose-dextran (GENTEAL TEARS) 0.1-0.3 % ophthalmic solution Place 1 drop into both eyes daily as needed for dry eyes        MECLIZINE HCL PO Take 25 mg by mouth        Multiple Vitamins-Minerals (MENS MULTIVITAMIN PLUS PO)        Multiple Vitamins-Minerals (PRESERVISION AREDS PO) Take by mouth daily        polyethylene glycol-propylene glycol PF (SYSTANE HYDRATION PF) 0.4-0.3 % SOLN opthalmic solution Apply 1 drop to eye       No facility-administered encounter medications on file as of 7/27/2021.             O:   NAD, WDWN, Alert & Oriented, Mood & Affect wnl, Vitals stable   Here today alone   Pulse 77   Ht 1.829 m (6')   SpO2 96%   BMI 27.80 kg/m     General appearance normal   Vitals stable   Alert, oriented and in no acute distress      Following lymph nodes palpated: Occipital, Cervical, Supraclavicular no lad   Mid frontal scalp 1.3cm keratotic plaque  R vertex scalp 6mm keratotic scaly papule    Gritty papules on face and scalp      Stuck on papules and brown macules on trunk and ext   Red papules on trunk  Flesh colored papules on trunk     The remainder of the full exam was normal; the following areas were examined:  conjunctiva/lids, oral mucosa, neck, peripheral vascular system, abdomen, lymph nodes, digits/nails, eccrine and apocrine glands, scalp/hair, face, neck, chest, abdomen, buttocks, back, RUE, LUE, RLE, LLE       Eyes: Conjunctivae/lids:Normal     ENT: Lips, buccal mucosa, tongue: normal    MSK:Normal    Cardiovascular: peripheral edema none    Pulm: Breathing Normal    Lymph Nodes: No Head and Neck Lymphadenopathy     Neuro/Psych: Orientation:Alert and Orientedx3 ; Mood/Affect:normal       MICRO:     Mid frontal scalp (red):There is a proliferation of irregular nests of abnormal squamous cells arising from the epidermis and invading the dermis. These are well differentiated. The dermis shows a variable superficial perivascular inflammatory infiltrate.   R vertex scalp (blue):There is a proliferation of irregular nests of abnormal squamous cells arising from the epidermis and invading the dermis. These are well differentiated. The dermis shows a variable superficial perivascular inflammatory infiltrate.   A/P:  1. Seborrheic keratosis, lentigo, angioma, dermal nevus, hx of non-melanoma skin  cancer   2. Actinic keratosis   Efudex discussed with patient   He declines and will do this winter  Actinic keratosis   Scalp x3, L temple x5, R temple x3, L brow x3  LN2:  Treated with LN2 for 5s for 1-2 cycles. Warned risks of blistering, pain, pigment change, scarring, and incomplete resolution.  Advised patient to return if lesions do not completely resolve.  Wound care sheet given.  3. R/o squamous cell carcinoma   TANGENTIAL BIOPSY IN HOUSE:  After consent, anesthesia with LEC and prep, tangential excision performed and dx above confirmed with frozen section histology.  No complications and routine wound care.  Patient is on fish and risk of bleeding discussed with patient.       I have personally reviewed all specimens and/or slides and used them with my medical judgement to determine or confirm the final diagnosis.     Patient told result   Mid frontal scalp squamous cell carcinoma schedule excision  R vertex scalp squamous cell carcinoma treat today .      It was a pleasure speaking to Nir Guy today.  Previous clinic notes and pertinent laboratory tests were reviewed prior to Nir Guy's visit.  Signs and Symptoms of skin cancer discussed with patient.  Patient encouraged to perform monthly skin exams.  UV precautions reviewed with patient.  Risks of non-melanoma skin cancer discussed with patient   Return to clinic next appt  PROCEDURE NOTE  R vertex scalp squamous cell carcinoma   MOHS:   Location    The rationale for Mohs surgery was discussed with the patient and consent was obtained.  The risks and benefits as well as alternatives to therapy were discussed, in detail.  Specifically, the risks of infection, scarring, bleeding, prolonged wound healing, incomplete removal, allergy to anesthesia, nerve injury and recurrence were addressed.  Indication for Mohs was Location. Prior to the procedure, the treatment site was clearly identified and, if available, confirmed with previous photos and  confirmed by the patient   All components of the Universal Protocol/PAUSE rule were completed.  The Mohs surgeon operated in two distinct and integrated capacities as the surgeon and pathologist.      The area was prepped with Betasept.  A rim of normal appearing skin was marked circumferentially around the lesion.  The area was infiltrated with local anesthesia.  The tumor was first debulked to remove all clinically apparent tumor.  An incision following the standard Mohs approach was done and the specimen was oriented,mapped and placed in 1 block(s).  Each specimen was then chromacoded and processed in the Mohs laboratory using standard Mohs technique and submitted for frozen section histology.  Frozen section analysis showed no residual tumor but CLEAR MARGINS.      The tumor was excised using standard Mohs technique in 1 stages(s).  CLEAR MARGINS OBTAINED and Final defect size was 1 cm.     We discussed the options for wound management in full with the patient including risks/benefits/ possible outcomes.        REPAIR SECOND INTENT: We discussed the options for wound management in full with the patient including risks/benefits/possible outcomes. Decision made to allow the wound to heal by second intention. Cautery was used for for hemostasis. EBL minimal; complications none; wound care routine.  The patient was discharged in good condition and will return in one month or prn for wound evaluation.

## 2021-07-31 ENCOUNTER — HEALTH MAINTENANCE LETTER (OUTPATIENT)
Age: 81
End: 2021-07-31

## 2021-08-03 ENCOUNTER — OFFICE VISIT (OUTPATIENT)
Dept: DERMATOLOGY | Facility: CLINIC | Age: 81
End: 2021-08-03
Payer: COMMERCIAL

## 2021-08-03 VITALS — DIASTOLIC BLOOD PRESSURE: 91 MMHG | OXYGEN SATURATION: 95 % | SYSTOLIC BLOOD PRESSURE: 148 MMHG | HEART RATE: 76 BPM

## 2021-08-03 DIAGNOSIS — C44.42 SQUAMOUS CELL CARCINOMA OF SCALP: Primary | ICD-10-CM

## 2021-08-03 PROCEDURE — 99207 PR NO CHARGE LOS: CPT | Performed by: DERMATOLOGY

## 2021-08-03 PROCEDURE — 17311 MOHS 1 STAGE H/N/HF/G: CPT | Mod: 79 | Performed by: DERMATOLOGY

## 2021-08-03 PROCEDURE — 12011 RPR F/E/E/N/L/M 2.5 CM/<: CPT | Mod: 79 | Performed by: DERMATOLOGY

## 2021-08-03 NOTE — NURSING NOTE
Surgical Office Location :   [facility]  [address] [place] [address]

## 2021-08-03 NOTE — NURSING NOTE
Chief Complaint   Patient presents with     Derm Problem     MOHS       Vitals:    08/03/21 0710   BP: (!) 148/91   BP Location: Right arm   Patient Position: Sitting   Cuff Size: Adult Regular   Pulse: 76   SpO2: 95%     Wt Readings from Last 1 Encounters:   07/13/21 93 kg (205 lb)       Vicky Vega LPN .................8/3/2021

## 2021-08-03 NOTE — PATIENT INSTRUCTIONS
Open Wound Care     for ____scalp__________        ? No strenuous activity for 48 hours    ? Take Tylenol as needed for discomfort.                                                .         ? Do not drink alcoholic beverages for 48 hours.    ? Keep the pressure bandage in place for 24 hours. If the bandage becomes blood tinged or loose, reinforce it with gauze and tape.        (Refer to the reverse side of this page for management of bleeding).    ? Remove bandage in 24 hours and begin wound care as follows:     1. Clean area with tap water using a Q tip or gauze pad, (shower / bathe normally)  2. Dry wound with Q tip or gauze pad  3. Apply Aquaphor, Vaseline, Polysporin or Bacitracin Ointment with a Q tip    Do NOT use Neosporin Ointment *  4. Cover the wound with a band-aid or nonstick gauze pad and paper tape.  5. Repeat wound care once a day until wound is completely healed.    It is an old wives tale that a wound heals better when it is exposed to air and allowed to dry out. The wound will heal faster with a better cosmetic result if it is kept moist with ointment and covered with a bandage.  Do not let the wound dry out.      Supplies Needed:                Qtips or gauze pads                Polysporin or Bacitracin Ointment                Bandaids or nonstick gauze pads and paper tape    Wound care kits and brown paper tape are available for purchase at   the pharmacy.    BLEEDIN. Use tightly rolled up gauze or cloth to apply direct pressure over the bandage for 20   minutes.  2. Reapply pressure for an additional 20 minutes if necessary  3. Call the office or go to the nearest emergency room if pressure fails to stop the bleeding.  4. Use additional gauze and tape to maintain pressure once the bleeding has stopped.  5. Begin wound care 24 hours after surgery as directed.                  WOUND HEALING    1. One week after surgery a pink / red halo will form around the outside of the wound.   This is  new skin.  2. The center of the wound will appear yellowish white and produce some drainage.  3. The pink halo will slowly migrate in toward the center of the wound until the wound is covered with new shiny pink skin.  4. There will be no more drainage when the wound is completely healed.  5. It will take six months to one year for the redness to fade.  6. The scar may be itchy, tight and sensitive to extreme temperatures for a year after the surgery.  7. Massaging the area several times a day for several minutes after the wound is completely healed will help the scar soften and normalize faster. Begin massage only after healing is complete.      In case of emergency call: Dr Jay: [phone]     [facility]: [phone]    [facility]: [phone]

## 2021-08-03 NOTE — PROGRESS NOTES
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  Patient has no other skin complaints today.  Remainder of the HPI, Meds, PMH, Allergies, FH, and SH was reviewed in chart.      Past Medical History:   Diagnosis Date     Actinic keratosis      AMD (age related macular degeneration)      Basal cell carcinoma      Former smoker      Full code status 1/6/2017     Hyperlipidemia      OA (osteoarthritis)      Osteoarthritis of left patellofemoral joint 1/6/2017     Osteoarthritis of shoulder      RBBB (right bundle branch block)      S/P colonoscopy 4/18/11     SNHL (sensorineural hearing loss)      Squamous cell carcinoma      Squamous cell carcinoma of skin      Tinnitus      Vertigo        Past Surgical History:   Procedure Laterality Date     BASAL CELL CARCINOMA EXCISION  1999    Nose and scalp     C TOTAL HIP ARTHROPLASTY Right 02/21/2018    Procedure:  RIGHT TOTAL HIP ARTHROPLASTY DIRECT ANTERIOR APPROACH;  Surgeon: Kaushik Hood MD;  Location: [facility];  Service: Orthopedics     CATARACT EXTRACTION, BILATERAL  2018     CATARACT IOL, RT/LT       IR LUMBAR EPIDURAL STEROID INJECTION  1/9/2018     RELEASE CARPAL TUNNEL Right 1/4/2021    Procedure: Revision open carpal tunnel release.;  Surgeon: Nasir Casper MD;  Location: [facility]     RELEASE CARPAL TUNNEL Right      RELEASE CARPAL TUNNEL Right 01/2021    REVISION     VASECTOMY  1980's        Family History   Problem Relation Age of Onset     Melanoma No family hx of      Cancer Mother         GYN     Cancer Father         Lung     Heart Disease Father        Social History     Socioeconomic History     Marital status:      Spouse name: Not on file     Number of children: Not on file     Years of education: Not on file     Highest education level: Not on file   Occupational History     Employer: RETIRED   Tobacco Use     Smoking status:  Never Smoker     Smokeless tobacco: Never Used   Substance and Sexual Activity     Alcohol use: Yes     Alcohol/week: 8.0 standard drinks     Comment: Alcoholic Drinks/day: nightly     Drug use: No     Sexual activity: Not on file   Other Topics Concern     Not on file   Social History Narrative     Not on file     Social Determinants of Health     Financial Resource Strain:      Difficulty of Paying Living Expenses:    Food Insecurity:      Worried About Running Out of Food in the Last Year:      Ran Out of Food in the Last Year:    Transportation Needs:      Lack of Transportation (Medical):      Lack of Transportation (Non-Medical):    Physical Activity:      Days of Exercise per Week:      Minutes of Exercise per Session:    Stress:      Feeling of Stress :    Social Connections:      Frequency of Communication with Friends and Family:      Frequency of Social Gatherings with Friends and Family:      Attends Alevism Services:      Active Member of Clubs or Organizations:      Attends Club or Organization Meetings:      Marital Status:    Intimate Partner Violence:      Fear of Current or Ex-Partner:      Emotionally Abused:      Physically Abused:      Sexually Abused:        Outpatient Encounter Medications as of 8/3/2021   Medication Sig Dispense Refill     fish oil-omega-3 fatty acids 1000 MG capsule Take 2 g by mouth daily       hypromellose-dextran (GENTEAL TEARS) 0.1-0.3 % ophthalmic solution Place 1 drop into both eyes daily as needed for dry eyes        MECLIZINE HCL PO Take 25 mg by mouth        Multiple Vitamins-Minerals (MENS MULTIVITAMIN PLUS PO)        Multiple Vitamins-Minerals (PRESERVISION AREDS PO) Take by mouth daily       polyethylene glycol-propylene glycol PF (SYSTANE HYDRATION PF) 0.4-0.3 % SOLN opthalmic solution Apply 1 drop to eye       No facility-administered encounter medications on file as of 8/3/2021.             O:   NAD, WDWN, Alert & Oriented, Mood & Affect wnl, Vitals  stable   Here today alone   BP (!) 148/91 (BP Location: Right arm, Patient Position: Sitting, Cuff Size: Adult Regular)   Pulse 76   SpO2 95%    General appearance normal   Vitals stable   Alert, oriented and in no acute distress      Following lymph nodes palpated: Occipital, Cervical, Supraclavicular no lad   Mid fronta scalp 1.3cm red plaque      Eyes: Conjunctivae/lids:Normal     ENT: Lips, buccal mucosa, tongue: normal    MSK:Normal    Cardiovascular: peripheral edema none    Pulm: Breathing Normal    Neuro/Psych: Orientation:Alert and Orientedx3 ; Mood/Affect:normal       A/P:  1. Mid frontal scalp squamous cell carcinoma   MOHS:   Location    The rationale for Mohs surgery was discussed with the patient and consent was obtained.  The risks and benefits as well as alternatives to therapy were discussed, in detail.  Specifically, the risks of infection, scarring, bleeding, prolonged wound healing, incomplete removal, allergy to anesthesia, nerve injury and recurrence were addressed.  Indication for Mohs was Location. Prior to the procedure, the treatment site was clearly identified and, if available, confirmed with previous photos and confirmed by the patient   All components of the Universal Protocol/PAUSE rule were completed.  The Mohs surgeon operated in two distinct and integrated capacities as the surgeon and pathologist.      The area was prepped with Betasept.  A rim of normal appearing skin was marked circumferentially around the lesion.  The area was infiltrated with local anesthesia.  The tumor was first debulked to remove all clinically apparent tumor.  An incision following the standard Mohs approach was done and the specimen was oriented,mapped and placed in 1 block(s).  Each specimen was then chromacoded and processed in the Mohs laboratory using standard Mohs technique and submitted for frozen section histology.  Frozen section analysis showed no residual tumor but CLEAR MARGINS.      The tumor  was excised using standard Mohs technique in 1 stages(s).  CLEAR MARGINS OBTAINED and Final defect size was 1.8 x 1.7 cm.     We discussed the options for wound management in full with the patient including risks/benefits/ possible outcomes.        REPAIR SECOND INTENT: We discussed the options for wound management in full with the patient including risks/benefits/possible outcomes. Decision made to allow the wound to heal by second intention. Cautery was used for for hemostasis. EBL minimal; complications none; wound care routine.  The patient was discharged in good condition and will return in one month or prn for wound evaluation.  It was a pleasure speaking to Nir Guy today.  Nir to follow up with Primary Care provider regarding elevated blood pressure.  Previous clinic notes and pertinent laboratory tests were reviewed prior to Nir Guy's visit.  Signs and Symptoms of skin cancer discussed with patient.  Patient encouraged to perform monthly skin exams.  UV precautions reviewed with patient.  Risks of non-melanoma skin cancer discussed with patient   Return to clinic 6 months

## 2021-08-03 NOTE — LETTER
8/3/2021         RE: Nir Guy  [address] [place] [id number]        Dear Colleague,    Thank you for referring your patient, Nir Guy, to the [facility]. Please see a copy of my visit note below.    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  Patient has no other skin complaints today.  Remainder of the HPI, Meds, PMH, Allergies, FH, and SH was reviewed in chart.      Past Medical History:   Diagnosis Date     Actinic keratosis      AMD (age related macular degeneration)      Basal cell carcinoma      Former smoker      Full code status 1/6/2017     Hyperlipidemia      OA (osteoarthritis)      Osteoarthritis of left patellofemoral joint 1/6/2017     Osteoarthritis of shoulder      RBBB (right bundle branch block)      S/P colonoscopy 4/18/11     SNHL (sensorineural hearing loss)      Squamous cell carcinoma      Squamous cell carcinoma of skin      Tinnitus      Vertigo        Past Surgical History:   Procedure Laterality Date     BASAL CELL CARCINOMA EXCISION  1999    Nose and scalp     C TOTAL HIP ARTHROPLASTY Right 02/21/2018    Procedure:  RIGHT TOTAL HIP ARTHROPLASTY DIRECT ANTERIOR APPROACH;  Surgeon: Kaushik Hood MD;  Location: [facility];  Service: Orthopedics     CATARACT EXTRACTION, BILATERAL  2018     CATARACT IOL, RT/LT       IR LUMBAR EPIDURAL STEROID INJECTION  1/9/2018     RELEASE CARPAL TUNNEL Right 1/4/2021    Procedure: Revision open carpal tunnel release.;  Surgeon: Nasir Casper MD;  Location: [facility]     RELEASE CARPAL TUNNEL Right      RELEASE CARPAL TUNNEL Right 01/2021    REVISION     VASECTOMY  1980's        Family History   Problem Relation Age of Onset     Melanoma No family hx of      Cancer Mother         GYN     Cancer Father         Lung     Heart Disease Father        Social History      Socioeconomic History     Marital status:      Spouse name: Not on file     Number of children: Not on file     Years of education: Not on file     Highest education level: Not on file   Occupational History     Employer: RETIRED   Tobacco Use     Smoking status: Never Smoker     Smokeless tobacco: Never Used   Substance and Sexual Activity     Alcohol use: Yes     Alcohol/week: 8.0 standard drinks     Comment: Alcoholic Drinks/day: nightly     Drug use: No     Sexual activity: Not on file   Other Topics Concern     Not on file   Social History Narrative     Not on file     Social Determinants of Health     Financial Resource Strain:      Difficulty of Paying Living Expenses:    Food Insecurity:      Worried About Running Out of Food in the Last Year:      Ran Out of Food in the Last Year:    Transportation Needs:      Lack of Transportation (Medical):      Lack of Transportation (Non-Medical):    Physical Activity:      Days of Exercise per Week:      Minutes of Exercise per Session:    Stress:      Feeling of Stress :    Social Connections:      Frequency of Communication with Friends and Family:      Frequency of Social Gatherings with Friends and Family:      Attends Voodoo Services:      Active Member of Clubs or Organizations:      Attends Club or Organization Meetings:      Marital Status:    Intimate Partner Violence:      Fear of Current or Ex-Partner:      Emotionally Abused:      Physically Abused:      Sexually Abused:        Outpatient Encounter Medications as of 8/3/2021   Medication Sig Dispense Refill     fish oil-omega-3 fatty acids 1000 MG capsule Take 2 g by mouth daily       hypromellose-dextran (GENTEAL TEARS) 0.1-0.3 % ophthalmic solution Place 1 drop into both eyes daily as needed for dry eyes        MECLIZINE HCL PO Take 25 mg by mouth        Multiple Vitamins-Minerals (MENS MULTIVITAMIN PLUS PO)        Multiple Vitamins-Minerals (PRESERVISION AREDS PO) Take by mouth daily        polyethylene glycol-propylene glycol PF (SYSTANE HYDRATION PF) 0.4-0.3 % SOLN opthalmic solution Apply 1 drop to eye       No facility-administered encounter medications on file as of 8/3/2021.             O:   NAD, WDWN, Alert & Oriented, Mood & Affect wnl, Vitals stable   Here today alone   BP (!) 148/91 (BP Location: Right arm, Patient Position: Sitting, Cuff Size: Adult Regular)   Pulse 76   SpO2 95%    General appearance normal   Vitals stable   Alert, oriented and in no acute distress      Following lymph nodes palpated: Occipital, Cervical, Supraclavicular no lad   Mid fronta scalp 1.3cm red plaque      Eyes: Conjunctivae/lids:Normal     ENT: Lips, buccal mucosa, tongue: normal    MSK:Normal    Cardiovascular: peripheral edema none    Pulm: Breathing Normal    Neuro/Psych: Orientation:Alert and Orientedx3 ; Mood/Affect:normal       A/P:  1. Mid frontal scalp squamous cell carcinoma   MOHS:   Location    The rationale for Mohs surgery was discussed with the patient and consent was obtained.  The risks and benefits as well as alternatives to therapy were discussed, in detail.  Specifically, the risks of infection, scarring, bleeding, prolonged wound healing, incomplete removal, allergy to anesthesia, nerve injury and recurrence were addressed.  Indication for Mohs was Location. Prior to the procedure, the treatment site was clearly identified and, if available, confirmed with previous photos and confirmed by the patient   All components of the Universal Protocol/PAUSE rule were completed.  The Mohs surgeon operated in two distinct and integrated capacities as the surgeon and pathologist.      The area was prepped with Betasept.  A rim of normal appearing skin was marked circumferentially around the lesion.  The area was infiltrated with local anesthesia.  The tumor was first debulked to remove all clinically apparent tumor.  An incision following the standard Mohs approach was done and the specimen was  oriented,mapped and placed in 1 block(s).  Each specimen was then chromacoded and processed in the Mohs laboratory using standard Mohs technique and submitted for frozen section histology.  Frozen section analysis showed no residual tumor but CLEAR MARGINS.      The tumor was excised using standard Mohs technique in 1 stages(s).  CLEAR MARGINS OBTAINED and Final defect size was 1.8 x 1.7 cm.     We discussed the options for wound management in full with the patient including risks/benefits/ possible outcomes.        REPAIR SECOND INTENT: We discussed the options for wound management in full with the patient including risks/benefits/possible outcomes. Decision made to allow the wound to heal by second intention. Cautery was used for for hemostasis. EBL minimal; complications none; wound care routine.  The patient was discharged in good condition and will return in one month or prn for wound evaluation.  It was a pleasure speaking to Nir Guy today.  Nir to follow up with Primary Care provider regarding elevated blood pressure.  Previous clinic notes and pertinent laboratory tests were reviewed prior to Nir Guy's visit.  Signs and Symptoms of skin cancer discussed with patient.  Patient encouraged to perform monthly skin exams.  UV precautions reviewed with patient.  Risks of non-melanoma skin cancer discussed with patient   Return to clinic 6 months        Again, thank you for allowing me to participate in the care of your patient.        Sincerely,        Chavez Jay MD

## 2021-08-06 ENCOUNTER — HOSPITAL ENCOUNTER (OUTPATIENT)
Dept: RESPIRATORY THERAPY | Facility: HOSPITAL | Age: 81
Discharge: HOME | End: 2021-08-06
Attending: INTERNAL MEDICINE | Admitting: INTERNAL MEDICINE
Payer: COMMERCIAL

## 2021-08-06 DIAGNOSIS — Z87.891 FORMER SMOKER: ICD-10-CM

## 2021-08-06 DIAGNOSIS — J41.1 BRONCHITIS, MUCOPURULENT RECURRENT (H): ICD-10-CM

## 2021-08-06 DIAGNOSIS — J44.9 CHRONIC OBSTRUCTIVE PULMONARY DISEASE, UNSPECIFIED COPD TYPE (H): ICD-10-CM

## 2021-08-06 LAB — HGB BLD-MCNC: 14.8 G/DL (ref 13.3–17.7)

## 2021-08-06 PROCEDURE — 85018 HEMOGLOBIN: CPT | Performed by: INTERNAL MEDICINE

## 2021-08-06 PROCEDURE — 94060 EVALUATION OF WHEEZING: CPT

## 2021-08-06 PROCEDURE — 999N000157 HC STATISTIC RCP TIME EA 10 MIN

## 2021-08-06 PROCEDURE — 94640 AIRWAY INHALATION TREATMENT: CPT

## 2021-08-06 PROCEDURE — 250N000009 HC RX 250: Performed by: INTERNAL MEDICINE

## 2021-08-06 PROCEDURE — 36415 COLL VENOUS BLD VENIPUNCTURE: CPT | Performed by: INTERNAL MEDICINE

## 2021-08-06 PROCEDURE — 94729 DIFFUSING CAPACITY: CPT

## 2021-08-06 PROCEDURE — 94726 PLETHYSMOGRAPHY LUNG VOLUMES: CPT

## 2021-08-06 RX ORDER — ALBUTEROL SULFATE 0.83 MG/ML
2.5 SOLUTION RESPIRATORY (INHALATION)
Status: COMPLETED | OUTPATIENT
Start: 2021-08-06 | End: 2021-08-06

## 2021-08-06 RX ADMIN — ALBUTEROL SULFATE 2.5 MG: 2.5 SOLUTION RESPIRATORY (INHALATION) at 11:03

## 2021-08-06 NOTE — PROGRESS NOTES
CPFT done, patient maximum effort, ATS criteria met.  2.5 mg Albuterol neb given.  Patient left PF Lab without any respiratory difficulties.

## 2021-08-23 LAB
DLCOCOR-%PRED-PRE: 90 %
DLCOCOR-PRE: 23.13 ML/MIN/MMHG
DLCOUNC-%PRED-PRE: 90 %
DLCOUNC-PRE: 23.26 ML/MIN/MMHG
DLCOUNC-PRED: 25.62 ML/MIN/MMHG
ERV-%PRED-PRE: 47 %
ERV-PRE: 0.48 L
ERV-PRED: 1.01 L
EXPTIME-PRE: 7.9 SEC
FEF2575-%PRED-POST: 104 %
FEF2575-%PRED-PRE: 62 %
FEF2575-POST: 2.15 L/SEC
FEF2575-PRE: 1.29 L/SEC
FEF2575-PRED: 2.06 L/SEC
FEFMAX-%PRED-PRE: 88 %
FEFMAX-PRE: 6.6 L/SEC
FEFMAX-PRED: 7.44 L/SEC
FEV1-%PRED-PRE: 83 %
FEV1-PRE: 2.52 L
FEV1FEV6-PRE: 69 %
FEV1FEV6-PRED: 76 %
FEV1FVC-PRE: 67 %
FEV1FVC-PRED: 74 %
FEV1SVC-PRE: 62 %
FEV1SVC-PRED: 62 %
FIFMAX-PRE: 4.05 L/SEC
FRCPLETH-%PRED-PRE: 121 %
FRCPLETH-PRE: 4.78 L
FRCPLETH-PRED: 3.93 L
FVC-%PRED-PRE: 91 %
FVC-PRE: 3.75 L
FVC-PRED: 4.1 L
IC-%PRED-PRE: 93 %
IC-PRE: 3.57 L
IC-PRED: 3.83 L
RVPLETH-%PRED-PRE: 144 %
RVPLETH-PRE: 4.3 L
RVPLETH-PRED: 2.97 L
TLCPLETH-%PRED-PRE: 110 %
TLCPLETH-PRE: 8.34 L
TLCPLETH-PRED: 7.53 L
VA-%PRED-PRE: 102 %
VA-PRE: 6.88 L
VC-%PRED-PRE: 83 %
VC-PRE: 4.04 L
VC-PRED: 4.84 L

## 2021-08-24 ENCOUNTER — TELEPHONE (OUTPATIENT)
Dept: INTERNAL MEDICINE | Facility: CLINIC | Age: 81
End: 2021-08-24

## 2021-08-24 DIAGNOSIS — J44.9 CHRONIC OBSTRUCTIVE PULMONARY DISEASE, UNSPECIFIED COPD TYPE (H): Primary | ICD-10-CM

## 2021-08-24 NOTE — TELEPHONE ENCOUNTER
Please call patient -    ______________________________________________________________________     Home phone:  [phone] (home)     Cell phone:   Telephone Information:   Mobile [phone]       Other contacts:  Name Home Phone Work Phone Mobile Phone Relationship Lgl GrMELITON Mccann [phone]   Significant*    MELITON QUEEN [phone] Spouse      ______________________________________________________________________     I did get his call yesterday.    The pulmonary doctor did interpret the breathing test yesterday.    This does confirm COPD which is not too bad overall but is at least mild in extent.  He did not respond to the inhaler fantastically.    This is likely part of his shortness of breath.    We can still try an inhaler to see if it helps with his symptoms.  I could send in a prescription for this.  He would use it once a day.  If it helps after a month or 2 months, he can continue with this.  Otherwise, he could consider stopping.    Please let me know if he has any other questions.      German Mcgregor MD  [facility]  8/24/2021, 11:19 AM

## 2021-08-25 NOTE — TELEPHONE ENCOUNTER
Pt is returning call.  Message relayed.    Patient would like a call back.    Requesting the breathing report is mailed to his address of record.     Willing to try inhaler wants to know if it one he already has?  Would like to know what the name of the inhaler Dr. Mcgregor would prescribe?

## 2021-08-26 NOTE — TELEPHONE ENCOUNTER
The inhaler is incruse ellipta.  This is a daily inhaler to see if it would help with his symptoms.   He should know within 4-6 weeks whether it is helping and whether he wants to continue with it.    I sent a prescription for this to [facility].    German Mcgregor MD  General Internal Medicine  [facility]  8/25/2021, 10:22 PM

## 2021-09-21 ENCOUNTER — TELEPHONE (OUTPATIENT)
Dept: INTERNAL MEDICINE | Facility: CLINIC | Age: 81
End: 2021-09-21

## 2021-09-21 NOTE — TELEPHONE ENCOUNTER
Please call patient -    ______________________________________________________________________     Home phone:  [phone] (home)     Cell phone:   Telephone Information:   Mobile [phone]       Other contacts:  Name Home Phone Work Phone Mobile Phone Relationship Lgl Grd   MELITON QUEEN [phone]   Significant*    MELITON QUEEN [phone] Spouse      ______________________________________________________________________     Patient called regarding possibly being seen or see an orthopedist about his carpal tunnel coming back in his right hand.    Had like to reexamine him first related to this.  There could be other things going on with his other history.  We had looked at this before but I want to check into it again.    You could offer him an appointment on Friday at 10:20 AM.    German Mcgregor MD  [facility]  9/21/2021, 7:55 AM

## 2021-09-21 NOTE — TELEPHONE ENCOUNTER
Called and left vm for patient to call back for message from provider.    Please relay and assist as needed

## 2021-09-24 ENCOUNTER — OFFICE VISIT (OUTPATIENT)
Dept: INTERNAL MEDICINE | Facility: CLINIC | Age: 81
End: 2021-09-24
Payer: COMMERCIAL

## 2021-09-24 VITALS
SYSTOLIC BLOOD PRESSURE: 130 MMHG | OXYGEN SATURATION: 96 % | DIASTOLIC BLOOD PRESSURE: 82 MMHG | BODY MASS INDEX: 28.75 KG/M2 | HEART RATE: 55 BPM | WEIGHT: 212 LBS

## 2021-09-24 DIAGNOSIS — I26.99 PE (PULMONARY THROMBOEMBOLISM) (H): ICD-10-CM

## 2021-09-24 DIAGNOSIS — G56.01 CARPAL TUNNEL SYNDROME OF RIGHT WRIST: ICD-10-CM

## 2021-09-24 DIAGNOSIS — J44.9 CHRONIC OBSTRUCTIVE PULMONARY DISEASE, UNSPECIFIED COPD TYPE (H): ICD-10-CM

## 2021-09-24 DIAGNOSIS — R20.0 NUMBNESS OF RIGHT HAND: Primary | ICD-10-CM

## 2021-09-24 PROBLEM — R40.20 LOSS OF CONSCIOUSNESS (H): Status: RESOLVED | Noted: 2021-03-29 | Resolved: 2021-09-24

## 2021-09-24 PROBLEM — I48.91 ATRIAL FIBRILLATION (H): Status: RESOLVED | Noted: 2021-03-29 | Resolved: 2021-09-24

## 2021-09-24 PROCEDURE — 90471 IMMUNIZATION ADMIN: CPT | Performed by: INTERNAL MEDICINE

## 2021-09-24 PROCEDURE — 90662 IIV NO PRSV INCREASED AG IM: CPT | Performed by: INTERNAL MEDICINE

## 2021-09-24 PROCEDURE — 99214 OFFICE O/P EST MOD 30 MIN: CPT | Mod: 25 | Performed by: INTERNAL MEDICINE

## 2021-09-24 NOTE — PROGRESS NOTES
[facility]    Comprehensive and complex medical care - Chronic disease management - Shared decision making - Care coordination - Compassionate care    Patient advocacy - Rational deprescribing - Minimally disruptive medicine - Ethical focus - Customized care         Date of Service: 9/24/2021  Primary Provider: German Mcgregor    Patient Care Team:  German Mcgregor MD as PCP - General (Internal Medicine)  Chavez Jay MD as Assigned Surgical Provider  German Mcgregor MD as Assigned PCP  Jacky Zayas MD as MD (Ophthalmology)  Rehan Victor MD as MD (Orthopaedic Surgery)          Patient's Pharmacy:    [facility] - [place], [place] - [address] [place] [id number]  Phone: [phone] Fax: [phone]     Patient's Contacts:  Name Home Phone Work Phone Mobile Phone Relationship Lgl Grd   MELITON QUEEN [phone]   Significant*    MELITON QUEEN [phone] Spouse      Patient's Insurance:    Payor: [employer] / Plan: [employer] / Product Type: HMO /           Active Problem List:  Problem List as of 9/24/2021 Reviewed: 1/4/2021  2:44 PM by Ning Irving RN       High    Former smoker - Quit in 1998    Full code status - 2017    COPD (chronic obstructive pulmonary disease) (H)       Medium    Osteoarthritis of shoulder    Osteoarthritis of left patellofemoral joint    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    Pericardial tamponade    PE (pulmonary thromboembolism) (H)    Pericardial effusion       Low    HLD (hyperlipidemia)    AMD (age related macular degeneration)    Vertigo    SNHL (sensorineural hearing loss)    Tinnitus    Intermediate stage nonexudative age-related macular degeneration of both eyes    RBBB (right bundle branch block)    Spinal stenosis of lumbar region with neurogenic claudication           Current Outpatient Medications    Medication Instructions     fish oil-omega-3 fatty acids 2 g, Oral, DAILY     hypromellose-dextran (GENTEAL TEARS) 0.1-0.3 % ophthalmic solution 1 drop, Both Eyes, DAILY PRN     MECLIZINE HCL PO 25 mg, Oral     Multiple Vitamins-Minerals (MENS MULTIVITAMIN PLUS PO) Oral     Multiple Vitamins-Minerals (PRESERVISION AREDS PO) Oral, DAILY     polyethylene glycol-propylene glycol PF (SYSTANE HYDRATION PF) 0.4-0.3 % SOLN opthalmic solution 1 drop, Ophthalmic     umeclidinium (INCRUSE ELLIPTA) 62.5 MCG/INH inhaler 1 puff, Inhalation, DAILY     Social History     Social History Narrative     Not on file       Subjective:     Nir Guy is a 81 year old male who comes in today for:    Chief Complaint   Patient presents with     Cts     right hand      Patient comes in today for a number of issues.    He is mainly here for right hand numbness.  He had carpal tunnel surgery by Dr. Nasir Casper in January.  He had resolution of his symptoms (pain, numbness, and strength) soon after the surgery.  He had an EMG prior to the surgery which showed severe carpal tunnel on the right.  He had had previous surgery as well.    In the last 2 to 3 months he has noticed increasing numbness in his thumb and 3 middle fingers.  He has noticed maybe a little bit of strength.  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  It seems to be worsening.  He denies any pain associated with this.  He has had no neck issues associated with this and had no signs of cervical radiculopathy on his EMG.    Reviewed his COPD and recent PFTs.  He is on an inhaler at this time and seeing how this goes for him.    He has a swelling on the right volar wrist towards the radial side which has popped up subsequently but is not immediately raised by the carpal tunnel surgery.    He would like a second opinion related to this.    We reviewed his other issues noted in the assessment but not specifically addressed in the HPI above.     Objective:     Wt  Readings from Last 3 Encounters:   09/24/21 96.2 kg (212 lb)   07/13/21 93 kg (205 lb)   05/11/21 95.3 kg (210 lb)     BP Readings from Last 3 Encounters:   09/24/21 130/82   08/03/21 (!) 148/91   07/13/21 136/84     /82   Pulse 55   Wt 96.2 kg (212 lb)   SpO2 96%   BMI 28.75 kg/m     The patient is comfortable, no acute distress.  Mood good.  Insight good.  Eyes are nonicteric.  Neck is supple without mass.  No cervical adenopathy.  No thyromegaly. Heart regular rate and rhythm.  Lungs clear to auscultation bilaterally.  Respiratory effort is good.  Extremities no edema.  His right hand shows good group at this time.  He has probably a ganglion of the radial volar wrist.  His incision does not show any major abnormalities.    Diagnostics:     CRP   Date Value Ref Range Status   04/08/2021 0.3 0.0 - 0.8 mg/dL Final     Erythrocyte Sedimentation Rate   Date Value Ref Range Status   07/31/2020 2 0 - 15 mm/hr Final      Lab Results   Component Value Date    Cyclic Citrullinated Peptide Antibody IgG <0.5 07/31/2020     ______________________________________________________________________     Pertinent radiology for this visit includes the following:    Echocardiogram Complete  1. The left ventricle is normal in size. Left ventricular systolic performance is mildly reduced. The ejection fraction is estimated to be 45%.   2. There is mild global reduction in left ventricular systolic performance.   3. No significant valvular heart disease is identified on this study.   4. Mild right ventricular enlargement with low normal right ventricular systolic performance.  5. There is mild right atrial enlargement.   6. There is mild to moderate enlargement of the proximal ascending aorta.  7. There is a small pericardial effusion. There is no evidence of significant intraventricular dependence/tamponade physiology.       ______________________________________________________________________    Assessment:     1. Numbness  of right hand    2. Carpal tunnel syndrome of right wrist    3. Chronic obstructive pulmonary disease, unspecified COPD type (H)    4. PE (pulmonary thromboembolism) (H)        Plan:     1. Referral to Dr. Rob Mendoza or Dr. Luis Hermosillo at [facility] for second opinion related to this.  2. Copy of the EMG done through [facility] copy for the patient and he can bring to his visit.  3. Continue inhaler at this time for COPD to see if this helps.  4. No signs of recurrence of pulmonary embolism.  5. Continue current medications otherwise.  6. Follow up sooner if issues.       German Mcgregor MD  General Internal Medicine  [facility]    Return in about 1 year (around 9/24/2022), or if symptoms worsen or fail to improve, for follow up visit.     Future Appointments   Date Time Provider Department Center   12/14/2021  9:00 AM Chavez Jay MD [facility]   1/10/2022 10:00 AM German Mcgregor MD [facility]

## 2021-09-24 NOTE — PATIENT INSTRUCTIONS
Please follow up if you have any further issues.    You may contact me by phone or MyChart if you are worsening or if things are not improving.    ______________________________________________________________________     Please remember that you can call [phone] to schedule an appointment.     You can schedule appointments 24 hours a day, 7 days a week.  Sometimes the best time to schedule an appointment is after clinic hours when less people are calling in.  Weekends are another option for calling in to schedule appointments.

## 2021-10-27 ENCOUNTER — TRANSFERRED RECORDS (OUTPATIENT)
Dept: HEALTH INFORMATION MANAGEMENT | Facility: CLINIC | Age: 81
End: 2021-10-27
Payer: COMMERCIAL

## 2021-11-29 ENCOUNTER — TRANSFERRED RECORDS (OUTPATIENT)
Dept: HEALTH INFORMATION MANAGEMENT | Facility: CLINIC | Age: 81
End: 2021-11-29
Payer: COMMERCIAL

## 2021-11-30 ENCOUNTER — TRANSFERRED RECORDS (OUTPATIENT)
Dept: HEALTH INFORMATION MANAGEMENT | Facility: CLINIC | Age: 81
End: 2021-11-30
Payer: COMMERCIAL

## 2021-12-01 ENCOUNTER — TELEPHONE (OUTPATIENT)
Dept: INTERNAL MEDICINE | Facility: CLINIC | Age: 81
End: 2021-12-01
Payer: COMMERCIAL

## 2021-12-01 NOTE — TELEPHONE ENCOUNTER
Reason for Call:  Other appointment    Detailed comments: Pre op surgery on 12/29/21 - [facility] - carpal tunnel - Dr. Mendoza - patient would like to see Dr. Mcgregor for his pre op - he also needs a covid test    Phone Number Patient can be reached at: Cell number on file:    Telephone Information:   Mobile [phone]       Best Time: any    Can we leave a detailed message on this number? YES    Call taken on 12/1/2021 at 10:11 AM by Melody Hannah

## 2021-12-02 NOTE — TELEPHONE ENCOUNTER
Can use a same day slot or reserved slot for this 1 week or more before the surgery.    German Mcgregor MD  General Internal Medicine  [facility]  12/1/2021, 9:44 PM

## 2021-12-03 NOTE — TELEPHONE ENCOUNTER
Called pt to schedule appointment.  Pt states he called back and scheduled with someone else.  Pt thanked for the call.

## 2021-12-14 ENCOUNTER — OFFICE VISIT (OUTPATIENT)
Dept: DERMATOLOGY | Facility: CLINIC | Age: 81
End: 2021-12-14
Payer: COMMERCIAL

## 2021-12-14 VITALS — DIASTOLIC BLOOD PRESSURE: 82 MMHG | HEART RATE: 85 BPM | OXYGEN SATURATION: 96 % | SYSTOLIC BLOOD PRESSURE: 138 MMHG

## 2021-12-14 DIAGNOSIS — L57.0 AK (ACTINIC KERATOSIS): ICD-10-CM

## 2021-12-14 DIAGNOSIS — D18.01 ANGIOMA OF SKIN: ICD-10-CM

## 2021-12-14 DIAGNOSIS — Z85.828 HISTORY OF SKIN CANCER: Primary | ICD-10-CM

## 2021-12-14 DIAGNOSIS — D22.9 DERMAL AND EPIDERMAL NEVUS: ICD-10-CM

## 2021-12-14 DIAGNOSIS — L81.4 LENTIGO: ICD-10-CM

## 2021-12-14 DIAGNOSIS — L82.1 SEBORRHEIC KERATOSIS: ICD-10-CM

## 2021-12-14 PROCEDURE — 99213 OFFICE O/P EST LOW 20 MIN: CPT | Performed by: DERMATOLOGY

## 2021-12-14 RX ORDER — CALCIPOTRIENE 50 UG/G
CREAM TOPICAL
Qty: 60 G | Refills: 3 | Status: SHIPPED | OUTPATIENT
Start: 2021-12-14 | End: 2022-02-04

## 2021-12-14 RX ORDER — FLUOROURACIL 50 MG/G
CREAM TOPICAL
Qty: 40 G | Refills: 1 | Status: SHIPPED | OUTPATIENT
Start: 2021-12-14 | End: 2021-12-23

## 2021-12-14 NOTE — PROGRESS NOTES
Nir Guy is an extremely pleasant 81 year old year old male patient here today for hx of nms.c  He notes rough spot son scalp.   .   Patient states this has been present for a while.  Patient reports the following symptoms:  cryo.  Patient reports the following previous treatments none.  These treatments did not work.  Patient reports the following modifying factors none.  Associated symptoms: none.  Patient has no other skin complaints today.  Remainder of the HPI, Meds, PMH, Allergies, FH, and SH was reviewed in chart.      Past Medical History:   Diagnosis Date     Actinic keratosis      AMD (age related macular degeneration)      Basal cell carcinoma      Former smoker      Full code status 1/6/2017     Hyperlipidemia      OA (osteoarthritis)      Osteoarthritis of left patellofemoral joint 1/6/2017     Osteoarthritis of shoulder      RBBB (right bundle branch block)      S/P colonoscopy 4/18/11     SNHL (sensorineural hearing loss)      Squamous cell carcinoma      Squamous cell carcinoma of skin      Tinnitus      Vertigo        Past Surgical History:   Procedure Laterality Date     BASAL CELL CARCINOMA EXCISION  1999    Nose and scalp     C TOTAL HIP ARTHROPLASTY Right 02/21/2018    Procedure:  RIGHT TOTAL HIP ARTHROPLASTY DIRECT ANTERIOR APPROACH;  Surgeon: Kaushik Hood MD;  Location: [facility];  Service: Orthopedics     CATARACT EXTRACTION, BILATERAL  2018     CATARACT IOL, RT/LT       IR LUMBAR EPIDURAL STEROID INJECTION  1/9/2018     RELEASE CARPAL TUNNEL Right 1/4/2021    Procedure: Revision open carpal tunnel release.;  Surgeon: Nasir Casper MD;  [facility]     RELEASE CARPAL TUNNEL Right      RELEASE CARPAL TUNNEL Right 01/2021    REVISION     VASECTOMY  1980's        Family History   Problem Relation Age of Onset     Melanoma No family hx of      Cancer Mother         GYN     Cancer Father         Lung     Heart Disease Father        Social History     Socioeconomic  History     Marital status:      Spouse name: Not on file     Number of children: Not on file     Years of education: Not on file     Highest education level: Not on file   Occupational History     Employer: RETIRED   Tobacco Use     Smoking status: Never Smoker     Smokeless tobacco: Never Used   Substance and Sexual Activity     Alcohol use: Yes     Alcohol/week: 8.0 standard drinks     Comment: Alcoholic Drinks/day: nightly     Drug use: No     Sexual activity: Not on file   Other Topics Concern     Not on file   Social History Narrative     Not on file     Social Determinants of Health     Financial Resource Strain: Not on file   Food Insecurity: Not on file   Transportation Needs: Not on file   Physical Activity: Not on file   Stress: Not on file   Social Connections: Not on file   Intimate Partner Violence: Not on file   Housing Stability: Not on file       Outpatient Encounter Medications as of 12/14/2021   Medication Sig Dispense Refill     fish oil-omega-3 fatty acids 1000 MG capsule Take 2 g by mouth daily       hypromellose-dextran (GENTEAL TEARS) 0.1-0.3 % ophthalmic solution Place 1 drop into both eyes daily as needed for dry eyes        MECLIZINE HCL PO Take 25 mg by mouth        Multiple Vitamins-Minerals (MENS MULTIVITAMIN PLUS PO)        Multiple Vitamins-Minerals (PRESERVISION AREDS PO) Take by mouth daily       polyethylene glycol-propylene glycol PF (SYSTANE HYDRATION PF) 0.4-0.3 % SOLN opthalmic solution Apply 1 drop to eye       umeclidinium (INCRUSE ELLIPTA) 62.5 MCG/INH inhaler Inhale 1 puff into the lungs daily 30 each 11     No facility-administered encounter medications on file as of 12/14/2021.             O:   NAD, WDWN, Alert & Oriented, Mood & Affect wnl, Vitals stable   Here today alone   /82   Pulse 85   SpO2 96%    General appearance normal   Vitals stable   Alert, oriented and in no acute distress      Following lymph nodes palpated: Occipital, Cervical,  Supraclavicular no lad  Gritty papules on temples and scalp.      Stuck on papules and brown macules on trunk and ext   Red papules on trunk  Flesh colored papules on trunk     The remainder of the full exam was normal; the following areas were examined:  conjunctiva/lids, oral mucosa, neck, peripheral vascular system, abdomen, lymph nodes, digits/nails, eccrine and apocrine glands, scalp/hair, face, neck, chest, abdomen, buttocks, back, RUE, LUE, RLE, LLE       Eyes: Conjunctivae/lids:Normal     ENT: Lips, buccal mucosa, tongue: normal    MSK:Normal    Cardiovascular: peripheral edema none    Pulm: Breathing Normal    Lymph Nodes: No Head and Neck Lymphadenopathy     Neuro/Psych: Orientation:Alert and Orientedx3 ; Mood/Affect:normal       A/P:  1. Seborrheic keratosis, lentigo, angioma, dermal nevus, hx of non-melanoma skin cancer   2. Actinic keratosis   Using 5-Flurouracil Cream    5-Fluorouracil (5FU) topical cream (brand names Efudex, Carac) is a prescription topical medicine to treat actinic keratoses (pre-squamous cell skin cancer lesions), sun-damaged skin as well as superficial skin cancers.    When applied the areas of sun-damaged skin, the 5FU will  find  damaged skin cells and destroy them.   During treatment, the skin will become red and look very irritated. This is the expected  normal response,  Some patients using 5FU show minimal redness and scaling while others have a very  vigorous  response where the skin scabs and peels. The important thing to realize is that 5FU is treating sun-damaged skin that carries skin cancer risk.        While the skin is irritated, open, sore or scabbed you can apply aquaphor, vaseline or 1% hydrocortisone cream in the morning.      You should apply a thin layer of the cream to the affected area twice a day for 2  weeks every night. A strip of cream the length of your finger tip should be enough to cover your entire face.  For tougher skin like arms, legs, or back, we  may suggest longer treatment plans.  However if you react really strong and fast, you might stop earlier or use less frequently. - please call if it is very strong and you are concern you might need to stop early.      If you prescription coverage allows we may add calcipotriene (Dovonex ), a vitamin-D derivative, to the treatment plan.  In these cases we will have you mix the calcipotriene with the efudex to help shorten the treatment course and improve outcomes.      Typically very strong reactions are related to lots of underlying sun damage, and this means you are getting a good response to the medication. However, there is no need to be miserable while using this. Please let us know if you are having trouble or concerns!       It was a pleasure speaking to Nir Guy today.  Previous clinic notes and pertinent laboratory tests were reviewed prior to Nir Guy's visit.  Nature and genetics of benign skin lesions dicussed with patient.  Signs and Symptoms of skin cancer discussed with patient.  Patient encouraged to perform monthly skin exams.  UV precautions reviewed with patient.  Risks of non-melanoma skin cancer discussed with patient   Return to clinic 3 months

## 2021-12-14 NOTE — PATIENT INSTRUCTIONS
Using 5-Flurouracil Cream    5-Fluorouracil (5FU) topical cream (brand names Efudex, Carac) is a prescription topical medicine to treat actinic keratoses (pre-squamous cell skin cancer lesions), sun-damaged skin as well as superficial skin cancers.    When applied the areas of sun-damaged skin, the 5FU will  find  damaged skin cells and destroy them.   During treatment, the skin will become red and look very irritated. This is the expected  normal response,  Some patients using 5FU show minimal redness and scaling while others have a very  vigorous  response where the skin scabs and peels. The important thing to realize is that 5FU is treating sun-damaged skin that carries skin cancer risk.      While the skin is irritated, open, sore or scabbed you can apply aquaphor, vaseline or 1% hydrocortisone cream in the morning.     You should apply a thin layer of the cream to the affected area twice a day for 10 days. A strip of cream the length of your finger tip should be enough to cover your entire face.  For tougher skin like arms, legs, or back, we may suggest longer treatment plans.  please call if it is very strong and you are concern you might need to stop early.     If you prescription coverage allows we may add calcipotriene (Dovonex ), a vitamin-D derivative, to the treatment plan.  In these cases we will have you mix the calcipotriene with the efudex to help shorten the treatment course and improve outcomes.      Typically very strong reactions are related to lots of underlying sun damage, and this means you are getting a good response to the medication. However, there is no need to be miserable while using this. Please let us know if you are having trouble or concerns!     You can use vaseline to help with burning

## 2021-12-14 NOTE — LETTER
12/14/2021         RE: Nir Guy  [address] [place] [id number]        Dear Colleague,    Thank you for referring your patient, Nir Guy, to the [facility]. Please see a copy of my visit note below.    Nir Guy is an extremely pleasant 81 year old year old male patient here today for hx of nms.c  He notes rough spot son scalp.   .   Patient states this has been present for a while.  Patient reports the following symptoms:  cryo.  Patient reports the following previous treatments none.  These treatments did not work.  Patient reports the following modifying factors none.  Associated symptoms: none.  Patient has no other skin complaints today.  Remainder of the HPI, Meds, PMH, Allergies, FH, and SH was reviewed in chart.      Past Medical History:   Diagnosis Date     Actinic keratosis      AMD (age related macular degeneration)      Basal cell carcinoma      Former smoker      Full code status 1/6/2017     Hyperlipidemia      OA (osteoarthritis)      Osteoarthritis of left patellofemoral joint 1/6/2017     Osteoarthritis of shoulder      RBBB (right bundle branch block)      S/P colonoscopy 4/18/11     SNHL (sensorineural hearing loss)      Squamous cell carcinoma      Squamous cell carcinoma of skin      Tinnitus      Vertigo        Past Surgical History:   Procedure Laterality Date     BASAL CELL CARCINOMA EXCISION  1999    Nose and scalp     C TOTAL HIP ARTHROPLASTY Right 02/21/2018    Procedure:  RIGHT TOTAL HIP ARTHROPLASTY DIRECT ANTERIOR APPROACH;  Surgeon: Kaushik Hood MD;  Location: [facility];  Service: Orthopedics     CATARACT EXTRACTION, BILATERAL  2018     CATARACT IOL, RT/LT       IR LUMBAR EPIDURAL STEROID INJECTION  1/9/2018     RELEASE CARPAL TUNNEL Right 1/4/2021    Procedure: Revision open carpal tunnel release.;  Surgeon: Nasir Casper MD;  Location: [facility]     RELEASE CARPAL TUNNEL Right      RELEASE CARPAL TUNNEL  Right 01/2021    REVISION     VASECTOMY  1980's        Family History   Problem Relation Age of Onset     Melanoma No family hx of      Cancer Mother         GYN     Cancer Father         Lung     Heart Disease Father        Social History     Socioeconomic History     Marital status:      Spouse name: Not on file     Number of children: Not on file     Years of education: Not on file     Highest education level: Not on file   Occupational History     Employer: RETIRED   Tobacco Use     Smoking status: Never Smoker     Smokeless tobacco: Never Used   Substance and Sexual Activity     Alcohol use: Yes     Alcohol/week: 8.0 standard drinks     Comment: Alcoholic Drinks/day: nightly     Drug use: No     Sexual activity: Not on file   Other Topics Concern     Not on file   Social History Narrative     Not on file     Social Determinants of Health     Financial Resource Strain: Not on file   Food Insecurity: Not on file   Transportation Needs: Not on file   Physical Activity: Not on file   Stress: Not on file   Social Connections: Not on file   Intimate Partner Violence: Not on file   Housing Stability: Not on file       Outpatient Encounter Medications as of 12/14/2021   Medication Sig Dispense Refill     fish oil-omega-3 fatty acids 1000 MG capsule Take 2 g by mouth daily       hypromellose-dextran (GENTEAL TEARS) 0.1-0.3 % ophthalmic solution Place 1 drop into both eyes daily as needed for dry eyes        MECLIZINE HCL PO Take 25 mg by mouth        Multiple Vitamins-Minerals (MENS MULTIVITAMIN PLUS PO)        Multiple Vitamins-Minerals (PRESERVISION AREDS PO) Take by mouth daily       polyethylene glycol-propylene glycol PF (SYSTANE HYDRATION PF) 0.4-0.3 % SOLN opthalmic solution Apply 1 drop to eye       umeclidinium (INCRUSE ELLIPTA) 62.5 MCG/INH inhaler Inhale 1 puff into the lungs daily 30 each 11     No facility-administered encounter medications on file as of 12/14/2021.             O:   NAD, VASILIYWN, Alert  & Oriented, Mood & Affect wnl, Vitals stable   Here today alone   /82   Pulse 85   SpO2 96%    General appearance normal   Vitals stable   Alert, oriented and in no acute distress      Following lymph nodes palpated: Occipital, Cervical, Supraclavicular no lad  Gritty papules on temples and scalp.      Stuck on papules and brown macules on trunk and ext   Red papules on trunk  Flesh colored papules on trunk     The remainder of the full exam was normal; the following areas were examined:  conjunctiva/lids, oral mucosa, neck, peripheral vascular system, abdomen, lymph nodes, digits/nails, eccrine and apocrine glands, scalp/hair, face, neck, chest, abdomen, buttocks, back, RUE, LUE, RLE, LLE       Eyes: Conjunctivae/lids:Normal     ENT: Lips, buccal mucosa, tongue: normal    MSK:Normal    Cardiovascular: peripheral edema none    Pulm: Breathing Normal    Lymph Nodes: No Head and Neck Lymphadenopathy     Neuro/Psych: Orientation:Alert and Orientedx3 ; Mood/Affect:normal       A/P:  1. Seborrheic keratosis, lentigo, angioma, dermal nevus, hx of non-melanoma skin cancer   2. Actinic keratosis   Using 5-Flurouracil Cream    5-Fluorouracil (5FU) topical cream (brand names Efudex, Carac) is a prescription topical medicine to treat actinic keratoses (pre-squamous cell skin cancer lesions), sun-damaged skin as well as superficial skin cancers.    When applied the areas of sun-damaged skin, the 5FU will  find  damaged skin cells and destroy them.   During treatment, the skin will become red and look very irritated. This is the expected  normal response,  Some patients using 5FU show minimal redness and scaling while others have a very  vigorous  response where the skin scabs and peels. The important thing to realize is that 5FU is treating sun-damaged skin that carries skin cancer risk.        While the skin is irritated, open, sore or scabbed you can apply aquaphor, vaseline or 1% hydrocortisone cream in the morning.       You should apply a thin layer of the cream to the affected area twice a day for 2  weeks every night. A strip of cream the length of your finger tip should be enough to cover your entire face.  For tougher skin like arms, legs, or back, we may suggest longer treatment plans.  However if you react really strong and fast, you might stop earlier or use less frequently. - please call if it is very strong and you are concern you might need to stop early.      If you prescription coverage allows we may add calcipotriene (Dovonex ), a vitamin-D derivative, to the treatment plan.  In these cases we will have you mix the calcipotriene with the efudex to help shorten the treatment course and improve outcomes.      Typically very strong reactions are related to lots of underlying sun damage, and this means you are getting a good response to the medication. However, there is no need to be miserable while using this. Please let us know if you are having trouble or concerns!       It was a pleasure speaking to Nir Guy today.  Previous clinic notes and pertinent laboratory tests were reviewed prior to Nir Guy's visit.  Nature and genetics of benign skin lesions dicussed with patient.  Signs and Symptoms of skin cancer discussed with patient.  Patient encouraged to perform monthly skin exams.  UV precautions reviewed with patient.  Risks of non-melanoma skin cancer discussed with patient   Return to clinic 3 months        Again, thank you for allowing me to participate in the care of your patient.        Sincerely,        Chavez Jay MD

## 2021-12-23 ENCOUNTER — OFFICE VISIT (OUTPATIENT)
Dept: FAMILY MEDICINE | Facility: CLINIC | Age: 81
End: 2021-12-23
Payer: COMMERCIAL

## 2021-12-23 VITALS
TEMPERATURE: 97.8 F | BODY MASS INDEX: 29.29 KG/M2 | OXYGEN SATURATION: 95 % | HEART RATE: 78 BPM | DIASTOLIC BLOOD PRESSURE: 70 MMHG | SYSTOLIC BLOOD PRESSURE: 138 MMHG | WEIGHT: 216 LBS

## 2021-12-23 DIAGNOSIS — Z01.818 PREOP GENERAL PHYSICAL EXAM: Primary | ICD-10-CM

## 2021-12-23 DIAGNOSIS — G56.01 CARPAL TUNNEL SYNDROME OF RIGHT WRIST: ICD-10-CM

## 2021-12-23 DIAGNOSIS — J44.9 CHRONIC OBSTRUCTIVE PULMONARY DISEASE, UNSPECIFIED COPD TYPE (H): ICD-10-CM

## 2021-12-23 LAB
ERYTHROCYTE [DISTWIDTH] IN BLOOD BY AUTOMATED COUNT: 12.5 % (ref 10–15)
HCT VFR BLD AUTO: 42.1 % (ref 40–53)
HGB BLD-MCNC: 14.5 G/DL (ref 13.3–17.7)
MCH RBC QN AUTO: 32.3 PG (ref 26.5–33)
MCHC RBC AUTO-ENTMCNC: 34.4 G/DL (ref 31.5–36.5)
MCV RBC AUTO: 94 FL (ref 78–100)
PLATELET # BLD AUTO: 179 10E3/UL (ref 150–450)
RBC # BLD AUTO: 4.49 10E6/UL (ref 4.4–5.9)
WBC # BLD AUTO: 8.8 10E3/UL (ref 4–11)

## 2021-12-23 PROCEDURE — 36415 COLL VENOUS BLD VENIPUNCTURE: CPT | Performed by: FAMILY MEDICINE

## 2021-12-23 PROCEDURE — 99214 OFFICE O/P EST MOD 30 MIN: CPT | Performed by: FAMILY MEDICINE

## 2021-12-23 PROCEDURE — 93000 ELECTROCARDIOGRAM COMPLETE: CPT | Performed by: FAMILY MEDICINE

## 2021-12-23 PROCEDURE — 85027 COMPLETE CBC AUTOMATED: CPT | Performed by: FAMILY MEDICINE

## 2021-12-23 NOTE — PATIENT INSTRUCTIONS
Please go ahead and stop your fish oil now until after surgery.    Good luck with your procedure!  Preparing for Your Surgery  Getting started  A nurse will call you to review your health history and instructions. They will give you an arrival time based on your scheduled surgery time. Please be ready to share:    Your doctor's clinic name and phone number    Your medical, surgical and anesthesia history    A list of allergies and sensitivities    A list of medicines, including herbal treatments and over-the-counter drugs    Whether the patient has a legal guardian (ask how to send us the papers in advance)  Please tell us if you're pregnant--or if there's any chance you might be pregnant. Some surgeries may injure a fetus (unborn baby), so they require a pregnancy test. Surgeries that are safe for a fetus don't always need a test, and you can choose whether to have one.   If you have a child who's having surgery, please ask for a copy of Preparing for Your Child's Surgery.    Preparing for surgery    Within 30 days of surgery: Have a pre-op exam (sometimes called an H&P, or History and Physical). This can be done at a clinic or pre-operative center.  ? If you're having a , you may not need this exam. Talk to your care team.    At your pre-op exam, talk to your care team about all medicines you take. If you need to stop any medicines before surgery, ask when to start taking them again.  ? We do this for your safety. Many medicines can make you bleed too much during surgery. Some change how well surgery (anesthesia) drugs work.    Call your insurance company to let them know you're having surgery. (If you don't have insurance, call [phone].)    Call your clinic if there's any change in your health. This includes signs of a cold or flu (sore throat, runny nose, cough, rash, fever). It also includes a scrape or scratch near the surgery site.    If you have questions on the day of surgery, call your  hospital or surgery center.  COVID testing  You may need to be tested for COVID-19 before having surgery. If so, we will give you instructions.  Eating and drinking guidelines  For your safety: Unless your surgeon tells you otherwise, follow the guidelines below.    Eat and drink as usual until 8 hours before surgery. After that, no food or milk.    Drink clear liquids until 2 hours before surgery. These are liquids you can see through, like water, Gatorade and Propel Water. You may also have black coffee and tea (no cream or milk).    Nothing by mouth within 2 hours of surgery. This includes gum, candy and breath mints.    If you drink alcohol: Stop drinking it the night before surgery.    If your care team tells you to take medicine on the morning of surgery, it's okay to take it with a sip of water.  Preventing infection    Shower or bathe the night before and morning of your surgery. Follow the instructions your clinic gave you. (If no instructions, use regular soap.)    Don't shave or clip hair near your surgery site. We'll remove the hair if needed.    Don't smoke or vape the morning of surgery. You may chew nicotine gum up to 2 hours before surgery. A nicotine patch is okay.  ? Note: Some surgeries require you to completely quit smoking and nicotine. Check with your surgeon.    Your care team will make every effort to keep you safe from infection. We will:  ? Clean our hands often with soap and water (or an alcohol-based hand rub).  ? Clean the skin at your surgery site with a special soap that kills germs.  ? Give you a special gown to keep you warm. (Cold raises the risk of infection.)  ? Wear special hair covers, masks, gowns and gloves during surgery.  ? Give antibiotic medicine, if prescribed. Not all surgeries need antibiotics.  What to bring on the day of surgery    Photo ID and insurance card    Copy of your health care directive, if you have one    Glasses and hearing aides (bring cases)  ? You can't  wear contacts during surgery    Inhaler and eye drops, if you use them (tell us about these when you arrive)    CPAP machine or breathing device, if you use them    A few personal items, if spending the night    If you have . . .  ? A pacemaker, ICD (cardiac defibrillator) or other implant: Bring the ID card.  ? An implanted stimulator: Bring the remote control.  ? A legal guardian: Bring a copy of the certified (court-stamped) guardianship papers.  Please remove any jewelry, including body piercings. Leave jewelry and other valuables at home.  If you're going home the day of surgery    You must have a responsible adult drive you home. They should stay with you overnight as well.    If you don't have someone to stay with you, and you aren't safe to go home alone, we may keep you overnight. Insurance often won't pay for this.  After surgery  If it's hard to control your pain or you need more pain medicine, please call your surgeon's office.  Questions?   If you have any questions for your care team, list them here: _________________________________________________________________________________________________________________________________________________________________________ ____________________________________ ____________________________________ ____________________________________  For informational purposes only. Not to replace the advice of your health care provider. Copyright   2003, 2019 [facility]. All rights reserved. Clinically reviewed by Celena Radford MD. [employer] [id number] - REV 07/21.

## 2021-12-23 NOTE — PROGRESS NOTES
[facility]  [address] [facility] [address]  Phone: [phone]  Primary Provider: German Mcgregor  Pre-op Performing Provider: ALISA KUMAR      PREOPERATIVE EVALUATION:  Today's date: 12/23/2021    Nir Guy is a 81 year old male who presents for a preoperative evaluation.    Surgical Information:  Surgery/Procedure: right carpal tunnel  Surgery Location: [facility]  Surgeon: Dr. Mendoza  Surgery Date: 12/29/21  Time of Surgery: tbd  Where patient plans to recover: At home with family  Fax number for surgical facility:     Type of Anesthesia Anticipated: to be determined    Assessment & Plan     The proposed surgical procedure is considered INTERMEDIATE risk.      ICD-10-CM    1. Preop general physical exam  Z01.818 EKG 12-lead complete w/read - Clinics     CBC with platelets     CBC with platelets   2. Carpal tunnel syndrome of right wrist  G56.01 EKG 12-lead complete w/read - Clinics     CBC with platelets     CBC with platelets   3. Chronic obstructive pulmonary disease, unspecified COPD type (H)  J44.9        Risks and Recommendations:  The patient has the following additional risks and recommendations for perioperative complications:   - No identified additional risk factors other than previously addressed    Medication Instructions:  Patient is to take all scheduled medications on the day of surgery  he will hold his fish oil until surgery    RECOMMENDATION:  APPROVAL GIVEN to proceed with proposed procedure, without further diagnostic evaluation.      Subjective     HPI related to upcoming procedure: Planned revision of R carpal tunnel surgery    Preop Questions 12/23/2021   1. Have you ever had a heart attack or stroke? No   2. Have you ever had surgery on your heart or blood vessels, such as a stent placement, a coronary artery bypass, or surgery on an artery in your head, neck, heart, or legs? No   3. Do you have chest pain with activity? No   4. Do you have a  history of  heart failure? No   5. Do you currently have a cold, bronchitis or symptoms of other infection? No   6. Do you have a cough, shortness of breath, or wheezing? No   7. Do you or anyone in your family have previous history of blood clots? No   8. Do you or does anyone in your family have a serious bleeding problem such as prolonged bleeding following surgeries or cuts? No   9. Have you ever had problems with anemia or been told to take iron pills? No   10. Have you had any abnormal blood loss such as black, tarry or bloody stools? No   11. Have you ever had a blood transfusion? No   12. Are you willing to have a blood transfusion if it is medically needed before, during, or after your surgery? Yes   13. Have you or any of your relatives ever had problems with anesthesia? No   14. Do you have sleep apnea, excessive snoring or daytime drowsiness? No   15. Do you have any artifical heart valves or other implanted medical devices like a pacemaker, defibrillator, or continuous glucose monitor? No   16. Do you have artificial joints? YES - R hip replacement   17. Are you allergic to latex? No       Health Care Directive:  Patient does not have a Health Care Directive or Living Will: Patient states has Advance Directive and will bring in a copy to clinic.    Preoperative Review of :   reviewed - no record of controlled substances prescribed.      Status of Chronic Conditions:  See problem list for active medical problems.  Problems all longstanding and stable, except as noted/documented.  See ROS for pertinent symptoms related to these conditions.      Review of Systems  CONSTITUTIONAL: NEGATIVE for fever, chills, change in weight  ENT/MOUTH: NEGATIVE for ear, mouth and throat problems  RESP: NEGATIVE for significant cough or SOB  CV: NEGATIVE for chest pain, palpitations or peripheral edema  GI: NEGATIVE for nausea, abdominal pain, heartburn, or change in bowel habits  : NEGATIVE for frequency, dysuria,  or hematuria  MUSCULOSKELETAL: NEGATIVE for significant arthralgias or myalgia  HEME: NEGATIVE for bleeding problems  PSYCHIATRIC: NEGATIVE for changes in mood or affect    Patient Active Problem List    Diagnosis Date Noted     Full code status - 2017 01/06/2017     Priority: High     Spinal stenosis of lumbar region with neurogenic claudication 05/11/2021     Priority: Medium     Pericardial effusion 03/29/2021     Priority: Medium     PE (pulmonary thromboembolism) (H)      Priority: Medium     3/21 at the end of his hospital stay at [facility] in [place], [place].         Pericardial tamponade 03/18/2021     Priority: Medium     Vertigo      Priority: Medium     COPD (chronic obstructive pulmonary disease) (H) 03/19/2020     Priority: Medium     CT shows emphysema.  Pulmonary function tests (PFTs) pending.  CT Chest High Resolution Without Contrast  Narrative: EXAM: CT CHEST HIGH RESOLUTION WO CONTRAST  LOCATION: [facility]  DATE/TIME: 3/13/2020 9:12 AM  INDICATION: Dyspnea on exertion Chronic cough.  Recurrent pulmonary   infections.  SOB.  History of smoking.  COMPARISON: No prior cross-sectional imaging of the chest; chest   radiography 11/22/2019 and 4/2/2019  TECHNIQUE: High resolution images were obtained through the chest during   inspiration with select expiratory views. Prone imaging was performed.   Multiplanar reformats were obtained. Dose reduction techniques were used.  IV CONTRAST: None.  FINDINGS:   LUNGS AND PLEURA:   Upper lung zone predominant emphysema. Mild symmetric subpleural apical   scarring. Limited peripheral interstitial opacities are present in the   inferior right middle lobe and lingula consistent with mild parenchymal   scarring. No subpleural reticulation is   present in the posterior costophrenic sulci. A solitary focus of   peribronchovascular soft tissue thickening in the medial basal right lower   lobe measures between 5 and 6 mm (series 5, image 206). There are  no   airspace opacities. No geographic lung   attenuation on expiratory images to suggest regional air trapping.  Mild central airway wall thickening is present. Mild cylindrical   bronchiectasis of third/fourth generation subsegmental airways which are   relatively removed from the subpleural lung.  A subsegmental pulmonary artery in the lateral basal left lower lobe is   focally dilated and higher attenuation, potentially isolated chronic   thrombus (series 5, image 273).  MEDIASTINUM: Cardiac chambers are normal in size. Three-vessel   atheromatous coronary calcifications. No pericardial effusion. The main   pulmonary artery is normal in size. Normal caliber thoracic aorta. Mild   arch and proximal great vessel atheromatous   calcifications.  No enlarged mediastinal or hilar lymph nodes.  Small hiatal hernia. The esophagus is decompressed.  UPPER ABDOMEN: No actionable findings in the imaged upper abdomen.  MUSCULOSKELETAL: Small thoracic spine degenerative osteophytes. No   fractures or bone lesions are present.  Impression: 1.  There are no findings of a diffuse fibrosing lung disorder   or obstructive small airways process.  2.  Emphysema.  3.  Small focus of nodular peribronchial soft tissue thickening in the   medial right lower lobe. Follow-up chest CT in 12 months is suggested to   reevaluate this indeterminate finding.  4.  Three-vessel atheromatous coronary calcifications.  5.  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      Priority: Medium     Osteoarthritis of left patellofemoral joint 01/06/2017     Priority: Medium     HLD (hyperlipidemia)      Priority: Medium     Osteoarthritis of shoulder      Priority: Medium     AMD (age related macular degeneration)      Priority: Medium     SNHL  (sensorineural hearing loss)      Priority: Medium     Tinnitus      Priority: Medium     RBBB (right bundle branch block)      Priority: Low      Past Medical History:   Diagnosis Date     Actinic keratosis      AMD (age related macular degeneration)      Basal cell carcinoma      Former smoker      Full code status 1/6/2017     Hyperlipidemia      OA (osteoarthritis)      Osteoarthritis of left patellofemoral joint 1/6/2017     Osteoarthritis of shoulder      RBBB (right bundle branch block)      S/P colonoscopy 4/18/11     SNHL (sensorineural hearing loss)      Squamous cell carcinoma      Squamous cell carcinoma of skin      Tinnitus      Vertigo      Past Surgical History:   Procedure Laterality Date     BASAL CELL CARCINOMA EXCISION  1999    Nose and scalp     CATARACT EXTRACTION, BILATERAL  2018     CATARACT IOL, RT/LT       IR LUMBAR EPIDURAL STEROID INJECTION  1/9/2018     RELEASE CARPAL TUNNEL Right 1/4/2021    Procedure: Revision open carpal tunnel release.;  Surgeon: Nasir Casper MD;  [facility]     RELEASE CARPAL TUNNEL Right      RELEASE CARPAL TUNNEL Right 01/2021    REVISION     VASECTOMY  1980's     ZZC TOTAL HIP ARTHROPLASTY Right 02/21/2018    Procedure:  RIGHT TOTAL HIP ARTHROPLASTY DIRECT ANTERIOR APPROACH;  Surgeon: Kaushik Hood MD;  Location: [facility];  Service: Orthopedics     Current Outpatient Medications   Medication Sig Dispense Refill     fish oil-omega-3 fatty acids 1000 MG capsule Take 2 g by mouth daily       hypromellose-dextran (GENTEAL TEARS) 0.1-0.3 % ophthalmic solution Place 1 drop into both eyes daily as needed for dry eyes        MECLIZINE HCL PO Take 25 mg by mouth daily        Multiple Vitamins-Minerals (MENS MULTIVITAMIN PLUS PO)        polyethylene glycol-propylene glycol PF (SYSTANE HYDRATION PF) 0.4-0.3 % SOLN opthalmic solution Apply 1 drop to eye       umeclidinium (INCRUSE ELLIPTA) 62.5 MCG/INH inhaler Inhale 1 puff into the lungs daily  30 each 11     calcipotriene (DOVONEX) 0.005 % external cream Mix with efudex and apply twice daily to scalp and temples for ten days (Patient not taking: Reported on 12/23/2021) 60 g 3       No Known Allergies     Social History     Tobacco Use     Smoking status: Never Smoker     Smokeless tobacco: Never Used   Substance Use Topics     Alcohol use: Yes     Alcohol/week: 8.0 standard drinks     Comment: Alcoholic Drinks/day: nightly     Family History   Problem Relation Age of Onset     Melanoma No family hx of      Cancer Mother         GYN     Cancer Father         Lung     Heart Disease Father      History   Drug Use No         Objective     /70   Pulse 78   Temp 97.8  F (36.6  C) (Tympanic)   Wt 98 kg (216 lb)   SpO2 95%   BMI 29.29 kg/m      Physical Exam    GENERAL APPEARANCE: healthy, alert and no distress     NECK: no adenopathy, no asymmetry, masses, or scars and thyroid normal to palpation     RESP: lungs clear to auscultation - no rales, rhonchi or wheezes     CV: regular rates and rhythm, normal S1 S2, no S3 or S4 and no murmur, click or rub     MS: extremities normal- no gross deformities noted, no evidence of inflammation in joints, FROM in all extremities.     NEURO: Normal strength and tone, sensory exam grossly normal, mentation intact and speech normal     PSYCH: mentation appears normal. and affect normal/bright     LYMPHATICS: No cervical adenopathy    Recent Labs   Lab Test 08/06/21  1040 04/08/21  1047 03/18/21  1138   HGB 14.8 14.1 14.9   PLT  --  296 216   INR  --   --  0.99   NA  --  139 138   POTASSIUM  --  4.4 4.0   CR  --  0.86 0.98        Diagnostics:  Recent Results (from the past 24 hour(s))   CBC with platelets    Collection Time: 12/23/21  3:26 PM   Result Value Ref Range    WBC Count 8.8 4.0 - 11.0 10e3/uL    RBC Count 4.49 4.40 - 5.90 10e6/uL    Hemoglobin 14.5 13.3 - 17.7 g/dL    Hematocrit 42.1 40.0 - 53.0 %    MCV 94 78 - 100 fL    MCH 32.3 26.5 - 33.0 pg    MCHC  34.4 31.5 - 36.5 g/dL    RDW 12.5 10.0 - 15.0 %    Platelet Count 179 150 - 450 10e3/uL      EKG: Normal Sinus Rhythm, no LVH by voltage criteria, Right Bundle Branch Block, unchanged from previous tracings    Revised Cardiac Risk Index (RCRI):  The patient has the following serious cardiovascular risks for perioperative complications:   - No serious cardiac risks = 0 points     RCRI Interpretation: 0 points: Class I (very low risk - 0.4% complication rate)           Signed Electronically by: Fariha Cuevas DO  Copy of this evaluation report is provided to requesting physician.

## 2021-12-23 NOTE — NURSING NOTE
Initial /70   Pulse 78   Temp 97.8  F (36.6  C) (Tympanic)   Wt 98 kg (216 lb)   SpO2 95%   BMI 29.29 kg/m   Estimated body mass index is 29.29 kg/m  as calculated from the following:    Height as of 7/27/21: 1.829 m (6').    Weight as of this encounter: 98 kg (216 lb). .

## 2021-12-24 ENCOUNTER — TELEPHONE (OUTPATIENT)
Dept: FAMILY MEDICINE | Facility: CLINIC | Age: 81
End: 2021-12-24
Payer: COMMERCIAL

## 2021-12-24 NOTE — TELEPHONE ENCOUNTER
Called patient for surgery center info and fax number.  Patient states [facility] in [place], phone number [phone]. He does not have fax number. Called [facility] and left message to return call for fax number to fax preop for surgery 12/29/21.

## 2021-12-26 ENCOUNTER — LAB (OUTPATIENT)
Dept: FAMILY MEDICINE | Facility: CLINIC | Age: 81
End: 2021-12-26
Attending: ORTHOPAEDIC SURGERY
Payer: COMMERCIAL

## 2021-12-26 DIAGNOSIS — Z11.59 ENCOUNTER FOR SCREENING FOR OTHER VIRAL DISEASES: ICD-10-CM

## 2021-12-26 PROCEDURE — U0005 INFEC AGEN DETEC AMPLI PROBE: HCPCS

## 2021-12-26 PROCEDURE — U0003 INFECTIOUS AGENT DETECTION BY NUCLEIC ACID (DNA OR RNA); SEVERE ACUTE RESPIRATORY SYNDROME CORONAVIRUS 2 (SARS-COV-2) (CORONAVIRUS DISEASE [COVID-19]), AMPLIFIED PROBE TECHNIQUE, MAKING USE OF HIGH THROUGHPUT TECHNOLOGIES AS DESCRIBED BY CMS-2020-01-R: HCPCS

## 2021-12-27 LAB — SARS-COV-2 RNA RESP QL NAA+PROBE: NEGATIVE

## 2021-12-27 NOTE — TELEPHONE ENCOUNTER
Attempted to reach [facility] 12/24 and 12/27 for fax number, no answer.    Livier Armendariz, SHAYLEE Johansen

## 2021-12-29 ENCOUNTER — TRANSFERRED RECORDS (OUTPATIENT)
Dept: HEALTH INFORMATION MANAGEMENT | Facility: CLINIC | Age: 81
End: 2021-12-29
Payer: COMMERCIAL

## 2022-01-05 ENCOUNTER — TELEPHONE (OUTPATIENT)
Dept: DERMATOLOGY | Facility: CLINIC | Age: 82
End: 2022-01-05
Payer: COMMERCIAL

## 2022-01-05 NOTE — TELEPHONE ENCOUNTER
"Spoke to patient and he was advised he can shampoo as needed.     We did review use of Efudex cream, he did not get the Calcipotriene cream as \"it cost too much-it was over $100..\"      I double checked with his pharmacist and they did dispense the Efudex cream.     Anu Patino RN    "

## 2022-01-05 NOTE — TELEPHONE ENCOUNTER
Reason for Call:  Other call back    Detailed comments: PT HAS A MEDICATION THAT HE JUST STARTED AND HAS QUESTIONS ABOUT THE INSTRUCTIONS,  HE PUTS THE MEDICATION ON HIS HEAD AND HE IS WONDERING IF HE CAN SHAMPOO HIS HAIR OR NOT.     Phone Number Patient can be reached at: Cell number on file:    Telephone Information:   Mobile [phone]       Best Time:       Can we leave a detailed message on this number? YES    Call taken on 1/5/2022 at 8:55 AM by Christie Allen MA

## 2022-01-07 ENCOUNTER — TELEPHONE (OUTPATIENT)
Dept: DERMATOLOGY | Facility: CLINIC | Age: 82
End: 2022-01-07
Payer: COMMERCIAL

## 2022-01-07 NOTE — TELEPHONE ENCOUNTER
Reason for Call:  Other call back    Detailed comments: Patient has questions about a medication cream that he started using on this past Monday 1/3/22, that he is getting/feeling no results from.  The medication is fluorouracil (EFUDEX) 5 % external cream that he states he is using, but it is discontinued on his med list.  Please advise patient.     Phone Number Patient can be reached at: Cell number on file:    Telephone Information:   Mobile [phone]       Best Time: any     Can we leave a detailed message on this number? YES    Call taken on 1/7/2022 at 9:10 AM by Zaida Mars

## 2022-01-07 NOTE — TELEPHONE ENCOUNTER
Called pt and he stated he had been using the Efudex for about 4 days now and has not seen any change to the scalp. Advised pt that it may take more than 4 days to see any changes and that the Efudex will only target the actinic keratosis. Advised to continue treatment and see how things go. Pt verbalized understanding.  Cristy ABDUL RN BSN PHN  [facility]ty Clin[facility]

## 2022-01-12 ENCOUNTER — TRANSFERRED RECORDS (OUTPATIENT)
Dept: HEALTH INFORMATION MANAGEMENT | Facility: CLINIC | Age: 82
End: 2022-01-12
Payer: COMMERCIAL

## 2022-01-27 ASSESSMENT — ACTIVITIES OF DAILY LIVING (ADL): CURRENT_FUNCTION: NO ASSISTANCE NEEDED

## 2022-02-03 ENCOUNTER — OFFICE VISIT (OUTPATIENT)
Dept: INTERNAL MEDICINE | Facility: CLINIC | Age: 82
End: 2022-02-03
Payer: COMMERCIAL

## 2022-02-03 VITALS
HEART RATE: 71 BPM | HEIGHT: 74 IN | BODY MASS INDEX: 27.46 KG/M2 | OXYGEN SATURATION: 97 % | DIASTOLIC BLOOD PRESSURE: 82 MMHG | WEIGHT: 214 LBS | SYSTOLIC BLOOD PRESSURE: 136 MMHG

## 2022-02-03 DIAGNOSIS — M48.062 SPINAL STENOSIS OF LUMBAR REGION WITH NEUROGENIC CLAUDICATION: ICD-10-CM

## 2022-02-03 DIAGNOSIS — Z13.220 LIPID SCREENING: ICD-10-CM

## 2022-02-03 DIAGNOSIS — Z00.00 MEDICARE ANNUAL WELLNESS VISIT, SUBSEQUENT: Primary | ICD-10-CM

## 2022-02-03 DIAGNOSIS — M62.9 HAMSTRING TIGHTNESS OF BOTH LOWER EXTREMITIES: ICD-10-CM

## 2022-02-03 DIAGNOSIS — I26.99 PE (PULMONARY THROMBOEMBOLISM) (H): ICD-10-CM

## 2022-02-03 DIAGNOSIS — J44.9 CHRONIC OBSTRUCTIVE PULMONARY DISEASE, UNSPECIFIED COPD TYPE (H): ICD-10-CM

## 2022-02-03 DIAGNOSIS — F51.01 PRIMARY INSOMNIA: ICD-10-CM

## 2022-02-03 DIAGNOSIS — I31.4 PERICARDIAL TAMPONADE: ICD-10-CM

## 2022-02-03 LAB
ALBUMIN SERPL-MCNC: 4.2 G/DL (ref 3.5–5)
ALP SERPL-CCNC: 67 U/L (ref 45–120)
ALT SERPL W P-5'-P-CCNC: 18 U/L (ref 0–45)
ANION GAP SERPL CALCULATED.3IONS-SCNC: 13 MMOL/L (ref 5–18)
AST SERPL W P-5'-P-CCNC: 20 U/L (ref 0–40)
BILIRUB SERPL-MCNC: 0.5 MG/DL (ref 0–1)
BUN SERPL-MCNC: 12 MG/DL (ref 8–28)
CALCIUM SERPL-MCNC: 9.5 MG/DL (ref 8.5–10.5)
CHLORIDE BLD-SCNC: 103 MMOL/L (ref 98–107)
CHOLEST SERPL-MCNC: 212 MG/DL
CO2 SERPL-SCNC: 24 MMOL/L (ref 22–31)
CREAT SERPL-MCNC: 0.86 MG/DL (ref 0.7–1.3)
ERYTHROCYTE [DISTWIDTH] IN BLOOD BY AUTOMATED COUNT: 12.1 % (ref 10–15)
FASTING STATUS PATIENT QL REPORTED: NO
GFR SERPL CREATININE-BSD FRML MDRD: 86 ML/MIN/1.73M2
GLUCOSE BLD-MCNC: 85 MG/DL (ref 70–125)
HCT VFR BLD AUTO: 44.1 % (ref 40–53)
HDLC SERPL-MCNC: 51 MG/DL
HGB BLD-MCNC: 15.1 G/DL (ref 13.3–17.7)
LDLC SERPL CALC-MCNC: 118 MG/DL
MCH RBC QN AUTO: 32.1 PG (ref 26.5–33)
MCHC RBC AUTO-ENTMCNC: 34.2 G/DL (ref 31.5–36.5)
MCV RBC AUTO: 94 FL (ref 78–100)
PLATELET # BLD AUTO: 182 10E3/UL (ref 150–450)
POTASSIUM BLD-SCNC: 4.4 MMOL/L (ref 3.5–5)
PROT SERPL-MCNC: 7.1 G/DL (ref 6–8)
RBC # BLD AUTO: 4.71 10E6/UL (ref 4.4–5.9)
SODIUM SERPL-SCNC: 140 MMOL/L (ref 136–145)
TRIGL SERPL-MCNC: 217 MG/DL
WBC # BLD AUTO: 7.4 10E3/UL (ref 4–11)

## 2022-02-03 PROCEDURE — 99214 OFFICE O/P EST MOD 30 MIN: CPT | Mod: 25 | Performed by: INTERNAL MEDICINE

## 2022-02-03 PROCEDURE — 80053 COMPREHEN METABOLIC PANEL: CPT | Performed by: INTERNAL MEDICINE

## 2022-02-03 PROCEDURE — 85027 COMPLETE CBC AUTOMATED: CPT | Performed by: INTERNAL MEDICINE

## 2022-02-03 PROCEDURE — 80061 LIPID PANEL: CPT | Performed by: INTERNAL MEDICINE

## 2022-02-03 PROCEDURE — 99397 PER PM REEVAL EST PAT 65+ YR: CPT | Performed by: INTERNAL MEDICINE

## 2022-02-03 PROCEDURE — 36415 COLL VENOUS BLD VENIPUNCTURE: CPT | Performed by: INTERNAL MEDICINE

## 2022-02-03 ASSESSMENT — MIFFLIN-ST. JEOR: SCORE: 1727.51

## 2022-02-03 NOTE — PROGRESS NOTES
"HPI  Do you feel safe in your environment? Yes  Fall risk  Fallen 2 or more times in the past year?: (P) No  Any fall with injury in the past year?: (P) No    Cognitive Screening   1) Repeat 3 items (Leader, Season, Table)    2) Clock draw: NORMAL  3) 3 item recall: Recalls 2 objects   Results: NORMAL clock, 1-2 items recalled: COGNITIVE IMPAIRMENT LESS LIKELY    Mini-CogTM Copyright [employer]. Licensed by the author for use in [facility]; reprinted with permission (jb@[facility]). All rights reserved.    Answers for HPI/ROS submitted by the patient on 1/27/2022  In general, how would you rate your overall physical health?: good  Frequency of exercise:: 6-7 days/week  Do you usually eat at least 4 servings of fruit and vegetables a day, include whole grains & fiber, and avoid regularly eating high fat or \"junk\" foods? : Yes  Taking medications regularly:: Yes  Medication side effects:: None  Activities of Daily Living: no assistance needed  Home safety: no safety concerns identified  Hearing Impairment:: no hearing concerns  In the past 6 months, have you been bothered by leaking of urine?: No  In general, how would you rate your overall mental or emotional health?: good  Additional concerns today:: No  Duration of exercise:: 30-45 minutes      "

## 2022-02-04 NOTE — PROGRESS NOTES
[facility]ists    Comprehensive and complex medical care - Chronic disease management - Shared decision making - Care coordination - Compassionate care    Patient advocacy - Rational deprescribing - Minimally disruptive medicine - Ethical focus - Customized care         Date of Service: 2/3/2022  Primary Provider: German Mcgregor    Patient Care Team:  German Mcgregor MD as PCP - General (Internal Medicine)  Chavez Jay MD as Assigned Surgical Provider  German Mcgregor MD as Assigned PCP  Jacky Zayas MD as MD (Ophthalmology)  Rehan Victor MD as MD (Orthopaedic Surgery)  Rob Mendoza MD as MD (Orthopaedic Surgery)          Patient's Pharmacy:    [email or web address] - [address] [address]  Phone: [phone] Fax: [phone]     Patient's Contacts:  Name Home Phone Work Phone Mobile Phone Relationship Lgl Grd   MELITON QUEEN [phone]   Significant*    MELITON QUEEN [phone] Spouse      Patient's Insurance:    Payor: [employer] / Plan: [employer]AGE / Product Type: HMO /      Subjective:     History of present illness:    Nir Guy is an 82 year old male here for an annual wellness visit.    The issues he would like to address at today's visit include the following:    Chief Complaint   Patient presents with     Annual Visit     Recheck Medication     started taking melatonin 5mg is it okay to take      Generally doing well at this time without significant concerns.    Last carpal tunnel syndrome was much more effective for him and had fat pad transfer as well.    Chronic obstructive pulmonary disease (COPD) is doing okay at this time without issues.    Wonders if he could use melatonin for insomnia issues.    No symptoms of recurrence of the pericardial effusion.    Does get some pains in his hamstrings in the am but quickly improved  with walking.    Low back pain otherwise doing well still.    We reviewed his other issues noted in the assessment but not specifically addressed in the HPI above.           Active Problem List:  Problem List as of 2/3/2022 Reviewed: 1/4/2021  2:44 PM by Ning Irving RN       High    Former smoker - Quit in 1998    Full code status - 2017    COPD (chronic obstructive pulmonary disease) (H)       Low    HLD (hyperlipidemia)    AMD (age related macular degeneration)    Vertigo    SNHL (sensorineural hearing loss)    Tinnitus    Intermediate stage nonexudative age-related macular degeneration of both eyes    Spinal stenosis of lumbar region with neurogenic claudication       Other    Osteoarthritis of shoulder    Osteoarthritis of left patellofemoral joint    Pericardial tamponade    PE (pulmonary thromboembolism) (H)    Pericardial effusion       Other    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       Other    RBBB (right bundle branch block)           Past Medical History:   Diagnosis Date     Actinic keratosis      AMD (age related macular degeneration)      Basal cell carcinoma      Former smoker      Full code status 1/6/2017     Hyperlipidemia      OA (osteoarthritis)      Osteoarthritis of left patellofemoral joint 1/6/2017     Osteoarthritis of shoulder      RBBB (right bundle branch block)      S/P colonoscopy 4/18/11     SNHL (sensorineural hearing loss)      Squamous cell carcinoma      Squamous cell carcinoma of skin      Tinnitus      Vertigo      Past Surgical History:   Procedure Laterality Date     BASAL CELL CARCINOMA EXCISION  01/01/1999    Nose and scalp     CATARACT EXTRACTION, BILATERAL  01/01/2018     CATARACT IOL, RT/LT       IR LUMBAR EPIDURAL STEROID INJECTION  01/09/2018     RELEASE CARPAL TUNNEL Right 01/04/2021    Procedure: Revision open carpal tunnel release.;  Surgeon: Nasir Casper MD;  Location: [facility] OR     REPEAT RIGHT CARPAL TUNNEL RELEASE WITH HYPOTHENAR FAT PAD  FLAP\  12/2021    DR. FINNEY     VASECTOMY  09/01/1980     ZC TOTAL HIP ARTHROPLASTY Right 02/21/2018    Procedure:  RIGHT TOTAL HIP ARTHROPLASTY DIRECT ANTERIOR APPROACH;  Surgeon: Kaushik Hood MD;  Location: [facility] Main OR;  Service: Orthopedics     Family History   Problem Relation Age of Onset     Melanoma No family hx of      Cancer Mother         GYN     Cancer Father         Lung     Heart Disease Father      Family history is otherwise noncontributory.     Social History     Occupational History     Employer: RETIRED   Tobacco Use     Smoking status: Never Smoker     Smokeless tobacco: Never Used   Substance and Sexual Activity     Alcohol use: Yes     Alcohol/week: 8.0 standard drinks     Comment: Alcoholic Drinks/day: nightly     Drug use: No     Sexual activity: Not on file      Social History     Social History Narrative     Not on file      Current Outpatient Medications   Medication Instructions     calcipotriene (DOVONEX) 0.005 % external cream Mix with efudex and apply twice daily to scalp and temples for ten days     fish oil-omega-3 fatty acids 2 g, Oral, DAILY     hypromellose-dextran (GENTEAL TEARS) 0.1-0.3 % ophthalmic solution 1 drop, Both Eyes, DAILY PRN     MECLIZINE HCL PO 25 mg, Oral, DAILY     Multiple Vitamins-Minerals (MENS MULTIVITAMIN PLUS PO) Oral     polyethylene glycol-propylene glycol PF (SYSTANE HYDRATION PF) 0.4-0.3 % SOLN opthalmic solution 1 drop, Ophthalmic     umeclidinium (INCRUSE ELLIPTA) 62.5 MCG/INH inhaler 1 puff, Inhalation, DAILY     Allergies: Patient has no known allergies.     Immunization History   Administered Date(s) Administered     COVID-19,PF,Pfizer (12+ Yrs) 02/01/2021, 02/22/2021, 10/11/2021     FLUAD(HD)65+ QUAD 10/05/2020     Flu, Unspecified 11/05/1999, 11/01/2001, 11/11/2002, 11/11/2003, 11/04/2004, 11/03/2005, 10/25/2007, 10/21/2008, 09/21/2009, 09/21/2010, 11/11/2011, 09/06/2013, 09/23/2016, 09/02/2018     Influenza (H1N1) 01/04/2010      "Influenza (High Dose) 3 valent vaccine 09/18/2012, 09/06/2013, 10/29/2015, 09/22/2016, 09/15/2017, 09/11/2018, 09/06/2019     Influenza (IIV3) PF 11/05/1999, 11/01/2001, 11/11/2002, 11/11/2003, 11/04/2004, 11/03/2005, 10/25/2007, 10/21/2008, 09/21/2009, 09/21/2010, 11/11/2011     Influenza, Quad, High Dose, Pf, 65yr+ (Fluzone HD) 09/24/2021     Pneumo Conj 13-V (2010&after) 12/28/2007, 12/28/2014     Pneumococcal 23 valent 03/18/1993, 12/28/2007     Td (Adult), Adsorbed 03/18/1993, 11/11/2003     Td,adult,historic,unspecified 11/11/2003     Tdap (Adacel,Boostrix) 03/11/2011     Varicella 12/12/1998     Zoster vaccine recombinant adjuvanted (SHINGRIX) 04/23/2018, 05/29/2018, 08/02/2018     Zoster vaccine, live 12/28/2014      Objective:     Wt Readings from Last 3 Encounters:   02/03/22 97.1 kg (214 lb)   12/23/21 98 kg (216 lb)   09/24/21 96.2 kg (212 lb)     BP Readings from Last 3 Encounters:   02/03/22 136/82   12/23/21 138/70   12/14/21 138/82     Vision Screening:  No exam data present     PHYSICAL EXAM  /82   Pulse 71   Ht 1.867 m (6' 1.5\")   Wt 97.1 kg (214 lb)   SpO2 97%   BMI 27.85 kg/m     The patient is comfortable, no acute distress.  Mood good.  Insight is good.  No skin lesions or nodules of concern.  Ears clear.  Eyes are nonicteric.  Pupils equal and reactive.  Throat is clear.  Neck is supple without mass, no thyromegaly. No cervical or epitrochlear adenopathy.  Heart regular rate and rhythm.  Lungs clear to auscultation bilaterally.  Respiratory effort good. Barrel chested.   Abdomen soft and nontender.  No hepatosplenomegaly.  Extremities show no edema.         Diagnostics:     Lab on 12/26/2021   Component Date Value Ref Range Status     SARS CoV2 PCR 12/26/2021 Negative  Negative, Testing sent to reference lab. Results will be returned via unsolicited result Final    NEGATIVE: SARS-CoV-2 (COVID-19) RNA not detected, presumed negative.        Results for orders placed or performed in " visit on 02/03/22   CBC with platelets     Status: Normal   Result Value Ref Range    WBC Count 7.4 4.0 - 11.0 10e3/uL    RBC Count 4.71 4.40 - 5.90 10e6/uL    Hemoglobin 15.1 13.3 - 17.7 g/dL    Hematocrit 44.1 40.0 - 53.0 %    MCV 94 78 - 100 fL    MCH 32.1 26.5 - 33.0 pg    MCHC 34.2 31.5 - 36.5 g/dL    RDW 12.1 10.0 - 15.0 %    Platelet Count 182 150 - 450 10e3/uL   Comprehensive metabolic panel     Status: Normal   Result Value Ref Range    Sodium 140 136 - 145 mmol/L    Potassium 4.4 3.5 - 5.0 mmol/L    Chloride 103 98 - 107 mmol/L    Carbon Dioxide (CO2) 24 22 - 31 mmol/L    Anion Gap 13 5 - 18 mmol/L    Urea Nitrogen 12 8 - 28 mg/dL    Creatinine 0.86 0.70 - 1.30 mg/dL    Calcium 9.5 8.5 - 10.5 mg/dL    Glucose 85 70 - 125 mg/dL    Alkaline Phosphatase 67 45 - 120 U/L    AST 20 0 - 40 U/L    ALT 18 0 - 45 U/L    Protein Total 7.1 6.0 - 8.0 g/dL    Albumin 4.2 3.5 - 5.0 g/dL    Bilirubin Total 0.5 0.0 - 1.0 mg/dL    GFR Estimate 86 >60 mL/min/1.73m2   Lipid panel reflex to direct LDL Non-fasting     Status: Abnormal   Result Value Ref Range    Cholesterol 212 (H) <=199 mg/dL    Triglycerides 217 (H) <=149 mg/dL    Direct Measure HDL 51 >=40 mg/dL    LDL Cholesterol Calculated 118 <=129 mg/dL    Patient Fasting > 8hrs? No         Assessment:     1. Medicare annual wellness visit, subsequent    2. PE (pulmonary thromboembolism) (H)    3. Chronic obstructive pulmonary disease, unspecified COPD type (H)    4. Lipid screening    5. Pericardial tamponade    6. Spinal stenosis of lumbar region with neurogenic claudication    7. Primary insomnia    8. Hamstring tightness of both lower extremities         Plan:     1. Check blood work today.     2. May use melatonin for sleep.    3. In relationship to pulmonary embolism (PE), no signs of recurrence and continue to monitor.  4. In relationship to the chronic obstructive pulmonary disease (COPD), continue current treatment.  5. Continue yearly follow up.  6. Consider  further work-up of the hamstring tightness if progresses.  7. Continue current medications.  8. Follow up sooner if issues.      A personalized health plan based on the identified health risks was provided to the patient on the AVS.       German Mcgregor MD  General Internal Medicine  [facility]    Return in about 1 year (around 2/3/2023), or if symptoms worsen or fail to improve, for annual wellness visit, visit and blood work.     Future Appointments   Date Time Provider Department Center   4/4/2022 10:15 AM Chavez Jay MD [facility]

## 2022-02-04 NOTE — PATIENT INSTRUCTIONS
Preventive Health Recommendations    See your health care provider every year (or as recommended) to:    Review health changes.     Discuss preventive care.      Review your medicines and supplements.    Consider colon cancer screening between the ages of 50 and 75 years old if necessary.      Cholesterol and diabetes testing as necessary.    Prostate specific antigen (PSA) testing until the age of 70 if desired.  Please be aware there are risks associated with this test.    Shots:    COVID vaccination if you have not had it yet.    Get a flu shot each year.    Get a tetanus shot every 10 years if you are under the age of 80 years old.    Talk to your doctor about a one time pneumonia vaccine that is recommended at the age of 65 years old.    Talk to your pharmacist about the shingles vaccine.  This is often better covered in the pharmacy through your insurance than if you have it in the clinic.    Lifestyle    Exercise at least 150 minutes a week (30 minutes a day, 5 days a week) if you are able. This will help you control your weight and prevent disease.    Limit alcohol to one drink per day.    No smoking.     Wear sunscreen to prevent skin cancer.     See your dentist twice a year for an exam and cleaning.    See your eye doctor every 1 to 2 years to screen for conditions such as glaucoma, macular degeneration and cataracts.    Personalized Prevention Plan  You are due for the preventive services outlined below.  Your care team is available to assist you in scheduling these services.  If you have already completed any of these items, please share that information with your care team to update in your medical record.    Health Maintenance Due   Topic Date Due     ANNUAL REVIEW OF HM ORDERS  Never done     MEDICARE ANNUAL WELLNESS VISIT  03/22/2020

## 2022-02-11 ENCOUNTER — TRANSFERRED RECORDS (OUTPATIENT)
Dept: HEALTH INFORMATION MANAGEMENT | Facility: CLINIC | Age: 82
End: 2022-02-11
Payer: COMMERCIAL

## 2022-02-17 PROBLEM — J44.9 COPD (CHRONIC OBSTRUCTIVE PULMONARY DISEASE) (H): Status: ACTIVE | Noted: 2020-03-19

## 2022-04-04 ENCOUNTER — TELEPHONE (OUTPATIENT)
Dept: DERMATOLOGY | Facility: CLINIC | Age: 82
End: 2022-04-04

## 2022-04-04 ENCOUNTER — OFFICE VISIT (OUTPATIENT)
Dept: DERMATOLOGY | Facility: CLINIC | Age: 82
End: 2022-04-04
Payer: COMMERCIAL

## 2022-04-04 VITALS — HEART RATE: 64 BPM | SYSTOLIC BLOOD PRESSURE: 154 MMHG | DIASTOLIC BLOOD PRESSURE: 82 MMHG | OXYGEN SATURATION: 97 %

## 2022-04-04 DIAGNOSIS — D23.9 DERMAL NEVUS: ICD-10-CM

## 2022-04-04 DIAGNOSIS — L81.4 LENTIGO: Primary | ICD-10-CM

## 2022-04-04 DIAGNOSIS — L82.1 SEBORRHEIC KERATOSIS: ICD-10-CM

## 2022-04-04 DIAGNOSIS — D18.01 ANGIOMA OF SKIN: ICD-10-CM

## 2022-04-04 DIAGNOSIS — L57.0 AK (ACTINIC KERATOSIS): ICD-10-CM

## 2022-04-04 DIAGNOSIS — Z85.828 HISTORY OF SKIN CANCER: ICD-10-CM

## 2022-04-04 DIAGNOSIS — C44.321 SQUAMOUS CELL CANCER OF SKIN OF NOSE: ICD-10-CM

## 2022-04-04 PROCEDURE — 88331 PATH CONSLTJ SURG 1 BLK 1SPC: CPT | Performed by: DERMATOLOGY

## 2022-04-04 PROCEDURE — 99213 OFFICE O/P EST LOW 20 MIN: CPT | Mod: 25 | Performed by: DERMATOLOGY

## 2022-04-04 PROCEDURE — 17000 DESTRUCT PREMALG LESION: CPT | Mod: 59 | Performed by: DERMATOLOGY

## 2022-04-04 PROCEDURE — 17003 DESTRUCT PREMALG LES 2-14: CPT | Mod: 59 | Performed by: DERMATOLOGY

## 2022-04-04 PROCEDURE — 11102 TANGNTL BX SKIN SINGLE LES: CPT | Performed by: DERMATOLOGY

## 2022-04-04 NOTE — PATIENT INSTRUCTIONS
Open Wound Care     for _____Nose_________        ? No strenuous activity for 48 hours    ? Take Tylenol as needed for discomfort.                                                .         ? Do not drink alcoholic beverages for 48 hours.    ? Keep the pressure bandage in place for 24 hours. If the bandage becomes blood tinged or loose, reinforce it with gauze and tape.        (Refer to the reverse side of this page for management of bleeding).    ? Remove bandage in 24 hours and begin wound care as follows:     1. Clean area with tap water using a Q tip or gauze pad, (shower / bathe normally)  2. Dry wound with Q tip or gauze pad  3. Apply Aquaphor, Vaseline, Polysporin or Bacitracin Ointment with a Q tip    Do NOT use Neosporin Ointment *  4. Cover the wound with a band-aid or nonstick gauze pad and paper tape.  5. Repeat wound care once a day until wound is completely healed.    It is an old wives tale that a wound heals better when it is exposed to air and allowed to dry out. The wound will heal faster with a better cosmetic result if it is kept moist with ointment and covered with a bandage.  Do not let the wound dry out.      Supplies Needed:                Qtips or gauze pads                Polysporin or Bacitracin Ointment                Bandaids or nonstick gauze pads and paper tape    Wound care kits and brown paper tape are available for purchase at   the pharmacy.    BLEEDIN. Use tightly rolled up gauze or cloth to apply direct pressure over the bandage for 20   minutes.  2. Reapply pressure for an additional 20 minutes if necessary  3. Call the office or go to the nearest emergency room if pressure fails to stop the bleeding.  4. Use additional gauze and tape to maintain pressure once the bleeding has stopped.  5. Begin wound care 24 hours after surgery as directed.                  WOUND HEALING    1. One week after surgery a pink / red halo will form around the outside of the wound.   This is new  skin.  2. The center of the wound will appear yellowish white and produce some drainage.  3. The pink halo will slowly migrate in toward the center of the wound until the wound is covered with new shiny pink skin.  4. There will be no more drainage when the wound is completely healed.  5. It will take six months to one year for the redness to fade.  6. The scar may be itchy, tight and sensitive to extreme temperatures for a year after the surgery.  7. Massaging the area several times a day for several minutes after the wound is completely healed will help the scar soften and normalize faster. Begin massage only after healing is complete.      In case of emergency call: Dr Jay: [phone]    [facility]: [phone]    [facility]:[phone]          WOUND CARE INSTRUCTIONS   FOR CRYOSURGERY   This area treated with liquid nitrogen should form a blister (areas treated may or may not blister-skin may just turn dark and slough off). You do not need to bandage the area unless a blister forms and breaks (which may be a few days). When the blister breaks, begin daily dressing changes as follows:   1) Clean and dry the area with tap water using clean Q-tip or sterile gauze pad.   2) Apply Polysporin ointment or Bacitracin ointment over entire wound. Do NOT use Neosporin ointment.   3) Cover the wound with a band-aid or sterile non-stick gauze pad and micropore paper tape.   REPEAT THESE INSTRUCTIONS AT LEAST ONCE A DAY UNTIL THE WOUND HAS COMPLETELY HEALED.   It is an old wives tale that a wound heals better when it is exposed to air and allowed to dry out. The wound will heal faster with a better cosmetic result if it is kept moist with ointment and covered with a bandage.   *Do not let the wound dry out.   Supplies Needed:   *Cotton tipped applicators (Q-tips)   *Polysporin ointment or Bacitracin ointment (NOT NEOSPORIN)   *Band-aids, or non stick gauze pads and micropore paper tape   PATIENT  INFORMATION   During the healing process you will notice a number of changes. All wounds develop a small halo of redness surrounding the wound. This means healing is occurring. Severe itching with extensive redness usually indicates sensitivity to the ointment or bandage tape used to dress the wound. You should call our office if this develops.   Swelling and/or discoloration around your surgical site is common, particularly when performed around the eye.   All wounds normally drain. The larger the wound the more drainage there will be. After 7-10 days, you will notice the wound beginning to shrink and new skin will begin to grow. The wound is healed when you can see skin has formed over the entire area. A healed wound has a healthy, shiny look to the surface and is red to dark pink in color to normalize. Wounds may take approximately 4-6 weeks to heal. Larger wounds may take 6-8 weeks. After the wound is healed you may discontinue dressing changes.   You may experience a sensation of tightness as your wound heals. This is normal and will gradually subside.   Your healed wound may be sensitive to temperature changes. This sensitivity improves with time, but if you re having a lot of discomfort, try to avoid temperature extremes.   Patients frequently experience itching after their wound appears to have healed because of the continue healing under the skin. Plain Vaseline will help relieve the itching.

## 2022-04-04 NOTE — PROGRESS NOTES
Nir Guy is an extremely pleasant 82 year old year old male patient here today for rough spots on scalp and nose.   .   Patient states this has been present for a while.  Patient reports the following symptoms:  rough.  Patient reports the following previous treatments efudex.  These treatments did not work.  Patient reports the following modifying factors none.  Associated symptoms: none.  Patient has no other skin complaints today.  Remainder of the HPI, Meds, PMH, Allergies, FH, and SH was reviewed in chart.      Past Medical History:   Diagnosis Date     Actinic keratosis      AMD (age related macular degeneration)      Basal cell carcinoma      Former smoker      Full code status 1/6/2017     Hyperlipidemia      OA (osteoarthritis)      Osteoarthritis of left patellofemoral joint 1/6/2017     Osteoarthritis of shoulder      RBBB (right bundle branch block)      S/P colonoscopy 4/18/11     SNHL (sensorineural hearing loss)      Squamous cell carcinoma      Squamous cell carcinoma of skin      Tinnitus      Vertigo        Past Surgical History:   Procedure Laterality Date     BASAL CELL CARCINOMA EXCISION  01/01/1999    Nose and scalp     CATARACT EXTRACTION, BILATERAL  01/01/2018     CATARACT IOL, RT/LT       IR LUMBAR EPIDURAL STEROID INJECTION  01/09/2018     RELEASE CARPAL TUNNEL Right 01/04/2021    Procedure: Revision open carpal tunnel release.;  Surgeon: Nasir Casper MD;  Location: [facility] OR     REPEAT RIGHT CARPAL TUNNEL RELEASE WITH HYPOTHENAR FAT PAD FLAP\  12/2021    DR. FINNEY     VASECTOMY  09/01/1980     ZC TOTAL HIP ARTHROPLASTY Right 02/21/2018    Procedure:  RIGHT TOTAL HIP ARTHROPLASTY DIRECT ANTERIOR APPROACH;  Surgeon: Kaushik Hood MD;  Location: [facility] Main OR;  Service: Orthopedics        Family History   Problem Relation Age of Onset     Melanoma No family hx of      Cancer Mother         GYN     Cancer Father         Lung     Heart Disease Father        Social History      Socioeconomic History     Marital status:      Spouse name: Not on file     Number of children: Not on file     Years of education: Not on file     Highest education level: Not on file   Occupational History     Employer: RETIRED   Tobacco Use     Smoking status: Never Smoker     Smokeless tobacco: Never Used   Substance and Sexual Activity     Alcohol use: Yes     Alcohol/week: 8.0 standard drinks     Comment: Alcoholic Drinks/day: nightly     Drug use: No     Sexual activity: Not on file   Other Topics Concern     Not on file   Social History Narrative     Not on file     Social Determinants of Health     Financial Resource Strain: Not on file   Food Insecurity: Not on file   Transportation Needs: Not on file   Physical Activity: Not on file   Stress: Not on file   Social Connections: Not on file   Intimate Partner Violence: Not on file   Housing Stability: Not on file       Outpatient Encounter Medications as of 4/4/2022   Medication Sig Dispense Refill     fish oil-omega-3 fatty acids 1000 MG capsule Take 2 g by mouth daily       hypromellose-dextran (GENTEAL TEARS) 0.1-0.3 % ophthalmic solution Place 1 drop into both eyes daily as needed for dry eyes        MECLIZINE HCL PO Take 25 mg by mouth daily        Multiple Vitamins-Minerals (MENS MULTIVITAMIN PLUS PO)        polyethylene glycol-propylene glycol PF (SYSTANE HYDRATION PF) 0.4-0.3 % SOLN opthalmic solution Apply 1 drop to eye       umeclidinium (INCRUSE ELLIPTA) 62.5 MCG/INH inhaler Inhale 1 puff into the lungs daily 30 each 11     No facility-administered encounter medications on file as of 4/4/2022.             O:   NAD, WDWN, Alert & Oriented, Mood & Affect wnl, Vitals stable   Here today alone   BP (!) 154/82 (BP Location: Right arm, Patient Position: Sitting, Cuff Size: Adult Regular)   Pulse 64   SpO2 97%    General appearance normal   Vitals stable   Alert, oriented and in no acute distress      Following lymph nodes palpated:  Occipital, Cervical, Supraclavicular no lad  Gritty scaly papule on scalp  R NSW tender keratotic 6mm scaly papule      Stuck on papules and brown macules on trunk and ext   Red papules on trunk  Flesh colored papules on trunk     The remainder of the full exam was normal; the following areas were examined:  conjunctiva/lids, oral mucosa, neck, peripheral vascular system, abdomen, lymph nodes, digits/nails, eccrine and apocrine glands, scalp/hair, face, neck, chest, abdomen, buttocks, back, RUE, LUE, RLE, LLE       Eyes: Conjunctivae/lids:Normal     ENT: Lips, buccal mucosa, tongue: normal    MSK:Normal    Cardiovascular: peripheral edema none    Pulm: Breathing Normal    Lymph Nodes: No Head and Neck Lymphadenopathy     Neuro/Psych: Orientation:Alert and Orientedx3 ; Mood/Affect:normal       MICRO:   R NSW:There is a proliferation of irregular nests of abnormal squamous cells arising from the epidermis and invading the dermis. These are well differentiated. The dermis shows a variable superficial perivascular inflammatory infiltrate.   A/P:  1. Seborrheic keratosis, lentigo, angioma, dermal nevus, hx of non-melanoma skin cancer  2. Actinic keratosis scalp  LN2:  Treated with LN2 for 5s for 1-2 cycles. Warned risks of blistering, pain, pigment change, scarring, and incomplete resolution.  Advised patient to return if lesions do not completely resolve.  Wound care sheet given.  8 lesions  3. R NSW r/o squamous cell carcinoma   TANGENTIAL BIOPSY IN HOUSE:  After consent, anesthesia with LEC and prep, tangential excision performed and dx above confirmed with frozen section histology.  No complications and routine wound care.  Patient is not on  anticoagulants and risk of bleeding discussed with patient.       I have personally reviewed all specimens and/or slides and used them with my medical judgement to determine or confirm the final diagnosis.     Patient told result squamous cell carcinoma schedule excision.    It  was a pleasure speaking to Nir Guy today.  Previous clinic notes and pertinent laboratory tests were reviewed prior to Nir Guy's visit.  Nature and genetics of benign skin lesions dicussed with patient.  Signs and Symptoms of skin cancer discussed with patient.  Patient encouraged to perform monthly skin exams.  UV precautions reviewed with patient.  Risks of non-melanoma skin cancer discussed with patient   Return to clinic next appt

## 2022-04-04 NOTE — TELEPHONE ENCOUNTER
Called patient and gave results. All questions were answered. Patient was then scheduled for Mohs with Dr. Jay.   Vicky Vega LPN

## 2022-04-04 NOTE — LETTER
4/4/2022         RE: Nir Guy  [address] [place] [id number]        Dear Colleague,    Thank you for referring your patient, Nir Guy, to the [facility]. Please see a copy of my visit note below.    Nir Guy is an extremely pleasant 82 year old year old male patient here today for rough spots on scalp and nose.   .   Patient states this has been present for a while.  Patient reports the following symptoms:  rough.  Patient reports the following previous treatments efudex.  These treatments did not work.  Patient reports the following modifying factors none.  Associated symptoms: none.  Patient has no other skin complaints today.  Remainder of the HPI, Meds, PMH, Allergies, FH, and SH was reviewed in chart.      Past Medical History:   Diagnosis Date     Actinic keratosis      AMD (age related macular degeneration)      Basal cell carcinoma      Former smoker      Full code status 1/6/2017     Hyperlipidemia      OA (osteoarthritis)      Osteoarthritis of left patellofemoral joint 1/6/2017     Osteoarthritis of shoulder      RBBB (right bundle branch block)      S/P colonoscopy 4/18/11     SNHL (sensorineural hearing loss)      Squamous cell carcinoma      Squamous cell carcinoma of skin      Tinnitus      Vertigo        Past Surgical History:   Procedure Laterality Date     BASAL CELL CARCINOMA EXCISION  01/01/1999    Nose and scalp     CATARACT EXTRACTION, BILATERAL  01/01/2018     CATARACT IOL, RT/LT       IR LUMBAR EPIDURAL STEROID INJECTION  01/09/2018     RELEASE CARPAL TUNNEL Right 01/04/2021    Procedure: Revision open carpal tunnel release.;  Surgeon: Nasir Casper MD;  Location: [facility]     REPEAT RIGHT CARPAL TUNNEL RELEASE WITH HYPOTHENAR FAT PAD FLAP\  12/2021    DR. FINNEY     VASECTOMY  09/01/1980     [facility] TOTAL HIP ARTHROPLASTY Right 02/21/2018    Procedure:  RIGHT TOTAL HIP ARTHROPLASTY DIRECT ANTERIOR APPROACH;  Surgeon: Kaushik GUALLPA  MD Erwin;  Location: [facility] OR;  Service: Orthopedics        Family History   Problem Relation Age of Onset     Melanoma No family hx of      Cancer Mother         GYN     Cancer Father         Lung     Heart Disease Father        Social History     Socioeconomic History     Marital status:      Spouse name: Not on file     Number of children: Not on file     Years of education: Not on file     Highest education level: Not on file   Occupational History     Employer: RETIRED   Tobacco Use     Smoking status: Never Smoker     Smokeless tobacco: Never Used   Substance and Sexual Activity     Alcohol use: Yes     Alcohol/week: 8.0 standard drinks     Comment: Alcoholic Drinks/day: nightly     Drug use: No     Sexual activity: Not on file   Other Topics Concern     Not on file   Social History Narrative     Not on file     Social Determinants of Health     Financial Resource Strain: Not on file   Food Insecurity: Not on file   Transportation Needs: Not on file   Physical Activity: Not on file   Stress: Not on file   Social Connections: Not on file   Intimate Partner Violence: Not on file   Housing Stability: Not on file       Outpatient Encounter Medications as of 4/4/2022   Medication Sig Dispense Refill     fish oil-omega-3 fatty acids 1000 MG capsule Take 2 g by mouth daily       hypromellose-dextran (GENTEAL TEARS) 0.1-0.3 % ophthalmic solution Place 1 drop into both eyes daily as needed for dry eyes        MECLIZINE HCL PO Take 25 mg by mouth daily        Multiple Vitamins-Minerals (MENS MULTIVITAMIN PLUS PO)        polyethylene glycol-propylene glycol PF (SYSTANE HYDRATION PF) 0.4-0.3 % SOLN opthalmic solution Apply 1 drop to eye       umeclidinium (INCRUSE ELLIPTA) 62.5 MCG/INH inhaler Inhale 1 puff into the lungs daily 30 each 11     No facility-administered encounter medications on file as of 4/4/2022.             O:   NAD, WDWN, Alert & Oriented, Mood & Affect wnl, Vitals stable   Here today  alone   BP (!) 154/82 (BP Location: Right arm, Patient Position: Sitting, Cuff Size: Adult Regular)   Pulse 64   SpO2 97%    General appearance normal   Vitals stable   Alert, oriented and in no acute distress      Following lymph nodes palpated: Occipital, Cervical, Supraclavicular no lad  Gritty scaly papule on scalp  R NSW tender keratotic 6mm scaly papule      Stuck on papules and brown macules on trunk and ext   Red papules on trunk  Flesh colored papules on trunk     The remainder of the full exam was normal; the following areas were examined:  conjunctiva/lids, oral mucosa, neck, peripheral vascular system, abdomen, lymph nodes, digits/nails, eccrine and apocrine glands, scalp/hair, face, neck, chest, abdomen, buttocks, back, RUE, LUE, RLE, LLE       Eyes: Conjunctivae/lids:Normal     ENT: Lips, buccal mucosa, tongue: normal    MSK:Normal    Cardiovascular: peripheral edema none    Pulm: Breathing Normal    Lymph Nodes: No Head and Neck Lymphadenopathy     Neuro/Psych: Orientation:Alert and Orientedx3 ; Mood/Affect:normal       MICRO:   R NSW:There is a proliferation of irregular nests of abnormal squamous cells arising from the epidermis and invading the dermis. These are well differentiated. The dermis shows a variable superficial perivascular inflammatory infiltrate.   A/P:  1. Seborrheic keratosis, lentigo, angioma, dermal nevus, hx of non-melanoma skin cancer  2. Actinic keratosis scalp  LN2:  Treated with LN2 for 5s for 1-2 cycles. Warned risks of blistering, pain, pigment change, scarring, and incomplete resolution.  Advised patient to return if lesions do not completely resolve.  Wound care sheet given.  8 lesions  3. R NSW r/o squamous cell carcinoma   TANGENTIAL BIOPSY IN HOUSE:  After consent, anesthesia with LEC and prep, tangential excision performed and dx above confirmed with frozen section histology.  No complications and routine wound care.  Patient is not on  anticoagulants and risk of  bleeding discussed with patient.       I have personally reviewed all specimens and/or slides and used them with my medical judgement to determine or confirm the final diagnosis.     Patient told result squamous cell carcinoma schedule excision.    It was a pleasure speaking to Nir Guy today.  Previous clinic notes and pertinent laboratory tests were reviewed prior to Nir Guy's visit.  Nature and genetics of benign skin lesions dicussed with patient.  Signs and Symptoms of skin cancer discussed with patient.  Patient encouraged to perform monthly skin exams.  UV precautions reviewed with patient.  Risks of non-melanoma skin cancer discussed with patient   Return to clinic next appt        Again, thank you for allowing me to participate in the care of your patient.        Sincerely,        Chavez Jay MD

## 2022-04-04 NOTE — NURSING NOTE
Chief Complaint   Patient presents with     Skin Check     nose and scalp       Vitals:    04/04/22 1000   BP: (!) 154/82   BP Location: Right arm   Patient Position: Sitting   Cuff Size: Adult Regular   Pulse: 64   SpO2: 97%     Wt Readings from Last 1 Encounters:   02/03/22 97.1 kg (214 lb)       Vicky Vega LPN .................4/4/2022

## 2022-04-04 NOTE — TELEPHONE ENCOUNTER
----- Message from Chavez Jay MD sent at 4/4/2022 11:36 AM CDT -----  [facility] squamous cell carcinoma schedule excision

## 2022-04-04 NOTE — LETTER
[facility] [place]  [address] [facility] [address]  Phone: [phone]    April 4, 2022    Nir DOWNING Guy                                                                                                                     [address] [place] [phone]            Dear Meet Brooks,    You are scheduled for Mohs Surgery on:         Please check in at Dermatology Clinic.   (2nd Floor, last  Clinic on right up staircase or elevator -past OB/GYN clinic)    You don't need to arrive more than 5-10 minutes prior to your appointment time.     Be sure to eat a good breakfast and bathe and wash your hair prior to Surgery.    If you are taking any anti-coagulants that are prescribed by your Doctor (such as Coumadin/warfarin, Plavix, Aspirin, Ibuprofen), please continue taking them.     However, If you are taking anti-coagulants over the counter without  a Doctor's order for a Medical condition, please discontinue them 10 days prior to Surgery.      Please wear loose comfortable clothing as it could possibly be 4-6 hours until your surgery is completed depending upon how many layers of tissue need to be removed.     Wi-fi access is available.     Thank you,      Chavez Jay MD/ Anu Patino RN

## 2022-04-06 ENCOUNTER — OFFICE VISIT (OUTPATIENT)
Dept: DERMATOLOGY | Facility: CLINIC | Age: 82
End: 2022-04-06
Payer: COMMERCIAL

## 2022-04-06 VITALS — OXYGEN SATURATION: 94 % | DIASTOLIC BLOOD PRESSURE: 83 MMHG | HEART RATE: 84 BPM | SYSTOLIC BLOOD PRESSURE: 145 MMHG

## 2022-04-06 DIAGNOSIS — C44.321 SQUAMOUS CELL CANCER OF SKIN OF NOSE: Primary | ICD-10-CM

## 2022-04-06 PROCEDURE — 13152 CMPLX RPR E/N/E/L 2.6-7.5 CM: CPT | Mod: 79 | Performed by: DERMATOLOGY

## 2022-04-06 PROCEDURE — 17311 MOHS 1 STAGE H/N/HF/G: CPT | Mod: 79 | Performed by: DERMATOLOGY

## 2022-04-06 PROCEDURE — 99207 PR NO CHARGE LOS: CPT | Performed by: DERMATOLOGY

## 2022-04-06 NOTE — LETTER
4/6/2022         RE: Nir Guy  [address] [place] [phone]        Dear Colleague,    Thank you for referring your patient, Nir Guy, to the [facility]. Please see a copy of my visit note below.    Surgical Office Location :   [facility]  [address] [place] [address]      Nir Guy is an extremely pleasant 82 year old year old male patient here today for evaluation and managment of squamous cell carcinoma on r nsw. Patient has no other skin complaints today.  Remainder of the HPI, Meds, PMH, Allergies, FH, and SH was reviewed in chart.      Past Medical History:   Diagnosis Date     Actinic keratosis      AMD (age related macular degeneration)      Basal cell carcinoma      Former smoker      Full code status 1/6/2017     Hyperlipidemia      OA (osteoarthritis)      Osteoarthritis of left patellofemoral joint 1/6/2017     Osteoarthritis of shoulder      RBBB (right bundle branch block)      S/P colonoscopy 4/18/11     SNHL (sensorineural hearing loss)      Squamous cell carcinoma      Squamous cell carcinoma of skin      Tinnitus      Vertigo        Past Surgical History:   Procedure Laterality Date     BASAL CELL CARCINOMA EXCISION  01/01/1999    Nose and scalp     CATARACT EXTRACTION, BILATERAL  01/01/2018     CATARACT IOL, RT/LT       IR LUMBAR EPIDURAL STEROID INJECTION  01/09/2018     RELEASE CARPAL TUNNEL Right 01/04/2021    Procedure: Revision open carpal tunnel release.;  Surgeon: Nasir Casper MD;  Location: [facility]     REPEAT RIGHT CARPAL TUNNEL RELEASE WITH HYPOTHENAR FAT PAD FLAP\  12/2021    DR. FINNEY     VASECTOMY  09/01/1980     [facility] TOTAL HIP ARTHROPLASTY Right 02/21/2018    Procedure:  RIGHT TOTAL HIP ARTHROPLASTY DIRECT ANTERIOR APPROACH;  Surgeon: Kaushik Hood MD;  Location: [facility];  Service: Orthopedics        Family History   Problem Relation Age of Onset     Melanoma No family hx of       Cancer Mother         GYN     Cancer Father         Lung     Heart Disease Father        Social History     Socioeconomic History     Marital status:      Spouse name: Not on file     Number of children: Not on file     Years of education: Not on file     Highest education level: Not on file   Occupational History     Employer: RETIRED   Tobacco Use     Smoking status: Never Smoker     Smokeless tobacco: Never Used   Substance and Sexual Activity     Alcohol use: Yes     Alcohol/week: 8.0 standard drinks     Comment: Alcoholic Drinks/day: nightly     Drug use: No     Sexual activity: Not on file   Other Topics Concern     Not on file   Social History Narrative     Not on file     Social Determinants of Health     Financial Resource Strain: Not on file   Food Insecurity: Not on file   Transportation Needs: Not on file   Physical Activity: Not on file   Stress: Not on file   Social Connections: Not on file   Intimate Partner Violence: Not on file   Housing Stability: Not on file       Outpatient Encounter Medications as of 4/6/2022   Medication Sig Dispense Refill     fish oil-omega-3 fatty acids 1000 MG capsule Take 2 g by mouth daily       hypromellose-dextran (GENTEAL TEARS) 0.1-0.3 % ophthalmic solution Place 1 drop into both eyes daily as needed for dry eyes        MECLIZINE HCL PO Take 25 mg by mouth daily        Multiple Vitamins-Minerals (MENS MULTIVITAMIN PLUS PO)        polyethylene glycol-propylene glycol PF (SYSTANE HYDRATION PF) 0.4-0.3 % SOLN opthalmic solution Apply 1 drop to eye       umeclidinium (INCRUSE ELLIPTA) 62.5 MCG/INH inhaler Inhale 1 puff into the lungs daily 30 each 11     No facility-administered encounter medications on file as of 4/6/2022.             O:   NAD, WDWN, Alert & Oriented, Mood & Affect wnl, Vitals stable   Here today alone   BP (!) 145/83 (BP Location: Left arm, Patient Position: Sitting, Cuff Size: Adult Large)   Pulse 84   SpO2 94%    General appearance  normal   Vitals stable   Alert, oriented and in no acute distress      Following lymph nodes palpated: Occipital, Cervical, Supraclavicular no lad  R NSW 6mm scaly papule       Eyes: Conjunctivae/lids:Normal     ENT: Lips, buccal mucosa, tongue: normal    MSK:Normal    Cardiovascular: peripheral edema none    Pulm: Breathing Normal    Lymph Nodes: No Head and Neck Lymphadenopathy     Neuro/Psych: Orientation:Alert and Orientedx3 ; Mood/Affect:normal       A/P:  1. R NSW squamous cell carcinoma   MOHS:   Location    The rationale for Mohs surgery was discussed with the patient and consent was obtained.  The risks and benefits as well as alternatives to therapy were discussed, in detail.  Specifically, the risks of infection, scarring, bleeding, prolonged wound healing, incomplete removal, allergy to anesthesia, nerve injury and recurrence were addressed.  Indication for Mohs was Location. Prior to the procedure, the treatment site was clearly identified and, if available, confirmed with previous photos and confirmed by the patient   All components of the Universal Protocol/PAUSE rule were completed.  The Mohs surgeon operated in two distinct and integrated capacities as the surgeon and pathologist.      The area was prepped with Betasept.  A rim of normal appearing skin was marked circumferentially around the lesion.  The area was infiltrated with local anesthesia.  The tumor was first debulked to remove all clinically apparent tumor.  An incision following the standard Mohs approach was done and the specimen was oriented,mapped and placed in 1 block(s).  Each specimen was then chromacoded and processed in the Mohs laboratory using standard Mohs technique and submitted for frozen section histology.  Frozen section analysis showed no residual tumor but CLEAR MARGINS.      The tumor was excised using standard Mohs technique in 1 stages(s).  CLEAR MARGINS OBTAINED and Final defect size was 1.1 cm.     We discussed the  options for wound management in full with the patient including risks/benefits/ possible outcomes.        REPAIR COMPLEX: Because of the tightness of the surrounding skin and Because of the size and full thickness nature of the defect, Because of the tightness of the surrounding skin and Because of the proximity to the nasal tip and ala, a complex closure was planned. After LE anesthesia and prep, Burow's triangles were excised in the relaxed skin tension lines. The wound edges were widely undermined greater than width of the defect on both sides by dissection in the subcutaneous plane until adequate tissue mobility was obtained. Hemostasis was obtained. The wound edges were closed in a layered fashion using Vicryl and Fast Absorbing Plain Gut sutures. Postoperative length was 3.2 cm.   EBL minimal; complications none; wound care routine.  The patient was discharged in good condition and will return in one week for wound evaluation.  It was a pleasure speaking to Nir Guy today.  Previous clinic notes and pertinent laboratory tests were reviewed prior to Nir Guy's visit.  Nature and genetics of benign skin lesions dicussed with patient.  Signs and Symptoms of skin cancer discussed with patient.  Patient encouraged to perform monthly skin exams.  UV precautions reviewed with patient.  Risks of non-melanoma skin cancer discussed with patient   Return to clinic 6 months        Again, thank you for allowing me to participate in the care of your patient.        Sincerely,        Chavez Jay MD

## 2022-04-06 NOTE — NURSING NOTE
Chief Complaint   Patient presents with     Derm Problem     Mohs- Nose       Vitals:    04/06/22 0718   BP: (!) 145/83   BP Location: Left arm   Patient Position: Sitting   Cuff Size: Adult Large   Pulse: 84   SpO2: 94%     Wt Readings from Last 1 Encounters:   02/03/22 97.1 kg (214 lb)       Vicky Vega LPN .................4/6/2022

## 2022-04-06 NOTE — PATIENT INSTRUCTIONS
Sutured Wound Care     [facility]: [phone]  [facility]: [phone]    Nose    ? No strenuous activity for 48 hours. Resume moderate activity in 48 hours. No heavy exercising until you are seen for follow up in one week.     ? Take Tylenol as needed for discomfort.                         ? Do not drink alcoholic beverages for 48 hours.     ? Keep the pressure bandage in place for 24 hours. If the bandage becomes blood tinged or loose, reinforce it with gauze and tape.        (Refer to the reverse side of this page for management of bleeding).    ? Remove pressure bandage in 24 hours     ? Leave the flat bandage in place until your follow up appointment.    ? Keep the bandage dry. Wash around it carefully.    ? If the tape becomes soiled or starts to come off, reinforce it with additional paper tape.    ? Do not smoke for 3 weeks; smoking is detrimental to wound healing.    ? It is normal to have swelling and bruising around the surgical site. The bruising will fade in approximately 10-14 days. Elevate the area to reduce swelling.    ? Numbness, itchiness and sensitivity to temperature changes can occur after surgery and may take up to 18 months to normalize.      POSSIBLE COMPLICATIONS    BLEEDIN. Leave the bandage in place.  2. Use tightly rolled up gauze or a cloth to apply direct pressure over the bandage for 20   minutes.  3. Reapply pressure for an additional 20 minutes if necessary  4. Call the office or go to the nearest emergency room if pressure fails to stop the bleeding.  5. Use additional gauze and tape to maintain pressure once the bleeding has stopped.    PAIN:    1. Post operative pain should slowly get better, never worse.  2. A severe increase in pain may indicate a problem. Call the office if this occurs.    In case of emergency phone:Dr Jay [phone]

## 2022-04-06 NOTE — PROGRESS NOTES
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 Patient has no other skin complaints today.  Remainder of the HPI, Meds, PMH, Allergies, FH, and SH was reviewed in chart.      Past Medical History:   Diagnosis Date     Actinic keratosis      AMD (age related macular degeneration)      Basal cell carcinoma      Former smoker      Full code status 1/6/2017     Hyperlipidemia      OA (osteoarthritis)      Osteoarthritis of left patellofemoral joint 1/6/2017     Osteoarthritis of shoulder      RBBB (right bundle branch block)      S/P colonoscopy 4/18/11     SNHL (sensorineural hearing loss)      Squamous cell carcinoma      Squamous cell carcinoma of skin      Tinnitus      Vertigo        Past Surgical History:   Procedure Laterality Date     BASAL CELL CARCINOMA EXCISION  01/01/1999    Nose and scalp     CATARACT EXTRACTION, BILATERAL  01/01/2018     CATARACT IOL, RT/LT       IR LUMBAR EPIDURAL STEROID INJECTION  01/09/2018     RELEASE CARPAL TUNNEL Right 01/04/2021    Procedure: Revision open carpal tunnel release.;  Surgeon: Nasir Casper MD;  Location: [facility]     REPEAT RIGHT CARPAL TUNNEL RELEASE WITH HYPOTHENAR FAT PAD FLAP\  12/2021    DR. FINNEY     VASECTOMY  09/01/1980     [facility] TOTAL HIP ARTHROPLASTY Right 02/21/2018    Procedure:  RIGHT TOTAL HIP ARTHROPLASTY DIRECT ANTERIOR APPROACH;  Surgeon: Kaushik Hood MD;  Location: [facility];  Service: Orthopedics        Family History   Problem Relation Age of Onset     Melanoma No family hx of      Cancer Mother         GYN     Cancer Father         Lung     Heart Disease Father        Social History     Socioeconomic History     Marital status:      Spouse name: Not on file     Number of children: Not on file     Years of education: Not on file     Highest education level: Not on file   Occupational History     Employer: RETIRED    Tobacco Use     Smoking status: Never Smoker     Smokeless tobacco: Never Used   Substance and Sexual Activity     Alcohol use: Yes     Alcohol/week: 8.0 standard drinks     Comment: Alcoholic Drinks/day: nightly     Drug use: No     Sexual activity: Not on file   Other Topics Concern     Not on file   Social History Narrative     Not on file     Social Determinants of Health     Financial Resource Strain: Not on file   Food Insecurity: Not on file   Transportation Needs: Not on file   Physical Activity: Not on file   Stress: Not on file   Social Connections: Not on file   Intimate Partner Violence: Not on file   Housing Stability: Not on file       Outpatient Encounter Medications as of 4/6/2022   Medication Sig Dispense Refill     fish oil-omega-3 fatty acids 1000 MG capsule Take 2 g by mouth daily       hypromellose-dextran (GENTEAL TEARS) 0.1-0.3 % ophthalmic solution Place 1 drop into both eyes daily as needed for dry eyes        MECLIZINE HCL PO Take 25 mg by mouth daily        Multiple Vitamins-Minerals (MENS MULTIVITAMIN PLUS PO)        polyethylene glycol-propylene glycol PF (SYSTANE HYDRATION PF) 0.4-0.3 % SOLN opthalmic solution Apply 1 drop to eye       umeclidinium (INCRUSE ELLIPTA) 62.5 MCG/INH inhaler Inhale 1 puff into the lungs daily 30 each 11     No facility-administered encounter medications on file as of 4/6/2022.             O:   NAD, WDWN, Alert & Oriented, Mood & Affect wnl, Vitals stable   Here today alone   BP (!) 145/83 (BP Location: Left arm, Patient Position: Sitting, Cuff Size: Adult Large)   Pulse 84   SpO2 94%    General appearance normal   Vitals stable   Alert, oriented and in no acute distress      Following lymph nodes palpated: Occipital, Cervical, Supraclavicular no lad  R NSW 6mm scaly papule       Eyes: Conjunctivae/lids:Normal     ENT: Lips, buccal mucosa, tongue: normal    MSK:Normal    Cardiovascular: peripheral edema none    Pulm: Breathing Normal    Lymph Nodes: No  Head and Neck Lymphadenopathy     Neuro/Psych: Orientation:Alert and Orientedx3 ; Mood/Affect:normal       A/P:  1. R NSW squamous cell carcinoma   MOHS:   Location    The rationale for Mohs surgery was discussed with the patient and consent was obtained.  The risks and benefits as well as alternatives to therapy were discussed, in detail.  Specifically, the risks of infection, scarring, bleeding, prolonged wound healing, incomplete removal, allergy to anesthesia, nerve injury and recurrence were addressed.  Indication for Mohs was Location. Prior to the procedure, the treatment site was clearly identified and, if available, confirmed with previous photos and confirmed by the patient   All components of the Universal Protocol/PAUSE rule were completed.  The Mohs surgeon operated in two distinct and integrated capacities as the surgeon and pathologist.      The area was prepped with Betasept.  A rim of normal appearing skin was marked circumferentially around the lesion.  The area was infiltrated with local anesthesia.  The tumor was first debulked to remove all clinically apparent tumor.  An incision following the standard Mohs approach was done and the specimen was oriented,mapped and placed in 1 block(s).  Each specimen was then chromacoded and processed in the Mohs laboratory using standard Mohs technique and submitted for frozen section histology.  Frozen section analysis showed no residual tumor but CLEAR MARGINS.      The tumor was excised using standard Mohs technique in 1 stages(s).  CLEAR MARGINS OBTAINED and Final defect size was 1.1 cm.     We discussed the options for wound management in full with the patient including risks/benefits/ possible outcomes.        REPAIR COMPLEX: Because of the tightness of the surrounding skin and Because of the size and full thickness nature of the defect, Because of the tightness of the surrounding skin and Because of the proximity to the nasal tip and ala, a complex  closure was planned. After LE anesthesia and prep, Burow's triangles were excised in the relaxed skin tension lines. The wound edges were widely undermined greater than width of the defect on both sides by dissection in the subcutaneous plane until adequate tissue mobility was obtained. Hemostasis was obtained. The wound edges were closed in a layered fashion using Vicryl and Fast Absorbing Plain Gut sutures. Postoperative length was 3.2 cm.   EBL minimal; complications none; wound care routine.  The patient was discharged in good condition and will return in one week for wound evaluation.  It was a pleasure speaking to Nir Guy today.  Previous clinic notes and pertinent laboratory tests were reviewed prior to Nir Guy's visit.  Nature and genetics of benign skin lesions dicussed with patient.  Signs and Symptoms of skin cancer discussed with patient.  Patient encouraged to perform monthly skin exams.  UV precautions reviewed with patient.  Risks of non-melanoma skin cancer discussed with patient   Return to clinic 6 months

## 2022-04-13 ENCOUNTER — OFFICE VISIT (OUTPATIENT)
Dept: DERMATOLOGY | Facility: CLINIC | Age: 82
End: 2022-04-13
Payer: COMMERCIAL

## 2022-04-13 DIAGNOSIS — Z48.01 ENCOUNTER FOR CHANGE OR REMOVAL OF SURGICAL WOUND DRESSING: Primary | ICD-10-CM

## 2022-04-13 PROCEDURE — 99207 PR NO CHARGE NURSE ONLY: CPT

## 2022-04-13 NOTE — PROGRESS NOTES
Pt returned to clinic for post surgery 1 week follow up bandage change. Pt has no complaints, denies pain. Bandage removed from right side of nose, area cleansed with normal saline. Site is healing and wound edges approximating well. Reapplied new steri strips and paper tape.    Advised to watch for signs/sx of infection; spreading redness, drainage, odor, fever. Call or report promptly to clinic. Pt given written instructions and informed to rtc as needed. Patient verbalized understanding.

## 2022-04-13 NOTE — PATIENT INSTRUCTIONS
WOUND CARE INSTRUCTIONS  for  ONE WEEK AFTER SURGERY    Leave flat bandage on your skin for one week after today s bandage change.  In one week when you remove the bandage, you may resume your regular skin care routine, including washing with mild soap and water, applying moisturizer, make-up and sunscreen.    If there are any open or bleeding areas at the incision/graft site you should begin to cover the area with a bandage daily as follows:    Clean and dry the area with plain tap water using a Q-tip or sterile gauze pad.  Apply Polysporin or Bacitracin ointment to the open area.  Cover the wound with a band-aid or a sterile non-stick gauze pad and micropore paper tape.     SIGNS OF INFECTION  - If you notice any of these signs of infection, call your doctor right away: expanding redness around the wound.  - Yellow or greenish-colored pus or cloudy wound drainage.    - Red streaking spreading from the wound.  - Increased swelling, tenderness, or pain around the wound.   - Fever.    Please remember that yellow and clear drainage from a wound can be normal and related to normal wound healing.  Isolated drainage from a wound without a combination of the above features does not indicate infection.     *Once the bandages are removed, the scar will be red and firm (especially in the lip/chin area). This is normal and will fade in time. It might take 6-12 months for this to happen.     *Massaging the area will help the scar soften and fade quicker. Begin to massage the area one month after the bandages have been removed. To massage apply pressure directly and firmly over the scar with the fingertips and move in a circular motion. Massage the area for a few minutes several times a day. Continue to massage the site for several months.    *Approximately 6-8 weeks after surgery it is not uncommon to see the formation of  tender pimple-like  bump along the scar. This is normal. As the scar continues to mature and the stitches  underneath the skin begin to dissolve, this might occur. Do not pick or squeeze, this will resolve on it s own. Should one break open producing a small amount of drainage, apply Polysporin or Bacitracin ointment a few times a day until the wound is completely healed.    *Numbness in the surgical area is expected. It might take 12-18 months for the feeling to return to normal. During this time sensations of itchiness, tingling and occasional sharp pains might be noted. These feelings are normal and will subside once the nerves have completely healed.       IN CASE OF EMERGENCY: Dr Jay [phone]     If you were seen in [place] call: [phone]    If you were seen in [place] call: [phone]

## 2022-05-11 ENCOUNTER — TRANSFERRED RECORDS (OUTPATIENT)
Dept: HEALTH INFORMATION MANAGEMENT | Facility: CLINIC | Age: 82
End: 2022-05-11
Payer: COMMERCIAL

## 2022-07-08 ENCOUNTER — TRANSFERRED RECORDS (OUTPATIENT)
Dept: HEALTH INFORMATION MANAGEMENT | Facility: CLINIC | Age: 82
End: 2022-07-08

## 2022-08-05 DIAGNOSIS — J44.9 CHRONIC OBSTRUCTIVE PULMONARY DISEASE, UNSPECIFIED COPD TYPE (H): ICD-10-CM

## 2022-08-25 NOTE — TELEPHONE ENCOUNTER
Called and relayed message to patient and wife. They appreciated the call and had no further questions    Yes

## 2022-10-04 ENCOUNTER — OFFICE VISIT (OUTPATIENT)
Dept: DERMATOLOGY | Facility: CLINIC | Age: 82
End: 2022-10-04
Payer: COMMERCIAL

## 2022-10-04 DIAGNOSIS — D18.01 ANGIOMA OF SKIN: ICD-10-CM

## 2022-10-04 DIAGNOSIS — L81.4 LENTIGO: ICD-10-CM

## 2022-10-04 DIAGNOSIS — Z85.828 HISTORY OF SKIN CANCER: Primary | ICD-10-CM

## 2022-10-04 DIAGNOSIS — L82.1 SEBORRHEIC KERATOSIS: ICD-10-CM

## 2022-10-04 DIAGNOSIS — L57.0 AK (ACTINIC KERATOSIS): ICD-10-CM

## 2022-10-04 DIAGNOSIS — D23.9 DERMAL NEVUS: ICD-10-CM

## 2022-10-04 PROCEDURE — 17003 DESTRUCT PREMALG LES 2-14: CPT | Performed by: DERMATOLOGY

## 2022-10-04 PROCEDURE — 99213 OFFICE O/P EST LOW 20 MIN: CPT | Mod: 25 | Performed by: DERMATOLOGY

## 2022-10-04 PROCEDURE — 17000 DESTRUCT PREMALG LESION: CPT | Performed by: DERMATOLOGY

## 2022-10-04 ASSESSMENT — PAIN SCALES - GENERAL: PAINLEVEL: NO PAIN (0)

## 2022-10-04 NOTE — PROGRESS NOTES
Nir Guy is an extremely pleasant 82 year old year old male patient here today for rough spot son face.   .   Patient states this has been present for a while.  Patient reports the following symptoms:  rough.  Patient reports the following previous treatments none.  These treatments did not work.  Patient reports the following modifying factors none.  Associated symptoms: none.  Patient has no other skin complaints today.  Remainder of the HPI, Meds, PMH, Allergies, FH, and SH was reviewed in chart.      Past Medical History:   Diagnosis Date     Actinic keratosis      AMD (age related macular degeneration)      Basal cell carcinoma      Former smoker      Full code status 1/6/2017     Hyperlipidemia      OA (osteoarthritis)      Osteoarthritis of left patellofemoral joint 1/6/2017     Osteoarthritis of shoulder      RBBB (right bundle branch block)      S/P colonoscopy 4/18/11     SNHL (sensorineural hearing loss)      Squamous cell carcinoma      Squamous cell carcinoma of skin      Tinnitus      Vertigo        Past Surgical History:   Procedure Laterality Date     BASAL CELL CARCINOMA EXCISION  01/01/1999    Nose and scalp     CATARACT EXTRACTION, BILATERAL  01/01/2018     CATARACT IOL, RT/LT       IR LUMBAR EPIDURAL STEROID INJECTION  01/09/2018     RELEASE CARPAL TUNNEL Right 01/04/2021    Procedure: Revision open carpal tunnel release.;  Surgeon: Nasir Casper MD;  Location: [facility]     REPEAT RIGHT CARPAL TUNNEL RELEASE WITH HYPOTHENAR FAT PAD FLAP\  12/2021    DR. FINNEY     VASECTOMY  09/01/1980     [facility] TOTAL HIP ARTHROPLASTY Right 02/21/2018    Procedure:  RIGHT TOTAL HIP ARTHROPLASTY DIRECT ANTERIOR APPROACH;  Surgeon: Kaushik Hood MD;  Location: [facility] OR;  Service: Orthopedics        Family History   Problem Relation Age of Onset     Melanoma No family hx of      Cancer Mother         GYN     Cancer Father         Lung     Heart Disease Father        Social History      Socioeconomic History     Marital status:      Spouse name: Not on file     Number of children: Not on file     Years of education: Not on file     Highest education level: Not on file   Occupational History     Employer: RETIRED   Tobacco Use     Smoking status: Never Smoker     Smokeless tobacco: Never Used   Substance and Sexual Activity     Alcohol use: Yes     Alcohol/week: 8.0 standard drinks     Comment: Alcoholic Drinks/day: nightly     Drug use: No     Sexual activity: Not on file   Other Topics Concern     Not on file   Social History Narrative     Not on file     Social Determinants of Health     Financial Resource Strain: Not on file   Food Insecurity: Not on file   Transportation Needs: Not on file   Physical Activity: Not on file   Stress: Not on file   Social Connections: Not on file   Intimate Partner Violence: Not on file   Housing Stability: Not on file       Outpatient Encounter Medications as of 10/4/2022   Medication Sig Dispense Refill     fish oil-omega-3 fatty acids 1000 MG capsule Take 2 g by mouth daily       hypromellose-dextran (ARTIFICAL TEARS) 0.1-0.3 % ophthalmic solution Place 1 drop into both eyes daily as needed for dry eyes        MECLIZINE HCL PO Take 25 mg by mouth daily        Multiple Vitamins-Minerals (MENS MULTIVITAMIN PLUS PO)        polyethylene glycol-propylene glycol PF (SYSTANE HYDRATION PF) 0.4-0.3 % SOLN opthalmic solution Apply 1 drop to eye       INCRUSE ELLIPTA 62.5 MCG/INH Inhaler Inhale 1 puff into the lungs daily 30 each 11     No facility-administered encounter medications on file as of 10/4/2022.             O:   NAD, WDWN, Alert & Oriented, Mood & Affect wnl, Vitals stable   Here today alone   General appearance normal   Vitals stable   Alert, oriented and in no acute distress      Following lymph nodes palpated: Occipital, Cervical, Supraclavicular no lad   Gritty scaly papule on face       Stuck on papules and brown macules on trunk and ext   Red  papules on trunk  Flesh colored papules on trunk     The remainder of the full exam was normal; the following areas were examined:  conjunctiva/lids, oral mucosa, neck, peripheral vascular system, abdomen, lymph nodes, digits/nails, eccrine and apocrine glands, scalp/hair, face, neck, chest, abdomen, buttocks, back, RUE, LUE, RLE, LLE       Eyes: Conjunctivae/lids:Normal     ENT: Lips, buccal mucosa, tongue: normal    MSK:Normal    Cardiovascular: peripheral edema none    Pulm: Breathing Normal    Lymph Nodes: No Head and Neck Lymphadenopathy     Neuro/Psych: Orientation:Alert and Orientedx3 ; Mood/Affect:normal       A/P:  1. Seborrheic keratosis, lentigo, angioma, dermal nevus, hx of non-melanoma skin cancer   2. Actinic keratosis   L cheek x4  R cheek x2  L forehead x2  LN2:  Treated with LN2 for 5s for 1-2 cycles. Warned risks of blistering, pain, pigment change, scarring, and incomplete resolution.  Advised patient to return if lesions do not completely resolve.  Wound care sheet given.  It was a pleasure speaking to Nir Guy today.  Previous clinic notes and pertinent laboratory tests were reviewed prior to Nir Guy's visit.  Patient encouraged to perform monthly skin exams.  UV precautions reviewed with patient.  Return to clinic 12 months

## 2022-10-04 NOTE — LETTER
10/4/2022         RE: Nir Guy  [address] [place] [id number]        Dear Colleague,    Thank you for referring your patient, Nir Guy, to the [facility]. Please see a copy of my visit note below.    Nir Guy is an extremely pleasant 82 year old year old male patient here today for rough spot son face.   .   Patient states this has been present for a while.  Patient reports the following symptoms:  rough.  Patient reports the following previous treatments none.  These treatments did not work.  Patient reports the following modifying factors none.  Associated symptoms: none.  Patient has no other skin complaints today.  Remainder of the HPI, Meds, PMH, Allergies, FH, and SH was reviewed in chart.      Past Medical History:   Diagnosis Date     Actinic keratosis      AMD (age related macular degeneration)      Basal cell carcinoma      Former smoker      Full code status 1/6/2017     Hyperlipidemia      OA (osteoarthritis)      Osteoarthritis of left patellofemoral joint 1/6/2017     Osteoarthritis of shoulder      RBBB (right bundle branch block)      S/P colonoscopy 4/18/11     SNHL (sensorineural hearing loss)      Squamous cell carcinoma      Squamous cell carcinoma of skin      Tinnitus      Vertigo        Past Surgical History:   Procedure Laterality Date     BASAL CELL CARCINOMA EXCISION  01/01/1999    Nose and scalp     CATARACT EXTRACTION, BILATERAL  01/01/2018     CATARACT IOL, RT/LT       IR LUMBAR EPIDURAL STEROID INJECTION  01/09/2018     RELEASE CARPAL TUNNEL Right 01/04/2021    Procedure: Revision open carpal tunnel release.;  Surgeon: Nasir Casper MD;  Location: [facility]     REPEAT RIGHT CARPAL TUNNEL RELEASE WITH HYPOTHENAR FAT PAD FLAP\  12/2021    DR. FINNEY     VASECTOMY  09/01/1980     [facility] TOTAL HIP ARTHROPLASTY Right 02/21/2018    Procedure:  RIGHT TOTAL HIP ARTHROPLASTY DIRECT ANTERIOR APPROACH;  Surgeon: Kaushik Hood MD;   Location: [facility] OR;  Service: Orthopedics        Family History   Problem Relation Age of Onset     Melanoma No family hx of      Cancer Mother         GYN     Cancer Father         Lung     Heart Disease Father        Social History     Socioeconomic History     Marital status:      Spouse name: Not on file     Number of children: Not on file     Years of education: Not on file     Highest education level: Not on file   Occupational History     Employer: RETIRED   Tobacco Use     Smoking status: Never Smoker     Smokeless tobacco: Never Used   Substance and Sexual Activity     Alcohol use: Yes     Alcohol/week: 8.0 standard drinks     Comment: Alcoholic Drinks/day: nightly     Drug use: No     Sexual activity: Not on file   Other Topics Concern     Not on file   Social History Narrative     Not on file     Social Determinants of Health     Financial Resource Strain: Not on file   Food Insecurity: Not on file   Transportation Needs: Not on file   Physical Activity: Not on file   Stress: Not on file   Social Connections: Not on file   Intimate Partner Violence: Not on file   Housing Stability: Not on file       Outpatient Encounter Medications as of 10/4/2022   Medication Sig Dispense Refill     fish oil-omega-3 fatty acids 1000 MG capsule Take 2 g by mouth daily       hypromellose-dextran (ARTIFICAL TEARS) 0.1-0.3 % ophthalmic solution Place 1 drop into both eyes daily as needed for dry eyes        MECLIZINE HCL PO Take 25 mg by mouth daily        Multiple Vitamins-Minerals (MENS MULTIVITAMIN PLUS PO)        polyethylene glycol-propylene glycol PF (SYSTANE HYDRATION PF) 0.4-0.3 % SOLN opthalmic solution Apply 1 drop to eye       INCRUSE ELLIPTA 62.5 MCG/INH Inhaler Inhale 1 puff into the lungs daily 30 each 11     No facility-administered encounter medications on file as of 10/4/2022.             O:   NAD, WDWN, Alert & Oriented, Mood & Affect wnl, Vitals stable   Here today alone   General  appearance normal   Vitals stable   Alert, oriented and in no acute distress      Following lymph nodes palpated: Occipital, Cervical, Supraclavicular no lad   Gritty scaly papule on face       Stuck on papules and brown macules on trunk and ext   Red papules on trunk  Flesh colored papules on trunk     The remainder of the full exam was normal; the following areas were examined:  conjunctiva/lids, oral mucosa, neck, peripheral vascular system, abdomen, lymph nodes, digits/nails, eccrine and apocrine glands, scalp/hair, face, neck, chest, abdomen, buttocks, back, RUE, LUE, RLE, LLE       Eyes: Conjunctivae/lids:Normal     ENT: Lips, buccal mucosa, tongue: normal    MSK:Normal    Cardiovascular: peripheral edema none    Pulm: Breathing Normal    Lymph Nodes: No Head and Neck Lymphadenopathy     Neuro/Psych: Orientation:Alert and Orientedx3 ; Mood/Affect:normal       A/P:  1. Seborrheic keratosis, lentigo, angioma, dermal nevus, hx of non-melanoma skin cancer   2. Actinic keratosis   L cheek x4  R cheek x2  L forehead x2  LN2:  Treated with LN2 for 5s for 1-2 cycles. Warned risks of blistering, pain, pigment change, scarring, and incomplete resolution.  Advised patient to return if lesions do not completely resolve.  Wound care sheet given.  It was a pleasure speaking to Nir Guy today.  Previous clinic notes and pertinent laboratory tests were reviewed prior to Nir Guy's visit.  Patient encouraged to perform monthly skin exams.  UV precautions reviewed with patient.  Return to clinic 12 months        Again, thank you for allowing me to participate in the care of your patient.        Sincerely,        Chavez aJy MD

## 2022-11-23 ENCOUNTER — TRANSFERRED RECORDS (OUTPATIENT)
Dept: HEALTH INFORMATION MANAGEMENT | Facility: CLINIC | Age: 82
End: 2022-11-23

## 2022-12-13 ENCOUNTER — NURSE TRIAGE (OUTPATIENT)
Dept: NURSING | Facility: CLINIC | Age: 82
End: 2022-12-13

## 2022-12-13 NOTE — TELEPHONE ENCOUNTER
Triage Call:     Pt calling to report that for the past week when he lays on his side in bed he gets tingling of his right foot. He would like to make an appointment to be seen for this.     He reports that if this happens he is able to get out of bed and then the tingling goes away, but then when he gets back into bed the tingling comes back.     Last night at 1:30am the tingling started and lasted all night; not present now that he is out of bed.     4-5 years ago he had right hip surgery  Gets some back pain once in a while    Every day he exercises:   One mile on the treadmill  Step-ups  Heel lifts    Disposition: Home Care. Pt would like to be seen for this and was transferred to scheduling to make an appt. He was also given the care advice.     Lilian Santa RN  [facility] Nurse Advisor 10:43 AM 12/13/2022        Reason for Disposition    Brief (now gone) tingling in foot (e.g., pins and needles) after prolonged sitting with legs crossed    Additional Information    Negative: Difficult to awaken or acting confused (e.g., disoriented, slurred speech)    Negative: New neurologic deficit that is present NOW, sudden onset of ANY of the following: * Weakness of the face, arm, or leg on one side of the body* Numbness of the face, arm, or leg on one side of the body* Loss of speech or garbled speech    Negative: Sounds like a life-threatening emergency to the triager    Negative: Confusion, disorientation, or hallucinations is main symptom    Negative: Dizziness is main symptom    Negative: Followed a head injury within last 3 days    Negative: Headache (with neurologic deficit)    Negative: Unable to urinate (or only a few drops) and bladder feels very full    Negative: Loss of bladder or bowel control (urine or bowel incontinence; wetting self, leaking stool) of new-onset    Negative: Back pain with numbness (loss of sensation) in groin or rectal area    Negative: Neurologic deficit that was brief (now gone),  ANY of the following: * Weakness of the face, arm, or leg on one side of the body * Numbness of the face, arm, or leg on one side of the body * Loss of speech or garbled speech    Negative: Patient sounds very sick or weak to the triager    Negative: Neurologic deficit of gradual onset (e.g., days to weeks), ANY of the following: * Weakness of the face, arm, or leg on one side of the body* Numbness of the face, arm, or leg on one side of the body* Loss of speech or garbled speech    Negative: [place] palsy suspected (i.e., weakness only one side of the face, developing over hours to days, no other symptoms)    Negative: Tingling (e.g., pins and needles) of the face, arm or leg on one side of the body, that is present now (Exceptions: Chronic or recurrent symptom lasting > 4 weeks; or tingling from known cause, such as: bumped elbow, carpal tunnel syndrome, pinched nerve, frostbite.)    Negative: Neck pain (with neurologic deficit)    Negative: Back pain (with neurologic deficit)    Negative: Patient wants to be seen    Negative: Loss of speech or garbled speech is a chronic symptom (recurrent or ongoing problem lasting > 4 weeks)    Negative: Weakness of arm or leg is a chronic symptom (recurrent or ongoing problem lasting > 4 weeks)    Negative: Numbness or tingling in one or both hands is a chronic symptom (recurrent or ongoing problem lasting > 4 weeks)    Negative: Numbness or tingling in one or both feet is a chronic symptom (recurrent or ongoing problem lasting > 4 weeks)    Negative: Numbness or tingling on both sides of body and is a new symptom lasting > 24 hours    Protocols used: NEUROLOGIC DEFICIT-A-[place]

## 2022-12-19 ENCOUNTER — OFFICE VISIT (OUTPATIENT)
Dept: INTERNAL MEDICINE | Facility: CLINIC | Age: 82
End: 2022-12-19
Payer: COMMERCIAL

## 2022-12-19 VITALS
RESPIRATION RATE: 18 BRPM | SYSTOLIC BLOOD PRESSURE: 138 MMHG | BODY MASS INDEX: 27.21 KG/M2 | HEIGHT: 74 IN | HEART RATE: 64 BPM | OXYGEN SATURATION: 97 % | WEIGHT: 212 LBS | TEMPERATURE: 97.8 F | DIASTOLIC BLOOD PRESSURE: 80 MMHG

## 2022-12-19 DIAGNOSIS — M79.18 MUSCLE ACHE OF EXTREMITY: ICD-10-CM

## 2022-12-19 DIAGNOSIS — M48.062 SPINAL STENOSIS OF LUMBAR REGION WITH NEUROGENIC CLAUDICATION: ICD-10-CM

## 2022-12-19 DIAGNOSIS — R20.0 NUMBNESS OF RIGHT FOOT: Primary | ICD-10-CM

## 2022-12-19 PROCEDURE — 99213 OFFICE O/P EST LOW 20 MIN: CPT | Performed by: INTERNAL MEDICINE

## 2022-12-19 RX ORDER — VITAMIN B COMPLEX
25 TABLET ORAL DAILY
COMMUNITY

## 2022-12-19 ASSESSMENT — PAIN SCALES - GENERAL: PAINLEVEL: SEVERE PAIN (6)

## 2022-12-19 NOTE — PROGRESS NOTES
[facility] - Primary Care Specialists    Comprehensive and complex medical care - Chronic disease management - Shared decision making - Care coordination - Compassionate care    Patient advocacy - Rational deprescribing - Minimally disruptive medicine - Ethical focus - Customized care         Date of Service: 12/19/2022  Primary Provider: German Mcgregor    Patient Care Team:  German Mcgregor MD as PCP - General (Internal Medicine)  Chavez Jay MD as Assigned Surgical Provider  German Mcgregor MD as Assigned PCP  Jacky Zayas MD as MD (Ophthalmology)  Rehan Victor MD as MD (Orthopaedic Surgery)  Rob Mendoza MD as MD (Orthopaedic Surgery)          Patient's Pharmacy:    [facility]. - [address] [address]  Phone: [phone] Fax: [phone]     Patient's Contacts:  Name Home Phone Work Phone Mobile Phone Relationship Lgl Grd   MELITON QUEEN [phone]   Significant*    MELITON QUEEN [phone] Spouse      Patient's Insurance:    Payor: [employer] / Plan: [employer] MEDICARE ADVANTAGE / Product Type: HMO /           Current Outpatient Medications   Medication Instructions     fish oil-omega-3 fatty acids 2 g, Oral, DAILY     hypromellose (GENTEAL SEVERE) ophthalmic gel 0.3% 1 drop, Both Eyes, EVERY 1 HOUR PRN     MECLIZINE HCL PO 25 mg, Oral, DAILY     Multiple Vitamins-Minerals (MENS MULTIVITAMIN PLUS PO) Oral     polyethylene glycol-propylene glycol PF (SYSTANE HYDRATION PF) 0.4-0.3 % SOLN opthalmic solution 1 drop, Ophthalmic     Vitamin D3 (CHOLECALCIFEROL) 25 mcg, Oral, DAILY        Subjective:      Nir Guy is a 82 year old male who comes in today for:    Chief Complaint   Patient presents with     NEUROPATHY     R foot pins and needles, comes and goes, worse when he lays down. Every once in a while when I am trying to sleep I get pains in my LE's.      "  Patient comes in today for episode about a week and a half ago where he had discomfort and a sticking needles sensation in the right lateral foot and ankle for about 4 days.  It would occur only at night when laying.  It was on the outside of the foot as mentioned.  He had no other symptoms with it.  It did not go up his leg.  His back has been an issue in the past but is not giving him any more pain than normal.  He had fairly severe symptoms with spinal stenosis in the past.    His right hip is doing well which has been replaced.  He has had no recent injury.  He denies any color change in the foot.    In the last week his symptoms have been good.  He has not had recurrence of this.  He does get tightness or aching in his calves bilaterally normally at night.    He had concerns about peripheral neuropathy.    Objective:     Wt Readings from Last 3 Encounters:   12/19/22 96.2 kg (212 lb)   02/03/22 97.1 kg (214 lb)   12/23/21 98 kg (216 lb)     BP Readings from Last 3 Encounters:   12/19/22 138/80   04/06/22 (!) 145/83   04/04/22 (!) 154/82      /80 (BP Location: Right arm, Patient Position: Sitting, Cuff Size: Adult Regular)   Pulse 64   Temp 97.8  F (36.6  C) (Oral)   Resp 18   Ht 1.88 m (6' 2\")   Wt 96.2 kg (212 lb)   SpO2 97%   BMI 27.22 kg/m     In general, the patient is comfortable, no apparent distress.  Mood good.  Insight good.  Heart regular rate and rhythm.  Lungs clear to auscultation bilaterally.  Distal pulses are present.  Foot color is good.  Strength intact.  Some hammertoe deformities noted.    Diagnostics:     No results found for any visits on 12/19/22.     Assessment:     1. Numbness of right foot    2. Muscle ache of extremity    3. Spinal stenosis of lumbar region with neurogenic claudication        Plan:     1. Numbness of foot has resolved but could be related to spinal stenosis.  Consider EMG in the future if needed to define further.  2. Follow symptoms at this time and " update me if worsens.    No orders of the defined types were placed in this encounter.      German Mcgregor MD  General Internal Medicine  [facility]    Return in about 3 months (around 3/24/2023), or if symptoms worsen or fail to improve, for annual wellness visit.     Future Appointments   Date Time Provider Department Center   3/24/2023 11:00 AM [facility]   4/5/2023  9:15 AM Chavez Jay MD [facility]

## 2022-12-19 NOTE — PATIENT INSTRUCTIONS
Please follow up if you have any further issues.    You may contact me by phone or MyChart if you are worsening or if things are not improving.    ______________________________________________________________________     You can call [phone] during weekday hours to get a hold of our team coordinators if you need to get a message to me.    There are 3 people in our building taking phone calls for me.  Be sure to listen to the prompts to get a hold of them.    You first press 1 for English (press another number for a different language) and then press 2 to get the .    They can then take your message and forward it onto me.    ______________________________________________________________________     Please remember that you can call [phone] ANYTIME to schedule an appointment.     You can schedule appointments 24 hours a day, 7 days a week.      Sometimes the best time to schedule an appointment is after clinic hours when less people are calling in.      Weekends are another option for calling in to schedule appointments.     ______________________________________________________________________     Future Appointments   Date Time Provider Department Center   3/24/2023 11:00 AM German Mcgregor MD [facility]   4/5/2023  9:15 AM Chavez Jay MD [facility]

## 2023-01-12 ENCOUNTER — TELEPHONE (OUTPATIENT)
Dept: INTERNAL MEDICINE | Facility: CLINIC | Age: 83
End: 2023-01-12

## 2023-01-12 ENCOUNTER — NURSE TRIAGE (OUTPATIENT)
Dept: NURSING | Facility: CLINIC | Age: 83
End: 2023-01-12

## 2023-01-12 NOTE — TELEPHONE ENCOUNTER
Pt is calling concerning an upcoming dental procedure and is wondering if he needs antibiotics because he had a Knee Replacement 3 years ago     Transferred to clinic     No triage     Madalyn Lozano RN  [facility] Nurse Advisor  11:58 AM 1/12/2023      Reason for Disposition    [1] Caller requesting NON-URGENT health information AND [2] PCP's office is the best resource    Additional Information    Negative: [1] Caller is not with the adult (patient) AND [2] reporting urgent symptoms    Negative: Lab result questions    Negative: Medication questions    Negative: Caller can't be reached by phone    Negative: Caller has already spoken to PCP or another triager    Negative: RN needs further essential information from caller in order to complete triage    Negative: Requesting regular office appointment    Protocols used: INFORMATION ONLY CALL - [facility]

## 2023-01-12 NOTE — TELEPHONE ENCOUNTER
No need at this time.    German Mcgregor MD  General Internal Medicine  [facility]  1/12/2023, 1:55 PM

## 2023-03-17 ASSESSMENT — ENCOUNTER SYMPTOMS
JOINT SWELLING: 0
HEMATURIA: 0
EYE PAIN: 0
DIZZINESS: 0
HEADACHES: 0
SHORTNESS OF BREATH: 0
CHILLS: 0
PARESTHESIAS: 0
FREQUENCY: 0
DIARRHEA: 0
DYSURIA: 0
HEARTBURN: 0
ARTHRALGIAS: 0
PALPITATIONS: 0
HEMATOCHEZIA: 0
NAUSEA: 0
SORE THROAT: 0
WEAKNESS: 0
ABDOMINAL PAIN: 0
NERVOUS/ANXIOUS: 0
CONSTIPATION: 0
MYALGIAS: 0
COUGH: 0
FEVER: 0

## 2023-03-17 ASSESSMENT — ACTIVITIES OF DAILY LIVING (ADL): CURRENT_FUNCTION: NO ASSISTANCE NEEDED

## 2023-03-24 ENCOUNTER — OFFICE VISIT (OUTPATIENT)
Dept: INTERNAL MEDICINE | Facility: CLINIC | Age: 83
End: 2023-03-24
Payer: COMMERCIAL

## 2023-03-24 VITALS
HEART RATE: 67 BPM | SYSTOLIC BLOOD PRESSURE: 159 MMHG | WEIGHT: 215.3 LBS | HEIGHT: 74 IN | BODY MASS INDEX: 27.63 KG/M2 | TEMPERATURE: 98.7 F | OXYGEN SATURATION: 97 % | DIASTOLIC BLOOD PRESSURE: 89 MMHG

## 2023-03-24 DIAGNOSIS — M15.0 PRIMARY OSTEOARTHRITIS INVOLVING MULTIPLE JOINTS: ICD-10-CM

## 2023-03-24 DIAGNOSIS — M48.062 SPINAL STENOSIS OF LUMBAR REGION WITH NEUROGENIC CLAUDICATION: ICD-10-CM

## 2023-03-24 DIAGNOSIS — R20.0 NUMBNESS OF TOES: ICD-10-CM

## 2023-03-24 DIAGNOSIS — J44.9 CHRONIC OBSTRUCTIVE PULMONARY DISEASE, UNSPECIFIED COPD TYPE (H): ICD-10-CM

## 2023-03-24 DIAGNOSIS — I26.99 PE (PULMONARY THROMBOEMBOLISM) (H): ICD-10-CM

## 2023-03-24 DIAGNOSIS — Z00.00 ENCOUNTER FOR MEDICARE ANNUAL WELLNESS EXAM: Primary | ICD-10-CM

## 2023-03-24 DIAGNOSIS — I31.39 PERICARDIAL EFFUSION: ICD-10-CM

## 2023-03-24 LAB
ALBUMIN SERPL BCG-MCNC: 4.5 G/DL (ref 3.5–5.2)
ALP SERPL-CCNC: 67 U/L (ref 40–129)
ALT SERPL W P-5'-P-CCNC: 18 U/L (ref 10–50)
ANION GAP SERPL CALCULATED.3IONS-SCNC: 12 MMOL/L (ref 7–15)
AST SERPL W P-5'-P-CCNC: 24 U/L (ref 10–50)
BILIRUB SERPL-MCNC: 0.5 MG/DL
BUN SERPL-MCNC: 11 MG/DL (ref 8–23)
CALCIUM SERPL-MCNC: 9.8 MG/DL (ref 8.8–10.2)
CHLORIDE SERPL-SCNC: 102 MMOL/L (ref 98–107)
CREAT SERPL-MCNC: 0.96 MG/DL (ref 0.67–1.17)
DEPRECATED HCO3 PLAS-SCNC: 28 MMOL/L (ref 22–29)
ERYTHROCYTE [DISTWIDTH] IN BLOOD BY AUTOMATED COUNT: 12.1 % (ref 10–15)
GFR SERPL CREATININE-BSD FRML MDRD: 78 ML/MIN/1.73M2
GLUCOSE SERPL-MCNC: 96 MG/DL (ref 70–99)
HCT VFR BLD AUTO: 44.6 % (ref 40–53)
HGB BLD-MCNC: 15.7 G/DL (ref 13.3–17.7)
MCH RBC QN AUTO: 32.8 PG (ref 26.5–33)
MCHC RBC AUTO-ENTMCNC: 35.2 G/DL (ref 31.5–36.5)
MCV RBC AUTO: 93 FL (ref 78–100)
PLATELET # BLD AUTO: 180 10E3/UL (ref 150–450)
POTASSIUM SERPL-SCNC: 4.6 MMOL/L (ref 3.4–5.3)
PROT SERPL-MCNC: 7.2 G/DL (ref 6.4–8.3)
RBC # BLD AUTO: 4.79 10E6/UL (ref 4.4–5.9)
SODIUM SERPL-SCNC: 142 MMOL/L (ref 136–145)
VIT B12 SERPL-MCNC: 394 PG/ML (ref 232–1245)
WBC # BLD AUTO: 7.2 10E3/UL (ref 4–11)

## 2023-03-24 PROCEDURE — 82607 VITAMIN B-12: CPT | Performed by: INTERNAL MEDICINE

## 2023-03-24 PROCEDURE — 99214 OFFICE O/P EST MOD 30 MIN: CPT | Mod: 25 | Performed by: INTERNAL MEDICINE

## 2023-03-24 PROCEDURE — 80053 COMPREHEN METABOLIC PANEL: CPT | Performed by: INTERNAL MEDICINE

## 2023-03-24 PROCEDURE — 36415 COLL VENOUS BLD VENIPUNCTURE: CPT | Performed by: INTERNAL MEDICINE

## 2023-03-24 PROCEDURE — 83921 ORGANIC ACID SINGLE QUANT: CPT | Performed by: INTERNAL MEDICINE

## 2023-03-24 PROCEDURE — 85027 COMPLETE CBC AUTOMATED: CPT | Performed by: INTERNAL MEDICINE

## 2023-03-24 PROCEDURE — G0439 PPPS, SUBSEQ VISIT: HCPCS | Performed by: INTERNAL MEDICINE

## 2023-03-24 ASSESSMENT — ACTIVITIES OF DAILY LIVING (ADL): CURRENT_FUNCTION: NO ASSISTANCE NEEDED

## 2023-03-24 ASSESSMENT — PAIN SCALES - GENERAL: PAINLEVEL: NO PAIN (0)

## 2023-03-24 NOTE — PATIENT INSTRUCTIONS
Please monitor your blood pressure at home and let me know if consistently above 150/90.    ______________________________________________________________________     Patient Education   Personalized Prevention Plan  You are due for the preventive services outlined below.  Your care team is available to assist you in scheduling these services.  If you have already completed any of these items, please share that information with your care team to update in your medical record.  Health Maintenance Due   Topic Date Due    ANNUAL REVIEW OF HM ORDERS  Never done

## 2023-03-24 NOTE — PROGRESS NOTES
[facility] - Primary [facility] Specialists    Comprehensive and complex medical care - Chronic disease management - Shared decision making - Care coordination - Compassionate care    Patient advocacy - Rational deprescribing - Minimally disruptive medicine - Ethical focus - Customized care         Date of Service: 3/24/2023  Primary Provider: German Mcgregor    Patient Care Team:  German Mcgregor MD as PCP - General (Internal Medicine)  Chavez Jay MD as Assigned Surgical Provider  German Mcgregor MD as Assigned PCP  Jacky Zayas MD as MD (Ophthalmology)  Rehan Victor MD as MD (Orthopaedic Surgery)  Rob Mendoza MD as MD (Orthopaedic Surgery)          Patient's Pharmacy:    [facility]. - [address] [address]  Phone: [phone] Fax: [phone]     Patient's Contacts:  Name Home Phone Work Phone Mobile Phone Relationship Lgl Grd   MELITON QUEEN [phone]   Significant*    MELITON QUEEN [phone] Spouse      Patient's Insurance:    Payor: [employer] / Plan: [employer] MEDICARE ADVANTAGE / Product Type: HMO /      Subjective:     History of present illness:    Nir Guy is an 83 year old here for an annual wellness visit.    The issues he would like to address at today's visit include the following:    Chief Complaint   Patient presents with     Physical     Pt states that he had COVID on February 5th but feels fine now       Patient comes in today for annual wellness visit and for other issues.    His significant other is having issues with gynecologic cancer and had some questions about this.  He is concerned about her.    They were in [place] for a few months and he got COVID while there and took Paxlovid (nirmatrelvir/ritonavir) for this.  His infection does not sound like it was very severe.    We reviewed his history of pericardial effusion and tamponade.  That was  2 years ago.  There has been no signs of recurrence.  We reviewed risk of recurrence with him.  No new symptoms.    He notes numbness in his toes or decreased sensation.  This has been more prominent over the last year.  He can bend his toes okay, but he does have some hammertoe deformities of the toes.    His breathing is a little short but this is stable.  He feels it is okay.    His osteoarthritis is about the same at this point.  He has seen orthopedics in the past for some of these issues.    No new back issues.    We reviewed his other issues noted in the assessment but not specifically addressed in the HPI above.           Active Problem List:  Problem List as of 3/24/2023 Reviewed: 3/24/2023  3:02 PM by German Mcgregor MD       High    Full code status - 2017    Former smoker - Quit in 1998    COPD (chronic obstructive pulmonary disease) (H)       Medium    Osteoarthritis of shoulder    Osteoarthritis of left patellofemoral joint    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    Pericardial tamponade    PE (pulmonary thromboembolism) (H)    Pericardial effusion       Low    RBBB (right bundle branch block)    HLD (hyperlipidemia)    AMD (age related macular degeneration)    Vertigo    SNHL (sensorineural hearing loss)    Tinnitus    Intermediate stage nonexudative age-related macular degeneration of both eyes    Spinal stenosis of lumbar region with neurogenic claudication        Past Medical History:   Diagnosis Date     Actinic keratosis      AMD (age related macular degeneration)      Basal cell carcinoma      Former smoker      Full code status 1/6/2017     Hyperlipidemia      OA (osteoarthritis)      Osteoarthritis of left patellofemoral joint 1/6/2017     Osteoarthritis of shoulder      RBBB (right bundle branch block)      S/P colonoscopy 4/18/11     SNHL (sensorineural hearing loss)      Squamous cell carcinoma      Squamous cell carcinoma of skin      Tinnitus      Vertigo      Past  Surgical History:   Procedure Laterality Date     BASAL CELL CARCINOMA EXCISION  01/01/1999    Nose and scalp     CATARACT EXTRACTION, BILATERAL  01/01/2018     CATARACT IOL, RT/LT       IR LUMBAR EPIDURAL STEROID INJECTION  01/09/2018     RELEASE CARPAL TUNNEL Right 01/04/2021    Procedure: Revision open carpal tunnel release.;  Surgeon: Nasir Casper MD;  Location: [facility]     REPEAT RIGHT CARPAL TUNNEL RELEASE WITH HYPOTHENAR FAT PAD FLAP\  12/2021    DR. FINNEY     VASECTOMY  09/01/1980     ZZC TOTAL HIP ARTHROPLASTY Right 02/21/2018    Procedure:  RIGHT TOTAL HIP ARTHROPLASTY DIRECT ANTERIOR APPROACH;  Surgeon: Kaushik Hood MD;  Location: [facility];  Service: Orthopedics     Family History   Problem Relation Age of Onset     Melanoma No family hx of      Cancer Mother         GYN     Cancer Father         Lung     Heart Disease Father      Family history is otherwise noncontributory.     Social History     Occupational History     Employer: RETIRED   Tobacco Use     Smoking status: Never     Smokeless tobacco: Never   Vaping Use     Vaping Use: Never used   Substance and Sexual Activity     Alcohol use: Yes     Alcohol/week: 8.0 standard drinks     Comment: Alcoholic Drinks/day: nightly     Drug use: No     Sexual activity: Not on file      Social History     Social History Narrative     Not on file      Current Outpatient Medications   Medication Instructions     fish oil-omega-3 fatty acids 2 g, Oral, DAILY     MECLIZINE HCL PO 25 mg, Oral, DAILY     Multiple Vitamins-Minerals (MENS MULTIVITAMIN PLUS PO) Oral     polyethylene glycol-propylene glycol PF (SYSTANE HYDRATION PF) 0.4-0.3 % SOLN opthalmic solution 1 drop, Ophthalmic     Vitamin D3 (CHOLECALCIFEROL) 25 mcg, Oral, DAILY     Allergies: Patient has no known allergies.     Immunization History   Administered Date(s) Administered     COVID-19 Vaccine 12+ (Pfizer 2022) 04/04/2022     COVID-19 Vaccine 12+ (Pfizer) 02/01/2021, 02/22/2021,  "10/11/2021     COVID-19 Vaccine Bivalent Booster 12+ (Pfizer) 10/17/2022     FLUAD(HD)65+ QUAD 10/05/2020, 10/17/2022     Flu, Unspecified 11/05/1999, 11/01/2001, 11/11/2002, 11/11/2003, 11/04/2004, 11/03/2005, 10/25/2007, 10/21/2008, 09/21/2009, 09/21/2010, 11/11/2011, 09/06/2013, 09/23/2016, 09/02/2018     Influenza (H1N1) 01/04/2010     Influenza (High Dose) 3 valent vaccine 09/18/2012, 09/06/2013, 10/29/2015, 09/22/2016, 09/15/2017, 09/11/2018, 09/06/2019     Influenza (IIV3) PF 11/05/1999, 11/01/2001, 11/11/2002, 11/11/2003, 11/04/2004, 11/03/2005, 10/25/2007, 10/21/2008, 09/21/2009, 09/21/2010, 11/11/2011     Influenza Vaccine 65+ (Fluzone HD) 09/24/2021     Pneumo Conj 13-V (2010&after) 12/28/2007, 12/28/2014     Pneumococcal 23 valent 03/18/1993, 12/28/2007     TDAP (Adacel,Boostrix) 03/11/2011     Td (Adult), Adsorbed 03/18/1993, 11/11/2003     Td,adult,historic,unspecified 11/11/2003     Varicella 12/12/1998     Zoster recombinant adjuvanted (SHINGRIX) 04/23/2018, 05/29/2018, 08/02/2018     Zoster vaccine, live 12/28/2014      Objective:     Wt Readings from Last 3 Encounters:   03/24/23 97.7 kg (215 lb 4.8 oz)   12/19/22 96.2 kg (212 lb)   02/03/22 97.1 kg (214 lb)     BP Readings from Last 3 Encounters:   03/24/23 (!) 159/89   12/19/22 138/80   04/06/22 (!) 145/83       PHYSICAL EXAM  BP (!) 159/89 (BP Location: Right arm, Patient Position: Sitting, Cuff Size: Adult Regular)   Pulse 67   Temp 98.7  F (37.1  C) (Oral)   Ht 1.88 m (6' 2\")   Wt 97.7 kg (215 lb 4.8 oz)   SpO2 97%   BMI 27.64 kg/m     The patient is comfortable, no acute distress.  Mood good.  Insight is good.  No skin lesions or nodules of concern.  Ears clear.  Eyes are nonicteric.  Pupils equal and reactive.  Throat is clear.  Neck is supple without mass, no thyromegaly. No cervical or epitrochlear adenopathy.  Heart regular rate and rhythm.  Lungs clear to auscultation bilaterally.  Respiratory effort good.  Abdomen soft and " nontender.  No hepatosplenomegaly.  Extremities show no edema.  He does have a little bit of decreased distal pulses but no other signs of vascular insufficiency.         Diagnostics:     Office Visit on 02/03/2022   Component Date Value Ref Range Status     WBC Count 02/03/2022 7.4  4.0 - 11.0 10e3/uL Final     RBC Count 02/03/2022 4.71  4.40 - 5.90 10e6/uL Final     Hemoglobin 02/03/2022 15.1  13.3 - 17.7 g/dL Final     Hematocrit 02/03/2022 44.1  40.0 - 53.0 % Final     MCV 02/03/2022 94  78 - 100 fL Final     MCH 02/03/2022 32.1  26.5 - 33.0 pg Final     MCHC 02/03/2022 34.2  31.5 - 36.5 g/dL Final     RDW 02/03/2022 12.1  10.0 - 15.0 % Final     Platelet Count 02/03/2022 182  150 - 450 10e3/uL Final     Sodium 02/03/2022 140  136 - 145 mmol/L Final     Potassium 02/03/2022 4.4  3.5 - 5.0 mmol/L Final     Chloride 02/03/2022 103  98 - 107 mmol/L Final     Carbon Dioxide (CO2) 02/03/2022 24  22 - 31 mmol/L Final     Anion Gap 02/03/2022 13  5 - 18 mmol/L Final     Urea Nitrogen 02/03/2022 12  8 - 28 mg/dL Final     Creatinine 02/03/2022 0.86  0.70 - 1.30 mg/dL Final     Calcium 02/03/2022 9.5  8.5 - 10.5 mg/dL Final     Glucose 02/03/2022 85  70 - 125 mg/dL Final     Alkaline Phosphatase 02/03/2022 67  45 - 120 U/L Final     AST 02/03/2022 20  0 - 40 U/L Final     ALT 02/03/2022 18  0 - 45 U/L Final     Protein Total 02/03/2022 7.1  6.0 - 8.0 g/dL Final     Albumin 02/03/2022 4.2  3.5 - 5.0 g/dL Final     Bilirubin Total 02/03/2022 0.5  0.0 - 1.0 mg/dL Final     GFR Estimate 02/03/2022 86  >60 mL/min/1.73m2 Final    Effective December 21, 2021 eGFRcr in adults is calculated using the 2021 CKD-EPI creatinine equation which includes age and gender (Peggy et al., NEJ, DOI: 10.1056/OYQTuw2352917)     Cholesterol 02/03/2022 212 (H)  <=199 mg/dL Final     Triglycerides 02/03/2022 217 (H)  <=149 mg/dL Final     Direct Measure HDL 02/03/2022 51  >=40 mg/dL Final    HDL Cholesterol Reference Range:     0-2 years:   No  reference ranges established for patients under 2 years old  at [facility] for lipid analytes.    2-8 years:  Greater than 45 mg/dL     18 years and older:   Female: Greater than or equal to 50 mg/dL   Male:   Greater than or equal to 40 mg/dL     LDL Cholesterol Calculated 02/03/2022 118  <=129 mg/dL Final     Patient Fasting > 8hrs? 02/03/2022 No   Final        Results for orders placed or performed in visit on 03/24/23   CBC with platelets     Status: Normal   Result Value Ref Range    WBC Count 7.2 4.0 - 11.0 10e3/uL    RBC Count 4.79 4.40 - 5.90 10e6/uL    Hemoglobin 15.7 13.3 - 17.7 g/dL    Hematocrit 44.6 40.0 - 53.0 %    MCV 93 78 - 100 fL    MCH 32.8 26.5 - 33.0 pg    MCHC 35.2 31.5 - 36.5 g/dL    RDW 12.1 10.0 - 15.0 %    Platelet Count 180 150 - 450 10e3/uL      Assessment and Plan:     1. Encounter for Medicare annual wellness exam  Doing well overall.  Continue to monitor closely.  Blood pressure up a bit today but stressed.  He has a blood pressure cuff and he can follow as an outpatient.    2. PE (pulmonary thromboembolism) (H)  No signs of recurrence.  Continue to monitor.  Blood work done.    - Comprehensive metabolic panel; Future  - CBC with platelets; Future  - Comprehensive metabolic panel  - CBC with platelets    3. Chronic obstructive pulmonary disease, unspecified COPD type (H)  This is stable and he does have some dyspnea with exertion.  He is not hypoxic.      4. Numbness of toes  Check B12 levels.  Likely benign.    - Methylmalonic Acid; Future  - Vitamin B12; Future  - Methylmalonic Acid  - Vitamin B12    5. Pericardial effusion  Continue to monitor.  Low risk of recurrence.    6. Primary osteoarthritis involving multiple joints      7. Spinal stenosis of lumbar region with neurogenic claudication           A personalized health plan based on the identified health risks was provided to the patient on the AVS.       German Mcgregor MD  General Internal Medicine  [facility]  [facility]    Return in about 1 year (around 3/24/2024) for annual wellness visit.     Future Appointments   Date Time Provider Department Center   4/5/2023  9:15 AM Chavez Jay MD [facility]         Health Maintenance   Topic Date Due     ANNUAL REVIEW OF HM ORDERS  Never done     MEDICARE ANNUAL WELLNESS VISIT  03/24/2024     FALL RISK ASSESSMENT  03/24/2024     ADVANCE CARE PLANNING  03/24/2028     SPIROMETRY  Completed     PHQ-2 (once per calendar year)  Completed     INFLUENZA VACCINE  Completed     Pneumococcal Vaccine: 65+ Years  Completed     ZOSTER IMMUNIZATION  Completed     COVID-19 Vaccine  Completed     COPD ACTION PLAN  Addressed     IPV IMMUNIZATION  Aged Out     MENINGITIS IMMUNIZATION  Aged Out     DTAP/TDAP/TD IMMUNIZATION  Discontinued

## 2023-03-24 NOTE — LETTER
3/29/2023    Nir Guy   [address] [place] [id number]         Dear Nir,    It was good to see you in clinic.  I hope your questions were answered at the time of your visit.    The results of your tests from your visit were as follows:    Resulted Orders   Comprehensive metabolic panel   Result Value Ref Range    Sodium 142 136 - 145 mmol/L    Potassium 4.6 3.4 - 5.3 mmol/L    Chloride 102 98 - 107 mmol/L    Carbon Dioxide (CO2) 28 22 - 29 mmol/L    Anion Gap 12 7 - 15 mmol/L    Urea Nitrogen 11.0 8.0 - 23.0 mg/dL    Creatinine 0.96 0.67 - 1.17 mg/dL    Calcium 9.8 8.8 - 10.2 mg/dL    Glucose 96 70 - 99 mg/dL    Alkaline Phosphatase 67 40 - 129 U/L    AST 24 10 - 50 U/L    ALT 18 10 - 50 U/L    Protein Total 7.2 6.4 - 8.3 g/dL    Albumin 4.5 3.5 - 5.2 g/dL    Bilirubin Total 0.5 <=1.2 mg/dL    GFR Estimate 78 >60 mL/min/1.73m2      Comment:      eGFR calculated using 2021 CKD-EPI equation.   CBC with platelets   Result Value Ref Range    WBC Count 7.2 4.0 - 11.0 10e3/uL    RBC Count 4.79 4.40 - 5.90 10e6/uL    Hemoglobin 15.7 13.3 - 17.7 g/dL    Hematocrit 44.6 40.0 - 53.0 %    MCV 93 78 - 100 fL    MCH 32.8 26.5 - 33.0 pg    MCHC 35.2 31.5 - 36.5 g/dL    RDW 12.1 10.0 - 15.0 %    Platelet Count 180 150 - 450 10e3/uL   Methylmalonic Acid   Result Value Ref Range    Methylmalonic Acid 0.39 0.00 - 0.40 umol/L      Comment:      INTERPRETIVE INFORMATION:    Slight elevation 0.41-0.99 umol/L is consistent with mild vitamin B12 deficiency, renal insufficiency, or intravascular volume contraction.    Moderate elevation 1.00-9.99 umol/L is consistent with mild vitamin B12 deficiency.    Massive elevation 10.00 umol/L or greater is consistent with significant vitamin B12 deficiency or with inborn errors of metabolism.    Narrative    This test was developed and its performance characteristics determined by the [facility],  Special Chemistry Laboratory. It has not been cleared  or approved by the FDA. The laboratory is regulated under CLIA as qualified to perform high-complexity testing. This test is used for clinical purposes. It should not be regarded as investigational or for research.   Vitamin B12   Result Value Ref Range    Vitamin B12 394 232 - 1,245 pg/mL     Your vitamin B12 levels are very good.     Your kidney tests are normal.  Your electrolytes are also normal.  There is no signs of diabetes.  Your liver tests are normal.      Your blood counts are normal and you are not anemic.     Please follow up again in 1 year, sooner if issues.     If you have any questions regarding these results, please feel free to contact me at [phone].  I wish you the best of health!      Sincerely,       German Mcgregor MD  General Internal Medicine  [facility]

## 2023-03-24 NOTE — LETTER
3/25/2023    Nir Guy   [address] [place] [id number]         Dear Nir,    It was good to see you in clinic.  I hope your questions were answered at the time of your visit.    The results of your tests from your visit were as follows:    Resulted Orders   Comprehensive metabolic panel   Result Value Ref Range    Sodium 142 136 - 145 mmol/L    Potassium 4.6 3.4 - 5.3 mmol/L    Chloride 102 98 - 107 mmol/L    Carbon Dioxide (CO2) 28 22 - 29 mmol/L    Anion Gap 12 7 - 15 mmol/L    Urea Nitrogen 11.0 8.0 - 23.0 mg/dL    Creatinine 0.96 0.67 - 1.17 mg/dL    Calcium 9.8 8.8 - 10.2 mg/dL    Glucose 96 70 - 99 mg/dL    Alkaline Phosphatase 67 40 - 129 U/L    AST 24 10 - 50 U/L    ALT 18 10 - 50 U/L    Protein Total 7.2 6.4 - 8.3 g/dL    Albumin 4.5 3.5 - 5.2 g/dL    Bilirubin Total 0.5 <=1.2 mg/dL    GFR Estimate 78 >60 mL/min/1.73m2      Comment:      eGFR calculated using 2021 CKD-EPI equation.   CBC with platelets   Result Value Ref Range    WBC Count 7.2 4.0 - 11.0 10e3/uL    RBC Count 4.79 4.40 - 5.90 10e6/uL    Hemoglobin 15.7 13.3 - 17.7 g/dL    Hematocrit 44.6 40.0 - 53.0 %    MCV 93 78 - 100 fL    MCH 32.8 26.5 - 33.0 pg    MCHC 35.2 31.5 - 36.5 g/dL    RDW 12.1 10.0 - 15.0 %    Platelet Count 180 150 - 450 10e3/uL   Vitamin B12   Result Value Ref Range    Vitamin B12 394 232 - 1,245 pg/mL     .nl    Please follow up again in ***    If you have any questions regarding these results, please feel free to contact me at [phone].  I wish you the best of health!      Sincerely,       German Mcgregor MD  General Internal Medicine  [facility]

## 2023-03-24 NOTE — PROGRESS NOTES
"Patient has been advised of split billing requirements and indicates understanding: Yes  Are you in the first 12 months of your Medicare coverage?  No    Healthy Habits:     In general, how would you rate your overall health?  Good    Frequency of exercise:  6-7 days/week    Duration of exercise:  15-30 minutes    Do you usually eat at least 4 servings of fruit and vegetables a day, include whole grains    & fiber and avoid regularly eating high fat or \"junk\" foods?  Yes    Taking medications regularly:  Yes    Medication side effects:  Not applicable    Ability to successfully perform activities of daily living:  No assistance needed    Home Safety:  No safety concerns identified    Hearing Impairment:  No hearing concerns    In the past 6 months, have you been bothered by leaking of urine?  No    In general, how would you rate your overall mental or emotional health?  Good      PHQ-2 Total Score: 0    Additional concerns today:  No      Have you ever done Advance Care Planning? (For example, a Health Directive, POLST, or a discussion with a medical provider or your loved ones about your wishes): Yes, advance care planning is on file.      Fall risk  Fallen 2 or more times in the past year?: No  Any fall with injury in the past year?: No    Cognitive Screening   1) Repeat 3 items (Leader, Season, Table)    2) Clock draw: NORMAL  3) 3 item recall: Recalls 3 objects  Results: 3 items recalled: COGNITIVE IMPAIRMENT LESS LIKELY    Mini-CogTM Copyright [employer]. Licensed by the author for use in [facility]; reprinted with permission (jb@.[facility]). All rights reserved.      "

## 2023-03-24 NOTE — PROGRESS NOTES
Wt Readings from Last 20 Encounters:   03/24/23 97.7 kg (215 lb 4.8 oz)   12/19/22 96.2 kg (212 lb)   02/03/22 97.1 kg (214 lb)   12/23/21 98 kg (216 lb)   09/24/21 96.2 kg (212 lb)   07/13/21 93 kg (205 lb)   05/11/21 95.3 kg (210 lb)   04/08/21 94.3 kg (208 lb)   03/29/21 95.3 kg (210 lb)   03/12/21 99.8 kg (220 lb)   01/29/21 98 kg (216 lb)   01/04/21 96.2 kg (212 lb)   12/28/20 97.9 kg (215 lb 14.4 oz)   10/19/20 97.1 kg (214 lb)   07/31/20 95.3 kg (210 lb)   03/17/20 94.8 kg (209 lb)   03/02/20 97.5 kg (215 lb)   01/17/20 95.7 kg (211 lb)   11/26/19 96.6 kg (213 lb)   11/22/19 97.5 kg (215 lb)     BP Readings from Last 20 Encounters:   03/24/23 (!) 159/89   12/19/22 138/80   04/06/22 (!) 145/83   04/04/22 (!) 154/82   02/03/22 136/82   12/23/21 138/70   12/14/21 138/82   09/24/21 130/82   08/03/21 (!) 148/91   07/13/21 136/84   05/11/21 130/80   04/08/21 132/78   03/29/21 138/74   03/18/21 126/96   03/12/21 138/86   01/29/21 138/72   01/19/21 131/78   01/12/21 139/70   01/04/21 (!) 142/85   12/28/20 (!) 142/80      Pulse Readings from Last 20 Encounters:   03/24/23 67   12/19/22 64   04/06/22 84   04/04/22 64   02/03/22 71   12/23/21 78   12/14/21 85   09/24/21 55   08/03/21 76   07/27/21 77   07/13/21 74   05/11/21 82   04/08/21 65   03/29/21 79   03/18/21 120   03/12/21 67   01/29/21 78   01/19/21 71   01/18/21 77   01/12/21 63     SpO2 Readings from Last 20 Encounters:   03/24/23 97%   12/19/22 97%   04/06/22 94%   04/04/22 97%   02/03/22 97%   12/23/21 95%   12/14/21 96%   09/24/21 96%   08/03/21 95%   07/27/21 96%   07/13/21 96%   05/11/21 97%   04/08/21 96%   03/29/21 94%   03/18/21 98%   03/12/21 98%   01/29/21 97%   01/19/21 96%   01/18/21 96%   01/12/21 97%

## 2023-03-29 LAB — METHYLMALONATE SERPL-SCNC: 0.39 UMOL/L (ref 0–0.4)

## 2023-04-05 ENCOUNTER — OFFICE VISIT (OUTPATIENT)
Dept: DERMATOLOGY | Facility: CLINIC | Age: 83
End: 2023-04-05
Payer: COMMERCIAL

## 2023-04-05 DIAGNOSIS — L81.4 LENTIGO: ICD-10-CM

## 2023-04-05 DIAGNOSIS — L57.0 AK (ACTINIC KERATOSIS): ICD-10-CM

## 2023-04-05 DIAGNOSIS — Z85.828 HISTORY OF SKIN CANCER: Primary | ICD-10-CM

## 2023-04-05 DIAGNOSIS — D23.9 DERMAL NEVUS: ICD-10-CM

## 2023-04-05 DIAGNOSIS — C44.329 SQUAMOUS CELL CANCER OF SKIN OF LEFT CHEEK: ICD-10-CM

## 2023-04-05 DIAGNOSIS — D18.01 ANGIOMA OF SKIN: ICD-10-CM

## 2023-04-05 DIAGNOSIS — L82.1 SEBORRHEIC KERATOSIS: ICD-10-CM

## 2023-04-05 PROCEDURE — 17000 DESTRUCT PREMALG LESION: CPT | Performed by: DERMATOLOGY

## 2023-04-05 PROCEDURE — 11102 TANGNTL BX SKIN SINGLE LES: CPT | Mod: 59 | Performed by: DERMATOLOGY

## 2023-04-05 PROCEDURE — 17311 MOHS 1 STAGE H/N/HF/G: CPT | Performed by: DERMATOLOGY

## 2023-04-05 PROCEDURE — 99213 OFFICE O/P EST LOW 20 MIN: CPT | Mod: 25 | Performed by: DERMATOLOGY

## 2023-04-05 PROCEDURE — 17312 MOHS ADDL STAGE: CPT | Performed by: DERMATOLOGY

## 2023-04-05 PROCEDURE — 17003 DESTRUCT PREMALG LES 2-14: CPT | Performed by: DERMATOLOGY

## 2023-04-05 PROCEDURE — 88331 PATH CONSLTJ SURG 1 BLK 1SPC: CPT | Mod: 59 | Performed by: DERMATOLOGY

## 2023-04-05 PROCEDURE — 13132 CMPLX RPR F/C/C/M/N/AX/G/H/F: CPT | Mod: 59 | Performed by: DERMATOLOGY

## 2023-04-05 ASSESSMENT — PAIN SCALES - GENERAL: PAINLEVEL: SEVERE PAIN (7)

## 2023-04-05 NOTE — PATIENT INSTRUCTIONS
Sutured Wound Care     [facility]: [phone]    [facility]: [phone]          No strenuous activity for 48 hours. Resume moderate activity in 48 hours. No heavy exercising until you are seen for follow up in one week.     Take Tylenol as needed for discomfort.                         Do not drink alcoholic beverages for 48 hours.     Keep the pressure bandage in place for 24 hours. ( White Gauze) If the bandage becomes blood tinged or loose, reinforce it with gauze and tape.        (Refer to the reverse side of this page for management of bleeding).    Remove pressure bandage in 24 hours White Gauze    Leave the flat bandage  (Brown Tape) in place until your follow up appointment. 2023    Keep the bandage dry. Wash around it carefully.    If the tape becomes soiled or starts to come off, reinforce it with additional paper tape.    Do not smoke for 3 weeks; smoking is detrimental to wound healing.    It is normal to have swelling and bruising around the surgical site. The bruising will fade in approximately 10-14 days. Elevate the area to reduce swelling.    Numbness, itchiness and sensitivity to temperature changes can occur after surgery and may take up to 18 months to normalize.      POSSIBLE COMPLICATIONS    BLEEDING:    Leave the bandage in place.  Use tightly rolled up gauze or a cloth to apply direct pressure over the bandage for 20   minutes.  Reapply pressure for an additional 20 minutes if necessary  Call the office or go to the nearest emergency room if pressure fails to stop the bleeding.  Use additional gauze and tape to maintain pressure once the bleeding has stopped.        PAIN:    Post operative pain should slowly get better, never worse.  A severe increase in pain may indicate a problem. Call the office if this occurs.    In case of emergency phone:Dr Jay [phone]

## 2023-04-05 NOTE — NURSING NOTE
Nir Guy's chief complaint for this visit includes:  Chief Complaint   Patient presents with     Skin Check     6 month skin check. Patient has a tender new spot on left post neck.     PCP: German Mcgregor    Referring Provider:  No referring provider defined for this encounter.    There were no vitals taken for this visit.  Severe Pain (7)      No Known Allergies      Do you need any medication refills at today's visit? NO    Nancy Chou MA

## 2023-04-05 NOTE — LETTER
4/5/2023         RE: Nir Guy  [address] [place] [id number]        Dear Colleague,    Thank you for referring your patient, Nir Guy, to the [facility]. Please see a copy of my visit note below.    Nir Guy is an extremely pleasant 83 year old year old male patient here today for hx of non-melanoma skin cancer.  He notes painful spot on left cheek.   .   Patient states this has been present for a while.  Patient reports the following symptoms:  tender.  Patient reports the following previous treatments none.  These treatments did not work.  Patient reports the following modifying factors none.  Associated symptoms: none.  Patient has no other skin complaints today.  Remainder of the HPI, Meds, PMH, Allergies, FH, and SH was reviewed in chart.      Past Medical History:   Diagnosis Date     Actinic keratosis      AMD (age related macular degeneration)      Basal cell carcinoma      Former smoker      Full code status 1/6/2017     Hyperlipidemia      OA (osteoarthritis)      Osteoarthritis of left patellofemoral joint 1/6/2017     Osteoarthritis of shoulder      RBBB (right bundle branch block)      S/P colonoscopy 4/18/11     SNHL (sensorineural hearing loss)      Squamous cell carcinoma      Squamous cell carcinoma of skin      Tinnitus      Vertigo        Past Surgical History:   Procedure Laterality Date     BASAL CELL CARCINOMA EXCISION  01/01/1999    Nose and scalp     CATARACT EXTRACTION, BILATERAL  01/01/2018     CATARACT IOL, RT/LT       IR LUMBAR EPIDURAL STEROID INJECTION  01/09/2018     RELEASE CARPAL TUNNEL Right 01/04/2021    Procedure: Revision open carpal tunnel release.;  Surgeon: Nasir Casper MD;  Location: [facility] OR     REPEAT RIGHT CARPAL TUNNEL RELEASE WITH HYPOTHENAR FAT PAD FLAP\  12/2021    DR. FINNEY     VASECTOMY  09/01/1980     [facility] TOTAL HIP ARTHROPLASTY Right 02/21/2018    Procedure:  RIGHT TOTAL HIP ARTHROPLASTY DIRECT  ANTERIOR APPROACH;  Surgeon: Kaushik Hood MD;  Location: [facility] Main OR;  Service: Orthopedics        Family History   Problem Relation Age of Onset     Melanoma No family hx of      Cancer Mother         GYN     Cancer Father         Lung     Heart Disease Father        Social History     Socioeconomic History     Marital status:      Spouse name: Not on file     Number of children: Not on file     Years of education: Not on file     Highest education level: Not on file   Occupational History     Employer: RETIRED   Tobacco Use     Smoking status: Never     Smokeless tobacco: Never   Vaping Use     Vaping status: Never Used     Passive vaping exposure: Yes   Substance and Sexual Activity     Alcohol use: Yes     Alcohol/week: 8.0 standard drinks of alcohol     Comment: Alcoholic Drinks/day: nightly     Drug use: No     Sexual activity: Not on file   Other Topics Concern     Not on file   Social History Narrative     Not on file     Social Determinants of Health     Financial Resource Strain: Not on file   Food Insecurity: Not on file   Transportation Needs: Not on file   Physical Activity: Not on file   Stress: Not on file   Social Connections: Not on file   Intimate Partner Violence: Not on file   Housing Stability: Not on file       Outpatient Encounter Medications as of 4/5/2023   Medication Sig Dispense Refill     fish oil-omega-3 fatty acids 1000 MG capsule Take 2 g by mouth daily       MECLIZINE HCL PO Take 25 mg by mouth daily        Multiple Vitamins-Minerals (MENS MULTIVITAMIN PLUS PO)        polyethylene glycol-propylene glycol PF (SYSTANE HYDRATION PF) 0.4-0.3 % SOLN opthalmic solution Apply 1 drop to eye       Vitamin D3 (CHOLECALCIFEROL) 25 mcg (1000 units) tablet Take 25 mcg by mouth daily       No facility-administered encounter medications on file as of 4/5/2023.             O:   NAD, WDWN, Alert & Oriented, Mood & Affect wnl, Vitals stable   Here today alone   General appearance  normal   Vitals stable   Alert, oriented and in no acute distress      Following lymph nodes palpated: Occipital, Cervical, Supraclavicular no lad  L sideburn 6mm keratotic indurated scaly papule   Gritty scaly papule on scalp and neck      Stuck on papules and brown macules on trunk and ext   Red papules on trunk  Flesh colored papules on trunk     The remainder of the full exam was normal; the following areas were examined:  conjunctiva/lids, , neck, peripheral vascular system, abdomen, lymph nodes, digits/nails, eccrine and apocrine glands, scalp/hair, face, neck, chest, abdomen, buttocks, back, RUE, LUE, RLE, LLE       Eyes: Conjunctivae/lids:Normal     ENT: Lips, buccal mucosa, tongue: normal    MSK:Normal    Cardiovascular: peripheral edema none    Pulm: Breathing Normal    Lymph Nodes: No Head and Neck Lymphadenopathy     Neuro/Psych: Orientation:Alert and Orientedx3 ; Mood/Affect:normal       MICRO:   L sideburn:There is hyperkeratosis & parakeratosis of the epidermis, with full thickness epidermal involvement by atypical keratinocytes with rare pale vacuolated cells invading dermis.    A/P:  1. Seborrheic keratosis, lentigo, angioma, dermal nevus, hx of non-melanoma skin cancer   2. Actinic keratosis   L post auricular neck x3  Scalp x10  LN2:  Treated with LN2 for 5s for 1-2 cycles. Warned risks of blistering, pain, pigment change, scarring, and incomplete resolution.  Advised patient to return if lesions do not completely resolve.  Wound care sheet given.    3. L sideburn r/o squamous cell carcinoma   TANGENTIAL BIOPSY IN HOUSE:  After consent, anesthesia with LEC and prep, tangential excision performed and dx above confirmed with frozen section histology.  No complications and routine wound care.  Patient is fish oil  anticoagulants and risk of bleeding discussed with patient.       I have personally reviewed all specimens and/or slides and used them with my medical judgement to determine or confirm the  final diagnosis.     Patient told result squamous cell carcinoma .    It was a pleasure speaking to Nir Guy today.  Previous clinic notes and pertinent laboratory tests were reviewed prior to Nir Guy's visit.  Signs and Symptoms of skin cancer discussed with patient.  Patient encouraged to perform monthly skin exams.  UV precautions reviewed with patient.  Risks of non-melanoma skin cancer discussed with patient   Return to clinic 6 months  PROCEDURE NOTE  L sideburn squamous cell carcinoma   MOHS:   Location    The rationale for Mohs surgery was discussed with the patient and consent was obtained.  The risks and benefits as well as alternatives to therapy were discussed, in detail.  Specifically, the risks of infection, scarring, bleeding, prolonged wound healing, incomplete removal, allergy to anesthesia, nerve injury and recurrence were addressed.  Indication for Mohs was Location. Prior to the procedure, the treatment site was clearly identified and, if available, confirmed with previous photos and confirmed by the patient   All components of the Universal Protocol/PAUSE rule were completed.  The Mohs surgeon operated in two distinct and integrated capacities as the surgeon and pathologist.      The area was prepped with Betasept.  A rim of normal appearing skin was marked circumferentially around the lesion.  The area was infiltrated with local anesthesia.  The tumor was first debulked to remove all clinically apparent tumor.  An incision following the standard Mohs approach was done and the specimen was oriented,mapped and placed in 2 block(s).  Each specimen was then chromacoded and processed in the Mohs laboratory using standard Mohs technique and submitted for frozen section histology.  Frozen section analysis showed  residual tumor but CLEAR MARGINS.    1st stage:There is a proliferation of irregular nests of abnormal squamous cells arising from the epidermis and invading the dermis. These  are well differentiated. The dermis shows a variable superficial perivascular inflammatory infiltrate.     The tumor was excised using standard Mohs technique in 2 stages(s).  CLEAR MARGINS OBTAINED and Final defect size was 1.4 x 1 cm.     We discussed the options for wound management in full with the patient including risks/benefits/ possible outcomes.      REPAIR COMPLEX: Because of the tightness of the surrounding skin and Because of the size and full thickness nature of the defect, Because of the tightness of the surrounding skin, To maintain form and function, In order to avoid distortion and Because of the proximity to the ear, a complex closure was planned. After LE anesthesia and prep, Burow's triangles were excised in the relaxed skin tension lines. The wound edges were widely undermined greater than width of the defect on both sides by dissection in the subcutaneous plane until adequate tissue mobility was obtained. Hemostasis was obtained. The wound edges were closed in a layered fashion using Vicryl and Fast Absorbing Plain Gut sutures. Postoperative length was 3.5 cm.   EBL minimal; complications none; wound care routine.  The patient was discharged in good condition and will return in one week for wound evaluation.        Again, thank you for allowing me to participate in the care of your patient.        Sincerely,        Chavez Jay MD

## 2023-04-05 NOTE — PROGRESS NOTES
Nir Guy is an extremely pleasant 83 year old year old male patient here today for hx of non-melanoma skin cancer.  He notes painful spot on left cheek.   .   Patient states this has been present for a while.  Patient reports the following symptoms:  tender.  Patient reports the following previous treatments none.  These treatments did not work.  Patient reports the following modifying factors none.  Associated symptoms: none.  Patient has no other skin complaints today.  Remainder of the HPI, Meds, PMH, Allergies, FH, and SH was reviewed in chart.      Past Medical History:   Diagnosis Date     Actinic keratosis      AMD (age related macular degeneration)      Basal cell carcinoma      Former smoker      Full code status 1/6/2017     Hyperlipidemia      OA (osteoarthritis)      Osteoarthritis of left patellofemoral joint 1/6/2017     Osteoarthritis of shoulder      RBBB (right bundle branch block)      S/P colonoscopy 4/18/11     SNHL (sensorineural hearing loss)      Squamous cell carcinoma      Squamous cell carcinoma of skin      Tinnitus      Vertigo        Past Surgical History:   Procedure Laterality Date     BASAL CELL CARCINOMA EXCISION  01/01/1999    Nose and scalp     CATARACT EXTRACTION, BILATERAL  01/01/2018     CATARACT IOL, RT/LT       IR LUMBAR EPIDURAL STEROID INJECTION  01/09/2018     RELEASE CARPAL TUNNEL Right 01/04/2021    Procedure: Revision open carpal tunnel release.;  Surgeon: Nasir Casper MD;  Location: [facility]     REPEAT RIGHT CARPAL TUNNEL RELEASE WITH HYPOTHENAR FAT PAD FLAP\  12/2021    DR. FINNEY     VASECTOMY  09/01/1980     [facility] [place] TOTAL HIP ARTHROPLASTY Right 02/21/2018    Procedure:  RIGHT TOTAL HIP ARTHROPLASTY DIRECT ANTERIOR APPROACH;  Surgeon: Kaushik Hood MD;  Location: [facility] Main OR;  Service: Orthopedics        Family History   Problem Relation Age of Onset     Melanoma No family hx of      Cancer Mother         GYN     Cancer Father         Lung     Heart  Disease Father        Social History     Socioeconomic History     Marital status:      Spouse name: Not on file     Number of children: Not on file     Years of education: Not on file     Highest education level: Not on file   Occupational History     Employer: RETIRED   Tobacco Use     Smoking status: Never     Smokeless tobacco: Never   Vaping Use     Vaping status: Never Used     Passive vaping exposure: Yes   Substance and Sexual Activity     Alcohol use: Yes     Alcohol/week: 8.0 standard drinks of alcohol     Comment: Alcoholic Drinks/day: nightly     Drug use: No     Sexual activity: Not on file   Other Topics Concern     Not on file   Social History Narrative     Not on file     Social Determinants of Health     Financial Resource Strain: Not on file   Food Insecurity: Not on file   Transportation Needs: Not on file   Physical Activity: Not on file   Stress: Not on file   Social Connections: Not on file   Intimate Partner Violence: Not on file   Housing Stability: Not on file       Outpatient Encounter Medications as of 4/5/2023   Medication Sig Dispense Refill     fish oil-omega-3 fatty acids 1000 MG capsule Take 2 g by mouth daily       MECLIZINE HCL PO Take 25 mg by mouth daily        Multiple Vitamins-Minerals (MENS MULTIVITAMIN PLUS PO)        polyethylene glycol-propylene glycol PF (SYSTANE HYDRATION PF) 0.4-0.3 % SOLN opthalmic solution Apply 1 drop to eye       Vitamin D3 (CHOLECALCIFEROL) 25 mcg (1000 units) tablet Take 25 mcg by mouth daily       No facility-administered encounter medications on file as of 4/5/2023.             O:   NAD, WDWN, Alert & Oriented, Mood & Affect wnl, Vitals stable   Here today alone   General appearance normal   Vitals stable   Alert, oriented and in no acute distress      Following lymph nodes palpated: Occipital, Cervical, Supraclavicular no lad  L sideburn 6mm keratotic indurated scaly papule   Gritty scaly papule on scalp and neck      Stuck on papules and  brown macules on trunk and ext   Red papules on trunk  Flesh colored papules on trunk     The remainder of the full exam was normal; the following areas were examined:  conjunctiva/lids, , neck, peripheral vascular system, abdomen, lymph nodes, digits/nails, eccrine and apocrine glands, scalp/hair, face, neck, chest, abdomen, buttocks, back, RUE, LUE, RLE, LLE       Eyes: Conjunctivae/lids:Normal     ENT: Lips, buccal mucosa, tongue: normal    MSK:Normal    Cardiovascular: peripheral edema none    Pulm: Breathing Normal    Lymph Nodes: No Head and Neck Lymphadenopathy     Neuro/Psych: Orientation:Alert and Orientedx3 ; Mood/Affect:normal       MICRO:   L sideburn:There is hyperkeratosis & parakeratosis of the epidermis, with full thickness epidermal involvement by atypical keratinocytes with rare pale vacuolated cells invading dermis.    A/P:  1. Seborrheic keratosis, lentigo, angioma, dermal nevus, hx of non-melanoma skin cancer   2. Actinic keratosis   L post auricular neck x3  Scalp x10  LN2:  Treated with LN2 for 5s for 1-2 cycles. Warned risks of blistering, pain, pigment change, scarring, and incomplete resolution.  Advised patient to return if lesions do not completely resolve.  Wound care sheet given.    3. L sideburn r/o squamous cell carcinoma   TANGENTIAL BIOPSY IN HOUSE:  After consent, anesthesia with LEC and prep, tangential excision performed and dx above confirmed with frozen section histology.  No complications and routine wound care.  Patient is fish oil  anticoagulants and risk of bleeding discussed with patient.       I have personally reviewed all specimens and/or slides and used them with my medical judgement to determine or confirm the final diagnosis.     Patient told result squamous cell carcinoma .    It was a pleasure speaking to Nir Guy today.  Previous clinic notes and pertinent laboratory tests were reviewed prior to Nir Guy's visit.  Signs and Symptoms of skin cancer  discussed with patient.  Patient encouraged to perform monthly skin exams.  UV precautions reviewed with patient.  Risks of non-melanoma skin cancer discussed with patient   Return to clinic 6 months  PROCEDURE NOTE  L sideburn squamous cell carcinoma   MOHS:   Location    The rationale for Mohs surgery was discussed with the patient and consent was obtained.  The risks and benefits as well as alternatives to therapy were discussed, in detail.  Specifically, the risks of infection, scarring, bleeding, prolonged wound healing, incomplete removal, allergy to anesthesia, nerve injury and recurrence were addressed.  Indication for Mohs was Location. Prior to the procedure, the treatment site was clearly identified and, if available, confirmed with previous photos and confirmed by the patient   All components of the Universal Protocol/PAUSE rule were completed.  The Mohs surgeon operated in two distinct and integrated capacities as the surgeon and pathologist.      The area was prepped with Betasept.  A rim of normal appearing skin was marked circumferentially around the lesion.  The area was infiltrated with local anesthesia.  The tumor was first debulked to remove all clinically apparent tumor.  An incision following the standard Mohs approach was done and the specimen was oriented,mapped and placed in 2 block(s).  Each specimen was then chromacoded and processed in the Mohs laboratory using standard Mohs technique and submitted for frozen section histology.  Frozen section analysis showed  residual tumor but CLEAR MARGINS.    1st stage:There is a proliferation of irregular nests of abnormal squamous cells arising from the epidermis and invading the dermis. These are well differentiated. The dermis shows a variable superficial perivascular inflammatory infiltrate.     The tumor was excised using standard Mohs technique in 2 stages(s).  CLEAR MARGINS OBTAINED and Final defect size was 1.4 x 1 cm.     We discussed the  options for wound management in full with the patient including risks/benefits/ possible outcomes.      REPAIR COMPLEX: Because of the tightness of the surrounding skin and Because of the size and full thickness nature of the defect, Because of the tightness of the surrounding skin, To maintain form and function, In order to avoid distortion and Because of the proximity to the ear, a complex closure was planned. After LE anesthesia and prep, Burow's triangles were excised in the relaxed skin tension lines. The wound edges were widely undermined greater than width of the defect on both sides by dissection in the subcutaneous plane until adequate tissue mobility was obtained. Hemostasis was obtained. The wound edges were closed in a layered fashion using Vicryl and Fast Absorbing Plain Gut sutures. Postoperative length was 3.5 cm.   EBL minimal; complications none; wound care routine.  The patient was discharged in good condition and will return in one week for wound evaluation.

## 2023-04-12 ENCOUNTER — ALLIED HEALTH/NURSE VISIT (OUTPATIENT)
Dept: DERMATOLOGY | Facility: CLINIC | Age: 83
End: 2023-04-12
Payer: COMMERCIAL

## 2023-04-12 ENCOUNTER — OFFICE VISIT (OUTPATIENT)
Dept: DERMATOLOGY | Facility: CLINIC | Age: 83
End: 2023-04-12
Payer: COMMERCIAL

## 2023-04-12 DIAGNOSIS — Z48.01 ENCOUNTER FOR CHANGE OR REMOVAL OF SURGICAL WOUND DRESSING: Primary | ICD-10-CM

## 2023-04-12 DIAGNOSIS — L57.0 AK (ACTINIC KERATOSIS): ICD-10-CM

## 2023-04-12 DIAGNOSIS — Z85.828 HISTORY OF SKIN CANCER: Primary | ICD-10-CM

## 2023-04-12 PROCEDURE — 99024 POSTOP FOLLOW-UP VISIT: CPT | Performed by: DERMATOLOGY

## 2023-04-12 PROCEDURE — 17003 DESTRUCT PREMALG LES 2-14: CPT | Performed by: DERMATOLOGY

## 2023-04-12 PROCEDURE — 17000 DESTRUCT PREMALG LESION: CPT | Performed by: DERMATOLOGY

## 2023-04-12 PROCEDURE — 99207 PR NO CHARGE NURSE ONLY: CPT

## 2023-04-12 ASSESSMENT — PAIN SCALES - GENERAL: PAINLEVEL: NO PAIN (0)

## 2023-04-12 NOTE — PATIENT INSTRUCTIONS
WOUND CARE INSTRUCTIONS  for  ONE WEEK AFTER SURGERY          Leave flat bandage on your skin for one week after today s bandage change.  In one week when you remove the bandage, you may resume your regular skin care routine, including washing with mild soap and water, applying moisturizer, make-up and sunscreen.    If there are any open or bleeding areas at the incision/graft site you should begin to cover the area with a bandage daily as follows:    Clean and dry the area with plain tap water using a Q-tip or sterile gauze pad.  Apply Polysporin or Bacitracin ointment to the open area.  Cover the wound with a band-aid or a sterile non-stick gauze pad and micropore paper tape.         SIGNS OF INFECTION  - If you notice any of these signs of infection, call your doctor right away: expanding redness around the wound.  - Yellow or greenish-colored pus or cloudy wound drainage.    - Red streaking spreading from the wound.  - Increased swelling, tenderness, or pain around the wound.   - Fever.    Please remember that yellow and clear drainage from a wound can be normal and related to normal wound healing.  Isolated drainage from a wound without a combination of the above features does not indicate infection.       *Once the bandages are removed, the scar will be red and firm (especially in the lip/chin area). This is normal and will fade in time. It might take 6-12 months for this to happen.     *Massaging the area will help the scar soften and fade quicker. Begin to massage the area one month after the bandages have been removed. To massage apply pressure directly and firmly over the scar with the fingertips and move in a circular motion. Massage the area for a few minutes several times a day. Continue to massage the site for several months.    *Approximately 6-8 weeks after surgery it is not uncommon to see the formation of  tender pimple-like  bump along the scar. This is normal. As the scar continues to mature and  the stitches underneath the skin begin to dissolve, this might occur. Do not pick or squeeze, this will resolve on it s own. Should one break open producing a small amount of drainage, apply Polysporin or Bacitracin ointment a few times a day until the wound is completely healed.    *Numbness in the surgical area is expected. It might take 12-18 months for the feeling to return to normal. During this time sensations of itchiness, tingling and occasional sharp pains might be noted. These feelings are normal and will subside once the nerves have completely healed.         IN CASE OF EMERGENCY: Dr Jay [phone]     If you were seen in [place] call: [phone]    If you were seen in [place] call: [phone]

## 2023-04-12 NOTE — PROGRESS NOTES
Nir Guy is an extremely pleasant 83 year old year old male patient here today for rough spots on scalp.   .   Patient states this has been present for a while.  Patient reports the following symptoms:  rough.  Patient reports the following previous treatments cryo.  These treatments did not work.  Patient reports the following modifying factors none.  Associated symptoms: none.  Patient has no other skin complaints today.  Remainder of the HPI, Meds, PMH, Allergies, FH, and SH was reviewed in chart.      Past Medical History:   Diagnosis Date     Actinic keratosis      AMD (age related macular degeneration)      Basal cell carcinoma      Former smoker      Full code status 1/6/2017     Hyperlipidemia      OA (osteoarthritis)      Osteoarthritis of left patellofemoral joint 1/6/2017     Osteoarthritis of shoulder      RBBB (right bundle branch block)      S/P colonoscopy 4/18/11     SNHL (sensorineural hearing loss)      Squamous cell carcinoma      Squamous cell carcinoma of skin      Tinnitus      Vertigo        Past Surgical History:   Procedure Laterality Date     BASAL CELL CARCINOMA EXCISION  01/01/1999    Nose and scalp     CATARACT EXTRACTION, BILATERAL  01/01/2018     CATARACT IOL, RT/LT       IR LUMBAR EPIDURAL STEROID INJECTION  01/09/2018     RELEASE CARPAL TUNNEL Right 01/04/2021    Procedure: Revision open carpal tunnel release.;  Surgeon: Nasir Casper MD;  Location: [facility] OR     REPEAT RIGHT CARPAL TUNNEL RELEASE WITH HYPOTHENAR FAT PAD FLAP\  12/2021    DR. FINNEY     VASECTOMY  09/01/1980     [facility] TOTAL HIP ARTHROPLASTY Right 02/21/2018    Procedure:  RIGHT TOTAL HIP ARTHROPLASTY DIRECT ANTERIOR APPROACH;  Surgeon: Kaushik Hood MD;  Location: [facility] OR;  Service: Orthopedics        Family History   Problem Relation Age of Onset     Melanoma No family hx of      Cancer Mother         GYN     Cancer Father         Lung     Heart Disease Father        Social History      Socioeconomic History     Marital status:      Spouse name: Not on file     Number of children: Not on file     Years of education: Not on file     Highest education level: Not on file   Occupational History     Employer: RETIRED   Tobacco Use     Smoking status: Never     Smokeless tobacco: Never   Vaping Use     Vaping status: Never Used     Passive vaping exposure: Yes   Substance and Sexual Activity     Alcohol use: Yes     Alcohol/week: 8.0 standard drinks of alcohol     Comment: Alcoholic Drinks/day: nightly     Drug use: No     Sexual activity: Not on file   Other Topics Concern     Not on file   Social History Narrative     Not on file     Social Determinants of Health     Financial Resource Strain: Not on file   Food Insecurity: Not on file   Transportation Needs: Not on file   Physical Activity: Not on file   Stress: Not on file   Social Connections: Not on file   Intimate Partner Violence: Not on file   Housing Stability: Not on file       Outpatient Encounter Medications as of 4/12/2023   Medication Sig Dispense Refill     fish oil-omega-3 fatty acids 1000 MG capsule Take 2 g by mouth daily       MECLIZINE HCL PO Take 25 mg by mouth daily        Multiple Vitamins-Minerals (MENS MULTIVITAMIN PLUS PO)        polyethylene glycol-propylene glycol PF (SYSTANE HYDRATION PF) 0.4-0.3 % SOLN opthalmic solution Apply 1 drop to eye       Vitamin D3 (CHOLECALCIFEROL) 25 mcg (1000 units) tablet Take 25 mcg by mouth daily       No facility-administered encounter medications on file as of 4/12/2023.             O:   NAD, WDWN, Alert & Oriented, Mood & Affect wnl, Vitals stable   Here today alone   General appearance normal   Vitals stable   Alert, oriented and in no acute distress     Surgical site healing well  Gritty scaly papule on scalp      Eyes: Conjunctivae/lids:Normal     ENT: Lips, buccal mucosa, tongue: normal    MSK:Normal    Cardiovascular: peripheral edema none    Pulm: Breathing  Normal    Neuro/Psych: Orientation:Alert and Orientedx3 ; Mood/Affect:normal       A/P:  1. hxof nsmc   2. Actinic keratosis scalp x5  LN2:  Treated with LN2 for 5s for 1-2 cycles. Warned risks of blistering, pain, pigment change, scarring, and incomplete resolution.  Advised patient to return if lesions do not completely resolve.  Wound care sheet given.  It was a pleasure speaking to Nir Guy today.  Previous clinic notes and pertinent laboratory tests were reviewed prior to Nir Guy's visit.  Signs and Symptoms of skin cancer discussed with patient.  Patient encouraged to perform monthly skin exams.  UV precautions reviewed with patient.  Risks of non-melanoma skin cancer discussed with patient   Return to clinic 6 months

## 2023-04-12 NOTE — PROGRESS NOTES
Nir Guy comes into clinic today at the request of Dr. Jay Ordering Provider for Wound Check Action taken: See Below.    This service provided today was under the supervising provider of the day , who was available if needed.    Pt returned to clinic for post surgery 1 week follow up bandage change. Pt has no complaints, denies pain. Bandage removed from left cheek, area cleansed with normal saline. Site is healing and wound edges approximating well. Reapplied new steri strips and paper tape.    Advised to watch for signs/sx of infection; spreading redness, drainage, odor, fever. Call or report promptly to clinic. Pt given written instructions and informed to rtc as needed. Patient verbalized understanding.     Concepcion Sharp MA

## 2023-04-12 NOTE — LETTER
4/12/2023         RE: Nir Guy  [address] [place] [id number]        Dear Colleague,    Thank you for referring your patient, Nir Guy, to the [facility]. Please see a copy of my visit note below.    Nir Guy is an extremely pleasant 83 year old year old male patient here today for rough spots on scalp.   .   Patient states this has been present for a while.  Patient reports the following symptoms:  rough.  Patient reports the following previous treatments cryo.  These treatments did not work.  Patient reports the following modifying factors none.  Associated symptoms: none.  Patient has no other skin complaints today.  Remainder of the HPI, Meds, PMH, Allergies, FH, and SH was reviewed in chart.      Past Medical History:   Diagnosis Date     Actinic keratosis      AMD (age related macular degeneration)      Basal cell carcinoma      Former smoker      Full code status 1/6/2017     Hyperlipidemia      OA (osteoarthritis)      Osteoarthritis of left patellofemoral joint 1/6/2017     Osteoarthritis of shoulder      RBBB (right bundle branch block)      S/P colonoscopy 4/18/11     SNHL (sensorineural hearing loss)      Squamous cell carcinoma      Squamous cell carcinoma of skin      Tinnitus      Vertigo        Past Surgical History:   Procedure Laterality Date     BASAL CELL CARCINOMA EXCISION  01/01/1999    Nose and scalp     CATARACT EXTRACTION, BILATERAL  01/01/2018     CATARACT IOL, RT/LT       IR LUMBAR EPIDURAL STEROID INJECTION  01/09/2018     RELEASE CARPAL TUNNEL Right 01/04/2021    Procedure: Revision open carpal tunnel release.;  Surgeon: Nasir Casper MD;  Location: [facility] OR     REPEAT RIGHT CARPAL TUNNEL RELEASE WITH HYPOTHENAR FAT PAD FLAP\  12/2021    DR. FINNEY     VASECTOMY  09/01/1980     [facility] TOTAL HIP ARTHROPLASTY Right 02/21/2018    Procedure:  RIGHT TOTAL HIP ARTHROPLASTY DIRECT ANTERIOR APPROACH;  Surgeon: Kaushik Hood MD;   Location: [facility] Main OR;  Service: Orthopedics        Family History   Problem Relation Age of Onset     Melanoma No family hx of      Cancer Mother         GYN     Cancer Father         Lung     Heart Disease Father        Social History     Socioeconomic History     Marital status:      Spouse name: Not on file     Number of children: Not on file     Years of education: Not on file     Highest education level: Not on file   Occupational History     Employer: RETIRED   Tobacco Use     Smoking status: Never     Smokeless tobacco: Never   Vaping Use     Vaping status: Never Used     Passive vaping exposure: Yes   Substance and Sexual Activity     Alcohol use: Yes     Alcohol/week: 8.0 standard drinks of alcohol     Comment: Alcoholic Drinks/day: nightly     Drug use: No     Sexual activity: Not on file   Other Topics Concern     Not on file   Social History Narrative     Not on file     Social Determinants of Health     Financial Resource Strain: Not on file   Food Insecurity: Not on file   Transportation Needs: Not on file   Physical Activity: Not on file   Stress: Not on file   Social Connections: Not on file   Intimate Partner Violence: Not on file   Housing Stability: Not on file       Outpatient Encounter Medications as of 4/12/2023   Medication Sig Dispense Refill     fish oil-omega-3 fatty acids 1000 MG capsule Take 2 g by mouth daily       MECLIZINE HCL PO Take 25 mg by mouth daily        Multiple Vitamins-Minerals (MENS MULTIVITAMIN PLUS PO)        polyethylene glycol-propylene glycol PF (SYSTANE HYDRATION PF) 0.4-0.3 % SOLN opthalmic solution Apply 1 drop to eye       Vitamin D3 (CHOLECALCIFEROL) 25 mcg (1000 units) tablet Take 25 mcg by mouth daily       No facility-administered encounter medications on file as of 4/12/2023.             O:   NAD, WDWN, Alert & Oriented, Mood & Affect wnl, Vitals stable   Here today alone   General appearance normal   Vitals stable   Alert, oriented and in no  acute distress     Surgical site healing well  Gritty scaly papule on scalp      Eyes: Conjunctivae/lids:Normal     ENT: Lips, buccal mucosa, tongue: normal    MSK:Normal    Cardiovascular: peripheral edema none    Pulm: Breathing Normal    Neuro/Psych: Orientation:Alert and Orientedx3 ; Mood/Affect:normal       A/P:  1. hxof [facility]   2. Actinic keratosis scalp x5  LN2:  Treated with LN2 for 5s for 1-2 cycles. Warned risks of blistering, pain, pigment change, scarring, and incomplete resolution.  Advised patient to return if lesions do not completely resolve.  Wound care sheet given.  It was a pleasure speaking to Nir Guy today.  Previous clinic notes and pertinent laboratory tests were reviewed prior to Nir Guy's visit.  Signs and Symptoms of skin cancer discussed with patient.  Patient encouraged to perform monthly skin exams.  UV precautions reviewed with patient.  Risks of non-melanoma skin cancer discussed with patient   Return to clinic 6 months        Again, thank you for allowing me to participate in the care of your patient.        Sincerely,        Chavez Jay MD

## 2023-04-24 ENCOUNTER — OFFICE VISIT (OUTPATIENT)
Dept: INTERNAL MEDICINE | Facility: CLINIC | Age: 83
End: 2023-04-24
Payer: COMMERCIAL

## 2023-04-24 VITALS
WEIGHT: 215.1 LBS | HEART RATE: 80 BPM | RESPIRATION RATE: 18 BRPM | OXYGEN SATURATION: 97 % | DIASTOLIC BLOOD PRESSURE: 82 MMHG | BODY MASS INDEX: 27.61 KG/M2 | SYSTOLIC BLOOD PRESSURE: 120 MMHG | HEIGHT: 74 IN | TEMPERATURE: 98.1 F

## 2023-04-24 DIAGNOSIS — S10.93XA HEMATOMA OF NECK, INITIAL ENCOUNTER: ICD-10-CM

## 2023-04-24 DIAGNOSIS — M48.062 SPINAL STENOSIS OF LUMBAR REGION WITH NEUROGENIC CLAUDICATION: ICD-10-CM
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
## 2023-04-24 DIAGNOSIS — R03.0 ELEVATED BLOOD PRESSURE READING: ICD-10-CM

## 2023-04-24 PROCEDURE — 99214 OFFICE O/P EST MOD 30 MIN: CPT | Performed by: INTERNAL MEDICINE

## 2023-04-24 NOTE — PATIENT INSTRUCTIONS
Future Appointments   Date Time Provider Department Center   10/10/2023  9:30 AM Chavez Jay MD [facility]    Preventative Measures:  Health Maintenance   Topic Date Due    ANNUAL REVIEW OF HM ORDERS  Never done    MEDICARE ANNUAL WELLNESS VISIT  03/24/2024    FALL RISK ASSESSMENT  03/24/2024    ADVANCE CARE PLANNING  03/24/2028    SPIROMETRY  Completed    PHQ-2 (once per calendar year)  Completed    INFLUENZA VACCINE  Completed    Pneumococcal Vaccine: 65+ Years  Completed    ZOSTER IMMUNIZATION  Completed    COVID-19 Vaccine  Completed    COPD ACTION PLAN  Addressed    IPV IMMUNIZATION  Aged Out    MENINGITIS IMMUNIZATION  Aged Out    DTAP/TDAP/TD IMMUNIZATION  Discontinued

## 2023-04-24 NOTE — PROGRESS NOTES
[facility]    Comprehensive and complex medical care - Chronic disease management - Shared decision making - Care coordination - Compassionate care    Patient advocacy - Rational deprescribing - Minimally disruptive medicine - Ethical focus - Customized care         Date of Service: 4/24/2023  Primary Provider: German Mcgregor    Patient Care Team:  German Mcgregor MD as PCP - General (Internal Medicine)  Chavez Jay MD as Assigned Surgical Provider  German Mcgregor MD as Assigned PCP  Jacky Zayas MD as MD (Ophthalmology)  Rehan Victor MD as MD (Orthopaedic Surgery)  Rob Mendoza MD as MD (Orthopaedic Surgery)          Patient's Pharmacy:    [facility] - [address] [address]  Phone: [phone] Fax: [phone]     Patient's Contacts:  Name Home Phone Work Phone Mobile Phone Relationship Lgl Grd   MELITON QUEEN [phone]   Significant*    MELITON QUEEN [phone] Spouse      Patient's Insurance:    Payor: [employer] / Plan: [employer]AGE / Product Type: HMO /           Active Problem List:  Problem List as of 4/24/2023 Reviewed: 4/24/2023  3:32 PM by German Mcgregor MD       High    Full code status - 2017    Former smoker - Quit in 1998    COPD (chronic obstructive pulmonary disease) (H)       Medium    Osteoarthritis of shoulder    Osteoarthritis of left patellofemoral joint    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    Pericardial tamponade    PE (pulmonary thromboembolism) (H)    Pericardial effusion       Low    RBBB (right bundle branch block)    HLD (hyperlipidemia)    AMD (age related macular degeneration)    Vertigo    SNHL (sensorineural hearing loss)    Tinnitus    Intermediate stage nonexudative age-related macular degeneration of both eyes    Spinal stenosis of lumbar region with neurogenic  claudication        Current Outpatient Medications   Medication Instructions     fish oil-omega-3 fatty acids 2 g, Oral, DAILY     MECLIZINE HCL PO 25 mg, Oral, DAILY     Multiple Vitamins-Minerals (MENS MULTIVITAMIN PLUS PO) Oral     polyethylene glycol-propylene glycol PF (SYSTANE HYDRATION PF) 0.4-0.3 % SOLN opthalmic solution 1 drop, Ophthalmic     Vitamin D3 (CHOLECALCIFEROL) 25 mcg, Oral, DAILY     Social History     Social History Narrative     Not on file       Subjective:     Nir Guy is a 83 year old male who comes in today for:    Chief Complaint   Patient presents with     Mass     Right side of neck, patient states noticed 3-4 days ago, I felt it there, no pain, no growth in size          4/24/2023     7:04 AM   Additional Questions   Roomed by Mary Anne CHADWICK CMA     Patient comes in today for a number of issues.    He first wanted to follow-up on his elevated blood pressure reading in clinic.  He has been following his blood pressure at home and it is consistently below 150/90.  We reviewed the numbers.    We reviewed his recent fall.  He was putting his plow away in his shed.  The plow tipped on the ramp and took him down with it.  He landed in the rock and scraped up his arm and hit the side of his jaw.  He did not lose consciousness.  He has had bruising underneath his jaw and has a lump underneath the right side of his jaw.  He is concerned about this.  It grew right after this and is doing a little bit better.  He wants to make sure things are okay.  His significant other has cancer.    He has been having problems with back pain since I last saw him.  He has a history of known central spinal stenosis of the lumbar region.  He had severe problems with this back in 2020/2021.  This is not as severe as it was then but he is noticing more problems with this.  It does go into the back of his thighs.  He denies any numbness with it.  It is worse with walking.  He has not lost any strength as of  "late.    We reviewed his other issues noted in the assessment but not specifically addressed in the HPI above.     Objective:     Wt Readings from Last 3 Encounters:   04/24/23 97.6 kg (215 lb 1.6 oz)   03/24/23 97.7 kg (215 lb 4.8 oz)   12/19/22 96.2 kg (212 lb)     BP Readings from Last 3 Encounters:   04/24/23 120/82   03/24/23 (!) 159/89   12/19/22 138/80     /82 (BP Location: Right arm, Patient Position: Sitting, Cuff Size: Adult Regular)   Pulse 80   Temp 98.1  F (36.7  C) (Oral)   Resp 18   Ht 1.88 m (6' 2\")   Wt 97.6 kg (215 lb 1.6 oz)   SpO2 97%   BMI 27.62 kg/m     The patient is comfortable, no acute distress.  Mood good.  Insight good.  Eyes are nonicteric.  Neck is supple.  There is a hematoma which is about 1-1 and half centimeters underneath the jaw and slightly onto the neck.  There is bruising around this and into the midline of the submandibular space.  No cervical adenopathy.  No thyromegaly. Heart regular rate and rhythm.  Lungs clear to auscultation bilaterally.  Respiratory effort is good.  Back is nontender at this time and range of motion in the hips is good.  Extremities no edema.  Straight leg raise is negative.  He also has scrapes and skin tears of his right forearm and his knuckles.      Diagnostics:     Office Visit on 03/24/2023   Component Date Value Ref Range Status     Sodium 03/24/2023 142  136 - 145 mmol/L Final     Potassium 03/24/2023 4.6  3.4 - 5.3 mmol/L Final     Chloride 03/24/2023 102  98 - 107 mmol/L Final     Carbon Dioxide (CO2) 03/24/2023 28  22 - 29 mmol/L Final     Anion Gap 03/24/2023 12  7 - 15 mmol/L Final     Urea Nitrogen 03/24/2023 11.0  8.0 - 23.0 mg/dL Final     Creatinine 03/24/2023 0.96  0.67 - 1.17 mg/dL Final     Calcium 03/24/2023 9.8  8.8 - 10.2 mg/dL Final     Glucose 03/24/2023 96  70 - 99 mg/dL Final     Alkaline Phosphatase 03/24/2023 67  40 - 129 U/L Final     AST 03/24/2023 24  10 - 50 U/L Final     ALT 03/24/2023 18  10 - 50 U/L Final "     Protein Total 03/24/2023 7.2  6.4 - 8.3 g/dL Final     Albumin 03/24/2023 4.5  3.5 - 5.2 g/dL Final     Bilirubin Total 03/24/2023 0.5  <=1.2 mg/dL Final     GFR Estimate 03/24/2023 78  >60 mL/min/1.73m2 Final    eGFR calculated using 2021 CKD-EPI equation.     WBC Count 03/24/2023 7.2  4.0 - 11.0 10e3/uL Final     RBC Count 03/24/2023 4.79  4.40 - 5.90 10e6/uL Final     Hemoglobin 03/24/2023 15.7  13.3 - 17.7 g/dL Final     Hematocrit 03/24/2023 44.6  40.0 - 53.0 % Final     MCV 03/24/2023 93  78 - 100 fL Final     MCH 03/24/2023 32.8  26.5 - 33.0 pg Final     MCHC 03/24/2023 35.2  31.5 - 36.5 g/dL Final     RDW 03/24/2023 12.1  10.0 - 15.0 % Final     Platelet Count 03/24/2023 180  150 - 450 10e3/uL Final     Methylmalonic Acid 03/24/2023 0.39  0.00 - 0.40 umol/L Final    INTERPRETIVE INFORMATION:    Slight elevation 0.41-0.99 umol/L is consistent with mild vitamin B12 deficiency, renal insufficiency, or intravascular volume contraction.    Moderate elevation 1.00-9.99 umol/L is consistent with mild vitamin B12 deficiency.    Massive elevation 10.00 umol/L or greater is consistent with significant vitamin B12 deficiency or with inborn errors of metabolism.     Vitamin B12 03/24/2023 394  232 - 1,245 pg/mL Final       No results found for any visits on 04/24/23.    Assessment:     1. Fall, initial encounter    2. Hematoma of neck, initial encounter    3. Elevated blood pressure reading    4. Spinal stenosis of lumbar region with neurogenic claudication        Plan:     1. Monitor hematoma at this time but anticipate gradual recovery.  Could consider ultrasound if not improving but low pretest probability of significant disease.  2. Continue to monitor blood pressure as discussed.  3. I suspect his spinal stenosis is causing the problem with his back and posterior legs.  Monitor at this time.  He might seek help from my chiropractor.  Could consider physical therapy.   4. Follow up sooner if issues.    30  minutes or greater was spent today on the patient's care on the day of service.      This includes time for chart preparation, reviewing medical tests done before or during the visit, talking with the patient, review of quality indicators, required documentation, and other elements of care.        German Mcgregor MD  General Internal Medicine  [facility]    Return in about 48 weeks (around 3/25/2024) for annual wellness visit.     Future Appointments   Date Time Provider Department Center   10/10/2023  9:30 AM Chavez Jay MD [facility]

## 2023-05-23 ENCOUNTER — OFFICE VISIT (OUTPATIENT)
Dept: INTERNAL MEDICINE | Facility: CLINIC | Age: 83
End: 2023-05-23
Payer: COMMERCIAL

## 2023-05-23 VITALS
TEMPERATURE: 98.2 F | DIASTOLIC BLOOD PRESSURE: 80 MMHG | OXYGEN SATURATION: 97 % | HEART RATE: 74 BPM | SYSTOLIC BLOOD PRESSURE: 138 MMHG | HEIGHT: 74 IN | RESPIRATION RATE: 18 BRPM | BODY MASS INDEX: 27.99 KG/M2 | WEIGHT: 218.1 LBS

## 2023-05-23 DIAGNOSIS — J44.9 CHRONIC OBSTRUCTIVE PULMONARY DISEASE, UNSPECIFIED COPD TYPE (H): ICD-10-CM

## 2023-05-23 DIAGNOSIS — M54.50 LOW BACK PAIN RADIATING TO BOTH LEGS: ICD-10-CM

## 2023-05-23 DIAGNOSIS — M79.604 LOW BACK PAIN RADIATING TO BOTH LEGS: ICD-10-CM

## 2023-05-23 DIAGNOSIS — M48.062 SPINAL STENOSIS OF LUMBAR REGION WITH NEUROGENIC CLAUDICATION: Primary | ICD-10-CM

## 2023-05-23 DIAGNOSIS — M79.605 LOW BACK PAIN RADIATING TO BOTH LEGS: ICD-10-CM

## 2023-05-23 DIAGNOSIS — Z73.89 ACTIVITY MODERATELY LIMITED: ICD-10-CM

## 2023-05-23 PROCEDURE — 99214 OFFICE O/P EST MOD 30 MIN: CPT | Performed by: INTERNAL MEDICINE

## 2023-05-23 NOTE — PATIENT INSTRUCTIONS
Please follow up if you have any further issues.    You may contact me by phone or MyChart if you are worsening or if things are not improving.    ______________________________________________________________________     You can call [phone] during weekday hours to get a hold of our team coordinators if you need to get a message to me.    There are 3 people in our building taking phone calls for me.  Be sure to listen to the prompts to get a hold of them.    You first press 1 for English (press another number for a different language) and then press 2 to get the .    They can then take your message and forward it onto me.    ______________________________________________________________________     Please remember that you can call [phone] ANYTIME to schedule an appointment.     You can schedule appointments 24 hours a day, 7 days a week.      Sometimes the best time to schedule an appointment is after clinic hours when less people are calling in.      Weekends are another option for calling in to schedule appointments.    Preventative Measures:  Health Maintenance   Topic Date Due    ANNUAL REVIEW OF HM ORDERS  Never done    COVID-19 Vaccine (6 - Pfizer series) 02/17/2023    MEDICARE ANNUAL WELLNESS VISIT  03/24/2024    FALL RISK ASSESSMENT  03/24/2024    ADVANCE CARE PLANNING  03/24/2028    SPIROMETRY  Completed    PHQ-2 (once per calendar year)  Completed    INFLUENZA VACCINE  Completed    Pneumococcal Vaccine: 65+ Years  Completed    ZOSTER IMMUNIZATION  Completed    COPD ACTION PLAN  Addressed    IPV IMMUNIZATION  Aged Out    MENINGITIS IMMUNIZATION  Aged Out    DTAP/TDAP/TD IMMUNIZATION  Discontinued

## 2023-05-23 NOTE — PROGRESS NOTES
[facility]    Comprehensive and complex medical care - Chronic disease management - Shared decision making - Care coordination - Compassionate care    Patient advocacy - Rational deprescribing - Minimally disruptive medicine - Ethical focus - Customized care         Date of Service: 5/23/2023  Primary Provider: German Mcgregor    Patient Care Team:  German Mcgregor MD as PCP - General (Internal Medicine)  Chavez Jay MD as Assigned Surgical Provider  German Mcgregor MD as Assigned PCP  Jacky Zayas MD as MD (Ophthalmology)  Rehan Victor MD as MD (Orthopaedic Surgery)  Rob Mendoza MD as MD (Orthopaedic Surgery)          Patient's Pharmacy:    [facility] - [address] [address]  Phone: [phone] Fax: [phone]     Patient's Contacts:  Name Home Phone Work Phone Mobile Phone Relationship Lgl Grd   MELITON QUEEN [phone]   Significant*    MELITON QUEEN [phone] Spouse      Patient's Insurance:    Payor: [employer] / Plan: [employer]IC[employer] ADVANTAGE / Product Type: HMO /           Active Problem List:  Problem List as of 5/23/2023 Reviewed: 4/24/2023  3:32 PM by German Mcgregor MD       High    Full code status - 2017    Former smoker - Quit in 1998    COPD (chronic obstructive pulmonary disease) (H)       Medium    Osteoarthritis of shoulder    Osteoarthritis of left patellofemoral joint    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    Pericardial tamponade    PE (pulmonary thromboembolism) (H)    Pericardial effusion       Low    RBBB (right bundle branch block)    HLD (hyperlipidemia)    AMD (age related macular degeneration)    Vertigo    SNHL (sensorineural hearing loss)    Tinnitus    Intermediate stage nonexudative age-related macular degeneration of both eyes    Spinal stenosis of lumbar region with neurogenic  claudication        Current Outpatient Medications   Medication Instructions     fish oil-omega-3 fatty acids 2 g, Oral, DAILY     MECLIZINE HCL PO 25 mg, Oral, DAILY     Multiple Vitamins-Minerals (MENS MULTIVITAMIN PLUS PO) Oral     polyethylene glycol-propylene glycol PF (SYSTANE HYDRATION PF) 0.4-0.3 % SOLN opthalmic solution 1 drop, Ophthalmic     Vitamin D3 (CHOLECALCIFEROL) 25 mcg, Oral, DAILY     Social History     Social History Narrative     Not on file       Subjective:     Nir Guy is a 83 year old male who comes in today for:    Chief Complaint   Patient presents with     Follow Up     Patient states left leg pain, I was in here a month ago for this and he told me to come back and see him if it doesn't get any better, it's getting better but it's just taking so long, comes and goes          5/23/2023     7:32 AM   Additional Questions   Roomed by Mary Anne CHADWICK CMA     Patient comes in today for follow-up of a few issues.    Mainly in today for low back pain radiating to the left back of his thigh.  He gets that in both posterior thighs but more so on the left.  Its been worsening or not improving.  He takes ibuprofen 400 mg twice daily for this.  He is able to go about 1 mile on a treadmill in the morning but he is limited.  He has been to the chiropractor 6 times since this has been going on and its not helping enough for him.  We have seen him previously for this.  He has had issues with his back since 2017 at least.  He almost had surgery in 2021 due to severe right-sided radiculopathy.  He still has left-sided radiculopathy at this time.  It is worse with doing things.  It is worse in the morning when getting up and at nighttime.  He has not noticed any change in his strength in his lower extremities or sudden change in his balance.  It comes and goes throughout the day.    He denies any focal hip pain.  He is able to do a Nigel's maneuver type move without issue.  He denies any lateral hip  "pain.    He denies giving way of the leg.    We reviewed his arthritis and issues with this.    We reviewed his COPD briefly and he is doing okay with his breathing.    We reviewed his other issues noted in the assessment but not specifically addressed in the HPI above.     Objective:     Wt Readings from Last 3 Encounters:   05/23/23 98.9 kg (218 lb 1.6 oz)   04/24/23 97.6 kg (215 lb 1.6 oz)   03/24/23 97.7 kg (215 lb 4.8 oz)     BP Readings from Last 3 Encounters:   05/23/23 138/80   04/24/23 120/82   03/24/23 (!) 159/89     /80 (BP Location: Right arm, Patient Position: Sitting, Cuff Size: Adult Regular)   Pulse 74   Temp 98.2  F (36.8  C) (Oral)   Resp 18   Ht 1.88 m (6' 2\")   Wt 98.9 kg (218 lb 1.6 oz)   SpO2 97%   BMI 28.00 kg/m     The patient is comfortable, no acute distress.  Mood good.  Insight good.  Eyes are nonicteric.  Neck is supple without mass.  No cervical adenopathy.  No thyromegaly. Heart regular rate and rhythm.  Lungs clear to auscultation bilaterally.  Respiratory effort is good.  Extremities trace edema.  Straight leg raises are negative.  His gait is stiff and somewhat sore.  He is somewhat limited with his movement.  His Nigel's maneuver is normal.  No trochanteric bursitis appreciated.  Reflexes equal in the lower extremities.      Diagnostics:     Office Visit on 03/24/2023   Component Date Value Ref Range Status     Sodium 03/24/2023 142  136 - 145 mmol/L Final     Potassium 03/24/2023 4.6  3.4 - 5.3 mmol/L Final     Chloride 03/24/2023 102  98 - 107 mmol/L Final     Carbon Dioxide (CO2) 03/24/2023 28  22 - 29 mmol/L Final     Anion Gap 03/24/2023 12  7 - 15 mmol/L Final     Urea Nitrogen 03/24/2023 11.0  8.0 - 23.0 mg/dL Final     Creatinine 03/24/2023 0.96  0.67 - 1.17 mg/dL Final     Calcium 03/24/2023 9.8  8.8 - 10.2 mg/dL Final     Glucose 03/24/2023 96  70 - 99 mg/dL Final     Alkaline Phosphatase 03/24/2023 67  40 - 129 U/L Final     AST 03/24/2023 24  10 - 50 U/L " Final     ALT 03/24/2023 18  10 - 50 U/L Final     Protein Total 03/24/2023 7.2  6.4 - 8.3 g/dL Final     Albumin 03/24/2023 4.5  3.5 - 5.2 g/dL Final     Bilirubin Total 03/24/2023 0.5  <=1.2 mg/dL Final     GFR Estimate 03/24/2023 78  >60 mL/min/1.73m2 Final    eGFR calculated using 2021 CKD-EPI equation.     WBC Count 03/24/2023 7.2  4.0 - 11.0 10e3/uL Final     RBC Count 03/24/2023 4.79  4.40 - 5.90 10e6/uL Final     Hemoglobin 03/24/2023 15.7  13.3 - 17.7 g/dL Final     Hematocrit 03/24/2023 44.6  40.0 - 53.0 % Final     MCV 03/24/2023 93  78 - 100 fL Final     MCH 03/24/2023 32.8  26.5 - 33.0 pg Final     MCHC 03/24/2023 35.2  31.5 - 36.5 g/dL Final     RDW 03/24/2023 12.1  10.0 - 15.0 % Final     Platelet Count 03/24/2023 180  150 - 450 10e3/uL Final     Methylmalonic Acid 03/24/2023 0.39  0.00 - 0.40 umol/L Final    INTERPRETIVE INFORMATION:    Slight elevation 0.41-0.99 umol/L is consistent with mild vitamin B12 deficiency, renal insufficiency, or intravascular volume contraction.    Moderate elevation 1.00-9.99 umol/L is consistent with mild vitamin B12 deficiency.    Massive elevation 10.00 umol/L or greater is consistent with significant vitamin B12 deficiency or with inborn errors of metabolism.     Vitamin B12 03/24/2023 394  232 - 1,245 pg/mL Final       No results found for any visits on 05/23/23.    Assessment:     1. Spinal stenosis of lumbar region with neurogenic claudication    2. Low back pain radiating to both legs    3. Activity moderately limited    4. Chronic obstructive pulmonary disease, unspecified COPD type (H)        Plan:     1. We reviewed different options relating to his care.  2. MRI of the lumbar spine to recheck on his current status.  Worsening symptoms.  3. Could consider intervention with injection or radiofrequency ablation but likely will avoid.  Consider physical therapy to strengthen the back.  4. He is going to continue with the chiropractor at this time and his current  management plan as well.  5. Continue current medications otherwise.  6. Follow up sooner if issues.    Orders Placed This Encounter   Procedures     MR Lumbar Spine w/o Contrast      30 minutes or greater was spent today on the patient's care on the day of service.      This includes time for chart preparation, reviewing medical tests done before or during the visit, talking with the patient, review of quality indicators, required documentation, and other elements of care.        German Mcgregor MD  General Internal Medicine  [facility]    Return in about 10 months (around 3/25/2024) for annual wellness visit.     Future Appointments   Date Time Provider Department Center   10/10/2023  9:30 AM Chavez Jay MD [facility]

## 2023-06-05 ENCOUNTER — HOSPITAL ENCOUNTER (OUTPATIENT)
Dept: MRI IMAGING | Facility: HOSPITAL | Age: 83
Discharge: HOME | End: 2023-06-05
Attending: INTERNAL MEDICINE | Admitting: INTERNAL MEDICINE
Payer: COMMERCIAL

## 2023-06-05 DIAGNOSIS — M79.605 LOW BACK PAIN RADIATING TO BOTH LEGS: ICD-10-CM

## 2023-06-05 DIAGNOSIS — Z73.89 ACTIVITY MODERATELY LIMITED: ICD-10-CM

## 2023-06-05 DIAGNOSIS — M54.50 LOW BACK PAIN RADIATING TO BOTH LEGS: ICD-10-CM

## 2023-06-05 DIAGNOSIS — M48.062 SPINAL STENOSIS OF LUMBAR REGION WITH NEUROGENIC CLAUDICATION: ICD-10-CM

## 2023-06-05 DIAGNOSIS — M79.604 LOW BACK PAIN RADIATING TO BOTH LEGS: ICD-10-CM

## 2023-06-05 PROCEDURE — 72148 MRI LUMBAR SPINE W/O DYE: CPT

## 2023-06-12 ENCOUNTER — TELEPHONE (OUTPATIENT)
Dept: INTERNAL MEDICINE | Facility: CLINIC | Age: 83
End: 2023-06-12
Payer: COMMERCIAL

## 2023-06-12 DIAGNOSIS — M48.062 SPINAL STENOSIS OF LUMBAR REGION WITH NEUROGENIC CLAUDICATION: Primary | ICD-10-CM

## 2023-06-12 NOTE — TELEPHONE ENCOUNTER
Please call patient -    ______________________________________________________________________     Home Phone:  [phone] (home)     Cell phone:   Telephone Information:   Mobile [phone]     ______________________________________________________________________     Overall his MRI scan of the spine looks about the same as 2 years ago.    There still a significant amount of narrowing in the spine especially at the L3-L4 levels.    This is likely related to the pain he is having.    It still might be the best option to do physical therapy to strengthen the back.  This could be done at [facility] in [place].    Otherwise, he has seen [facility] for this in the past and he could see what options they can offer him.     German Mcgregor MD  [facility]  6/12/2023, 11:10 AM   ______________________________________________________________________    Pertinent radiology for this visit includes the following:    MR Lumbar Spine w/o Contrast  Narrative: EXAM: MR LUMBAR SPINE W/O CONTRAST  LOCATION: [facility]  DATE/TIME: 6/5/2023 11:12 AM CDT    INDICATION: Spinal stenosis of lumbar region with neurogenic claudication, Low back pain radiating to both legs, activity moderately limited.  COMPARISON: CT lumbar spine 03/18/2021, MR lumbar spine 02/01/2021.  TECHNIQUE: Routine Lumbar Spine MRI without IV contrast.    FINDINGS:   Nomenclature is based on 5 lumbar-type vertebral bodies. No compression fractures. Satisfactory lumbar vertebral body height and alignment stable from prior studies listed above. No high-grade subluxation is evident with stable 1 mm anterior subluxation   L3-L4. Interspace narrowing most marked L2-L3 through L4-L5. No compression fractures. No marrow or ligamentous edema. Nothing for sacral insufficiency or stress fracture. No presacral mass or fluid collections are present. Nothing for a   marrow-infiltrative process. Satisfactory  sacral alignment. No pelvic mass or adenopathy. Normal distal spinal cord and cauda equina with conus medullaris at L2. Nerve roots of the cauda equina are satisfactory without nodularity or thickening. Benign   perineural/Tarlov cyst at the mid sacral level. Degenerative changes both SI joints and lower lumbar spine facet joints. Mild rightward curve at about T11. No retroperitoneal solid mass or adenopathy. Normal caliber abdominal aorta. Simple cysts both   kidneys. Symmetric and satisfactory psoas muscular appearance bilaterally. Stable hypointense T1/T2 signal within the T12 and S1 segment without mineralization abnormality on prior CT may represent an atypical vertebral body hemangiomas or benign bone   islands, suspected benign given stability over time.    T12-L1: Disc desiccation with annular bulge. Shallow left paracentral broad-based disc protrusion with mild spinal stenosis. No foraminal narrowing. Facet joints are minimally hypertrophic. Stable appearance from previous MRI evaluation overall at this   level.     L1-L2: Normal disc height and signal. No herniation. Normal facets. No spinal canal or neural foraminal stenosis. Stable appearance from prior MRI study at this level.    L2-L3: Moderate loss of disc height with generalized disc bulge asymmetric to the right. Moderate spinal stenosis. Moderate foraminal narrowing bilaterally. Shallow broad-based left paracentral disc protrusion with mild facet joint arthropathy. Mild   narrowing of the L3 subarticular zones. Stable appearance from prior study at this level from previous MRI.    L3-L4: Mild loss of disc height. Low-grade anterior degenerative subluxation. Prominent thickening of the ligamentum flavum. There is a midline synovial cyst, which measures about 4.5 mm decreased in size from previous evaluation series 5 image 31.   Advanced facet joint arthropathy. Generalized disc bulge without disc herniation. Severe effective spinal stenosis.  Moderate foraminal narrowing bilaterally. Moderate narrowing L4 subarticular zones bilaterally. Since previous MRI, the midline synovial   cyst dorsally is decreased in size. This results in slight decreased mass effect upon the dorsal midline thecal sac, however persistent severe spinal stenosis remains present.    L4-L5: Moderate loss of disc height. Generalized disc bulge with some asymmetry to the right of midline. Shallow central and right paracentral disc extrusion with cephalad migration stable. Thickening of the ligamentum flavum and mild to moderate facet   joint arthropathy contribute to moderate spinal stenosis as before. Moderate left and severe right foraminal narrowing unchanged. Mild L5 subarticular zone narrowing stable.    L5-S1: Stable appearance from previous MRI without disc herniation, spinal stenosis or foraminal narrowing. Mild facet joint arthropathy bilaterally as before.  Impression: IMPRESSION:  1.  Since previous MRI evaluation 02/01/2021, redemonstration of multilevel degenerative changes in lumbar spine most marked L2-L3 through L4-L5.    2.  Previously identified midline dorsal synovial cyst at the L3-L4 level has decreased in size. There is proportional decreased degree of mass effect in the midline dorsally upon the thecal sac, however the overall degree of spinal stenosis remains   severe at this level.    3.  Degenerative changes elsewhere are otherwise stable.    4.  Stable suspected benign hypointense T1/hypointense T2 signal marrow lesions in the T12 and S1 vertebral body segments as on multiple prior studies, which may reflect stable benign bone islands or atypical hemangiomas. No corresponding mineralization   abnormality on prior CT lumbar spine 03/18/2021 is noted.    5.  Overall stable degrees of multilevel spinal stenosis, neural foraminal and/or subarticular zone narrowing, some severe in degree, from prior MR 02/01/2021 most marked L2-L3 through  L4-L5.      ______________________________________________________________________

## 2023-06-13 NOTE — TELEPHONE ENCOUNTER
"Patient states \"in fact.. it's getting a bit better but very slow I will call one of them and make an appointment. I have been continuing to ice it to help with the pain as well. But if that is what he recommends then that is what I will do.\"    Ariadne asked if [facility] does the back injections if that is an option or do they refer out? Writer let her know that writer is not too familiar with that but when they call [facility] if they decide to call to find out what their options are then that is a question that they can ask and find out.    Ariadne and patient verbalized understanding and had no further questions. Patient thanked writer for the call and wanted to thank PCP as well.        "

## 2023-06-14 NOTE — TELEPHONE ENCOUNTER
[facility] calling to state the patient set up an appt without a order so they are looking to get a order in for PT ASAP seeing the patient scheduled for the 22nd    request a order to be placed and faxed over    [facility]  [phone]  Fax: [phone]

## 2023-06-14 NOTE — TELEPHONE ENCOUNTER
Order placed for PT at [facility]    Yes, [facility] is able to do spinal injections. They would first do a consult so I would not expect that it would be done in the first visit. If his pain has been a little better, it might be best to start with PT

## 2023-06-22 ENCOUNTER — TRANSFERRED RECORDS (OUTPATIENT)
Dept: HEALTH INFORMATION MANAGEMENT | Facility: CLINIC | Age: 83
End: 2023-06-22
Payer: COMMERCIAL

## 2023-08-09 ENCOUNTER — TRANSFERRED RECORDS (OUTPATIENT)
Dept: HEALTH INFORMATION MANAGEMENT | Facility: CLINIC | Age: 83
End: 2023-08-09
Payer: COMMERCIAL

## 2023-10-10 ENCOUNTER — OFFICE VISIT (OUTPATIENT)
Dept: DERMATOLOGY | Facility: CLINIC | Age: 83
End: 2023-10-10
Payer: COMMERCIAL

## 2023-10-10 DIAGNOSIS — L82.0 INFLAMED SEBORRHEIC KERATOSIS: ICD-10-CM

## 2023-10-10 DIAGNOSIS — L57.0 AK (ACTINIC KERATOSIS): Primary | ICD-10-CM

## 2023-10-10 DIAGNOSIS — L82.1 SEBORRHEIC KERATOSIS: ICD-10-CM

## 2023-10-10 DIAGNOSIS — D23.9 DERMAL NEVUS: ICD-10-CM

## 2023-10-10 DIAGNOSIS — Z85.828 HISTORY OF SKIN CANCER: ICD-10-CM

## 2023-10-10 DIAGNOSIS — D18.01 ANGIOMA OF SKIN: ICD-10-CM

## 2023-10-10 PROCEDURE — 17003 DESTRUCT PREMALG LES 2-14: CPT | Mod: 59 | Performed by: DERMATOLOGY

## 2023-10-10 PROCEDURE — 17110 DESTRUCTION B9 LES UP TO 14: CPT | Performed by: DERMATOLOGY

## 2023-10-10 PROCEDURE — 17000 DESTRUCT PREMALG LESION: CPT | Mod: 59 | Performed by: DERMATOLOGY

## 2023-10-10 PROCEDURE — 99213 OFFICE O/P EST LOW 20 MIN: CPT | Mod: 25 | Performed by: DERMATOLOGY

## 2023-10-10 NOTE — LETTER
10/10/2023         RE: Nir Guy  [address] [place] [id number]        Dear Colleague,    Thank you for referring your patient, Nir Guy, to the [facility]. Please see a copy of my visit note below.    Nir Guy is an extremely pleasant 83 year old year old male patient here today for rough spot on left temple and scalp.  Patient has no other skin complaints today.  Remainder of the HPI, Meds, PMH, Allergies, FH, and SH was reviewed in chart.      Past Medical History:   Diagnosis Date     Actinic keratosis      AMD (age related macular degeneration)      Basal cell carcinoma      Former smoker      Full code status 1/6/2017     Hyperlipidemia      OA (osteoarthritis)      Osteoarthritis of left patellofemoral joint 1/6/2017     Osteoarthritis of shoulder      RBBB (right bundle branch block)      S/P colonoscopy 4/18/11     SNHL (sensorineural hearing loss)      Squamous cell carcinoma      Squamous cell carcinoma of skin      Tinnitus      Vertigo        Past Surgical History:   Procedure Laterality Date     BASAL CELL CARCINOMA EXCISION  01/01/1999    Nose and scalp     CATARACT EXTRACTION, BILATERAL  01/01/2018     CATARACT IOL, RT/LT       IR LUMBAR EPIDURAL STEROID INJECTION  01/09/2018     RELEASE CARPAL TUNNEL Right 01/04/2021    Procedure: Revision open carpal tunnel release.;  Surgeon: Nasir Casper MD;  Location: [facility] OR     REPEAT RIGHT CARPAL TUNNEL RELEASE WITH HYPOTHENAR FAT PAD FLAP\  12/2021    DR. FINNEY     TOTAL HIP ARTHROPLASTY Right 02/21/2018    Procedure:  RIGHT TOTAL HIP ARTHROPLASTY DIRECT ANTERIOR APPROACH;  Surgeon: Kaushik Hood MD;  Location: [facility] OR;  Service: Orthopedics     VASECTOMY  09/01/1980        Family History   Problem Relation Age of Onset     Melanoma No family hx of      Cancer Mother         GYN     Cancer Father         Lung     Heart Disease Father        Social History     Socioeconomic  History     Marital status:      Spouse name: Not on file     Number of children: Not on file     Years of education: Not on file     Highest education level: Not on file   Occupational History     Employer: RETIRED   Tobacco Use     Smoking status: Never     Smokeless tobacco: Never   Vaping Use     Vaping Use: Never used   Substance and Sexual Activity     Alcohol use: Yes     Alcohol/week: 8.0 standard drinks of alcohol     Comment: Alcoholic Drinks/day: nightly     Drug use: No     Sexual activity: Not on file   Other Topics Concern     Not on file   Social History Narrative     Not on file     Social Determinants of Health     Financial Resource Strain: Not on file   Food Insecurity: Not on file   Transportation Needs: Not on file   Physical Activity: Not on file   Stress: Not on file   Social Connections: Not on file   Interpersonal Safety: Not on file   Housing Stability: Not on file       Outpatient Encounter Medications as of 10/10/2023   Medication Sig Dispense Refill     fish oil-omega-3 fatty acids 1000 MG capsule Take 2 g by mouth daily       MECLIZINE HCL PO Take 25 mg by mouth daily        Multiple Vitamins-Minerals (MENS MULTIVITAMIN PLUS PO)        polyethylene glycol-propylene glycol PF (SYSTANE HYDRATION PF) 0.4-0.3 % SOLN opthalmic solution Apply 1 drop to eye       Vitamin D3 (CHOLECALCIFEROL) 25 mcg (1000 units) tablet Take 25 mcg by mouth daily       No facility-administered encounter medications on file as of 10/10/2023.             O:   NAD, WDWN, Alert & Oriented, Mood & Affect wnl, Vitals stable   General appearance normal   Vitals stable   Alert, oriented and in no acute distress     Gritty scaly papule on scalp and l cheek   L temple inflamed seborrheic keratosis      Stuck on papules and brown macules on trunk and ext   Red papules on trunk  Flesh colored papules on trunk     The remainder of the full exam was normal; the following areas were examined:  conjunctiva/lids, , neck,  peripheral vascular system, abdomen, lymph nodes, digits/nails, eccrine and apocrine glands, scalp/hair, face, neck, chest, abdomen, buttocks, back, RUE, LUE, RLE, LLE       Eyes: Conjunctivae/lids:Normal     ENT: Lips, buccal mucosa, tongue: normal    MSK:Normal    Cardiovascular: peripheral edema none    Pulm: Breathing Normal    Lymph Nodes: No Head and Neck Lymphadenopathy     Neuro/Psych: Orientation:Alert and Orientedx3 ; Mood/Affect:normal       A/P:  1. Seborrheic keratosis, lentigo, angioma, dermal nevus, hx of non-melanoma skin cancer   2. L temple inflamed seborrheic keratosis   LN2:  Treated with LN2 for 5s for 1-2 cycles. Warned risks of blistering, pain, pigment change, scarring, and incomplete resolution.  Advised patient to return if lesions do not completely resolve.  Wound care sheet given.  3. Actinic keratosis   Scalp x8, L cheek x3  LN2:  Treated with LN2 for 5s for 1-2 cycles. Warned risks of blistering, pain, pigment change, scarring, and incomplete resolution.  Advised patient to return if lesions do not completely resolve.  Wound care sheet given.  It was a pleasure speaking to Nir Guy today.  Previous clinic notes and pertinent laboratory tests were reviewed prior to Nir Guy's visit.  Signs and Symptoms of skin cancer discussed with patient.  Patient encouraged to perform monthly skin exams.  UV precautions reviewed with patient.  Risks of non-melanoma skin cancer discussed with patient   Return to clinic 6 months      Again, thank you for allowing me to participate in the care of your patient.        Sincerely,        Chavez Jay MD

## 2023-10-10 NOTE — PROGRESS NOTES
Nir Guy is an extremely pleasant 83 year old year old male patient here today for rough spot on left temple and scalp.  Patient has no other skin complaints today.  Remainder of the HPI, Meds, PMH, Allergies, FH, and SH was reviewed in chart.      Past Medical History:   Diagnosis Date    Actinic keratosis     AMD (age related macular degeneration)     Basal cell carcinoma     Former smoker     Full code status 1/6/2017    Hyperlipidemia     OA (osteoarthritis)     Osteoarthritis of left patellofemoral joint 1/6/2017    Osteoarthritis of shoulder     RBBB (right bundle branch block)     S/P colonoscopy 4/18/11    SNHL (sensorineural hearing loss)     Squamous cell carcinoma     Squamous cell carcinoma of skin     Tinnitus     Vertigo        Past Surgical History:   Procedure Laterality Date    BASAL CELL CARCINOMA EXCISION  01/01/1999    Nose and scalp    CATARACT EXTRACTION, BILATERAL  01/01/2018    CATARACT IOL, RT/LT      IR LUMBAR EPIDURAL STEROID INJECTION  01/09/2018    RELEASE CARPAL TUNNEL Right 01/04/2021    Procedure: Revision open carpal tunnel release.;  Surgeon: Nasir Casper MD;  Location: [facility] OR    REPEAT RIGHT CARPAL TUNNEL RELEASE WITH HYPOTHENAR FAT PAD FLAP\  12/2021    DR. FINNEY    TOTAL HIP ARTHROPLASTY Right 02/21/2018    Procedure:  RIGHT TOTAL HIP ARTHROPLASTY DIRECT ANTERIOR APPROACH;  Surgeon: Kaushik Hood MD;  Location: [facility] OR;  Service: Orthopedics    VASECTOMY  09/01/1980        Family History   Problem Relation Age of Onset    Melanoma No family hx of     Cancer Mother         GYN    Cancer Father         Lung    Heart Disease Father        Social History     Socioeconomic History    Marital status:      Spouse name: Not on file    Number of children: Not on file    Years of education: Not on file    Highest education level: Not on file   Occupational History     Employer: RETIRED   Tobacco Use    Smoking status: Never    Smokeless tobacco: Never    Vaping Use    Vaping Use: Never used   Substance and Sexual Activity    Alcohol use: Yes     Alcohol/week: 8.0 standard drinks of alcohol     Comment: Alcoholic Drinks/day: nightly    Drug use: No    Sexual activity: Not on file   Other Topics Concern    Not on file   Social History Narrative    Not on file     Social Determinants of Health     Financial Resource Strain: Not on file   Food Insecurity: Not on file   Transportation Needs: Not on file   Physical Activity: Not on file   Stress: Not on file   Social Connections: Not on file   Interpersonal Safety: Not on file   Housing Stability: Not on file       Outpatient Encounter Medications as of 10/10/2023   Medication Sig Dispense Refill    fish oil-omega-3 fatty acids 1000 MG capsule Take 2 g by mouth daily      MECLIZINE HCL PO Take 25 mg by mouth daily       Multiple Vitamins-Minerals (MENS MULTIVITAMIN PLUS PO)       polyethylene glycol-propylene glycol PF (SYSTANE HYDRATION PF) 0.4-0.3 % SOLN opthalmic solution Apply 1 drop to eye      Vitamin D3 (CHOLECALCIFEROL) 25 mcg (1000 units) tablet Take 25 mcg by mouth daily       No facility-administered encounter medications on file as of 10/10/2023.             O:   NAD, WDWN, Alert & Oriented, Mood & Affect wnl, Vitals stable   General appearance normal   Vitals stable   Alert, oriented and in no acute distress     Gritty scaly papule on scalp and l cheek   L temple inflamed seborrheic keratosis      Stuck on papules and brown macules on trunk and ext   Red papules on trunk  Flesh colored papules on trunk     The remainder of the full exam was normal; the following areas were examined:  conjunctiva/lids, , neck, peripheral vascular system, abdomen, lymph nodes, digits/nails, eccrine and apocrine glands, scalp/hair, face, neck, chest, abdomen, buttocks, back, RUE, LUE, RLE, LLE       Eyes: Conjunctivae/lids:Normal     ENT: Lips, buccal mucosa, tongue: normal    MSK:Normal    Cardiovascular: peripheral edema  none    Pulm: Breathing Normal    Lymph Nodes: No Head and Neck Lymphadenopathy     Neuro/Psych: Orientation:Alert and Orientedx3 ; Mood/Affect:normal       A/P:  1. Seborrheic keratosis, lentigo, angioma, dermal nevus, hx of non-melanoma skin cancer   2. L temple inflamed seborrheic keratosis   LN2:  Treated with LN2 for 5s for 1-2 cycles. Warned risks of blistering, pain, pigment change, scarring, and incomplete resolution.  Advised patient to return if lesions do not completely resolve.  Wound care sheet given.  3. Actinic keratosis   Scalp x8, L cheek x3  LN2:  Treated with LN2 for 5s for 1-2 cycles. Warned risks of blistering, pain, pigment change, scarring, and incomplete resolution.  Advised patient to return if lesions do not completely resolve.  Wound care sheet given.  It was a pleasure speaking to Nir Guy today.  Previous clinic notes and pertinent laboratory tests were reviewed prior to Nir Guy's visit.  Signs and Symptoms of skin cancer discussed with patient.  Patient encouraged to perform monthly skin exams.  UV precautions reviewed with patient.  Risks of non-melanoma skin cancer discussed with patient   Return to clinic 6 months

## 2023-11-17 ENCOUNTER — VIRTUAL VISIT (OUTPATIENT)
Dept: INTERNAL MEDICINE | Facility: CLINIC | Age: 83
End: 2023-11-17
Payer: COMMERCIAL

## 2023-11-17 DIAGNOSIS — R42 VERTIGO: Primary | ICD-10-CM

## 2023-11-17 PROCEDURE — 99213 OFFICE O/P EST LOW 20 MIN: CPT | Mod: 93 | Performed by: INTERNAL MEDICINE

## 2023-11-17 RX ORDER — MELATONIN 10 MG
CAPSULE ORAL
COMMUNITY
End: 2023-12-07

## 2023-11-17 NOTE — PATIENT INSTRUCTIONS
Future Appointments   Date Time Provider Department Center   12/7/2023 11:30 AM [facility]   4/16/2024  9:15 AM Chavez Jay MD [facility]

## 2023-11-17 NOTE — PROGRESS NOTES
[facility]     TELEPHONE VISIT     Comprehensive and complex medical care - Chronic disease management - Shared decision making - Care coordination - Compassionate care    Patient advocacy - Rational deprescribing - Minimally disruptive medicine - Ethical focus - Customized care         Nir Guy is a 83 year old male who is being evaluated for a billable telephone visit.           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    Pericardial tamponade    PE (pulmonary thromboembolism) (H)    Pericardial effusion       Low    HLD (hyperlipidemia)    AMD (age related macular degeneration)    Vertigo    SNHL (sensorineural hearing loss)    Tinnitus    Intermediate stage nonexudative age-related macular degeneration of both eyes    RBBB (right bundle branch block)    Spinal stenosis of lumbar region with neurogenic claudication        Current Outpatient Medications   Medication Instructions    fish oil-omega-3 fatty acids 2 g, Oral, DAILY    Melatonin 10 MG CAPS Oral    Multiple Vitamins-Minerals (MENS MULTIVITAMIN PLUS PO) Oral    polyethylene glycol-propylene glycol PF (SYSTANE HYDRATION PF) 0.4-0.3 % SOLN opthalmic solution 1 drop, Ophthalmic    Vitamin D3 (CHOLECALCIFEROL) 25 mcg, Oral, DAILY        Subjective:     Nir Guy presents with:      Chief Complaint   Patient presents with    Vertigo      Patient comes in today for follow-up of vertigo.  His significant other is on the phone with him.    He had stopped meclizine towards the end of October because I told him apparently 2 at 1 time or another.  He did not have apparent side  effects with the medication.    He has had 3 bouts of vertigo in the last 2 to 3 months.  He has had off and on again vertigo for a long time.  He had work-up in 2014 with this including MRI and MRA.  This was negative.  He has been through physical therapy for this in the past.  He has had about now for the last 2 days.  His last episode of bouts were around October 30.  Normally he would take meclizine 25 mg in the morning for this.    He has been taking melatonin at night for sleeping.  He sleeps with a raised head normally.    He does feel unsteady on his feet.  He can get vertigo when laying down.  He does not really want to do physical therapy again for this.  He denies any new tinnitus or any other new neurologic symptoms.    He does have follow-up with me in the future for an elbow issue.    He had questions about wax buildup in his ears and whether this would affect it.  We reviewed this as well.  He has seen [facility] in the past.    We reviewed his other issues noted in the assessment but not specifically addressed in the HPI above.     Objective:     Limited phone exam reveals:  Patient in no distress.  Mood is good.  Insight is good.    Diagnostics:     Office Visit on 03/24/2023   Component Date Value Ref Range Status    Sodium 03/24/2023 142  136 - 145 mmol/L Final    Potassium 03/24/2023 4.6  3.4 - 5.3 mmol/L Final    Chloride 03/24/2023 102  98 - 107 mmol/L Final    Carbon Dioxide (CO2) 03/24/2023 28  22 - 29 mmol/L Final    Anion Gap 03/24/2023 12  7 - 15 mmol/L Final    Urea Nitrogen 03/24/2023 11.0  8.0 - 23.0 mg/dL Final    Creatinine 03/24/2023 0.96  0.67 - 1.17 mg/dL Final    Calcium 03/24/2023 9.8  8.8 - 10.2 mg/dL Final    Glucose 03/24/2023 96  70 - 99 mg/dL Final    Alkaline Phosphatase 03/24/2023 67  40 - 129 U/L Final    AST 03/24/2023 24  10 - 50 U/L Final    ALT 03/24/2023 18  10 - 50 U/L Final    Protein Total 03/24/2023 7.2  6.4 - 8.3 g/dL Final    Albumin 03/24/2023 4.5  3.5 - 5.2  g/dL Final    Bilirubin Total 03/24/2023 0.5  <=1.2 mg/dL Final    GFR Estimate 03/24/2023 78  >60 mL/min/1.73m2 Final    eGFR calculated using 2021 CKD-EPI equation.    WBC Count 03/24/2023 7.2  4.0 - 11.0 10e3/uL Final    RBC Count 03/24/2023 4.79  4.40 - 5.90 10e6/uL Final    Hemoglobin 03/24/2023 15.7  13.3 - 17.7 g/dL Final    Hematocrit 03/24/2023 44.6  40.0 - 53.0 % Final    MCV 03/24/2023 93  78 - 100 fL Final    MCH 03/24/2023 32.8  26.5 - 33.0 pg Final    MCHC 03/24/2023 35.2  31.5 - 36.5 g/dL Final    RDW 03/24/2023 12.1  10.0 - 15.0 % Final    Platelet Count 03/24/2023 180  150 - 450 10e3/uL Final    Methylmalonic Acid 03/24/2023 0.39  0.00 - 0.40 umol/L Final    INTERPRETIVE INFORMATION:    Slight elevation 0.41-0.99 umol/L is consistent with mild vitamin B12 deficiency, renal insufficiency, or intravascular volume contraction.    Moderate elevation 1.00-9.99 umol/L is consistent with mild vitamin B12 deficiency.    Massive elevation 10.00 umol/L or greater is consistent with significant vitamin B12 deficiency or with inborn errors of metabolism.    Vitamin B12 03/24/2023 394  232 - 1,245 pg/mL Final       No results found for any visits on 11/17/23.     Assessment:     1. Vertigo        Plan:     Meclizine 25 mg twice daily at this time.  Follow-up in 3 weeks as scheduled.  Be seen sooner if needed.  Could see [facility] in the future for this if needed.  Consider further treatment in the future.  Could see [facility] in [place] for this in the future if needed.         German Mcgregor MD  General Internal Medicine  [facility]    Phone call duration: 16 minutes    This phone visit originated from [facility]    Return in about 20 days (around 12/7/2023) for follow up visit.     Future Appointments   Date Time Provider Department Center   12/7/2023 11:30 AM German Mcgregor MD [facility]   4/16/2024  9:15 AM Chavez Jay MD [facility]       Answers submitted by the patient for this visit:  General Questionnaire (Submitted on 11/15/2023)  Chief Complaint: Chronic problems general questions HPI Form  What is the reason for your visit today? : Vertigo. What does Dr. Mcgregor suggest I do. See an ear Doctor every so often to clean my ears? Will this help?  How many servings of fruits and vegetables do you eat daily?: 2-3  On average, how many sweetened beverages do you drink each day (Examples: soda, juice, sweet tea, etc.  Do NOT count diet or artificially sweetened beverages)?: 1  How many minutes a day do you exercise enough to make your heart beat faster?: 30 to 60  How many days a week do you exercise enough to make your heart beat faster?: 6  How many days per week do you miss taking your medication?: 0

## 2023-11-17 NOTE — PROGRESS NOTES
"Nir is a 83 year old who is being evaluated via a billable telephone visit.      What phone number would you like to be contacted at? [phone]  How would you like to obtain your AVS? MyChart  {PROVIDER LOCATION On-site should be selected for visits conducted from your clinic location or adjoining [facility] hospital, academic office, or other nearby [facility] building. Off-site should be selected for all other provider locations, including home:730480}  Distant Location (provider location):  {virtual location provider:405653}    {PROVIDER CHARTING PREFERENCE:572906}    Subjective   Nir is a 83 year old, presenting for the following health issues:  Vertigo        11/17/2023     8:56 AM   Additional Questions   Roomed by Sony DOWNING   Accompanied by N/A       History of Present Illness       Reason for visit:  Vertigo. What does Dr. Mcgregor suggest I do. See an ear Doctor every so often to clean my ears? Will this help?    He eats 2-3 servings of fruits and vegetables daily.He consumes 1 sweetened beverage(s) daily.He exercises with enough effort to increase his heart rate 30 to 60 minutes per day.  He exercises with enough effort to increase his heart rate 6 days per week.   He is taking medications regularly.       {SUPERLIST (Optional):267513}  {additonal problems for provider to add (Optional):913614}      Review of Systems   {ROS COMP (Optional):849958}      Objective    Vitals - Patient Reported  Weight (Patient Reported): 98.9 kg (218 lb)  Height (Patient Reported): 188 cm (6' 2\")  BMI (Based on Pt Reported Ht/Wt): 27.99  Pain Score: Moderate Pain (5)  Pain Loc: Arm (pt states left arm elbow)      Vitals:  No vitals were obtained today due to virtual visit.    Physical Exam   {GENERAL APPEARANCE:50}  PSYCH: Alert and oriented times 3; coherent speech, normal   rate and volume, able to articulate logical thoughts, able   to abstract reason, no tangential thoughts, no hallucinations   or delusions  His affect is { " :0229465}  RESP: No cough, no audible wheezing, able to talk in full sentences  Remainder of exam unable to be completed due to telephone visits    {Diagnostic Test Results (Optional):034394}    {AMBULATORY ATTESTATION (Optional):576302}        Phone call duration: *** minutes

## 2023-12-07 ENCOUNTER — OFFICE VISIT (OUTPATIENT)
Dept: INTERNAL MEDICINE | Facility: CLINIC | Age: 83
End: 2023-12-07
Payer: COMMERCIAL

## 2023-12-07 VITALS
RESPIRATION RATE: 18 BRPM | DIASTOLIC BLOOD PRESSURE: 72 MMHG | OXYGEN SATURATION: 96 % | HEART RATE: 60 BPM | SYSTOLIC BLOOD PRESSURE: 136 MMHG

## 2023-12-07 DIAGNOSIS — R42 VERTIGO: Primary | ICD-10-CM

## 2023-12-07 DIAGNOSIS — M77.02 MEDIAL EPICONDYLITIS OF ELBOW, LEFT: ICD-10-CM

## 2023-12-07 DIAGNOSIS — F51.01 PRIMARY INSOMNIA: ICD-10-CM

## 2023-12-07 PROCEDURE — 99214 OFFICE O/P EST MOD 30 MIN: CPT | Performed by: INTERNAL MEDICINE

## 2023-12-07 RX ORDER — PHENOL 1.4 %
10 AEROSOL, SPRAY (ML) MUCOUS MEMBRANE
COMMUNITY

## 2023-12-07 NOTE — PROGRESS NOTES
{PROVIDER CHARTING PREFERENCE:211113}    Vicenta Loyola is a 83 year old, presenting for the following health issues:  Elbow Pain (Pt states sore on the left elbow. ) and Vertigo        12/7/2023    11:08 AM   Additional Questions   Roomed by Laura Carvajal   Accompanied by N/A       Elbow Pain    History of Present Illness       Reason for visit:  Left arm elbow pain & Vertigo    He eats 2-3 servings of fruits and vegetables daily.He consumes 1 sweetened beverage(s) daily.He exercises with enough effort to increase his heart rate 10 to 19 minutes per day.  He exercises with enough effort to increase his heart rate 6 days per week.   He is taking medications regularly.       {MA/LPN/RN Pre-Provider Visit Orders- hCG/UA/Strep (Optional):004229}  {SUPERLIST (Optional):617601}  {additonal problems for provider to add (Optional):805474}      Review of Systems   {ROS COMP (Optional):228433}      Objective    /72 (BP Location: Right arm, Patient Position: Sitting, Cuff Size: Adult Regular)   Pulse 60   Resp 18   SpO2 96%   There is no height or weight on file to calculate BMI.  Physical Exam   {Exam List (Optional):047532}    {Diagnostic Test Results (Optional):651864}    {AMBULATORY ATTESTATION (Optional):985533}

## 2023-12-07 NOTE — PROGRESS NOTES
[facility]    Comprehensive and complex medical care - Chronic disease management - Shared decision making - Care coordination - Compassionate care    Patient advocacy - Rational deprescribing - Minimally disruptive medicine - Ethical focus - Customized care         Date of Service: 12/7/2023  Primary Provider: German Mcgregor    Patient Care Team:  German Mcgregor MD as PCP - General (Internal Medicine)  Chavez Jay MD as Assigned Surgical Provider  German Mcgregor MD as Assigned PCP  Jacky Zayas MD as MD (Ophthalmology)  Rehan Victor MD as MD (Orthopaedic Surgery)  Rob Mendoza MD as MD (Orthopaedic Surgery)          Patient's Pharmacy:    [facility] - [address] [address]  Phone: [phone] Fax: [phone]     Patient's Contacts:  Name Home Phone Work Phone Mobile Phone Relationship Lgl Grd   MELITON QUEEN [phone]   Significant*    MELITON QUEEN [phone] Significant*      Patient's Insurance:    Payor: [employer] / Plan: [employer] / Product Type: HMO /           Active Problem List:  Problem List as of 12/7/2023 Reviewed: 12/7/2023  2:10 PM by German Mcgregor MD         High    Former smoker - Quit in 1998    Full code status - 2017    COPD (chronic obstructive pulmonary disease) (H)       Medium    Osteoarthritis of shoulder    Osteoarthritis of left patellofemoral joint    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    Pericardial tamponade    PE (pulmonary thromboembolism) (H)    Pericardial effusion       Low    HLD (hyperlipidemia)    AMD (age related macular degeneration)    Vertigo    SNHL (sensorineural hearing loss)    Tinnitus    Intermediate stage nonexudative age-related macular degeneration of both eyes    RBBB (right bundle branch block)    Spinal stenosis of lumbar region with neurogenic  claudication        Current Outpatient Medications   Medication Instructions    fish oil-omega-3 fatty acids 2 g, Oral, DAILY    Melatonin 10 mg, Oral, AT BEDTIME PRN    Multiple Vitamins-Minerals (MENS MULTIVITAMIN PLUS PO) Oral    polyethylene glycol-propylene glycol PF (SYSTANE HYDRATION PF) 0.4-0.3 % SOLN opthalmic solution 1 drop, Ophthalmic    Vitamin D3 (CHOLECALCIFEROL) 25 mcg, Oral, DAILY     Social History     Social History Narrative    Not on file       Subjective:     Nir Guy is a 83 year old male who comes in today for:    Chief Complaint   Patient presents with    Elbow Pain     Pt states sore on the left elbow.     Vertigo          12/7/2023    11:08 AM   Additional Questions   Roomed by Laura Carvajal   Accompanied by N/A     Patient comes in today for a number of issues.    We first reviewed his episodes of vertigo.  He has noticed that these tend to occur during the fall for him.  He is doing well at this time.  His last episode of vertigo was on November 28.  His wife has done Epley type maneuvers at home which seem to help.  He does show nystagmus when this occurs.  He is not taking meclizine now regularly.  He just is going to take it as needed.  He does have a adjustable bed to work on his head.    We reviewed his blood pressures.  His blood pressures as an outpatient have been doing well.  There is been no signs of hypertension.    We reviewed his insomnia.  He is using melatonin 10 mg at at bedtime.  It seems to be working for him.  We reviewed this again.    He has some medial left elbow pain and wanted to know about this.  Occasionally when resting it, he has a jolt of pain in this area.  No numbness or loss of strength in the left hand.    We reviewed his other issues noted in the assessment but not specifically addressed in the HPI above.     Objective:     Wt Readings from Last 3 Encounters:   05/23/23 98.9 kg (218 lb 1.6 oz)   04/24/23 97.6 kg (215 lb 1.6 oz)   03/24/23 97.7 kg (215  lb 4.8 oz)     BP Readings from Last 3 Encounters:   12/07/23 136/72   05/23/23 138/80   04/24/23 120/82     /72 (BP Location: Right arm, Patient Position: Sitting, Cuff Size: Adult Regular)   Pulse 60   Resp 18   SpO2 96%    The patient is comfortable, no acute distress.  Mood good.  Insight fair to good.  Ears are clear.  Eyes are nonicteric.  Neck is supple without mass.  No cervical adenopathy.  No thyromegaly. Heart regular rate and rhythm.  Lungs clear to auscultation bilaterally.  Respiratory effort is good.   Extremities no edema.  He has tenderness over the lateral epicondyle which is mild.  No loss of strength in elbow.      Diagnostics:     Office Visit on 03/24/2023   Component Date Value Ref Range Status    Sodium 03/24/2023 142  136 - 145 mmol/L Final    Potassium 03/24/2023 4.6  3.4 - 5.3 mmol/L Final    Chloride 03/24/2023 102  98 - 107 mmol/L Final    Carbon Dioxide (CO2) 03/24/2023 28  22 - 29 mmol/L Final    Anion Gap 03/24/2023 12  7 - 15 mmol/L Final    Urea Nitrogen 03/24/2023 11.0  8.0 - 23.0 mg/dL Final    Creatinine 03/24/2023 0.96  0.67 - 1.17 mg/dL Final    Calcium 03/24/2023 9.8  8.8 - 10.2 mg/dL Final    Glucose 03/24/2023 96  70 - 99 mg/dL Final    Alkaline Phosphatase 03/24/2023 67  40 - 129 U/L Final    AST 03/24/2023 24  10 - 50 U/L Final    ALT 03/24/2023 18  10 - 50 U/L Final    Protein Total 03/24/2023 7.2  6.4 - 8.3 g/dL Final    Albumin 03/24/2023 4.5  3.5 - 5.2 g/dL Final    Bilirubin Total 03/24/2023 0.5  <=1.2 mg/dL Final    GFR Estimate 03/24/2023 78  >60 mL/min/1.73m2 Final    eGFR calculated using 2021 CKD-EPI equation.    WBC Count 03/24/2023 7.2  4.0 - 11.0 10e3/uL Final    RBC Count 03/24/2023 4.79  4.40 - 5.90 10e6/uL Final    Hemoglobin 03/24/2023 15.7  13.3 - 17.7 g/dL Final    Hematocrit 03/24/2023 44.6  40.0 - 53.0 % Final    MCV 03/24/2023 93  78 - 100 fL Final    MCH 03/24/2023 32.8  26.5 - 33.0 pg Final    MCHC 03/24/2023 35.2  31.5 - 36.5 g/dL Final     RDW 03/24/2023 12.1  10.0 - 15.0 % Final    Platelet Count 03/24/2023 180  150 - 450 10e3/uL Final    Methylmalonic Acid 03/24/2023 0.39  0.00 - 0.40 umol/L Final    INTERPRETIVE INFORMATION:    Slight elevation 0.41-0.99 umol/L is consistent with mild vitamin B12 deficiency, renal insufficiency, or intravascular volume contraction.    Moderate elevation 1.00-9.99 umol/L is consistent with mild vitamin B12 deficiency.    Massive elevation 10.00 umol/L or greater is consistent with significant vitamin B12 deficiency or with inborn errors of metabolism.    Vitamin B12 03/24/2023 394  232 - 1,245 pg/mL Final       No results found for any visits on 12/07/23.    Assessment:     1. Vertigo    2. Medial epicondylitis of elbow, left    3. Primary insomnia        Plan:     Monitor vertigo at this time.  Use prescription meclizine if needed.  May do physical therapy in the future if worsens.  No additional treatment for epicondylitis needed at this time.  Could review with orthopedics in the future if needed.  Continue melatonin for sleep.  Continue current medications otherwise.  Follow up sooner if issues.    No orders of the defined types were placed in this encounter.          German Mcgregor MD  General Internal Medicine  [facility]    Return in about 4 months (around 3/26/2024) for annual wellness visit.     Future Appointments   Date Time Provider Department Center   3/26/2024 10:30 AM German Mcgregor MD [facility]   4/16/2024  9:15 AM Chavez Jay MD [facility]

## 2023-12-07 NOTE — PATIENT INSTRUCTIONS
Future Appointments   Date Time Provider Department Center   3/26/2024 10:30 AM [facility] Mcgregor [facility]   4/16/2024  9:15 AM Chavez Jay MD [facility]

## 2023-12-13 ENCOUNTER — TRANSFERRED RECORDS (OUTPATIENT)
Dept: HEALTH INFORMATION MANAGEMENT | Facility: CLINIC | Age: 83
End: 2023-12-13
Payer: COMMERCIAL

## 2024-01-22 ENCOUNTER — TRANSFERRED RECORDS (OUTPATIENT)
Dept: HEALTH INFORMATION MANAGEMENT | Facility: CLINIC | Age: 84
End: 2024-01-22
Payer: COMMERCIAL

## 2024-01-31 ENCOUNTER — OFFICE VISIT (OUTPATIENT)
Dept: DERMATOLOGY | Facility: CLINIC | Age: 84
End: 2024-01-31
Payer: COMMERCIAL

## 2024-01-31 DIAGNOSIS — D18.01 ANGIOMA OF SKIN: ICD-10-CM

## 2024-01-31 DIAGNOSIS — L57.0 AK (ACTINIC KERATOSIS): ICD-10-CM

## 2024-01-31 DIAGNOSIS — L81.4 LENTIGO: ICD-10-CM

## 2024-01-31 DIAGNOSIS — D23.9 DERMAL NEVUS: Primary | ICD-10-CM

## 2024-01-31 DIAGNOSIS — C44.42 SQUAMOUS CELL CARCINOMA OF SCALP: ICD-10-CM

## 2024-01-31 DIAGNOSIS — L82.1 SEBORRHEIC KERATOSES: ICD-10-CM

## 2024-01-31 PROCEDURE — 17003 DESTRUCT PREMALG LES 2-14: CPT | Performed by: DERMATOLOGY

## 2024-01-31 PROCEDURE — 99213 OFFICE O/P EST LOW 20 MIN: CPT | Mod: 25 | Performed by: DERMATOLOGY

## 2024-01-31 PROCEDURE — 17000 DESTRUCT PREMALG LESION: CPT | Mod: 59 | Performed by: DERMATOLOGY

## 2024-01-31 PROCEDURE — 88331 PATH CONSLTJ SURG 1 BLK 1SPC: CPT | Mod: 59 | Performed by: DERMATOLOGY

## 2024-01-31 PROCEDURE — 11102 TANGNTL BX SKIN SINGLE LES: CPT | Mod: 59 | Performed by: DERMATOLOGY

## 2024-01-31 PROCEDURE — 17312 MOHS ADDL STAGE: CPT | Performed by: DERMATOLOGY

## 2024-01-31 PROCEDURE — 17311 MOHS 1 STAGE H/N/HF/G: CPT | Performed by: DERMATOLOGY

## 2024-01-31 PROCEDURE — 12031 INTMD RPR S/A/T/EXT 2.5 CM/<: CPT | Mod: 59 | Performed by: DERMATOLOGY

## 2024-01-31 NOTE — PATIENT INSTRUCTIONS
Open Wound Care     for ______________        No strenuous activity for 48 hours    Take Tylenol as needed for discomfort.                                                .         Do not drink alcoholic beverages for 48 hours.    Keep the pressure bandage in place for 24 hours. If the bandage becomes blood tinged or loose, reinforce it with gauze and tape.        (Refer to the reverse side of this page for management of bleeding).    Remove bandage in 24 hours and begin wound care as follows:     Clean area with tap water using a Q tip or gauze pad, (shower / bathe normally)  Dry wound with Q tip or gauze pad  Apply Aquaphor, Vaseline, Polysporin or Bacitracin Ointment with a Q tip  Do NOT use Neosporin Ointment *  Cover the wound with a band-aid or nonstick gauze pad and paper tape.  Repeat wound care once a day until wound is completely healed.    It is an old wives tale that a wound heals better when it is exposed to air and allowed to dry out. The wound will heal faster with a better cosmetic result if it is kept moist with ointment and covered with a bandage.  Do not let the wound dry out.      Supplies Needed:                Qtips or gauze pads                Polysporin or Bacitracin Ointment                Bandaids or nonstick gauze pads and paper tape    Wound care kits and brown paper tape are available for purchase at   the pharmacy.    BLEEDING:    Use tightly rolled up gauze or cloth to apply direct pressure over the bandage for 20   minutes.  Reapply pressure for an additional 20 minutes if necessary  Call the office or go to the nearest emergency room if pressure fails to stop the bleeding.  Use additional gauze and tape to maintain pressure once the bleeding has stopped.  Begin wound care 24 hours after surgery as directed.                  WOUND HEALING    One week after surgery a pink / red halo will form around the outside of the wound.   This is new skin.  The center of the wound will appear  yellowish white and produce some drainage.  The pink halo will slowly migrate in toward the center of the wound until the wound is covered with new shiny pink skin.  There will be no more drainage when the wound is completely healed.  It will take six months to one year for the redness to fade.  The scar may be itchy, tight and sensitive to extreme temperatures for a year after the surgery.  Massaging the area several times a day for several minutes after the wound is completely healed will help the scar soften and normalize faster. Begin massage only after healing is complete.      In case of emergency call: Dr Jay: [phone]    [facility]: [phone]    [facility]:[phone]

## 2024-01-31 NOTE — PROGRESS NOTES
Nir Guy is an extremely pleasant 84 year old year old male patient here today for hx of non-melanoma skin cancer.  He notes spot on head.  Patient has no other skin complaints today.  Remainder of the HPI, Meds, PMH, Allergies, FH, and SH was reviewed in chart.      Past Medical History:   Diagnosis Date    Actinic keratosis     AMD (age related macular degeneration)     Basal cell carcinoma     Former smoker     Full code status 1/6/2017    Hyperlipidemia     OA (osteoarthritis)     Osteoarthritis of left patellofemoral joint 1/6/2017    Osteoarthritis of shoulder     RBBB (right bundle branch block)     S/P colonoscopy 4/18/11    SNHL (sensorineural hearing loss)     Squamous cell carcinoma     Squamous cell carcinoma of skin     Tinnitus     Vertigo        Past Surgical History:   Procedure Laterality Date    BASAL CELL CARCINOMA EXCISION  01/01/1999    Nose and scalp    CATARACT EXTRACTION, BILATERAL  01/01/2018    CATARACT IOL, RT/LT      IR LUMBAR EPIDURAL STEROID INJECTION  01/09/2018    RELEASE CARPAL TUNNEL Right 01/04/2021    Procedure: Revision open carpal tunnel release.;  Surgeon: Nasir Casper MD;  Location: [facility]    REPEAT RIGHT CARPAL TUNNEL RELEASE WITH HYPOTHENAR FAT PAD FLAP\  12/2021    DR. FINNEY    TOTAL HIP ARTHROPLASTY Right 02/21/2018    Procedure:  RIGHT TOTAL HIP ARTHROPLASTY DIRECT ANTERIOR APPROACH;  Surgeon: Kaushik Hood MD;  Location: [facility];  Service: Orthopedics    VASECTOMY  09/01/1980        Family History   Problem Relation Age of Onset    Melanoma No family hx of     Cancer Mother         GYN    Cancer Father         Lung    Heart Disease Father        Social History     Socioeconomic History    Marital status:      Spouse name: Not on file    Number of children: Not on file    Years of education: Not on file    Highest education level: Not on file   Occupational History     Employer: RETIRED   Tobacco Use    Smoking status: Never     Smokeless tobacco: Never   Vaping Use    Vaping Use: Never used   Substance and Sexual Activity    Alcohol use: Yes     Alcohol/week: 8.0 standard drinks of alcohol     Comment: Alcoholic Drinks/day: nightly    Drug use: No    Sexual activity: Not on file   Other Topics Concern    Not on file   Social History Narrative    Not on file     Social Determinants of Health     Financial Resource Strain: Low Risk  (12/1/2023)    Financial Resource Strain     Within the past 12 months, have you or your family members you live with been unable to get utilities (heat, electricity) when it was really needed?: No   Food Insecurity: Low Risk  (12/1/2023)    Food Insecurity     Within the past 12 months, did you worry that your food would run out before you got money to buy more?: No     Within the past 12 months, did the food you bought just not last and you didn t have money to get more?: No   Transportation Needs: Low Risk  (12/1/2023)    Transportation Needs     Within the past 12 months, has lack of transportation kept you from medical appointments, getting your medicines, non-medical meetings or appointments, work, or from getting things that you need?: No   Physical Activity: Not on file   Stress: Not on file   Social Connections: Not on file   Interpersonal Safety: Low Risk  (12/7/2023)    Interpersonal Safety     Do you feel physically and emotionally safe where you currently live?: Yes     Within the past 12 months, have you been hit, slapped, kicked or otherwise physically hurt by someone?: No     Within the past 12 months, have you been humiliated or emotionally abused in other ways by your partner or ex-partner?: No   Housing Stability: Low Risk  (12/1/2023)    Housing Stability     Do you have housing? : Yes     Are you worried about losing your housing?: No       Outpatient Encounter Medications as of 1/31/2024   Medication Sig Dispense Refill    fish oil-omega-3 fatty acids 1000 MG capsule Take 2 g by mouth daily       Melatonin 10 MG TABS tablet Take 10 mg by mouth nightly as needed for sleep      Multiple Vitamins-Minerals (MENS MULTIVITAMIN PLUS PO)       polyethylene glycol-propylene glycol PF (SYSTANE HYDRATION PF) 0.4-0.3 % SOLN opthalmic solution Apply 1 drop to eye      Vitamin D3 (CHOLECALCIFEROL) 25 mcg (1000 units) tablet Take 25 mcg by mouth daily       No facility-administered encounter medications on file as of 1/31/2024.             O:   NAD, WDWN, Alert & Oriented, Mood & Affect wnl, Vitals stable   General appearance normal   Vitals stable   Alert, oriented and in no acute distress      Following lymph nodes palpated: Occipital, Cervical, Supraclavicular no lad  Gritty scaly papule on scalp  L vertex scalp 8mm keratotic scaly papule      Stuck on papules and brown macules on trunk and ext   Red papules on trunk  Flesh colored papules on trunk      Eyes: Conjunctivae/lids:Normal     ENT: Lips, buccal mucosa, tongue: normal    MSK:Normal    Cardiovascular: peripheral edema none    Pulm: Breathing Normal    Lymph Nodes: No Head and Neck Lymphadenopathy     Neuro/Psych: Orientation:Alert and Orientedx3 ; Mood/Affect:normal       MICRO:   L vertex scalp:There is a proliferation of irregular nests of abnormal squamous cells arising from the epidermis and invading the dermis. These are well differentiated. The dermis shows a variable superficial perivascular inflammatory infiltrate.   A/P:  1. Seborrheic keratosis, lentigo, angioma, dermal nevus, hx of non-melanoma skin cancer   2. Actinic keratosis   Scalp x5  LN2:  Treated with LN2 for 5s for 1-2 cycles. Warned risks of blistering, pain, pigment change, scarring, and incomplete resolution.  Advised patient to return if lesions do not completely resolve.  Wound care sheet given.  3. L vertex scalp r/o squamous cell carcinoma   TANGENTIAL BIOPSY IN HOUSE:  After consent, anesthesia with LEC and prep, tangential excision performed and dx above confirmed with frozen  section histology.  No complications and routine wound care.  Patient is not on  anticoagulants and risk of bleeding discussed with patient.       I have personally reviewed all specimens and/or slides and used them with my medical judgement to determine or confirm the final diagnosis.     Patient told result squamous cell carcinoma .    It was a pleasure speaking to Nir Guy today.  Previous clinic notes and pertinent laboratory tests were reviewed prior to Nir Guy's visit.  Signs and Symptoms of skin cancer discussed with patient.  Patient encouraged to perform monthly skin exams.  UV precautions reviewed with patient.  Risks of non-melanoma skin cancer discussed with patient   Return to clinic 6 months  PROCEDURE NOTE  L vertex scalp squamous cell carcinoma   MOHS:   Location    The rationale for Mohs surgery was discussed with the patient and consent was obtained.  The risks and benefits as well as alternatives to therapy were discussed, in detail.  Specifically, the risks of infection, scarring, bleeding, prolonged wound healing, incomplete removal, allergy to anesthesia, nerve injury and recurrence were addressed.  Indication for Mohs was Location. Prior to the procedure, the treatment site was clearly identified and, if available, confirmed with previous photos and confirmed by the patient   All components of the Universal Protocol/PAUSE rule were completed.  The Mohs surgeon operated in two distinct and integrated capacities as the surgeon and pathologist.      The area was prepped with Betasept.  A rim of normal appearing skin was marked circumferentially around the lesion.  The area was infiltrated with local anesthesia.  The tumor was first debulked to remove all clinically apparent tumor.  An incision following the standard Mohs approach was done and the specimen was oriented,mapped and placed in 1 block(s).  Each specimen was then chromacoded and processed in the Mohs laboratory using  standard Mohs technique and submitted for frozen section histology.  Frozen section analysis showed residual tumor but CLEAR MARGINS.    1st stage:There is a proliferation of irregular nests of abnormal squamous cells arising from the epidermis and invading the dermis. These are well differentiated. The dermis shows a variable superficial perivascular inflammatory infiltrate.     The tumor was excised using standard Mohs technique in 2 stages(s).  CLEAR MARGINS OBTAINED and Final defect size was 1.5 cm.     We discussed the options for wound management in full with the patient including risks/benefits/ possible outcomes.    Because of the relatively superficial nature of the wound and patient s preference, an intermediate repair was planned.  Guiding sutures were carefully placed across the wound to control the direction of wound closure, to prevent distortion from wound contraction, and to minimize wound size to facilitate wound care and healing.  No complications; EBL minimal.  Routine wound care discussed with patient.  1.5cm

## 2024-01-31 NOTE — LETTER
1/31/2024         RE: Nir Guy  [address] [place] [id number]        Dear Colleague,    Thank you for referring your patient, Nir Guy, to the [facility]. Please see a copy of my visit note below.    Nir Guy is an extremely pleasant 84 year old year old male patient here today for hx of non-melanoma skin cancer.  He notes spot on head.  Patient has no other skin complaints today.  Remainder of the HPI, Meds, PMH, Allergies, FH, and SH was reviewed in chart.      Past Medical History:   Diagnosis Date     Actinic keratosis      AMD (age related macular degeneration)      Basal cell carcinoma      Former smoker      Full code status 1/6/2017     Hyperlipidemia      OA (osteoarthritis)      Osteoarthritis of left patellofemoral joint 1/6/2017     Osteoarthritis of shoulder      RBBB (right bundle branch block)      S/P colonoscopy 4/18/11     SNHL (sensorineural hearing loss)      Squamous cell carcinoma      Squamous cell carcinoma of skin      Tinnitus      Vertigo        Past Surgical History:   Procedure Laterality Date     BASAL CELL CARCINOMA EXCISION  01/01/1999    Nose and scalp     CATARACT EXTRACTION, BILATERAL  01/01/2018     CATARACT IOL, RT/LT       IR LUMBAR EPIDURAL STEROID INJECTION  01/09/2018     RELEASE CARPAL TUNNEL Right 01/04/2021    Procedure: Revision open carpal tunnel release.;  Surgeon: Nasir Casper MD;  Location: [facility]     REPEAT RIGHT CARPAL TUNNEL RELEASE WITH HYPOTHENAR FAT PAD FLAP\  12/2021    DR. FINNEY     TOTAL HIP ARTHROPLASTY Right 02/21/2018    Procedure:  RIGHT TOTAL HIP ARTHROPLASTY DIRECT ANTERIOR APPROACH;  Surgeon: Kaushik Hood MD;  Location: [facility];  Service: Orthopedics     VASECTOMY  09/01/1980        Family History   Problem Relation Age of Onset     Melanoma No family hx of      Cancer Mother         GYN     Cancer Father         Lung     Heart Disease Father        Social History      Socioeconomic History     Marital status:      Spouse name: Not on file     Number of children: Not on file     Years of education: Not on file     Highest education level: Not on file   Occupational History     Employer: RETIRED   Tobacco Use     Smoking status: Never     Smokeless tobacco: Never   Vaping Use     Vaping Use: Never used   Substance and Sexual Activity     Alcohol use: Yes     Alcohol/week: 8.0 standard drinks of alcohol     Comment: Alcoholic Drinks/day: nightly     Drug use: No     Sexual activity: Not on file   Other Topics Concern     Not on file   Social History Narrative     Not on file     Social Determinants of Health     Financial Resource Strain: Low Risk  (12/1/2023)    Financial Resource Strain      Within the past 12 months, have you or your family members you live with been unable to get utilities (heat, electricity) when it was really needed?: No   Food Insecurity: Low Risk  (12/1/2023)    Food Insecurity      Within the past 12 months, did you worry that your food would run out before you got money to buy more?: No      Within the past 12 months, did the food you bought just not last and you didn t have money to get more?: No   Transportation Needs: Low Risk  (12/1/2023)    Transportation Needs      Within the past 12 months, has lack of transportation kept you from medical appointments, getting your medicines, non-medical meetings or appointments, work, or from getting things that you need?: No   Physical Activity: Not on file   Stress: Not on file   Social Connections: Not on file   Interpersonal Safety: Low Risk  (12/7/2023)    Interpersonal Safety      Do you feel physically and emotionally safe where you currently live?: Yes      Within the past 12 months, have you been hit, slapped, kicked or otherwise physically hurt by someone?: No      Within the past 12 months, have you been humiliated or emotionally abused in other ways by your partner or ex-partner?: No    Housing Stability: Low Risk  (12/1/2023)    Housing Stability      Do you have housing? : Yes      Are you worried about losing your housing?: No       Outpatient Encounter Medications as of 1/31/2024   Medication Sig Dispense Refill     fish oil-omega-3 fatty acids 1000 MG capsule Take 2 g by mouth daily       Melatonin 10 MG TABS tablet Take 10 mg by mouth nightly as needed for sleep       Multiple Vitamins-Minerals (MENS MULTIVITAMIN PLUS PO)        polyethylene glycol-propylene glycol PF (SYSTANE HYDRATION PF) 0.4-0.3 % SOLN opthalmic solution Apply 1 drop to eye       Vitamin D3 (CHOLECALCIFEROL) 25 mcg (1000 units) tablet Take 25 mcg by mouth daily       No facility-administered encounter medications on file as of 1/31/2024.             O:   NAD, WDWN, Alert & Oriented, Mood & Affect wnl, Vitals stable   General appearance normal   Vitals stable   Alert, oriented and in no acute distress      Following lymph nodes palpated: Occipital, Cervical, Supraclavicular no lad  Gritty scaly papule on scalp  L vertex scalp 8mm keratotic scaly papule      Stuck on papules and brown macules on trunk and ext   Red papules on trunk  Flesh colored papules on trunk      Eyes: Conjunctivae/lids:Normal     ENT: Lips, buccal mucosa, tongue: normal    MSK:Normal    Cardiovascular: peripheral edema none    Pulm: Breathing Normal    Lymph Nodes: No Head and Neck Lymphadenopathy     Neuro/Psych: Orientation:Alert and Orientedx3 ; Mood/Affect:normal       MICRO:   L vertex scalp:There is a proliferation of irregular nests of abnormal squamous cells arising from the epidermis and invading the dermis. These are well differentiated. The dermis shows a variable superficial perivascular inflammatory infiltrate.   A/P:  1. Seborrheic keratosis, lentigo, angioma, dermal nevus, hx of non-melanoma skin cancer   2. Actinic keratosis   Scalp x5  LN2:  Treated with LN2 for 5s for 1-2 cycles. Warned risks of blistering, pain, pigment change,  scarring, and incomplete resolution.  Advised patient to return if lesions do not completely resolve.  Wound care sheet given.  3. L vertex scalp r/o squamous cell carcinoma   TANGENTIAL BIOPSY IN HOUSE:  After consent, anesthesia with LEC and prep, tangential excision performed and dx above confirmed with frozen section histology.  No complications and routine wound care.  Patient is not on  anticoagulants and risk of bleeding discussed with patient.       I have personally reviewed all specimens and/or slides and used them with my medical judgement to determine or confirm the final diagnosis.     Patient told result squamous cell carcinoma .    It was a pleasure speaking to Nir Guy today.  Previous clinic notes and pertinent laboratory tests were reviewed prior to Nir Guy's visit.  Signs and Symptoms of skin cancer discussed with patient.  Patient encouraged to perform monthly skin exams.  UV precautions reviewed with patient.  Risks of non-melanoma skin cancer discussed with patient   Return to clinic 6 months  PROCEDURE NOTE  L vertex scalp squamous cell carcinoma   MOHS:   Location    The rationale for Mohs surgery was discussed with the patient and consent was obtained.  The risks and benefits as well as alternatives to therapy were discussed, in detail.  Specifically, the risks of infection, scarring, bleeding, prolonged wound healing, incomplete removal, allergy to anesthesia, nerve injury and recurrence were addressed.  Indication for Mohs was Location. Prior to the procedure, the treatment site was clearly identified and, if available, confirmed with previous photos and confirmed by the patient   All components of the Universal Protocol/PAUSE rule were completed.  The Mohs surgeon operated in two distinct and integrated capacities as the surgeon and pathologist.      The area was prepped with Betasept.  A rim of normal appearing skin was marked circumferentially around the lesion.  The area  was infiltrated with local anesthesia.  The tumor was first debulked to remove all clinically apparent tumor.  An incision following the standard Mohs approach was done and the specimen was oriented,mapped and placed in 1 block(s).  Each specimen was then chromacoded and processed in the Mohs laboratory using standard Mohs technique and submitted for frozen section histology.  Frozen section analysis showed no residual tumor but CLEAR MARGINS.      The tumor was excised using standard Mohs technique in 1 stages(s).  CLEAR MARGINS OBTAINED and Final defect size was 1.5 cm.     We discussed the options for wound management in full with the patient including risks/benefits/ possible outcomes.      REPAIR SECOND INTENT: We discussed the options for wound management in full with the patient including risks/benefits/possible outcomes. Decision made to allow the wound to heal by second intention. Cautery was used for for hemostasis. EBL minimal; complications none; wound care routine.  The patient was discharged in good condition and will return in one month or prn for wound evaluation.      Again, thank you for allowing me to participate in the care of your patient.        Sincerely,        Chavez Jay MD

## 2024-02-22 ENCOUNTER — ALLIED HEALTH/NURSE VISIT (OUTPATIENT)
Dept: DERMATOLOGY | Facility: CLINIC | Age: 84
End: 2024-02-22
Payer: COMMERCIAL

## 2024-02-22 DIAGNOSIS — Z48.01 ENCOUNTER FOR CHANGE OR REMOVAL OF SURGICAL WOUND DRESSING: Primary | ICD-10-CM

## 2024-02-22 PROCEDURE — 99207 PR NO CHARGE NURSE ONLY: CPT

## 2024-02-22 NOTE — PATIENT INSTRUCTIONS
Continue your current wound care.       Site is healing well and will take longer to heal since the 2 stitches broke.

## 2024-02-22 NOTE — PROGRESS NOTES
Patient walked into clinic today. Patient wanted to be sure his scalp wound was healing right and that him and his wife was taking care of it right. Patient is putting Bacitracin on the site.     Left vertex scalp looked like a healing open wound. 2 sutures broke that held the wound together. No odor, drainage, pain, or redness seen today.     Provider Pratibha Salvador PA-C did come in and do a brief examination of the site and agreed that it looked like a healing open wound and that patient should continue with his wound care. Provider did explain that it will take longer to heal.     Concepcion Sharp MA

## 2024-03-23 SDOH — HEALTH STABILITY: PHYSICAL HEALTH: ON AVERAGE, HOW MANY DAYS PER WEEK DO YOU ENGAGE IN MODERATE TO STRENUOUS EXERCISE (LIKE A BRISK WALK)?: 7 DAYS

## 2024-03-23 SDOH — HEALTH STABILITY: PHYSICAL HEALTH: ON AVERAGE, HOW MANY MINUTES DO YOU ENGAGE IN EXERCISE AT THIS LEVEL?: 30 MIN

## 2024-03-23 ASSESSMENT — SOCIAL DETERMINANTS OF HEALTH (SDOH): HOW OFTEN DO YOU GET TOGETHER WITH FRIENDS OR RELATIVES?: ONCE A WEEK

## 2024-03-28 ENCOUNTER — OFFICE VISIT (OUTPATIENT)
Dept: INTERNAL MEDICINE | Facility: CLINIC | Age: 84
End: 2024-03-28
Payer: COMMERCIAL

## 2024-03-28 VITALS
HEIGHT: 74 IN | OXYGEN SATURATION: 98 % | DIASTOLIC BLOOD PRESSURE: 80 MMHG | HEART RATE: 71 BPM | WEIGHT: 217.3 LBS | SYSTOLIC BLOOD PRESSURE: 138 MMHG | RESPIRATION RATE: 20 BRPM | TEMPERATURE: 98.1 F | BODY MASS INDEX: 27.89 KG/M2

## 2024-03-28 DIAGNOSIS — H90.3 SENSORINEURAL HEARING LOSS (SNHL) OF BOTH EARS: ICD-10-CM

## 2024-03-28 DIAGNOSIS — H61.21 CERUMINOSIS, RIGHT: ICD-10-CM

## 2024-03-28 DIAGNOSIS — H35.30 AMD (AGE RELATED MACULAR DEGENERATION): ICD-10-CM

## 2024-03-28 DIAGNOSIS — F51.01 PRIMARY INSOMNIA: ICD-10-CM

## 2024-03-28 DIAGNOSIS — Z00.00 MEDICARE ANNUAL WELLNESS VISIT, SUBSEQUENT: Primary | ICD-10-CM

## 2024-03-28 DIAGNOSIS — I26.99 PE (PULMONARY THROMBOEMBOLISM) (H): ICD-10-CM

## 2024-03-28 DIAGNOSIS — S00.451A FOREIGN BODY OF RIGHT EXTERNAL EAR: ICD-10-CM

## 2024-03-28 DIAGNOSIS — J44.9 CHRONIC OBSTRUCTIVE PULMONARY DISEASE, UNSPECIFIED COPD TYPE (H): ICD-10-CM

## 2024-03-28 LAB
ERYTHROCYTE [DISTWIDTH] IN BLOOD BY AUTOMATED COUNT: 12.3 % (ref 10–15)
HCT VFR BLD AUTO: 43.6 % (ref 40–53)
HGB BLD-MCNC: 14.9 G/DL (ref 13.3–17.7)
MCH RBC QN AUTO: 31.8 PG (ref 26.5–33)
MCHC RBC AUTO-ENTMCNC: 34.2 G/DL (ref 31.5–36.5)
MCV RBC AUTO: 93 FL (ref 78–100)
PLATELET # BLD AUTO: 189 10E3/UL (ref 150–450)
RBC # BLD AUTO: 4.68 10E6/UL (ref 4.4–5.9)
WBC # BLD AUTO: 7.8 10E3/UL (ref 4–11)

## 2024-03-28 PROCEDURE — 99214 OFFICE O/P EST MOD 30 MIN: CPT | Mod: 25 | Performed by: INTERNAL MEDICINE

## 2024-03-28 PROCEDURE — 36415 COLL VENOUS BLD VENIPUNCTURE: CPT | Performed by: INTERNAL MEDICINE

## 2024-03-28 PROCEDURE — G0439 PPPS, SUBSEQ VISIT: HCPCS | Performed by: INTERNAL MEDICINE

## 2024-03-28 PROCEDURE — 80053 COMPREHEN METABOLIC PANEL: CPT | Performed by: INTERNAL MEDICINE

## 2024-03-28 PROCEDURE — 85027 COMPLETE CBC AUTOMATED: CPT | Performed by: INTERNAL MEDICINE

## 2024-03-28 RX ORDER — TRAZODONE HYDROCHLORIDE 50 MG/1
50-100 TABLET, FILM COATED ORAL
Qty: 60 TABLET | Refills: 5 | Status: SHIPPED | OUTPATIENT
Start: 2024-03-28 | End: 2024-06-15

## 2024-03-28 NOTE — PATIENT INSTRUCTIONS
Learning About Stress  What is stress?     Stress is your body's response to a hard situation. Your body can have a physical, emotional, or mental response. Stress is a fact of life for most people, and it affects everyone differently. What causes stress for you may not be stressful for someone else.  A lot of things can cause stress. You may feel stress when you go on a job interview, take a test, or run a race. This kind of short-term stress is normal and even useful. It can help you if you need to work hard or react quickly. For example, stress can help you finish an important job on time.  Long-term stress is caused by ongoing stressful situations or events. Examples of long-term stress include long-term health problems, ongoing problems at work, or conflicts in your family. Long-term stress can harm your health.  How does stress affect your health?  When you are stressed, your body responds as though you are in danger. It makes hormones that speed up your heart, make you breathe faster, and give you a burst of energy. This is called the fight-or-flight stress response. If the stress is over quickly, your body goes back to normal and no harm is done.  But if stress happens too often or lasts too long, it can have bad effects. Long-term stress can make you more likely to get sick, and it can make symptoms of some diseases worse. If you tense up when you are stressed, you may develop neck, shoulder, or low back pain. Stress is linked to high blood pressure and heart disease.  Stress also harms your emotional health. It can make you jones, tense, or depressed. Your relationships may suffer, and you may not do well at work or school.  What can you do to manage stress?  You can try these things to help manage stress:   Do something active. Exercise or activity can help reduce stress. Walking is a great way to get started. Even everyday activities such as housecleaning or yard work can help.  Try yoga or david chi.  These techniques combine exercise and meditation. You may need some training at first to learn them.  Do something you enjoy. For example, listen to music or go to a movie. Practice your hobby or do volunteer work.  Meditate. This can help you relax, because you are not worrying about what happened before or what may happen in the future.  Do guided imagery. Imagine yourself in any setting that helps you feel calm. You can use online videos, books, or a teacher to guide you.  Do breathing exercises. For example:  From a standing position, bend forward from the waist with your knees slightly bent. Let your arms dangle close to the floor.  Breathe in slowly and deeply as you return to a standing position. Roll up slowly and lift your head last.  Hold your breath for just a few seconds in the standing position.  Breathe out slowly and bend forward from the waist.  Let your feelings out. Talk, laugh, cry, and express anger when you need to. Talking with supportive friends or family, a counselor, or a bonnie leader about your feelings is a healthy way to relieve stress. Avoid discussing your feelings with people who make you feel worse.  Write. It may help to write about things that are bothering you. This helps you find out how much stress you feel and what is causing it. When you know this, you can find better ways to cope.  What can you do to prevent stress?  You might try some of these things to help prevent stress:  Manage your time. This helps you find time to do the things you want and need to do.  Get enough sleep. Your body recovers from the stresses of the day while you are sleeping.  Get support. Your family, friends, and community can make a difference in how you experience stress.  Limit your news feed. Avoid or limit time on social media or news that may make you feel stressed.  Do something active. Exercise or activity can help reduce stress. Walking is a great way to get started.  Where can you learn  "more?  Go to https://www.[employer].net/patiented  Enter N032 in the search box to learn more about \"Learning About Stress.\"  Current as of: October 24, 2023               Content Version: 14.0    2448-7489 [employer].   Care instructions adapted under license by your healthcare professional. If you have questions about a medical condition or this instruction, always ask your healthcare professional. [employer] disclaims any warranty or liability for your use of this information.      Learning About Sleeping Well  What does sleeping well mean?     Sleeping well means getting enough sleep to feel good and stay healthy. How much sleep is enough varies among people.  The number of hours you sleep and how you feel when you wake up are both important. If you do not feel refreshed, you probably need more sleep. Another sign of not getting enough sleep is feeling tired during the day.  Experts recommend that adults get at least 7 or more hours of sleep per day. Children and older adults need more sleep.  Why is getting enough sleep important?  Getting enough quality sleep is a basic part of good health. When your sleep suffers, your physical health, mood, and your thoughts can suffer too. You may find yourself feeling more grumpy or stressed. Not getting enough sleep also can lead to serious problems, including injury, accidents, anxiety, and depression.  What might cause poor sleeping?  Many things can cause sleep problems, including:  Changes to your sleep schedule.  Stress. Stress can be caused by fear about a single event, such as giving a speech. Or you may have ongoing stress, such as worry about work or school.  Depression, anxiety, and other mental or emotional conditions.  Changes in your sleep habits or surroundings. This includes changes that happen where you sleep, such as noise, light, or sleeping in a different bed. It also includes changes in your sleep pattern, such as having jet " "lag or working a late shift.  Health problems, such as pain, breathing problems, and restless legs syndrome.  Lack of regular exercise.  Using alcohol, nicotine, or caffeine before bed.  How can you help yourself?  Here are some tips that may help you sleep more soundly and wake up feeling more refreshed.  Your sleeping area   Use your bedroom only for sleeping and sex. A bit of light reading may help you fall asleep. But if it doesn't, do your reading elsewhere in the house. Try not to use your TV, computer, smartphone, or tablet while you are in bed.  Be sure your bed is big enough to stretch out comfortably, especially if you have a sleep partner.  Keep your bedroom quiet, dark, and cool. Use curtains, blinds, or a sleep mask to block out light. To block out noise, use earplugs, soothing music, or a \"white noise\" machine.  Your evening and bedtime routine   Create a relaxing bedtime routine. You might want to take a warm shower or bath, or listen to soothing music.  Go to bed at the same time every night. And get up at the same time every morning, even if you feel tired.  What to avoid   Limit caffeine (coffee, tea, caffeinated sodas) during the day, and don't have any for at least 6 hours before bedtime.  Avoid drinking alcohol before bedtime. Alcohol can cause you to wake up more often during the night.  Try not to smoke or use tobacco, especially in the evening. Nicotine can keep you awake.  Limit naps during the day, especially close to bedtime.  Avoid lying in bed awake for too long. If you can't fall asleep or if you wake up in the middle of the night and can't get back to sleep within about 20 minutes, get out of bed and go to another room until you feel sleepy.  Avoid taking medicine right before bed that may keep you awake or make you feel hyper or energized. Your doctor can tell you if your medicine may do this and if you can take it earlier in the day.  If you can't sleep   Imagine yourself in a " "peaceful, pleasant scene. Focus on the details and feelings of being in a place that is relaxing.  Get up and do a quiet or boring activity until you feel sleepy.  Avoid drinking any liquids before going to bed to help prevent waking up often to use the bathroom.  Where can you learn more?  Go to https://www.[employer].net/patiented  Enter J942 in the search box to learn more about \"Learning About Sleeping Well.\"  Current as of: July 10, 2023               Content Version: 14.0    0761-4411 [employer].   Care instructions adapted under license by your healthcare professional. If you have questions about a medical condition or this instruction, always ask your healthcare professional. [employer] disclaims any warranty or liability for your use of this information.      "

## 2024-03-28 NOTE — PROGRESS NOTES
[facility]    Comprehensive and complex medical care - Chronic disease management - Shared decision making - Care coordination - Compassionate care    Patient advocacy - Rational deprescribing - Minimally disruptive medicine - Ethical focus - Customized care         Date of Service: 3/28/2024  Primary Provider: German Mcgregor    Patient Care Team:  German Mcgregor MD as PCP - General (Internal Medicine)  Chavez Jay MD as Assigned Surgical Provider  German Mcgregor MD as Assigned PCP  Jacky Zayas MD as MD (Ophthalmology)  Rehan Victor MD as MD (Orthopaedic Surgery)  Rob Mendoza MD as MD (Orthopaedic Surgery)  Nasir Casper MD as MD (Orthopaedic Surgery)          Patient's Pharmacy:    [facility]. - [address] [address]  Phone: [phone] Fax: [phone]     Patient's Contacts:  Name Home Phone Work Phone Mobile Phone Relationship Lgl Grd   MELITON QUEEN [phone]   Significant*    MELITON QUEEN [phone] Significant*      Patient's Insurance:    Payor: [employer] / Plan: [employer]VANTAGE / Product Type: HMO /      Subjective:     History of present illness:    Nir Guy is an 84 year old here for an annual wellness visit.    The issues he would like to address at today's visit include the following:    Chief Complaint   Patient presents with    Wellness Visit     Patient states I have a note on me to talk about my memory. I seem to be forgetting things now and memory is not that good anymore.          3/28/2024     3:35 PM   Additional Questions   Roomed by Mary Anne CHADWICK CMA     Patient comes in today for annual wellness visit and for other issues.    We reviewed his blood pressure record which she brings in today and this is generally good without major concerns.    We reviewed his supplements and he takes  vitamin D3, AREDS 2, fish oil, and a One-A-Day vitamin.    He has noted some issues with memory.  This has been worse in the last few years.  Specifically, he forgets names.  He does remember what he had for lunch today and what he had for breakfast but does not remember what he had for supper last night.  His significant other has not remarked on this.  He wonders whether Prevagen would help him for this.    He has no issues with headaches or new neurologic symptoms.    His breathing has been good.    He had trigger finger release on his left hand and this is okay.    He reviewed his insomnia.  He has had difficulty with getting to sleep over the last couple of months.  It is mainly getting to sleep that the problem is.  He has tried melatonin 10 mg for this without help.  He has used other over-the-counter medications without help.    He denies any urinary symptoms.  He denies any heart or new pulmonary symptoms.    We reviewed his other issues noted in the assessment but not specifically addressed in the HPI above.           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    Pericardial tamponade    PE (pulmonary thromboembolism) (H)    Pericardial effusion       Low    HLD (hyperlipidemia)    AMD (age related macular degeneration)    Vertigo    SNHL (sensorineural hearing loss)    Tinnitus    Intermediate stage nonexudative age-related macular degeneration of both eyes    RBBB (right bundle branch block)    Spinal stenosis of lumbar region with neurogenic claudication        Past Medical History:   Diagnosis Date    Actinic keratosis     AMD (age related macular degeneration)     Basal cell  carcinoma     Former smoker     Full code status 01/06/2017    Hyperlipidemia     OA (osteoarthritis)     Osteoarthritis of left patellofemoral joint 01/06/2017    Osteoarthritis of shoulder     RBBB (right bundle branch block)     S/P colonoscopy 04/18/2011    SNHL (sensorineural hearing loss)     Squamous cell carcinoma of skin     Tinnitus     Vertigo      Past Surgical History:   Procedure Laterality Date    BASAL CELL CARCINOMA EXCISION  01/01/1999    Nose and scalp    CATARACT EXTRACTION, BILATERAL  01/01/2018    CATARACT IOL, RT/LT      IR LUMBAR EPIDURAL STEROID INJECTION  01/09/2018    RELEASE CARPAL TUNNEL Right 01/04/2021    Procedure: Revision open carpal tunnel release.;  Surgeon: Nasir Casper MD;  Location: [facility] OR    RELEASE TRIGGER FINGER Left     REPEAT RIGHT CARPAL TUNNEL RELEASE WITH HYPOTHENAR FAT PAD FLAP  12/2021    DR. FINNEY    TOTAL HIP ARTHROPLASTY Right 02/21/2018    Procedure:  RIGHT TOTAL HIP ARTHROPLASTY DIRECT ANTERIOR APPROACH;  Surgeon: Kaushik Hood MD;  Location: [facility] Main OR;  Service: Orthopedics    VASECTOMY  09/01/1980     Family History   Problem Relation Age of Onset    Melanoma No family hx of     Cancer Mother         GYN    Cancer Father         Lung    Heart Disease Father      Family history is otherwise noncontributory.     Social History     Occupational History     Employer: RETIRED   Tobacco Use    Smoking status: Never    Smokeless tobacco: Never   Vaping Use    Vaping Use: Never used   Substance and Sexual Activity    Alcohol use: Yes     Alcohol/week: 8.0 standard drinks of alcohol     Comment: Alcoholic Drinks/day: nightly    Drug use: No    Sexual activity: Not on file      Social History     Social History Narrative    Not on file      Current Outpatient Medications   Medication Instructions    fish oil-omega-3 fatty acids 2 g, Oral, DAILY    Melatonin 10 mg, Oral, AT BEDTIME PRN    Multiple Vitamins-Minerals (MENS MULTIVITAMIN PLUS PO) Oral     polyethylene glycol-propylene glycol PF (SYSTANE HYDRATION PF) 0.4-0.3 % SOLN opthalmic solution 1 drop, Ophthalmic    traZODone (DESYREL)  mg, Oral, AT BEDTIME PRN    Vitamin D3 (CHOLECALCIFEROL) 25 mcg, Oral, DAILY     Allergies: Patient has no known allergies.     Immunization History   Administered Date(s) Administered    COVID-19 12+ (2023-24) (MODERNA) 10/12/2023    COVID-19 Bivalent 12+ (Pfizer) 10/17/2022    COVID-19 MONOVALENT 12+ (Pfizer) 02/01/2021, 02/22/2021, 10/11/2021    COVID-19 Monovalent 12+ (Pfizer 2022) 04/04/2022    Flu, Unspecified 11/05/1999, 11/01/2001, 11/11/2002, 11/11/2003, 11/04/2004, 11/03/2005, 10/25/2007, 10/21/2008, 09/21/2009, 09/21/2010, 11/11/2011, 09/06/2013, 09/23/2016, 09/02/2018    Influenza (H1N1) 01/04/2010    Influenza (High Dose) 3 valent vaccine 09/18/2012, 09/06/2013, 10/29/2015, 09/22/2016, 09/15/2017, 09/11/2018, 09/06/2019    Influenza (IIV3) PF 11/05/1999, 11/01/2001, 11/11/2002, 11/11/2003, 11/04/2004, 11/03/2005, 10/25/2007, 10/21/2008, 09/21/2009, 09/21/2010, 11/11/2011    Influenza Vaccine 65+ (FLUAD) 10/05/2020, 10/17/2022, 10/02/2023    Influenza Vaccine 65+ (Fluzone HD) 09/24/2021    Pneumo Conj 13-V (2010&after) 12/28/2007, 12/28/2014    Pneumococcal 23 valent 03/18/1993, 12/28/2007    RSV Vaccine (Abrysvo) 10/23/2023    TDAP (Adacel,Boostrix) 03/11/2011    Td (Adult), Adsorbed 03/18/1993, 11/11/2003    Td,adult,historic,unspecified 11/11/2003    Varicella 12/12/1998    Zoster recombinant adjuvanted (SHINGRIX) 04/23/2018, 05/29/2018, 08/02/2018    Zoster vaccine, live 12/28/2014      Objective:     Wt Readings from Last 3 Encounters:   03/28/24 98.6 kg (217 lb 4.8 oz)   05/23/23 98.9 kg (218 lb 1.6 oz)   04/24/23 97.6 kg (215 lb 1.6 oz)     BP Readings from Last 3 Encounters:   03/28/24 138/80   12/07/23 136/72   05/23/23 138/80       PHYSICAL EXAM  /80 (BP Location: Right arm, Patient Position: Sitting, Cuff Size: Adult Regular)   Pulse 71    "Temp 98.1  F (36.7  C) (Oral)   Resp 20   Ht 1.88 m (6' 2\")   Wt 98.6 kg (217 lb 4.8 oz)   SpO2 98%   BMI 27.90 kg/m     The patient is comfortable, no acute distress.  Mood good.  Insight is fair to good.  Per this examiner's long-term experience with the patient, he has had some gradual memory loss over time.  No skin lesions or nodules of concern.  Ears show ceruminosis on the right along with a foreign body which is likely a rubber end of his hearing aid.  Eyes are nonicteric.  Pupils equal and reactive.  Throat is clear.  Neck is supple without mass, no thyromegaly. No cervical or epitrochlear adenopathy.  Heart regular rate and rhythm.  Lungs clear to auscultation bilaterally.  Respiratory effort good.  Abdomen soft and nontender.  No hepatosplenomegaly.  Extremities show no edema.         Diagnostics:     Office Visit on 03/24/2023   Component Date Value Ref Range Status    Sodium 03/24/2023 142  136 - 145 mmol/L Final    Potassium 03/24/2023 4.6  3.4 - 5.3 mmol/L Final    Chloride 03/24/2023 102  98 - 107 mmol/L Final    Carbon Dioxide (CO2) 03/24/2023 28  22 - 29 mmol/L Final    Anion Gap 03/24/2023 12  7 - 15 mmol/L Final    Urea Nitrogen 03/24/2023 11.0  8.0 - 23.0 mg/dL Final    Creatinine 03/24/2023 0.96  0.67 - 1.17 mg/dL Final    Calcium 03/24/2023 9.8  8.8 - 10.2 mg/dL Final    Glucose 03/24/2023 96  70 - 99 mg/dL Final    Alkaline Phosphatase 03/24/2023 67  40 - 129 U/L Final    AST 03/24/2023 24  10 - 50 U/L Final    ALT 03/24/2023 18  10 - 50 U/L Final    Protein Total 03/24/2023 7.2  6.4 - 8.3 g/dL Final    Albumin 03/24/2023 4.5  3.5 - 5.2 g/dL Final    Bilirubin Total 03/24/2023 0.5  <=1.2 mg/dL Final    GFR Estimate 03/24/2023 78  >60 mL/min/1.73m2 Final    eGFR calculated using 2021 CKD-EPI equation.    WBC Count 03/24/2023 7.2  4.0 - 11.0 10e3/uL Final    RBC Count 03/24/2023 4.79  4.40 - 5.90 10e6/uL Final    Hemoglobin 03/24/2023 15.7  13.3 - 17.7 g/dL Final    Hematocrit 03/24/2023 " 44.6  40.0 - 53.0 % Final    MCV 03/24/2023 93  78 - 100 fL Final    MCH 03/24/2023 32.8  26.5 - 33.0 pg Final    MCHC 03/24/2023 35.2  31.5 - 36.5 g/dL Final    RDW 03/24/2023 12.1  10.0 - 15.0 % Final    Platelet Count 03/24/2023 180  150 - 450 10e3/uL Final    Methylmalonic Acid 03/24/2023 0.39  0.00 - 0.40 umol/L Final    INTERPRETIVE INFORMATION:    Slight elevation 0.41-0.99 umol/L is consistent with mild vitamin B12 deficiency, renal insufficiency, or intravascular volume contraction.    Moderate elevation 1.00-9.99 umol/L is consistent with mild vitamin B12 deficiency.    Massive elevation 10.00 umol/L or greater is consistent with significant vitamin B12 deficiency or with inborn errors of metabolism.    Vitamin B12 03/24/2023 394  232 - 1,245 pg/mL Final        Results for orders placed or performed in visit on 03/28/24   CBC with platelets     Status: Normal   Result Value Ref Range    WBC Count 7.8 4.0 - 11.0 10e3/uL    RBC Count 4.68 4.40 - 5.90 10e6/uL    Hemoglobin 14.9 13.3 - 17.7 g/dL    Hematocrit 43.6 40.0 - 53.0 %    MCV 93 78 - 100 fL    MCH 31.8 26.5 - 33.0 pg    MCHC 34.2 31.5 - 36.5 g/dL    RDW 12.3 10.0 - 15.0 %    Platelet Count 189 150 - 450 10e3/uL        Assessment:     1. Medicare annual wellness visit, subsequent    2. PE (pulmonary thromboembolism) (H)   This occurred 3 years ago.  There is no signs of recurrence.  Continue to monitor.   3. Chronic obstructive pulmonary disease, unspecified COPD type (H)   He has no active symptoms at this time that are new.  Continue to monitor.   4. Primary insomnia    5. AMD (age related macular degeneration)    6. Sensorineural hearing loss (SNHL) of both ears    7. Foreign body of right external ear    8. Ceruminosis, l right        Plan:     We did blood work today.  We are trying him on trazodone for sleep.  We could consider occupational therapy or neuropsych eval for memory testing.  CT previously done in 2021 looked good.  Continue follow-up  with ophthalmology.  The right ear foreign body and wax was removed in its entirety today under direct visualization by myself.  Continue current medications.  Follow up sooner if issues.    Orders Placed This Encounter   Procedures    REMOVE IMPACTED CERUMEN    Comprehensive metabolic panel    CBC with platelets        A personalized health plan based on the identified health risks was provided to the patient on the AVS.       German Mcgregor MD  General Internal Medicine  [facility]    Return in about 1 year (around 3/28/2025) for annual wellness visit, visit and blood work.     Future Appointments   Date Time Provider Department Center   4/16/2024  9:15 AM [facility]         Health Maintenance   Topic Date Due    ANNUAL REVIEW OF  ORDERS  Never done    MEDICARE ANNUAL WELLNESS VISIT  03/28/2025    FALL RISK ASSESSMENT  03/28/2025    LIPID  02/03/2027    ADVANCE CARE PLANNING  03/24/2028    SPIROMETRY  Completed    PHQ-2 (once per calendar year)  Completed    INFLUENZA VACCINE  Completed    Pneumococcal Vaccine: 65+ Years  Completed    ZOSTER IMMUNIZATION  Completed    RSV VACCINE (Pregnancy & 60+)  Completed    COVID-19 Vaccine  Completed    COPD ACTION PLAN  Addressed    IPV IMMUNIZATION  Aged Out    HPV IMMUNIZATION  Aged Out    MENINGITIS IMMUNIZATION  Aged Out    RSV MONOCLONAL ANTIBODY  Aged Out    DTAP/TDAP/TD IMMUNIZATION  Discontinued        ______________________________________________________________________     Additional required elements:    I have reviewed Opioid Use Disorder and Substance Use Disorder risk factors and made any needed referrals.  This may not apply to this individual patient, but this documentation is required by Medicare.    Memory screen:      3/28/2024     3:41 PM   MINI COG   Clock Draw Score 2 Normal   3 Item Recall 2 objects recalled   Mini Cog Total Score 4        PHQ-2 Score:         3/28/2024     3:40 PM  3/17/2023     8:13 PM   PHQ-2 ( 1999 Pfizer)   Q1: Little interest or pleasure in doing things 0 0   Q2: Feeling down, depressed or hopeless 0 0   PHQ-2 Score 0 0   Q1: Little interest or pleasure in doing things Not at all Not at all   Q2: Feeling down, depressed or hopeless Not at all Not at all   PHQ-2 Score 0 0        Advanced care planning reviewed.        3/28/2024   Fall Risk   Fallen 2 or more times in the past year? No   Trouble with walking or balance? No          3/23/2024   Substance Use   Alcohol more than 3/day or more than 7/wk No   Do you have a current opioid prescription? No   How severe/bad is pain from 1 to 10? 0/10 (No Pain)   Do you use any other substances recreationally? No       Last vision screening (if done):       No data to display

## 2024-03-28 NOTE — LETTER
4/1/2024    Nir Guy   [address] [place] [id number]         Dear Nir,    It was good to see you in clinic.  I hope your questions were answered at the time of your visit.    The results of your tests from your visit were as follows:    Resulted Orders   Comprehensive metabolic panel   Result Value Ref Range    Sodium 142 135 - 145 mmol/L    Potassium 4.5 3.4 - 5.3 mmol/L    Carbon Dioxide (CO2) 27 22 - 29 mmol/L    Anion Gap 10 7 - 15 mmol/L    Urea Nitrogen 13.8 8.0 - 23.0 mg/dL    Creatinine 1.00 0.67 - 1.17 mg/dL    GFR Estimate 74 >60 mL/min/1.73m2    Calcium 9.4 8.8 - 10.2 mg/dL    Chloride 105 98 - 107 mmol/L    Glucose 75 70 - 99 mg/dL    Alkaline Phosphatase 66 40 - 150 U/L    AST 23 0 - 45 U/L    ALT 21 0 - 70 U/L    Protein Total 7.1 6.4 - 8.3 g/dL    Albumin 4.5 3.5 - 5.2 g/dL    Bilirubin Total 0.4 <=1.2 mg/dL   CBC with platelets   Result Value Ref Range    WBC Count 7.8 4.0 - 11.0 10e3/uL    RBC Count 4.68 4.40 - 5.90 10e6/uL    Hemoglobin 14.9 13.3 - 17.7 g/dL    Hematocrit 43.6 40.0 - 53.0 %    MCV 93 78 - 100 fL    MCH 31.8 26.5 - 33.0 pg    MCHC 34.2 31.5 - 36.5 g/dL    RDW 12.3 10.0 - 15.0 %    Platelet Count 189 150 - 450 10e3/uL     Your blood counts are normal and you are not anemic.   Your kidney tests are normal.  Your electrolytes are also normal.  There is no signs of diabetes.  Your liver tests are normal.      Please follow up again in 1 year, sooner if issues.     If you have any questions regarding these results, please feel free to contact me at [phone].  I wish you the best of health!      Sincerely,     German Mcgregor MD  General Internal Medicine  [facility]

## 2024-03-29 LAB
ALBUMIN SERPL BCG-MCNC: 4.5 G/DL (ref 3.5–5.2)
ALP SERPL-CCNC: 66 U/L (ref 40–150)
ALT SERPL W P-5'-P-CCNC: 21 U/L (ref 0–70)
ANION GAP SERPL CALCULATED.3IONS-SCNC: 10 MMOL/L (ref 7–15)
AST SERPL W P-5'-P-CCNC: 23 U/L (ref 0–45)
BILIRUB SERPL-MCNC: 0.4 MG/DL
BUN SERPL-MCNC: 13.8 MG/DL (ref 8–23)
CALCIUM SERPL-MCNC: 9.4 MG/DL (ref 8.8–10.2)
CHLORIDE SERPL-SCNC: 105 MMOL/L (ref 98–107)
CREAT SERPL-MCNC: 1 MG/DL (ref 0.67–1.17)
DEPRECATED HCO3 PLAS-SCNC: 27 MMOL/L (ref 22–29)
EGFRCR SERPLBLD CKD-EPI 2021: 74 ML/MIN/1.73M2
GLUCOSE SERPL-MCNC: 75 MG/DL (ref 70–99)
POTASSIUM SERPL-SCNC: 4.5 MMOL/L (ref 3.4–5.3)
PROT SERPL-MCNC: 7.1 G/DL (ref 6.4–8.3)
SODIUM SERPL-SCNC: 142 MMOL/L (ref 135–145)

## 2024-04-10 ENCOUNTER — TELEPHONE (OUTPATIENT)
Dept: INTERNAL MEDICINE | Facility: CLINIC | Age: 84
End: 2024-04-10
Payer: COMMERCIAL

## 2024-04-10 DIAGNOSIS — R41.3 MEMORY PROBLEM: Primary | ICD-10-CM

## 2024-04-10 NOTE — TELEPHONE ENCOUNTER
Order/Referral Request    Who is requesting: Patient    Orders being requested: For memory care    Reason service is needed/diagnosis: Patient had stated in visit on 3/28/24 w/PCP that he was having some issues with his memory and PCP said he would refer pt out for help.    When are orders needed by: As soon as provider can assist    Has this been discussed with Provider: Yes per pt    Does patient have a preference on a Group/Provider/Facility? No    Does patient have an appointment scheduled?: No    Where to send orders: N/A    Could we [facility] this information to you [facility] or would you prefer to receive a phone call?:   Patient would prefer a phone call   Okay to leave a detailed message?: Yes at Cell number on file:    Telephone Information:   Mobile [phone]

## 2024-04-11 NOTE — TELEPHONE ENCOUNTER
Sent in referral to occupational therapy and they will call to schedule an assessment.    German Mcgregor MD  General Internal Medicine  [facility]  4/10/2024, 8:14 PM

## 2024-04-16 ENCOUNTER — OFFICE VISIT (OUTPATIENT)
Dept: DERMATOLOGY | Facility: CLINIC | Age: 84
End: 2024-04-16
Payer: COMMERCIAL

## 2024-04-16 DIAGNOSIS — D18.01 ANGIOMA OF SKIN: ICD-10-CM

## 2024-04-16 DIAGNOSIS — Z85.828 HISTORY OF SKIN CANCER: ICD-10-CM

## 2024-04-16 DIAGNOSIS — L82.0 INFLAMED SEBORRHEIC KERATOSIS: ICD-10-CM

## 2024-04-16 DIAGNOSIS — D23.9 DERMAL NEVUS: Primary | ICD-10-CM

## 2024-04-16 DIAGNOSIS — L82.1 SEBORRHEIC KERATOSES: ICD-10-CM

## 2024-04-16 DIAGNOSIS — L57.0 AK (ACTINIC KERATOSIS): ICD-10-CM

## 2024-04-16 DIAGNOSIS — L81.4 LENTIGO: ICD-10-CM

## 2024-04-16 PROCEDURE — 17003 DESTRUCT PREMALG LES 2-14: CPT | Mod: 59 | Performed by: DERMATOLOGY

## 2024-04-16 PROCEDURE — 99213 OFFICE O/P EST LOW 20 MIN: CPT | Mod: 25 | Performed by: DERMATOLOGY

## 2024-04-16 PROCEDURE — 17000 DESTRUCT PREMALG LESION: CPT | Mod: 59 | Performed by: DERMATOLOGY

## 2024-04-16 PROCEDURE — 17110 DESTRUCTION B9 LES UP TO 14: CPT | Performed by: DERMATOLOGY

## 2024-04-16 ASSESSMENT — PAIN SCALES - GENERAL: PAINLEVEL: NO PAIN (0)

## 2024-04-16 NOTE — NURSING NOTE
Nir Guy's chief complaint for this visit includes:  Chief Complaint   Patient presents with    Skin Check     Patient is here for a skin check and has a spot on left cheek.      PCP: German Mcgregor    Referring Provider:  Chavez Jay MD  [facility] [place] [address]    There were no vitals taken for this visit.  No Pain (0)      No Known Allergies      Do you need any medication refills at today's visit? No    Nancy Chou MA

## 2024-04-16 NOTE — PATIENT INSTRUCTIONS
WOUND CARE INSTRUCTIONS   FOR CRYOSURGERY   This area treated with liquid nitrogen should form a blister (areas treated may or may not blister-skin may just turn dark and slough off). You do not need to bandage the area unless a blister forms and breaks (which may be a few days). When the blister breaks, begin daily dressing changes as follows:  1) Clean and dry the area with tap water using clean Q-tip or sterile gauze pad.   2) Apply Polysporin ointment or Bacitracin ointment over entire wound. Do NOT use Neosporin ointment.   3) Cover the wound with a band-aid or sterile non-stick gauze pad and micropore paper tape.   REPEAT THESE INSTRUCTIONS AT LEAST ONCE A DAY UNTIL THE WOUND HAS COMPLETELY HEALED.   It is an old wives tale that a wound heals better when it is exposed to air and allowed to dry out. The wound will heal faster with a better cosmetic result if it is kept moist with ointment and covered with a bandage.   Do not let the wound dry out.   IMPORTANT INFORMATION ON REVERSE SIDE   Supplies Needed:   *Cotton tipped applicators (Q-tips)   *Polysporin ointment or Bacitracin ointment (NOT NEOSPORIN)   *Band-aids, or non stick gauze pads and micropore paper tape   PATIENT INFORMATION   During the healing process you will notice a number of changes. All wounds develop a small halo of redness surrounding the wound. This means healing is occurring. Severe itching with extensive redness usually indicates sensitivity to the ointment or bandage tape used to dress the wound. You should call our office if this develops.   Swelling and/or discoloration around your surgical site is common, particularly when performed around the eye.   All wounds normally drain. The larger the wound the more drainage there will be. After 7-10 days, you will notice the wound beginning to shrink and new skin will begin to grow. The wound is healed when you can see skin has formed over the entire area. A healed wound has a healthy, shiny  look to the surface and is red to dark pink in color to normalize. Wounds may take approximately 4-6 weeks to heal. Larger wounds may take 6-8 weeks. After the wound is healed you may discontinue dressing changes.   You may experience a sensation of tightness as your wound heals. This is normal and will gradually subside.   Your healed wound may be sensitive to temperature changes. This sensitivity improves with time, but if you re having a lot of discomfort, try to avoid temperature extremes.   Patients frequently experience itching after their wound appears to have healed because of the continue healing under the skin. Plain Vaseline will help relieve the itching.              Patient Education       Proper skin care from [facility]:    -Eliminate harsh soaps as they strip the natural oils from the skin, often resulting in dry itchy skin ( i.e. Dial, Zest, Janeth Spring)  -Use mild soaps such as Cetaphil or Dove Sensitive Skin in the shower. You do not need to use soap on arms, legs, and trunk every time you shower unless visibly soiled.   -Avoid hot or cold showers.  -After showering, lightly dry off and apply moisturizing within 2-3 minutes. This will help trap moisture in the skin.   -Aggressive use of a moisturizer at least 1-2 times a day to the entire body (including -Vanicream, Cetaphil, Aquaphor or Cerave) and moisturize hands after every washing.  -We recommend using moisturizers that come in a tub that needs to be scooped out, not a pump. This has more of an oil base. It will hold moisture in your skin much better than a water base moisturizer. The above recommended are non-pore clogging.      Wear a sunscreen with at least SPF 30 on your face, ears, neck and V of the chest daily. Wear sunscreen on other areas of the body if those areas are exposed to the sun throughout the day. Sunscreens can contain physical and/or chemical blockers. Physical blockers are less likely to clog pores, these  include zinc oxide and titanium dioxide. Reapply every two hour and after swimming.     Sunscreen examples: https://www.ewg.org/sunscreen/    UV radiation  UVA radiation remains constant throughout the day and throughout the year. It is a longer wavelength than UVB and therefore penetrates deeper into the skin leading to immediate and delayed tanning, photoaging, and skin cancer. 70-80% of UVA and UVB radiation occurs between the hours of 10am-2pm.  UVB radiation  UVB radiation causes the most harmful effects and is more significant during the summer months. However, snow and ice can reflect UVB radiation leading to skin damage during the winter months as well. UVB radiation is responsible for tanning, burning, inflammation, delayed erythema (pinkness), pigmentation (brown spots), and skin cancer.     I recommend self monthly full body exams and yearly full body exams with a dermatology provider. If you develop a new or changing lesion please follow up for examination. Most skin cancers are pink and scaly or pink and pearly. However, we do see blue/brown/black skin cancers.  Consider the ABCDEs of melanoma when giving yourself your monthly full body exam ( don't forget the groin, buttocks, feet, toes, etc). A-asymmetry, B-borders, C-color, D-diameter, E-elevation or evolving. If you see any of these changes please follow up in clinic. If you cannot see your back I recommend purchasing a hand held mirror to use with a larger wall mirror.       Checking for Skin Cancer  You can find cancer early by checking your skin each month. There are 3 kinds of skin cancer. They are melanoma, basal cell carcinoma, and squamous cell carcinoma. Doing monthly skin checks is the best way to find new marks or skin changes. Follow the instructions below for checking your skin.   The ABCDEs of checking moles for melanoma   Check your moles or growths for signs of melanoma using ABCDE:   Asymmetry: the sides of the mole or growth don t  match  Border: the edges are ragged, notched, or blurred  Color: the color within the mole or growth varies  Diameter: the mole or growth is larger than 6 mm (size of a pencil eraser)  Evolving: the size, shape, or color of the mole or growth is changing (evolving is not shown in the images below)    Checking for other types of skin cancer  Basal cell carcinoma or squamous cell carcinoma have symptoms such as:     A spot or mole that looks different from all other marks on your skin  Changes in how an area feels, such as itching, tenderness, or pain  Changes in the skin's surface, such as oozing, bleeding, or scaliness  A sore that does not heal  New swelling or redness beyond the border of a mole    Who s at risk?  Anyone can get skin cancer. But you are at greater risk if you have:   Fair skin, light-colored hair, or light-colored eyes  Many moles or abnormal moles on your skin  A history of sunburns from sunlight or tanning beds  A family history of skin cancer  A history of exposure to radiation or chemicals  A weakened immune system  If you have had skin cancer in the past, you are at risk for recurring skin cancer.   How to check your skin  Do your monthly skin checkups in front of a full-length mirror. Check all parts of your body, including your:   Head (ears, face, neck, and scalp)  Torso (front, back, and sides)  Arms (tops, undersides, upper, and lower armpits)  Hands (palms, backs, and fingers, including under the nails)  Buttocks and genitals  Legs (front, back, and sides)  Feet (tops, soles, toes, including under the nails, and between toes)  If you have a lot of moles, take digital photos of them each month. Make sure to take photos both up close and from a distance. These can help you see if any moles change over time.   Most skin changes are not cancer. But if you see any changes in your skin, call your doctor right away. Only he or she can diagnose a problem. If you have skin cancer, seeing your  doctor can be the first step toward getting the treatment that could save your life.   [employer] last reviewed this educational content on 4/1/2019 2000-2020 [employer]. [address] [place] [address], [place] [address]. All rights reserved. This information is not intended as a substitute for professional medical care. Always follow your healthcare professional's instructions.       When should I call my doctor?  If you are worsening or not improving, please, contact us or seek urgent care as noted below.     Who should I call with questions (adults)?  [facility] (adult and pediatric): [phone]  [facility] (adult): [phone]  [facility] ([facility] [place]) [phone]  For urgent needs outside of business hours call the [facility] at [phone] and ask for the dermatology resident on call to be paged  If this is a medical emergency and you are unable to reach an ER, Call [phone]      If you need a prescription refill, please contact your pharmacy. Refills are approved or denied by our Physicians during normal business hours, Monday through Fridays  Per office policy, refills will not be granted if you have not been seen within the past year (or sooner depending on your child's condition)

## 2024-04-16 NOTE — PROGRESS NOTES
Nir Guy is an extremely pleasant 84 year old year old male patient here today for hx of non-melanoma skin cancer.  Patient has no other skin complaints today.  Remainder of the HPI, Meds, PMH, Allergies, FH, and SH was reviewed in chart.      Past Medical History:   Diagnosis Date    Actinic keratosis     AMD (age related macular degeneration)     Basal cell carcinoma     Former smoker     Full code status 01/06/2017    Hyperlipidemia     OA (osteoarthritis)     Osteoarthritis of left patellofemoral joint 01/06/2017    Osteoarthritis of shoulder     RBBB (right bundle branch block)     S/P colonoscopy 04/18/2011    SNHL (sensorineural hearing loss)     Squamous cell carcinoma of skin     Tinnitus     Vertigo        Past Surgical History:   Procedure Laterality Date    BASAL CELL CARCINOMA EXCISION  01/01/1999    Nose and scalp    CATARACT EXTRACTION, BILATERAL  01/01/2018    CATARACT IOL, RT/LT      IR LUMBAR EPIDURAL STEROID INJECTION  01/09/2018    RELEASE CARPAL TUNNEL Right 01/04/2021    Procedure: Revision open carpal tunnel release.;  Surgeon: Nasir Casper MD;  Location: [facility] OR    RELEASE TRIGGER FINGER Left     REPEAT RIGHT CARPAL TUNNEL RELEASE WITH HYPOTHENAR FAT PAD FLAP  12/2021    DR. FINNEY    TOTAL HIP ARTHROPLASTY Right 02/21/2018    Procedure:  RIGHT TOTAL HIP ARTHROPLASTY DIRECT ANTERIOR APPROACH;  Surgeon: Kaushik Hood MD;  Location: [facility] OR;  Service: Orthopedics    VASECTOMY  09/01/1980        Family History   Problem Relation Age of Onset    Melanoma No family hx of     Cancer Mother         GYN    Cancer Father         Lung    Heart Disease Father        Social History     Socioeconomic History    Marital status:      Spouse name: Not on file    Number of children: Not on file    Years of education: Not on file    Highest education level: Not on file   Occupational History     Employer: RETIRED   Tobacco Use    Smoking status: Never    Smokeless tobacco:  Never   Vaping Use    Vaping status: Never Used   Substance and Sexual Activity    Alcohol use: Yes     Alcohol/week: 8.0 standard drinks of alcohol     Comment: Alcoholic Drinks/day: nightly    Drug use: No    Sexual activity: Not on file   Other Topics Concern    Not on file   Social History Narrative    Not on file     Social Determinants of Health     Financial Resource Strain: Low Risk  (3/23/2024)    Financial Resource Strain     Within the past 12 months, have you or your family members you live with been unable to get utilities (heat, electricity) when it was really needed?: No   Food Insecurity: Low Risk  (3/23/2024)    Food Insecurity     Within the past 12 months, did you worry that your food would run out before you got money to buy more?: No     Within the past 12 months, did the food you bought just not last and you didn t have money to get more?: No   Transportation Needs: Low Risk  (3/23/2024)    Transportation Needs     Within the past 12 months, has lack of transportation kept you from medical appointments, getting your medicines, non-medical meetings or appointments, work, or from getting things that you need?: No   Physical Activity: Sufficiently Active (3/23/2024)    Exercise Vital Sign     Days of Exercise per Week: 7 days     Minutes of Exercise per Session: 30 min   Stress: No Stress Concern Present (3/23/2024)    [employer] - Occupational Stress Questionnaire     Feeling of Stress : Only a little   Social Connections: Unknown (3/23/2024)    Social Connection and Isolation Panel [NHANES]     Frequency of Communication with Friends and Family: Not on file     Frequency of Social Gatherings with Friends and Family: Once a week     Attends Restoration Services: Not on file     Active Member of Clubs or Organizations: Not on file     Attends Club or Organization Meetings: Not on file     Marital Status: Not on file   Interpersonal Safety: Low Risk  (3/28/2024)     Interpersonal Safety     Do you feel physically and emotionally safe where you currently live?: Yes     Within the past 12 months, have you been hit, slapped, kicked or otherwise physically hurt by someone?: No     Within the past 12 months, have you been humiliated or emotionally abused in other ways by your partner or ex-partner?: No   Housing Stability: Low Risk  (3/23/2024)    Housing Stability     Do you have housing? : Yes     Are you worried about losing your housing?: No       Outpatient Encounter Medications as of 4/16/2024   Medication Sig Dispense Refill    fish oil-omega-3 fatty acids 1000 MG capsule Take 2 g by mouth daily      Melatonin 10 MG TABS tablet Take 10 mg by mouth nightly as needed for sleep      Multiple Vitamins-Minerals (MENS MULTIVITAMIN PLUS PO)       polyethylene glycol-propylene glycol PF (SYSTANE HYDRATION PF) 0.4-0.3 % SOLN opthalmic solution Apply 1 drop to eye      traZODone (DESYREL) 50 MG tablet Take 1-2 tablets ( mg) by mouth nightly as needed for sleep 60 tablet 5    Vitamin D3 (CHOLECALCIFEROL) 25 mcg (1000 units) tablet Take 25 mcg by mouth daily       No facility-administered encounter medications on file as of 4/16/2024.             O:   NAD, WDWN, Alert & Oriented, Mood & Affect wnl, Vitals stable   General appearance normal   Vitals stable   Alert, oriented and in no acute distress     L cheek inflamed seborrheic keratosis   Gritty scaly papule on face and scalp    Stuck on papules and brown macules on trunk and ext   Red papules on trunk  Flesh colored papules on trunk     The remainder of the full exam was normal; the following areas were examined:  conjunctiva/lids, , neck, peripheral vascular system, abdomen, lymph nodes, digits/nails, eccrine and apocrine glands, scalp/hair, face, neck, chest, abdomen, buttocks, back, RUE, LUE, RLE, LLE       Eyes: Conjunctivae/lids:Normal     ENT: Lips, buccal mucosa, tongue: normal    MSK:Normal    Cardiovascular: peripheral  edema none    Pulm: Breathing Normal    Lymph Nodes: No Head and Neck Lymphadenopathy     Neuro/Psych: Orientation:Alert and Orientedx3 ; Mood/Affect:normal       A/P:  1. Seborrheic keratosis, lentigo, angioma, dermal nevus, hx of non-melanoma skin cancer   2. L cheek inflamed seborrheic keratosis   LN2:  Treated with LN2 for 5s for 1-2 cycles. Warned risks of blistering, pain, pigment change, scarring, and incomplete resolution.  Advised patient to return if lesions do not completely resolve.  Wound care sheet given.  3. Actinic keratosis   Scalp x4, L cheek x4  R cheek x3  LN2:  Treated with LN2 for 5s for 1-2 cycles. Warned risks of blistering, pain, pigment change, scarring, and incomplete resolution.  Advised patient to return if lesions do not completely resolve.  Wound care sheet given.  It was a pleasure speaking to Nir Guy today.  Previous clinic notes and pertinent laboratory tests were reviewed prior to Nir Guy's visit.  Signs and Symptoms of skin cancer discussed with patient.  Patient encouraged to perform monthly skin exams.  UV precautions reviewed with patient.  Risks of non-melanoma skin cancer discussed with patient   Return to clinic 6 months     No

## 2024-04-16 NOTE — LETTER
4/16/2024         RE: Nir Guy  [address] [place] [id number]        Dear Colleague,    Thank you for referring your patient, Nir Guy, to the [facility]. Please see a copy of my visit note below.    Nir Guy is an extremely pleasant 84 year old year old male patient here today for hx of non-melanoma skin cancer.  Patient has no other skin complaints today.  Remainder of the HPI, Meds, PMH, Allergies, FH, and SH was reviewed in chart.      Past Medical History:   Diagnosis Date     Actinic keratosis      AMD (age related macular degeneration)      Basal cell carcinoma      Former smoker      Full code status 01/06/2017     Hyperlipidemia      OA (osteoarthritis)      Osteoarthritis of left patellofemoral joint 01/06/2017     Osteoarthritis of shoulder      RBBB (right bundle branch block)      S/P colonoscopy 04/18/2011     SNHL (sensorineural hearing loss)      Squamous cell carcinoma of skin      Tinnitus      Vertigo        Past Surgical History:   Procedure Laterality Date     BASAL CELL CARCINOMA EXCISION  01/01/1999    Nose and scalp     CATARACT EXTRACTION, BILATERAL  01/01/2018     CATARACT IOL, RT/LT       IR LUMBAR EPIDURAL STEROID INJECTION  01/09/2018     RELEASE CARPAL TUNNEL Right 01/04/2021    Procedure: Revision open carpal tunnel release.;  Surgeon: Nasir Casper MD;  Location: [facility]     RELEASE TRIGGER FINGER Left      REPEAT RIGHT CARPAL TUNNEL RELEASE WITH HYPOTHENAR FAT PAD FLAP  12/2021    DR. FINNEY     TOTAL HIP ARTHROPLASTY Right 02/21/2018    Procedure:  RIGHT TOTAL HIP ARTHROPLASTY DIRECT ANTERIOR APPROACH;  Surgeon: Kaushik Hood MD;  Location: [facility] OR;  Service: Orthopedics     VASECTOMY  09/01/1980        Family History   Problem Relation Age of Onset     Melanoma No family hx of      Cancer Mother         GYN     Cancer Father         Lung     Heart Disease Father        Social History     Socioeconomic  History     Marital status:      Spouse name: Not on file     Number of children: Not on file     Years of education: Not on file     Highest education level: Not on file   Occupational History     Employer: RETIRED   Tobacco Use     Smoking status: Never     Smokeless tobacco: Never   Vaping Use     Vaping status: Never Used   Substance and Sexual Activity     Alcohol use: Yes     Alcohol/week: 8.0 standard drinks of alcohol     Comment: Alcoholic Drinks/day: nightly     Drug use: No     Sexual activity: Not on file   Other Topics Concern     Not on file   Social History Narrative     Not on file     Social Determinants of Health     Financial Resource Strain: Low Risk  (3/23/2024)    Financial Resource Strain      Within the past 12 months, have you or your family members you live with been unable to get utilities (heat, electricity) when it was really needed?: No   Food Insecurity: Low Risk  (3/23/2024)    Food Insecurity      Within the past 12 months, did you worry that your food would run out before you got money to buy more?: No      Within the past 12 months, did the food you bought just not last and you didn t have money to get more?: No   Transportation Needs: Low Risk  (3/23/2024)    Transportation Needs      Within the past 12 months, has lack of transportation kept you from medical appointments, getting your medicines, non-medical meetings or appointments, work, or from getting things that you need?: No   Physical Activity: Sufficiently Active (3/23/2024)    Exercise Vital Sign      Days of Exercise per Week: 7 days      Minutes of Exercise per Session: 30 min   Stress: No Stress Concern Present (3/23/2024)    [employer] - Occupational Stress Questionnaire      Feeling of Stress : Only a little   Social Connections: Unknown (3/23/2024)    Social Connection and Isolation Panel [NHANES]      Frequency of Communication with Friends and Family: Not on file       Frequency of Social Gatherings with Friends and Family: Once a week      Attends Mu-ism Services: Not on file      Active Member of Clubs or Organizations: Not on file      Attends Club or Organization Meetings: Not on file      Marital Status: Not on file   Interpersonal Safety: Low Risk  (3/28/2024)    Interpersonal Safety      Do you feel physically and emotionally safe where you currently live?: Yes      Within the past 12 months, have you been hit, slapped, kicked or otherwise physically hurt by someone?: No      Within the past 12 months, have you been humiliated or emotionally abused in other ways by your partner or ex-partner?: No   Housing Stability: Low Risk  (3/23/2024)    Housing Stability      Do you have housing? : Yes      Are you worried about losing your housing?: No       Outpatient Encounter Medications as of 4/16/2024   Medication Sig Dispense Refill     fish oil-omega-3 fatty acids 1000 MG capsule Take 2 g by mouth daily       Melatonin 10 MG TABS tablet Take 10 mg by mouth nightly as needed for sleep       Multiple Vitamins-Minerals (MENS MULTIVITAMIN PLUS PO)        polyethylene glycol-propylene glycol PF (SYSTANE HYDRATION PF) 0.4-0.3 % SOLN opthalmic solution Apply 1 drop to eye       traZODone (DESYREL) 50 MG tablet Take 1-2 tablets ( mg) by mouth nightly as needed for sleep 60 tablet 5     Vitamin D3 (CHOLECALCIFEROL) 25 mcg (1000 units) tablet Take 25 mcg by mouth daily       No facility-administered encounter medications on file as of 4/16/2024.             O:   NAD, WDWN, Alert & Oriented, Mood & Affect wnl, Vitals stable   General appearance normal   Vitals stable   Alert, oriented and in no acute distress     L cheek inflamed seborrheic keratosis   Gritty scaly papule on face and scalp    Stuck on papules and brown macules on trunk and ext   Red papules on trunk  Flesh colored papules on trunk     The remainder of the full exam was normal; the following areas were examined:   conjunctiva/lids, , neck, peripheral vascular system, abdomen, lymph nodes, digits/nails, eccrine and apocrine glands, scalp/hair, face, neck, chest, abdomen, buttocks, back, RUE, LUE, RLE, LLE       Eyes: Conjunctivae/lids:Normal     ENT: Lips, buccal mucosa, tongue: normal    MSK:Normal    Cardiovascular: peripheral edema none    Pulm: Breathing Normal    Lymph Nodes: No Head and Neck Lymphadenopathy     Neuro/Psych: Orientation:Alert and Orientedx3 ; Mood/Affect:normal       A/P:  1. Seborrheic keratosis, lentigo, angioma, dermal nevus, hx of non-melanoma skin cancer   2. L cheek inflamed seborrheic keratosis   LN2:  Treated with LN2 for 5s for 1-2 cycles. Warned risks of blistering, pain, pigment change, scarring, and incomplete resolution.  Advised patient to return if lesions do not completely resolve.  Wound care sheet given.  3. Actinic keratosis   Scalp x4, L cheek x4  R cheek x3  LN2:  Treated with LN2 for 5s for 1-2 cycles. Warned risks of blistering, pain, pigment change, scarring, and incomplete resolution.  Advised patient to return if lesions do not completely resolve.  Wound care sheet given.  It was a pleasure speaking to Nir Guy today.  Previous clinic notes and pertinent laboratory tests were reviewed prior to Nir Guy's visit.  Signs and Symptoms of skin cancer discussed with patient.  Patient encouraged to perform monthly skin exams.  UV precautions reviewed with patient.  Risks of non-melanoma skin cancer discussed with patient   Return to clinic 6 months      Again, thank you for allowing me to participate in the care of your patient.        Sincerely,        Chavez Jay MD

## 2024-06-14 ENCOUNTER — OFFICE VISIT (OUTPATIENT)
Dept: INTERNAL MEDICINE | Facility: CLINIC | Age: 84
End: 2024-06-14
Payer: COMMERCIAL

## 2024-06-14 DIAGNOSIS — F51.01 PRIMARY INSOMNIA: ICD-10-CM

## 2024-06-14 DIAGNOSIS — G60.9 IDIOPATHIC PERIPHERAL NEUROPATHY: ICD-10-CM

## 2024-06-14 DIAGNOSIS — R41.3 MEMORY PROBLEM: Primary | ICD-10-CM

## 2024-06-14 PROCEDURE — G2211 COMPLEX E/M VISIT ADD ON: HCPCS | Performed by: INTERNAL MEDICINE

## 2024-06-14 PROCEDURE — 99214 OFFICE O/P EST MOD 30 MIN: CPT | Performed by: INTERNAL MEDICINE

## 2024-06-14 ASSESSMENT — PAIN SCALES - GENERAL: PAINLEVEL: NO PAIN (0)

## 2024-06-14 NOTE — PROGRESS NOTES
[facility]    Comprehensive and complex medical care - Chronic disease management - Shared decision making - Care coordination - Compassionate care    Patient advocacy - Rational deprescribing - Minimally disruptive medicine - Ethical focus - Customized care         Date of Service: 6/14/2024  Primary Provider: German Mcgregor    Patient Care Team:  German Mcgregor MD as PCP - General (Internal Medicine)  Chavez Jay MD as Assigned Surgical Provider  German Mcgregor MD as Assigned PCP  Jacky Zayas MD as MD (Ophthalmology)  Rehan Victor MD as MD (Orthopaedic Surgery)  Rob Mendoza MD as MD (Orthopaedic Surgery)  Nasir Casper MD as MD (Orthopaedic Surgery)          Patient's Pharmacy:    [facility]. - [address] [address]  Phone: [phone] Fax: [phone]     Patient's Contacts:  Name Home Phone Work Phone Mobile Phone Relationship Lgl Grd   MELITON QUEEN [phone]   Significant*    MELITON QUEEN [phone] Significant*      Patient's Insurance:    Payor: [employer] / Plan: [employer] / Product Type: HMO /            Active Problem List:  Problem List as of 6/14/2024 Reviewed: 3/28/2024  6:31 PM by German Mcgregor MD         High    Former smoker - Quit in 1998    Full code status - 2017    COPD (chronic obstructive pulmonary disease) (H)       Medium    Osteoarthritis of shoulder    Osteoarthritis of left patellofemoral joint    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    Pericardial tamponade    PE (pulmonary thromboembolism) (H)    Pericardial effusion       Low    HLD (hyperlipidemia)    AMD (age related macular degeneration)    Vertigo    SNHL (sensorineural hearing loss)    Tinnitus    Intermediate stage nonexudative age-related macular degeneration of both eyes    RBBB (right bundle branch  block)    Spinal stenosis of lumbar region with neurogenic claudication    Primary insomnia        Current Outpatient Medications   Medication Instructions    fish oil-omega-3 fatty acids 2 g, Oral, DAILY    Melatonin 10 mg, Oral, AT BEDTIME PRN    Multiple Vitamins-Minerals (MENS MULTIVITAMIN PLUS PO) Oral    polyethylene glycol-propylene glycol PF (SYSTANE HYDRATION PF) 0.4-0.3 % SOLN opthalmic solution 1 drop, Ophthalmic    traZODone (DESYREL)  mg, Oral, AT BEDTIME PRN    Vitamin D3 (CHOLECALCIFEROL) 25 mcg, Oral, DAILY      Social History     Social History Narrative    Not on file       Subjective:     Nir Guy is a 84 year old male who comes in today for:    Chief Complaint   Patient presents with    Referral Request     Memory referral     Memory Concerns          6/14/2024     3:55 PM   Additional Questions   Roomed by JENNIFER Dhaliwal   Accompanied by Self     Mainly in to discuss memory issues again.      Has noted increased memory issues in the last 1-2 years.  He has had this complaint during this time.  His SO notes it as well.    Does have difficulty remembering names and things to do.    We previously ordered occupational therapy evaluation and this was cancelled by the patient.    We did further testing today related to this.    Outpatient blood pressures doing well.    Did not like trazodone for sleep due to some hangover symptoms.  Is taking the melatonin at this time with good outcomes.    Does note loss of sensation in the toes of both feet at this time without pain.    We reviewed his other issues noted in the assessment but not specifically addressed in the HPI above.     Objective:     Wt Readings from Last 3 Encounters:   06/14/24 99.7 kg (219 lb 11.2 oz)   03/28/24 98.6 kg (217 lb 4.8 oz)   05/23/23 98.9 kg (218 lb 1.6 oz)     BP Readings from Last 3 Encounters:   06/15/24 132/76   03/28/24 138/80   12/07/23 136/72     /76   Pulse 67   Temp 98  F (36.7  C) (Oral)   Resp  "16   Ht 1.88 m (6' 2\")   Wt 99.7 kg (219 lb 11.2 oz)   SpO2 98%   BMI 28.21 kg/m     The patient is comfortable, no acute distress.  Mood good.  Insight good.  Eyes are nonicteric.  Neck is supple without mass.  No cervical adenopathy.  No thyromegaly. Heart regular rate and rhythm.  Lungs clear to auscultation bilaterally.  Respiratory effort is good.  Extremities no edema.  No parkinsonian feature.    MoCA done today and this revealed a score of 24/30.   Missed sequential A-1-B-2 and did not remember 5 words (0/5).    Diagnostics:     Office Visit on 03/28/2024   Component Date Value Ref Range Status    Sodium 03/28/2024 142  135 - 145 mmol/L Final    Reference intervals for this test were updated on 09/26/2023 to more accurately reflect our healthy population. There may be differences in the flagging of prior results with similar values performed with this method. Interpretation of those prior results can be made in the context of the updated reference intervals.     Potassium 03/28/2024 4.5  3.4 - 5.3 mmol/L Final    Carbon Dioxide (CO2) 03/28/2024 27  22 - 29 mmol/L Final    Anion Gap 03/28/2024 10  7 - 15 mmol/L Final    Urea Nitrogen 03/28/2024 13.8  8.0 - 23.0 mg/dL Final    Creatinine 03/28/2024 1.00  0.67 - 1.17 mg/dL Final    GFR Estimate 03/28/2024 74  >60 mL/min/1.73m2 Final    Calcium 03/28/2024 9.4  8.8 - 10.2 mg/dL Final    Chloride 03/28/2024 105  98 - 107 mmol/L Final    Glucose 03/28/2024 75  70 - 99 mg/dL Final    Alkaline Phosphatase 03/28/2024 66  40 - 150 U/L Final    Reference intervals for this test were updated on 11/14/2023 to more accurately reflect our healthy population. There may be differences in the flagging of prior results with similar values performed with this method. Interpretation of those prior results can be made in the context of the updated reference intervals.    AST 03/28/2024 23  0 - 45 U/L Final    Reference intervals for this test were updated on 6/12/2023 to more " accurately reflect our healthy population. There may be differences in the flagging of prior results with similar values performed with this method. Interpretation of those prior results can be made in the context of the updated reference intervals.    ALT 03/28/2024 21  0 - 70 U/L Final    Reference intervals for this test were updated on 6/12/2023 to more accurately reflect our healthy population. There may be differences in the flagging of prior results with similar values performed with this method. Interpretation of those prior results can be made in the context of the updated reference intervals.      Protein Total 03/28/2024 7.1  6.4 - 8.3 g/dL Final    Albumin 03/28/2024 4.5  3.5 - 5.2 g/dL Final    Bilirubin Total 03/28/2024 0.4  <=1.2 mg/dL Final    WBC Count 03/28/2024 7.8  4.0 - 11.0 10e3/uL Final    RBC Count 03/28/2024 4.68  4.40 - 5.90 10e6/uL Final    Hemoglobin 03/28/2024 14.9  13.3 - 17.7 g/dL Final    Hematocrit 03/28/2024 43.6  40.0 - 53.0 % Final    MCV 03/28/2024 93  78 - 100 fL Final    MCH 03/28/2024 31.8  26.5 - 33.0 pg Final    MCHC 03/28/2024 34.2  31.5 - 36.5 g/dL Final    RDW 03/28/2024 12.3  10.0 - 15.0 % Final    Platelet Count 03/28/2024 189  150 - 450 10e3/uL Final       No results found for any visits on 06/14/24.     Assessment:     1. Memory problem    2. Primary insomnia    3. Idiopathic peripheral neuropathy        Plan:     He has memory loss and this is likely more age related memory loss than Alzheimer's dementia or other dementia at this time.  We will order a MRI scan of the brain.  Could see neurology or neuropsychology in the future if wishes.  Continue current medications otherwise.  Follow up sooner if issues.    Orders Placed This Encounter   Procedures    MR Brain w/o Contrast        30 minutes or greater was spent today on the patient's care on the day of service.      This includes time for chart preparation, reviewing medical tests done before or during the visit,  talking with the patient, review of quality indicators, required documentation, and other elements of care.        German Mcgregor MD  General Internal Medicine  [facility]    The longitudinal plan of care for the diagnoses and conditions as documented were addressed during this visit. Due to the added complexity in care, I will continue to support Nir in the subsequent management and with ongoing continuity of care.     Return in about 10 months (around 4/8/2025) for annual wellness visit.     Future Appointments   Date Time Provider Department Center   10/22/2024  9:30 AM [facility]   4/8/2025 10:30 AM German Mcgregor MD [facility]

## 2024-06-15 VITALS
BODY MASS INDEX: 28.19 KG/M2 | HEART RATE: 67 BPM | WEIGHT: 219.7 LBS | RESPIRATION RATE: 16 BRPM | OXYGEN SATURATION: 98 % | TEMPERATURE: 98 F | HEIGHT: 74 IN | SYSTOLIC BLOOD PRESSURE: 132 MMHG | DIASTOLIC BLOOD PRESSURE: 76 MMHG

## 2024-06-15 PROBLEM — G60.9 IDIOPATHIC PERIPHERAL NEUROPATHY: Status: ACTIVE | Noted: 2024-06-15

## 2024-06-15 NOTE — PATIENT INSTRUCTIONS
Future Appointments   Date Time Provider Department Center   10/22/2024  9:30 AM Chavez Jay MD [facility]   4/8/2025 10:30 AM [facility]

## 2024-07-03 ENCOUNTER — HOSPITAL ENCOUNTER (OUTPATIENT)
Dept: MRI IMAGING | Facility: CLINIC | Age: 84
Discharge: HOME | End: 2024-07-03
Attending: INTERNAL MEDICINE | Admitting: INTERNAL MEDICINE
Payer: COMMERCIAL

## 2024-07-03 DIAGNOSIS — R41.3 MEMORY PROBLEM: ICD-10-CM

## 2024-07-03 PROCEDURE — 70551 MRI BRAIN STEM W/O DYE: CPT

## 2024-07-05 ENCOUNTER — TELEPHONE (OUTPATIENT)
Dept: INTERNAL MEDICINE | Facility: CLINIC | Age: 84
End: 2024-07-05
Payer: COMMERCIAL

## 2024-07-05 DIAGNOSIS — G93.81 MESIAL TEMPORAL SCLEROSIS: ICD-10-CM

## 2024-07-05 DIAGNOSIS — R41.3 MEMORY PROBLEM: Primary | ICD-10-CM

## 2024-07-05 DIAGNOSIS — R90.89 ABNORMAL BRAIN MRI: ICD-10-CM

## 2024-07-05 NOTE — TELEPHONE ENCOUNTER
FYI - Status Update    Who is Calling: patient    Update: Patient called to schedule neurology appointment. Was told they are booked out until December. They checked [facility], [facility] and all were booked out. Referral says 1-2 weeks. Patient is wondering what he should do. Would like to stay within  if possible.     Does caller want a call/response back: Yes     Could we send this information to you in [employer] or would you prefer to receive a phone call?:   Patient would prefer a phone call   Okay to leave a detailed message?: Yes at Cell number on file:    Telephone Information:   Mobile [phone]

## 2024-07-05 NOTE — TELEPHONE ENCOUNTER
Please call patient -    ______________________________________________________________________     Home Phone:  [phone] (home)     Cell phone:   Telephone Information:   Mobile [phone]     ______________________________________________________________________     We did get the results of his MRI scan.    Overall the MRI scan looked good.  No signs of abnormal pressure in the brain.  No recent stroke and no signs of brain tumor.    There are some changes in the left side of the brain near the hippocampus.  I am not certain of the significance of this but it may affect the memory.    I am going to place a referral to neurology to look at this further.  It can take some time to get in so let me know if there are issues, especially if it takes more than 3 months.    In this case, we might recommend he see [facility] neurological clinic or [facility] of neurology instead.     German Mcgregor MD  [facility]  7/5/2024, 9:29 AM   ______________________________________________________________________    Pertinent radiology for this visit includes the following:    MR Brain w/o Contrast  Narrative: MRI BRAIN WITHOUT CONTRAST  7/3/2024 11:09 AM    HISTORY: Memory problem.    TECHNIQUE:  Multiplanar, multisequence MRI of the brain without  gadolinium IV contrast material.      COMPARISON: Head CT 3/18/2021.    FINDINGS:   Volume loss is present with left mesiotemporal lobe predilection. The  left hippocampus appears asymmetrically small and T2 hyperintense.  Associated left anterior temporal T2 FLAIR hyperintensity is present.  Scattered frontal parietal predominant white matter T2  hyperintensities likely represent chronic small vessel ischemic  change. No evidence of acute ischemia, hemorrhage, mass, or  hydrocephalus.    Marrow signal is within normal limits. Mild paranasal sinus mucosal  thickening with probable right maxillary sinus retention cysts. The  visualized tympanic and  mastoid cavities appear relatively well  aerated.  Impression: IMPRESSION:    1. No evidence of acute ischemia or hemorrhage.  2. Volume loss and scattered nonspecific white matter T2  hyperintensities likely representing chronic small ischemic change,  worsened compared to the prior.  3. Focal volume loss and T2 hyperintense signal involving the left  mesial temporal lobe which may be related to prior seizure activity  (possible mesiotemporal sclerosis), encephalitis, or trauma, new  compared to the prior.    LETY CONRAD MD         SYSTEM ID:  [id number]      ______________________________________________________________________

## 2024-07-05 NOTE — TELEPHONE ENCOUNTER
Called pt and reviewed message below. Pt verbalized understanding and thanked for the call.     Pt states will reach back out to the clinic if Neuro is 3+ months out for scheduling.     Pt thanked for call.

## 2024-07-06 NOTE — TELEPHONE ENCOUNTER
It looks like they will try to get him into another clinic we work with in our system to get him in sooner.  This still could take a couple of months, but is not urgent.    German Mcgregor MD  General Internal Medicine  [facility]  7/5/2024, 10:44 PM

## 2024-07-11 ENCOUNTER — TRANSFERRED RECORDS (OUTPATIENT)
Dept: HEALTH INFORMATION MANAGEMENT | Facility: CLINIC | Age: 84
End: 2024-07-11
Payer: COMMERCIAL

## 2024-07-17 ENCOUNTER — TRANSFERRED RECORDS (OUTPATIENT)
Dept: HEALTH INFORMATION MANAGEMENT | Facility: CLINIC | Age: 84
End: 2024-07-17
Payer: COMMERCIAL

## 2024-10-22 ENCOUNTER — OFFICE VISIT (OUTPATIENT)
Dept: DERMATOLOGY | Facility: CLINIC | Age: 84
End: 2024-10-22
Payer: COMMERCIAL

## 2024-10-22 ENCOUNTER — TELEPHONE (OUTPATIENT)
Dept: DERMATOLOGY | Facility: CLINIC | Age: 84
End: 2024-10-22

## 2024-10-22 DIAGNOSIS — D04.4 SQUAMOUS CELL CARCINOMA IN SITU (SCCIS) OF SCALP: ICD-10-CM

## 2024-10-22 DIAGNOSIS — L57.0 AK (ACTINIC KERATOSIS): ICD-10-CM

## 2024-10-22 DIAGNOSIS — C44.311 BASAL CELL CARCINOMA (BCC) OF LEFT SIDE OF NOSE: ICD-10-CM

## 2024-10-22 DIAGNOSIS — L82.1 SEBORRHEIC KERATOSES: ICD-10-CM

## 2024-10-22 DIAGNOSIS — L81.4 LENTIGO: ICD-10-CM

## 2024-10-22 DIAGNOSIS — Z85.828 HISTORY OF SKIN CANCER: ICD-10-CM

## 2024-10-22 DIAGNOSIS — D18.01 ANGIOMA OF SKIN: ICD-10-CM

## 2024-10-22 DIAGNOSIS — D23.9 DERMAL NEVUS: Primary | ICD-10-CM

## 2024-10-22 DIAGNOSIS — C44.42 SQUAMOUS CELL CARCINOMA OF SCALP: ICD-10-CM

## 2024-10-22 DIAGNOSIS — C44.329 SQUAMOUS CELL CANCER OF SKIN OF LEFT CHEEK: ICD-10-CM

## 2024-10-22 DIAGNOSIS — C44.42 SQUAMOUS CELL CARCINOMA OF SCALP: Primary | ICD-10-CM

## 2024-10-22 PROCEDURE — 88331 PATH CONSLTJ SURG 1 BLK 1SPC: CPT | Performed by: DERMATOLOGY

## 2024-10-22 PROCEDURE — 17000 DESTRUCT PREMALG LESION: CPT | Mod: 59 | Performed by: DERMATOLOGY

## 2024-10-22 PROCEDURE — 99213 OFFICE O/P EST LOW 20 MIN: CPT | Mod: 25 | Performed by: DERMATOLOGY

## 2024-10-22 PROCEDURE — 11103 TANGNTL BX SKIN EA SEP/ADDL: CPT | Performed by: DERMATOLOGY

## 2024-10-22 PROCEDURE — 17003 DESTRUCT PREMALG LES 2-14: CPT | Performed by: DERMATOLOGY

## 2024-10-22 PROCEDURE — 11102 TANGNTL BX SKIN SINGLE LES: CPT | Performed by: DERMATOLOGY

## 2024-10-22 NOTE — LETTER
10/22/2024      Nir Guy  [address] [place] [id number]      Dear Colleague,    Thank you for referring your patient, Nir Guy, to the [facility]. Please see a copy of my visit note below.    Nir Guy is an extremely pleasant 84 year old year old male patient here today for hx of non-melanoma skin cancer.  Patient has no other skin complaints today.  Remainder of the HPI, Meds, PMH, Allergies, FH, and SH was reviewed in chart.      Past Medical History:   Diagnosis Date     Actinic keratosis      AMD (age related macular degeneration)      Basal cell carcinoma      Former smoker      Full code status 01/06/2017     Hyperlipidemia      OA (osteoarthritis)      Osteoarthritis of left patellofemoral joint 01/06/2017     Osteoarthritis of shoulder      RBBB (right bundle branch block)      S/P colonoscopy 04/18/2011     SNHL (sensorineural hearing loss)      Squamous cell carcinoma of skin      Tinnitus      Vertigo        Past Surgical History:   Procedure Laterality Date     BASAL CELL CARCINOMA EXCISION  01/01/1999    Nose and scalp     CATARACT EXTRACTION, BILATERAL  01/01/2018     CATARACT IOL, RT/LT       IR LUMBAR EPIDURAL STEROID INJECTION  01/09/2018     RELEASE CARPAL TUNNEL Right 01/04/2021    Procedure: Revision open carpal tunnel release.;  Surgeon: Nasir Casper MD;  Location: [facility]     RELEASE TRIGGER FINGER Left      REPEAT RIGHT CARPAL TUNNEL RELEASE WITH HYPOTHENAR FAT PAD FLAP  12/2021    DR. FINNEY     TOTAL HIP ARTHROPLASTY Right 02/21/2018    Procedure:  RIGHT TOTAL HIP ARTHROPLASTY DIRECT ANTERIOR APPROACH;  Surgeon: Kaushik Hood MD;  Location: [facility];  Service: Orthopedics     VASECTOMY  09/01/1980        Family History   Problem Relation Age of Onset     Melanoma No family hx of      Cancer Mother         GYN     Cancer Father         Lung     Heart Disease Father        Social History     Socioeconomic History      Marital status:      Spouse name: Not on file     Number of children: Not on file     Years of education: Not on file     Highest education level: Not on file   Occupational History     Employer: RETIRED   Tobacco Use     Smoking status: Never     Smokeless tobacco: Never   Vaping Use     Vaping status: Never Used   Substance and Sexual Activity     Alcohol use: Yes     Alcohol/week: 8.0 standard drinks of alcohol     Comment: Alcoholic Drinks/day: nightly     Drug use: No     Sexual activity: Not on file   Other Topics Concern     Not on file   Social History Narrative     Not on file     Social Determinants of Health     Financial Resource Strain: Low Risk  (3/23/2024)    Financial Resource Strain      Within the past 12 months, have you or your family members you live with been unable to get utilities (heat, electricity) when it was really needed?: No   Food Insecurity: Low Risk  (3/23/2024)    Food Insecurity      Within the past 12 months, did you worry that your food would run out before you got money to buy more?: No      Within the past 12 months, did the food you bought just not last and you didn t have money to get more?: No   Transportation Needs: Low Risk  (3/23/2024)    Transportation Needs      Within the past 12 months, has lack of transportation kept you from medical appointments, getting your medicines, non-medical meetings or appointments, work, or from getting things that you need?: No   Physical Activity: Sufficiently Active (3/23/2024)    Exercise Vital Sign      Days of Exercise per Week: 7 days      Minutes of Exercise per Session: 30 min   Stress: No Stress Concern Present (3/23/2024)    [employer] [place] [employer] - Occupational Stress Questionnaire      Feeling of Stress : Only a little   Social Connections: Unknown (3/23/2024)    Social Connection and Isolation Panel [NHANES]      Frequency of Communication with Friends and Family: Not on file      Frequency of Social  Gatherings with Friends and Family: Once a week      Attends Nondenominational Services: Not on file      Active Member of Clubs or Organizations: Not on file      Attends Club or Organization Meetings: Not on file      Marital Status: Not on file   Interpersonal Safety: Low Risk  (3/28/2024)    Interpersonal Safety      Do you feel physically and emotionally safe where you currently live?: Yes      Within the past 12 months, have you been hit, slapped, kicked or otherwise physically hurt by someone?: No      Within the past 12 months, have you been humiliated or emotionally abused in other ways by your partner or ex-partner?: No   Housing Stability: Low Risk  (3/23/2024)    Housing Stability      Do you have housing? : Yes      Are you worried about losing your housing?: No       Outpatient Encounter Medications as of 10/22/2024   Medication Sig Dispense Refill     fish oil-omega-3 fatty acids 1000 MG capsule Take 2 g by mouth daily       Melatonin 10 MG TABS tablet Take 10 mg by mouth nightly as needed for sleep       Multiple Vitamins-Minerals (MENS MULTIVITAMIN PLUS PO)        polyethylene glycol-propylene glycol PF (SYSTANE HYDRATION PF) 0.4-0.3 % SOLN opthalmic solution Apply 1 drop to eye       Vitamin D3 (CHOLECALCIFEROL) 25 mcg (1000 units) tablet Take 25 mcg by mouth daily       No facility-administered encounter medications on file as of 10/22/2024.             O:   NAD, WDWN, Alert & Oriented, Mood & Affect wnl, Vitals stable   General appearance normal   Vitals stable   Alert, oriented and in no acute distress      Following lymph nodes palpated: Occipital, Cervical, Supraclavicular no lad  R frontal scalp scaly plaque 8mm  L parietal scalp scaly plaque 1cm   L sideburn crusted scaly papule 6mm   L nasal tip crusted scaly papule 6mm   Gritty scaly papule on scalp and face     Stuck on papules and brown macules on trunk and ext   Red papules on trunk  Flesh colored papules on trunk     The remainder of the full  exam was normal; the following areas were examined:  conjunctiva/lids, , neck, peripheral vascular system, abdomen, lymph nodes, digits/nails, eccrine and apocrine glands, scalp/hair, face, neck, chest, abdomen, buttocks, back, RUE, LUE, RLE, LLE       Eyes: Conjunctivae/lids:Normal     ENT: Lips, mucosa: normal    MSK:Normal    Cardiovascular: peripheral edema none    Pulm: Breathing Normal    Lymph Nodes: No Head and Neck Lymphadenopathy     Neuro/Psych: Orientation:Alert and Orientedx3 ; Mood/Affect:normal       MICRO:     R frontal scalp (red):There is a proliferation of irregular nests of abnormal squamous cells arising from the epidermis and invading the dermis. These are well differentiated. The dermis shows a variable superficial perivascular inflammatory infiltrate.   L parietal scalp (blue):There is hyperkeratosis & parakeratosis of the epidermis, with full thickness epidermal involvement by atypical keratinocytes with rare pale vacuolated cells.  Unremarkable dermis.    L sideburn (green):There is a proliferation of irregular nests of abnormal squamous cells arising from the epidermis and invading the dermis. These are well differentiated. The dermis shows a variable superficial perivascular inflammatory infiltrate.   L nasal sidewall (red):Orthokeratosis of epidermis with a proliferation of nests of basaloid cells, with peripheral palisading and a haphazard arrangement in the center extending into the dermis, forming nodules.  The tumor cells have hyperchromatic nuclei. Poor cytoplasm and intercellular bridging.    A/P:  1. Seborrheic keratosis, lentigo, angioma, dermal nevus, hx of non-melanoma skin cancer   2. R/o non-melanoma skin cancer   TANGENTIAL BIOPSY IN HOUSE:  After consent, anesthesia with LEC and prep, tangential excision performed and dx above confirmed with frozen section histology.  No complications and routine wound care.  Patient is not on  anticoagulants and risk of bleeding discussed  with patient.       I have personally reviewed all specimens and/or slides and used them with my medical judgement to determine or confirm the final diagnosis.     Patient told result   R frontal scalp squamous cell carcinoma   L parietal scalp squamous cell carcinoma in situ   L sideburn  squamous cell carcinoma   L nasal tip basal cell carcinoma   Schedule excision     4. Actinic keratosis   LN2:  Treated with LN2 for 5s for 1-2 cycles. Warned risks of blistering, pain, pigment change, scarring, and incomplete resolution.  Advised patient to return if lesions do not completely resolve.  Wound care sheet given.     Scalp x4, R cheek x2, L cheek x3    It was a pleasure speaking to Nir Guy today.  Previous clinic notes and pertinent laboratory tests were reviewed prior to Nir Guy's visit.  Signs and Symptoms of skin cancer discussed with patient.  Patient encouraged to perform monthly skin exams.  UV precautions reviewed with patient.  Risks of non-melanoma skin cancer discussed with patient   Return to clinic next appt      Again, thank you for allowing me to participate in the care of your patient.        Sincerely,        Chavez Jay MD

## 2024-10-22 NOTE — PROGRESS NOTES
Nir Guy is an extremely pleasant 84 year old year old male patient here today for hx of non-melanoma skin cancer.  Patient has no other skin complaints today.  Remainder of the HPI, Meds, PMH, Allergies, FH, and SH was reviewed in chart.      Past Medical History:   Diagnosis Date    Actinic keratosis     AMD (age related macular degeneration)     Basal cell carcinoma     Former smoker     Full code status 01/06/2017    Hyperlipidemia     OA (osteoarthritis)     Osteoarthritis of left patellofemoral joint 01/06/2017    Osteoarthritis of shoulder     RBBB (right bundle branch block)     S/P colonoscopy 04/18/2011    SNHL (sensorineural hearing loss)     Squamous cell carcinoma of skin     Tinnitus     Vertigo        Past Surgical History:   Procedure Laterality Date    BASAL CELL CARCINOMA EXCISION  01/01/1999    Nose and scalp    CATARACT EXTRACTION, BILATERAL  01/01/2018    CATARACT IOL, RT/LT      IR LUMBAR EPIDURAL STEROID INJECTION  01/09/2018    RELEASE CARPAL TUNNEL Right 01/04/2021    Procedure: Revision open carpal tunnel release.;  Surgeon: Nasir Casper MD;  Location: [facility]    RELEASE TRIGGER FINGER Left     REPEAT RIGHT CARPAL TUNNEL RELEASE WITH HYPOTHENAR FAT PAD FLAP  12/2021    DR. FINNEY    TOTAL HIP ARTHROPLASTY Right 02/21/2018    Procedure:  RIGHT TOTAL HIP ARTHROPLASTY DIRECT ANTERIOR APPROACH;  Surgeon: Kaushik Hood MD;  Location: [facility] OR;  Service: Orthopedics    VASECTOMY  09/01/1980        Family History   Problem Relation Age of Onset    Melanoma No family hx of     Cancer Mother         GYN    Cancer Father         Lung    Heart Disease Father        Social History     Socioeconomic History    Marital status:      Spouse name: Not on file    Number of children: Not on file    Years of education: Not on file    Highest education level: Not on file   Occupational History     Employer: RETIRED   Tobacco Use    Smoking status: Never    Smokeless tobacco:  Never   Vaping Use    Vaping status: Never Used   Substance and Sexual Activity    Alcohol use: Yes     Alcohol/week: 8.0 standard drinks of alcohol     Comment: Alcoholic Drinks/day: nightly    Drug use: No    Sexual activity: Not on file   Other Topics Concern    Not on file   Social History Narrative    Not on file     Social Determinants of Health     Financial Resource Strain: Low Risk  (3/23/2024)    Financial Resource Strain     Within the past 12 months, have you or your family members you live with been unable to get utilities (heat, electricity) when it was really needed?: No   Food Insecurity: Low Risk  (3/23/2024)    Food Insecurity     Within the past 12 months, did you worry that your food would run out before you got money to buy more?: No     Within the past 12 months, did the food you bought just not last and you didn t have money to get more?: No   Transportation Needs: Low Risk  (3/23/2024)    Transportation Needs     Within the past 12 months, has lack of transportation kept you from medical appointments, getting your medicines, non-medical meetings or appointments, work, or from getting things that you need?: No   Physical Activity: Sufficiently Active (3/23/2024)    Exercise Vital Sign     Days of Exercise per Week: 7 days     Minutes of Exercise per Session: 30 min   Stress: No Stress Concern Present (3/23/2024)    [employer] [place] [employer] - Occupational Stress Questionnaire     Feeling of Stress : Only a little   Social Connections: Unknown (3/23/2024)    Social Connection and Isolation Panel [NHANES]     Frequency of Communication with Friends and Family: Not on file     Frequency of Social Gatherings with Friends and Family: Once a week     Attends [employer] Services: Not on file     Active Member of Clubs or Organizations: Not on file     Attends Club or Organization Meetings: Not on file     Marital Status: Not on file   Interpersonal Safety: Low Risk  (3/28/2024)     Interpersonal Safety     Do you feel physically and emotionally safe where you currently live?: Yes     Within the past 12 months, have you been hit, slapped, kicked or otherwise physically hurt by someone?: No     Within the past 12 months, have you been humiliated or emotionally abused in other ways by your partner or ex-partner?: No   Housing Stability: Low Risk  (3/23/2024)    Housing Stability     Do you have housing? : Yes     Are you worried about losing your housing?: No       Outpatient Encounter Medications as of 10/22/2024   Medication Sig Dispense Refill    fish oil-omega-3 fatty acids 1000 MG capsule Take 2 g by mouth daily      Melatonin 10 MG TABS tablet Take 10 mg by mouth nightly as needed for sleep      Multiple Vitamins-Minerals (MENS MULTIVITAMIN PLUS PO)       polyethylene glycol-propylene glycol PF (SYSTANE HYDRATION PF) 0.4-0.3 % SOLN opthalmic solution Apply 1 drop to eye      Vitamin D3 (CHOLECALCIFEROL) 25 mcg (1000 units) tablet Take 25 mcg by mouth daily       No facility-administered encounter medications on file as of 10/22/2024.             O:   NAD, WDWN, Alert & Oriented, Mood & Affect wnl, Vitals stable   General appearance normal   Vitals stable   Alert, oriented and in no acute distress      Following lymph nodes palpated: Occipital, Cervical, Supraclavicular no lad  R frontal scalp scaly plaque 8mm  L parietal scalp scaly plaque 1cm   L sideburn crusted scaly papule 6mm   L nasal tip crusted scaly papule 6mm   Gritty scaly papule on scalp and face     Stuck on papules and brown macules on trunk and ext   Red papules on trunk  Flesh colored papules on trunk     The remainder of the full exam was normal; the following areas were examined:  conjunctiva/lids, , neck, peripheral vascular system, abdomen, lymph nodes, digits/nails, eccrine and apocrine glands, scalp/hair, face, neck, chest, abdomen, buttocks, back, RUE, LUE, RLE, LLE       Eyes: Conjunctivae/lids:Normal     ENT: Lips,  mucosa: normal    MSK:Normal    Cardiovascular: peripheral edema none    Pulm: Breathing Normal    Lymph Nodes: No Head and Neck Lymphadenopathy     Neuro/Psych: Orientation:Alert and Orientedx3 ; Mood/Affect:normal       MICRO:     R frontal scalp (red):There is a proliferation of irregular nests of abnormal squamous cells arising from the epidermis and invading the dermis. These are well differentiated. The dermis shows a variable superficial perivascular inflammatory infiltrate.   L parietal scalp (blue):There is hyperkeratosis & parakeratosis of the epidermis, with full thickness epidermal involvement by atypical keratinocytes with rare pale vacuolated cells.  Unremarkable dermis.    L sideburn (green):There is a proliferation of irregular nests of abnormal squamous cells arising from the epidermis and invading the dermis. These are well differentiated. The dermis shows a variable superficial perivascular inflammatory infiltrate.   L nasal sidewall (red):Orthokeratosis of epidermis with a proliferation of nests of basaloid cells, with peripheral palisading and a haphazard arrangement in the center extending into the dermis, forming nodules.  The tumor cells have hyperchromatic nuclei. Poor cytoplasm and intercellular bridging.    A/P:  1. Seborrheic keratosis, lentigo, angioma, dermal nevus, hx of non-melanoma skin cancer   2. R/o non-melanoma skin cancer   TANGENTIAL BIOPSY IN HOUSE:  After consent, anesthesia with LEC and prep, tangential excision performed and dx above confirmed with frozen section histology.  No complications and routine wound care.  Patient is not on  anticoagulants and risk of bleeding discussed with patient.       I have personally reviewed all specimens and/or slides and used them with my medical judgement to determine or confirm the final diagnosis.     Patient told result   R frontal scalp squamous cell carcinoma   L parietal scalp squamous cell carcinoma in situ   L sideburn  squamous  cell carcinoma   L nasal tip basal cell carcinoma   Schedule excision     4. Actinic keratosis   LN2:  Treated with LN2 for 5s for 1-2 cycles. Warned risks of blistering, pain, pigment change, scarring, and incomplete resolution.  Advised patient to return if lesions do not completely resolve.  Wound care sheet given.     Scalp x4, R cheek x2, L cheek x3    It was a pleasure speaking to Nir Guy today.  Previous clinic notes and pertinent laboratory tests were reviewed prior to Nir Guy's visit.  Signs and Symptoms of skin cancer discussed with patient.  Patient encouraged to perform monthly skin exams.  UV precautions reviewed with patient.  Risks of non-melanoma skin cancer discussed with patient   Return to clinic next appt

## 2024-10-22 NOTE — PATIENT INSTRUCTIONS
Wound Care Instructions       FOR SUPERFICIAL WOUNDS     [facility] [phone]    [facility] [phone]                       AFTER 24 HOURS YOU SHOULD REMOVE THE BANDAGE AND BEGIN DAILY DRESSING CHANGES AS FOLLOWS:     1) Remove Dressing.     2) Clean and dry the area with tap water using a Q-tip or sterile gauze pad.     3) Apply Vaseline, Aquaphor, Polysporin ointment or Bacitracin ointment over entire wound.  Do NOT use Neosporin ointment.     4) Cover the wound with a band-aid, or a sterile non-stick gauze pad and micropore paper tape      REPEAT THESE INSTRUCTIONS AT LEAST ONCE A DAY UNTIL THE WOUND HAS COMPLETELY HEALED.    It is an old wives tale that a wound heals better when it is exposed to air and allowed to dry out. The wound will heal faster with a better cosmetic result if it is kept moist with ointment and covered with a bandage.    **Do not let the wound dry out.**      Supplies Needed:      *Cotton tipped applicators (Q-tips)    *Polysporin Ointment or Bacitracin Ointment (NOT NEOSPORIN)    *Band-aids or non-stick gauze pads and micropore paper tape.      PATIENT INFORMATION:    During the healing process you will notice a number of changes. All wounds develop a small halo of redness surrounding the wound.  This means healing is occurring. Severe itching with extensive redness usually indicates sensitivity to the ointment or bandage tape used to dress the wound.  You should call our office if this develops.      Swelling  and/or discoloration around your surgical site is common, particularly when performed around the eye.    All wounds normally drain.  The larger the wound the more drainage there will be.  After 7-10 days, you will notice the wound beginning to shrink and new skin will begin to grow.  The wound is healed when you can see skin has formed over the entire area.  A healed wound has a healthy, shiny look to the surface and is red to dark pink in  color to normalize.  Wounds may take approximately 4-6 weeks to heal.  Larger wounds may take 6-8 weeks.  After the wound is healed you may discontinue dressing changes.    You may experience a sensation of tightness as your wound heals. This is normal and will gradually subside.    Your healed wound may be sensitive to temperature changes. This sensitivity improves with time, but if you re having a lot of discomfort, try to avoid temperature extremes.    Patients frequently experience itching after their wound appears to have healed because of the continue healing under the skin.  Plain Vaseline will help relieve the itching.        POSSIBLE COMPLICATIONS    BLEEDING:    Leave the bandage in place.  Use tightly rolled up gauze or a cloth to apply direct pressure over the bandage for 30  minutes.  Reapply pressure for an additional 30 minutes if necessary  Use additional gauze and tape to maintain pressure once the bleeding has stopped.

## 2024-10-22 NOTE — LETTER
"October 22, 2024    Nir Guy  [address] [place] [id number]        Dear Nir,     You are scheduled for Mohs Surgery on:     Tuesday November 19 th at 7:45 am.  Tuesday November 26 th at 7:45 am.  Tuesday December 3 rd at 7:45 am.   Tuesday December 10 th at 7:45 am.     Please check in at [facility]   (2nd Floor, last  Clinic on right up staircase or elevator -past OB/GYN clinic)    You don't need to arrive more than 5-10 minutes prior to your appointment time.     Be sure to eat a good breakfast and bathe and wash your hair prior to Surgery.    If you are taking any anti-coagulants that are prescribed by your Doctor (such as Coumadin/warfarin, Plavix, Aspirin, Ibuprofen), please continue taking them.     However, If you are taking anti-coagulants over the counter without  a Doctor's order for a Medical condition, please discontinue them 10 days prior to Surgery.      Please wear loose comfortable clothing as it could possibly be 4-6 hours until your surgery is completed depending upon how many layers of tissue need to be removed.     Wi-fi access is available.     Sincerely,       Chavez Jay MD/ Anu Patino RN              "

## 2024-10-22 NOTE — TELEPHONE ENCOUNTER
----- Message from Chavez Jay sent at 10/22/2024 11:40 AM CDT -----  R frontal scalp squamous cell carcinoma   L parietal scalp squamous cell carcinoma in situ   L sideburn  squamous cell carcinoma   L nasal tip basal cell carcinoma   Schedule excision

## 2024-10-22 NOTE — TELEPHONE ENCOUNTER
Patient notified. Patient verbalized understanding. Scheduled for MOHS Surgery x 4. Mohs Pre-op letter sent to verified home address.   Anu Patino RN

## 2024-11-05 DIAGNOSIS — G47.61 PERIODIC LIMB MOVEMENT DISORDER: ICD-10-CM

## 2024-11-05 DIAGNOSIS — R41.3 MEMORY LOSS: ICD-10-CM

## 2024-11-05 DIAGNOSIS — G25.81 RESTLESS LEGS SYNDROME (RLS): ICD-10-CM

## 2024-11-05 DIAGNOSIS — G47.33 OBSTRUCTIVE SLEEP APNEA (ADULT) (PEDIATRIC): ICD-10-CM

## 2024-11-05 DIAGNOSIS — G47.00 INSOMNIA: ICD-10-CM

## 2024-11-05 DIAGNOSIS — R90.89 ABNORMAL BRAIN MRI: ICD-10-CM

## 2024-11-05 DIAGNOSIS — I49.3 VENTRICULAR PREMATURE BEATS: ICD-10-CM

## 2024-11-05 DIAGNOSIS — R25.9 ABNORMAL INVOLUNTARY MOVEMENT: ICD-10-CM

## 2024-11-05 DIAGNOSIS — E83.10 DISORDER OF IRON METABOLISM: Primary | ICD-10-CM

## 2024-11-18 NOTE — PROGRESS NOTES
Surgical Office Location :   [facility]  [address] [place] [address]

## 2024-11-20 ENCOUNTER — OFFICE VISIT (OUTPATIENT)
Dept: DERMATOLOGY | Facility: CLINIC | Age: 84
End: 2024-11-20
Attending: DERMATOLOGY
Payer: COMMERCIAL

## 2024-11-20 DIAGNOSIS — C44.42 SQUAMOUS CELL CARCINOMA OF SCALP: ICD-10-CM

## 2024-11-20 NOTE — PATIENT INSTRUCTIONS
Open Wound Care     for scalp      No strenuous activity for 48 hours    Take Tylenol as needed for discomfort.                                                .         Do not drink alcoholic beverages for 48 hours.    Keep the pressure bandage in place for 24 hours. If the bandage becomes blood tinged or loose, reinforce it with gauze and tape.        (Refer to the reverse side of this page for management of bleeding).    Remove bandage in 24 hours and begin wound care as follows:     Clean area with tap water using a Q tip or gauze pad, (shower / bathe normally)  Dry wound with Q tip or gauze pad  Apply Aquaphor, Vaseline, Polysporin or Bacitracin Ointment with a Q tip  Do NOT use Neosporin Ointment *  Cover the wound with a band-aid or nonstick gauze pad and paper tape.  Repeat wound care once a day until wound is completely healed.    It is an old wives tale that a wound heals better when it is exposed to air and allowed to dry out. The wound will heal faster with a better cosmetic result if it is kept moist with ointment and covered with a bandage.  Do not let the wound dry out.      Supplies Needed:                Qtips or gauze pads                Polysporin or Bacitracin Ointment                Bandaids or nonstick gauze pads and paper tape    Wound care kits and brown paper tape are available for purchase at   the pharmacy.    BLEEDING:    Use tightly rolled up gauze or cloth to apply direct pressure over the bandage for 20   minutes.  Reapply pressure for an additional 20 minutes if necessary  Call the office or go to the nearest emergency room if pressure fails to stop the bleeding.  Use additional gauze and tape to maintain pressure once the bleeding has stopped.  Begin wound care 24 hours after surgery as directed.                  WOUND HEALING    One week after surgery a pink / red halo will form around the outside of the wound.   This is new skin.  The center of the wound will appear yellowish white  and produce some drainage.  The pink halo will slowly migrate in toward the center of the wound until the wound is covered with new shiny pink skin.  There will be no more drainage when the wound is completely healed.  It will take six months to one year for the redness to fade.  The scar may be itchy, tight and sensitive to extreme temperatures for a year after the surgery.  Massaging the area several times a day for several minutes after the wound is completely healed will help the scar soften and normalize faster. Begin massage only after healing is complete.      In case of emergency call: Dr Jay: [phone]    [facility]: [phone]    [facility]:[phone]

## 2024-11-20 NOTE — LETTER
11/20/2024      Nir Guy  [address] [place] [id number]      Dear Colleague,    Thank you for referring your patient, Nir Guy, to the [facility]. Please see a copy of my visit note below.    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  Patient has no other skin complaints today.  Remainder of the HPI, Meds, PMH, Allergies, FH, and SH was reviewed in chart.      Past Medical History:   Diagnosis Date     Actinic keratosis      AMD (age related macular degeneration)      Basal cell carcinoma      Former smoker      Full code status 01/06/2017     Hyperlipidemia      OA (osteoarthritis)      Osteoarthritis of left patellofemoral joint 01/06/2017     Osteoarthritis of shoulder      RBBB (right bundle branch block)      S/P colonoscopy 04/18/2011     SNHL (sensorineural hearing loss)      Squamous cell carcinoma of skin      Tinnitus      Vertigo        Past Surgical History:   Procedure Laterality Date     BASAL CELL CARCINOMA EXCISION  01/01/1999    Nose and scalp     CATARACT EXTRACTION, BILATERAL  01/01/2018     CATARACT IOL, RT/LT       IR LUMBAR EPIDURAL STEROID INJECTION  01/09/2018     RELEASE CARPAL TUNNEL Right 01/04/2021    Procedure: Revision open carpal tunnel release.;  Surgeon: Nasir Casper MD;  Location: [facility]     RELEASE TRIGGER FINGER Left      REPEAT RIGHT CARPAL TUNNEL RELEASE WITH HYPOTHENAR FAT PAD FLAP  12/2021    DR. FINNEY     TOTAL HIP ARTHROPLASTY Right 02/21/2018    Procedure:  RIGHT TOTAL HIP ARTHROPLASTY DIRECT ANTERIOR APPROACH;  Surgeon: Kaushik Hood MD;  Location: [facility];  Service: Orthopedics     VASECTOMY  09/01/1980        Family History   Problem Relation Age of Onset     Melanoma No family hx of       Cancer Mother         GYN     Cancer Father         Lung     Heart Disease Father        Social History     Socioeconomic History     Marital status:      Spouse name: Not on file     Number of children: Not on file     Years of education: Not on file     Highest education level: Not on file   Occupational History     Employer: RETIRED   Tobacco Use     Smoking status: Never     Smokeless tobacco: Never   Vaping Use     Vaping status: Never Used   Substance and Sexual Activity     Alcohol use: Yes     Alcohol/week: 8.0 standard drinks of alcohol     Comment: Alcoholic Drinks/day: nightly     Drug use: No     Sexual activity: Not on file   Other Topics Concern     Not on file   Social History Narrative     Not on file     Social Drivers of Health     Financial Resource Strain: Low Risk  (3/23/2024)    Financial Resource Strain      Within the past 12 months, have you or your family members you live with been unable to get utilities (heat, electricity) when it was really needed?: No   Food Insecurity: Low Risk  (3/23/2024)    Food Insecurity      Within the past 12 months, did you worry that your food would run out before you got money to buy more?: No      Within the past 12 months, did the food you bought just not last and you didn t have money to get more?: No   Transportation Needs: Low Risk  (3/23/2024)    Transportation Needs      Within the past 12 months, has lack of transportation kept you from medical appointments, getting your medicines, non-medical meetings or appointments, work, or from getting things that you need?: No   Physical Activity: Sufficiently Active (3/23/2024)    Exercise Vital Sign      Days of Exercise per Week: 7 days      Minutes of Exercise per Session: 30 min   Stress: No Stress Concern Present (3/23/2024)    [employer] - Occupational Stress Questionnaire      Feeling of Stress : Only a little   Social Connections: Unknown (3/23/2024)    Social  Connection and Isolation Panel [NHANES]      Frequency of Communication with Friends and Family: Not on file      Frequency of Social Gatherings with Friends and Family: Once a week      Attends Hoahaoism Services: Not on file      Active Member of Clubs or Organizations: Not on file      Attends Club or Organization Meetings: Not on file      Marital Status: Not on file   Interpersonal Safety: Low Risk  (3/28/2024)    Interpersonal Safety      Do you feel physically and emotionally safe where you currently live?: Yes      Within the past 12 months, have you been hit, slapped, kicked or otherwise physically hurt by someone?: No      Within the past 12 months, have you been humiliated or emotionally abused in other ways by your partner or ex-partner?: No   Housing Stability: Low Risk  (3/23/2024)    Housing Stability      Do you have housing? : Yes      Are you worried about losing your housing?: No       Outpatient Encounter Medications as of 11/20/2024   Medication Sig Dispense Refill     fish oil-omega-3 fatty acids 1000 MG capsule Take 2 g by mouth daily       Melatonin 10 MG TABS tablet Take 10 mg by mouth nightly as needed for sleep       Multiple Vitamins-Minerals (MENS MULTIVITAMIN PLUS PO)        polyethylene glycol-propylene glycol PF (SYSTANE HYDRATION PF) 0.4-0.3 % SOLN opthalmic solution Apply 1 drop to eye       Vitamin D3 (CHOLECALCIFEROL) 25 mcg (1000 units) tablet Take 25 mcg by mouth daily       No facility-administered encounter medications on file as of 11/20/2024.             O:   NAD, WDWN, Alert & Oriented, Mood & Affect wnl, Vitals stable   General appearance normal   Vitals stable   Alert, oriented and in no acute distress     R frontal scalp 8mm scaly papule       Eyes: Conjunctivae/lids:Normal     ENT: Lips, mucosa: normal    MSK:Normal    Cardiovascular: peripheral edema none    Pulm: Breathing Normal    Lymph Nodes: No Head and Neck Lymphadenopathy     Neuro/Psych: Orientation:Alert and  Orientedx3 ; Mood/Affect:normal       A/P:  R frontal scalp squamous cell carcinoma   MOHS:   Location    The rationale for Mohs surgery was discussed with the patient and consent was obtained.  The risks and benefits as well as alternatives to therapy were discussed, in detail.  Specifically, the risks of infection, scarring, bleeding, prolonged wound healing, incomplete removal, allergy to anesthesia, nerve injury and recurrence were addressed.  Indication for Mohs was Location. Prior to the procedure, the treatment site was clearly identified and, if available, confirmed with previous photos and confirmed by the patient   All components of the Universal Protocol/PAUSE rule were completed.  The Mohs surgeon operated in two distinct and integrated capacities as the surgeon and pathologist.      The area was prepped with Betasept.  A rim of normal appearing skin was marked circumferentially around the lesion.  The area was infiltrated with local anesthesia.  The tumor was first debulked to remove all clinically apparent tumor.  An incision following the standard Mohs approach was done and the specimen was oriented,mapped and placed in 2 block(s).  Each specimen was then chromacoded and processed in the Mohs laboratory using standard Mohs technique and submitted for frozen section histology.  Frozen section analysis showed  residual tumor but CLEAR MARGINS.    1st stage:There is a proliferation of irregular nests of abnormal squamous cells arising from the epidermis and invading the dermis. These are well differentiated. The dermis shows a variable superficial perivascular inflammatory infiltrate.     The tumor was excised using standard Mohs technique in 2 stages(s).  CLEAR MARGINS OBTAINED and Final defect size was 1.2 x 1.4 cm.     We discussed the options for wound management in full with the patient including risks/benefits/ possible outcomes.      REPAIR SECOND INTENT: We discussed the options for wound  management in full with the patient including risks/benefits/possible outcomes. Decision made to allow the wound to heal by second intention. Cautery was used for for hemostasis. EBL minimal; complications none; wound care routine.  The patient was discharged in good condition and will return in one month or prn for wound evaluation.  It was a pleasure speaking to Nir Guy today.  Previous clinic notes and pertinent laboratory tests were reviewed prior to Nir Guy's visit.  Signs and Symptoms of skin cancer discussed with patient.  Patient encouraged to perform monthly skin exams.  UV precautions reviewed with patient.  Risks of non-melanoma skin cancer discussed with patient   Return to clinic next appt        Again, thank you for allowing me to participate in the care of your patient.        Sincerely,        Chavez Jay MD

## 2024-11-20 NOTE — PROGRESS NOTES
Nir Guy is an extremely pleasant 84 year old year old male patient here today for evaluation and managment of squamous cell carcinoma on scalp.  Patient has no other skin complaints today.  Remainder of the HPI, Meds, PMH, Allergies, FH, and SH was reviewed in chart.      Past Medical History:   Diagnosis Date    Actinic keratosis     AMD (age related macular degeneration)     Basal cell carcinoma     Former smoker     Full code status 01/06/2017    Hyperlipidemia     OA (osteoarthritis)     Osteoarthritis of left patellofemoral joint 01/06/2017    Osteoarthritis of shoulder     RBBB (right bundle branch block)     S/P colonoscopy 04/18/2011    SNHL (sensorineural hearing loss)     Squamous cell carcinoma of skin     Tinnitus     Vertigo        Past Surgical History:   Procedure Laterality Date    BASAL CELL CARCINOMA EXCISION  01/01/1999    Nose and scalp    CATARACT EXTRACTION, BILATERAL  01/01/2018    CATARACT IOL, RT/LT      IR LUMBAR EPIDURAL STEROID INJECTION  01/09/2018    RELEASE CARPAL TUNNEL Right 01/04/2021    Procedure: Revision open carpal tunnel release.;  Surgeon: Nasir Casper MD;  Location: [facility]    RELEASE TRIGGER FINGER Left     REPEAT RIGHT CARPAL TUNNEL RELEASE WITH HYPOTHENAR FAT PAD FLAP  12/2021    DR. FINNEY    TOTAL HIP ARTHROPLASTY Right 02/21/2018    Procedure:  RIGHT TOTAL HIP ARTHROPLASTY DIRECT ANTERIOR APPROACH;  Surgeon: Kaushik Hood MD;  Location: [facility];  Service: Orthopedics    VASECTOMY  09/01/1980        Family History   Problem Relation Age of Onset    Melanoma No family hx of     Cancer Mother         GYN    Cancer Father         Lung    Heart Disease Father        Social History     Socioeconomic History    Marital status:      Spouse name: Not on file    Number of children: Not on file    Years of education: Not on file    Highest education level: Not on file   Occupational History     Employer: RETIRED   Tobacco Use    Smoking status:  Never    Smokeless tobacco: Never   Vaping Use    Vaping status: Never Used   Substance and Sexual Activity    Alcohol use: Yes     Alcohol/week: 8.0 standard drinks of alcohol     Comment: Alcoholic Drinks/day: nightly    Drug use: No    Sexual activity: Not on file   Other Topics Concern    Not on file   Social History Narrative    Not on file     Social Drivers of Health     Financial Resource Strain: Low Risk  (3/23/2024)    Financial Resource Strain     Within the past 12 months, have you or your family members you live with been unable to get utilities (heat, electricity) when it was really needed?: No   Food Insecurity: Low Risk  (3/23/2024)    Food Insecurity     Within the past 12 months, did you worry that your food would run out before you got money to buy more?: No     Within the past 12 months, did the food you bought just not last and you didn t have money to get more?: No   Transportation Needs: Low Risk  (3/23/2024)    Transportation Needs     Within the past 12 months, has lack of transportation kept you from medical appointments, getting your medicines, non-medical meetings or appointments, work, or from getting things that you need?: No   Physical Activity: Sufficiently Active (3/23/2024)    Exercise Vital Sign     Days of Exercise per Week: 7 days     Minutes of Exercise per Session: 30 min   Stress: No Stress Concern Present (3/23/2024)    [employer] - Occupational Stress Questionnaire     Feeling of Stress : Only a little   Social Connections: Unknown (3/23/2024)    Social Connection and Isolation Panel [NHANES]     Frequency of Communication with Friends and Family: Not on file     Frequency of Social Gatherings with Friends and Family: Once a week     Attends Yazdanism Services: Not on file     Active Member of Clubs or Organizations: Not on file     Attends Club or Organization Meetings: Not on file     Marital Status: Not on file   Interpersonal Safety: Low  Risk  (3/28/2024)    Interpersonal Safety     Do you feel physically and emotionally safe where you currently live?: Yes     Within the past 12 months, have you been hit, slapped, kicked or otherwise physically hurt by someone?: No     Within the past 12 months, have you been humiliated or emotionally abused in other ways by your partner or ex-partner?: No   Housing Stability: Low Risk  (3/23/2024)    Housing Stability     Do you have housing? : Yes     Are you worried about losing your housing?: No       Outpatient Encounter Medications as of 11/20/2024   Medication Sig Dispense Refill    fish oil-omega-3 fatty acids 1000 MG capsule Take 2 g by mouth daily      Melatonin 10 MG TABS tablet Take 10 mg by mouth nightly as needed for sleep      Multiple Vitamins-Minerals (MENS MULTIVITAMIN PLUS PO)       polyethylene glycol-propylene glycol PF (SYSTANE HYDRATION PF) 0.4-0.3 % SOLN opthalmic solution Apply 1 drop to eye      Vitamin D3 (CHOLECALCIFEROL) 25 mcg (1000 units) tablet Take 25 mcg by mouth daily       No facility-administered encounter medications on file as of 11/20/2024.             O:   NAD, WDWN, Alert & Oriented, Mood & Affect wnl, Vitals stable   General appearance normal   Vitals stable   Alert, oriented and in no acute distress     R frontal scalp 8mm scaly papule       Eyes: Conjunctivae/lids:Normal     ENT: Lips, mucosa: normal    MSK:Normal    Cardiovascular: peripheral edema none    Pulm: Breathing Normal    Lymph Nodes: No Head and Neck Lymphadenopathy     Neuro/Psych: Orientation:Alert and Orientedx3 ; Mood/Affect:normal       A/P:  R frontal scalp squamous cell carcinoma   MOHS:   Location    The rationale for Mohs surgery was discussed with the patient and consent was obtained.  The risks and benefits as well as alternatives to therapy were discussed, in detail.  Specifically, the risks of infection, scarring, bleeding, prolonged wound healing, incomplete removal, allergy to anesthesia, nerve  injury and recurrence were addressed.  Indication for Mohs was Location. Prior to the procedure, the treatment site was clearly identified and, if available, confirmed with previous photos and confirmed by the patient   All components of the Universal Protocol/PAUSE rule were completed.  The Mohs surgeon operated in two distinct and integrated capacities as the surgeon and pathologist.      The area was prepped with Betasept.  A rim of normal appearing skin was marked circumferentially around the lesion.  The area was infiltrated with local anesthesia.  The tumor was first debulked to remove all clinically apparent tumor.  An incision following the standard Mohs approach was done and the specimen was oriented,mapped and placed in 2 block(s).  Each specimen was then chromacoded and processed in the Mohs laboratory using standard Mohs technique and submitted for frozen section histology.  Frozen section analysis showed  residual tumor but CLEAR MARGINS.    1st stage:There is a proliferation of irregular nests of abnormal squamous cells arising from the epidermis and invading the dermis. These are well differentiated. The dermis shows a variable superficial perivascular inflammatory infiltrate.     The tumor was excised using standard Mohs technique in 2 stages(s).  CLEAR MARGINS OBTAINED and Final defect size was 1.2 x 1.4 cm.     We discussed the options for wound management in full with the patient including risks/benefits/ possible outcomes.      REPAIR SECOND INTENT: We discussed the options for wound management in full with the patient including risks/benefits/possible outcomes. Decision made to allow the wound to heal by second intention. Cautery was used for for hemostasis. EBL minimal; complications none; wound care routine.  The patient was discharged in good condition and will return in one month or prn for wound evaluation.  It was a pleasure speaking to Nir Guy today.  Previous clinic notes and  pertinent laboratory tests were reviewed prior to Nir Guy's visit.  Signs and Symptoms of skin cancer discussed with patient.  Patient encouraged to perform monthly skin exams.  UV precautions reviewed with patient.  Risks of non-melanoma skin cancer discussed with patient   Return to clinic next appt

## 2024-11-26 ENCOUNTER — OFFICE VISIT (OUTPATIENT)
Dept: DERMATOLOGY | Facility: CLINIC | Age: 84
End: 2024-11-26
Payer: COMMERCIAL

## 2024-11-26 DIAGNOSIS — C44.329 SQUAMOUS CELL CANCER OF SKIN OF LEFT CHEEK: Primary | ICD-10-CM

## 2024-11-26 PROCEDURE — 17311 MOHS 1 STAGE H/N/HF/G: CPT | Performed by: DERMATOLOGY

## 2024-11-26 NOTE — PROGRESS NOTES
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  Scalp healing well.  Patient has no other skin complaints today.  Remainder of the HPI, Meds, PMH, Allergies, FH, and SH was reviewed in chart.      Past Medical History:   Diagnosis Date    Actinic keratosis     AMD (age related macular degeneration)     Basal cell carcinoma     Former smoker     Full code status 01/06/2017    Hyperlipidemia     OA (osteoarthritis)     Osteoarthritis of left patellofemoral joint 01/06/2017    Osteoarthritis of shoulder     RBBB (right bundle branch block)     S/P colonoscopy 04/18/2011    SNHL (sensorineural hearing loss)     Squamous cell carcinoma of skin     Tinnitus     Vertigo        Past Surgical History:   Procedure Laterality Date    BASAL CELL CARCINOMA EXCISION  01/01/1999    Nose and scalp    CATARACT EXTRACTION, BILATERAL  01/01/2018    CATARACT IOL, RT/LT      IR LUMBAR EPIDURAL STEROID INJECTION  01/09/2018    RELEASE CARPAL TUNNEL Right 01/04/2021    Procedure: Revision open carpal tunnel release.;  Surgeon: Nasir Casper MD;  Location: [facility]    RELEASE TRIGGER FINGER Left     REPEAT RIGHT CARPAL TUNNEL RELEASE WITH HYPOTHENAR FAT PAD FLAP  12/2021    DR. FINNEY    TOTAL HIP ARTHROPLASTY Right 02/21/2018    Procedure:  RIGHT TOTAL HIP ARTHROPLASTY DIRECT ANTERIOR APPROACH;  Surgeon: Kaushik Hood MD;  Location: [facility];  Service: Orthopedics    VASECTOMY  09/01/1980        Family History   Problem Relation Age of Onset    Melanoma No family hx of     Cancer Mother         GYN    Cancer Father         Lung    Heart Disease Father        Social History     Socioeconomic History    Marital status:      Spouse name: Not on file    Number of children: Not on file    Years of education: Not on file    Highest education level: Not on file   Occupational History     Employer: RETIRED    Tobacco Use    Smoking status: Never    Smokeless tobacco: Never   Vaping Use    Vaping status: Never Used   Substance and Sexual Activity    Alcohol use: Yes     Alcohol/week: 8.0 standard drinks of alcohol     Comment: Alcoholic Drinks/day: nightly    Drug use: No    Sexual activity: Not on file   Other Topics Concern    Not on file   Social History Narrative    Not on file     Social Drivers of Health     Financial Resource Strain: Low Risk  (3/23/2024)    Financial Resource Strain     Within the past 12 months, have you or your family members you live with been unable to get utilities (heat, electricity) when it was really needed?: No   Food Insecurity: Low Risk  (3/23/2024)    Food Insecurity     Within the past 12 months, did you worry that your food would run out before you got money to buy more?: No     Within the past 12 months, did the food you bought just not last and you didn t have money to get more?: No   Transportation Needs: Low Risk  (3/23/2024)    Transportation Needs     Within the past 12 months, has lack of transportation kept you from medical appointments, getting your medicines, non-medical meetings or appointments, work, or from getting things that you need?: No   Physical Activity: Sufficiently Active (3/23/2024)    Exercise Vital Sign     Days of Exercise per Week: 7 days     Minutes of Exercise per Session: 30 min   Stress: No Stress Concern Present (3/23/2024)    [employer] [place] [employer] - Occupational Stress Questionnaire     Feeling of Stress : Only a little   Social Connections: Unknown (3/23/2024)    Social Connection and Isolation Panel [NHANES]     Frequency of Communication with Friends and Family: Not on file     Frequency of Social Gatherings with Friends and Family: Once a week     Attends Mormonism Services: Not on file     Active Member of Clubs or Organizations: Not on file     Attends Club or Organization Meetings: Not on file     Marital Status: Not on  file   Interpersonal Safety: Low Risk  (3/28/2024)    Interpersonal Safety     Do you feel physically and emotionally safe where you currently live?: Yes     Within the past 12 months, have you been hit, slapped, kicked or otherwise physically hurt by someone?: No     Within the past 12 months, have you been humiliated or emotionally abused in other ways by your partner or ex-partner?: No   Housing Stability: Low Risk  (3/23/2024)    Housing Stability     Do you have housing? : Yes     Are you worried about losing your housing?: No       Outpatient Encounter Medications as of 11/26/2024   Medication Sig Dispense Refill    fish oil-omega-3 fatty acids 1000 MG capsule Take 2 g by mouth daily      Melatonin 10 MG TABS tablet Take 10 mg by mouth nightly as needed for sleep      Multiple Vitamins-Minerals (MENS MULTIVITAMIN PLUS PO)       polyethylene glycol-propylene glycol PF (SYSTANE HYDRATION PF) 0.4-0.3 % SOLN opthalmic solution Apply 1 drop to eye      Vitamin D3 (CHOLECALCIFEROL) 25 mcg (1000 units) tablet Take 25 mcg by mouth daily       No facility-administered encounter medications on file as of 11/26/2024.             O:   NAD, WDWN, Alert & Oriented, Mood & Affect wnl, Vitals stable   General appearance normal   Vitals stable   Alert, oriented and in no acute distress   L sideburn 6mm scaly papule       Eyes: Conjunctivae/lids:Normal     ENT: Lips, mucosa: normal    MSK:Normal    Cardiovascular: peripheral edema none    Pulm: Breathing Normal    Lymph Nodes: No Head and Neck Lymphadenopathy     Neuro/Psych: Orientation:Alert and Orientedx3 ; Mood/Affect:normal       A/P:  L sideburn squamous cell carcinoma   MOHS:   Location    The rationale for Mohs surgery was discussed with the patient and consent was obtained.  The risks and benefits as well as alternatives to therapy were discussed, in detail.  Specifically, the risks of infection, scarring, bleeding, prolonged wound healing, incomplete removal, allergy  to anesthesia, nerve injury and recurrence were addressed.  Indication for Mohs was Location. Prior to the procedure, the treatment site was clearly identified and, if available, confirmed with previous photos and confirmed by the patient   All components of the Universal Protocol/PAUSE rule were completed.  The Mohs surgeon operated in two distinct and integrated capacities as the surgeon and pathologist.      The area was prepped with Betasept.  A rim of normal appearing skin was marked circumferentially around the lesion.  The area was infiltrated with local anesthesia.  The tumor was first debulked to remove all clinically apparent tumor.  An incision following the standard Mohs approach was done and the specimen was oriented,mapped and placed in 1 block(s).  Each specimen was then chromacoded and processed in the Mohs laboratory using standard Mohs technique and submitted for frozen section histology.  Frozen section analysis showed no residual tumor but CLEAR MARGINS.      The tumor was excised using standard Mohs technique in 1 stages(s).  CLEAR MARGINS OBTAINED and Final defect size was 1.1 x 0.9 cm.     We discussed the options for wound management in full with the patient including risks/benefits/ possible outcomes.      REPAIR SECOND INTENT: We discussed the options for wound management in full with the patient including risks/benefits/possible outcomes. Decision made to allow the wound to heal by second intention. Cautery was used for for hemostasis. EBL minimal; complications none; wound care routine.  The patient was discharged in good condition and will return in one month or prn for wound evaluation.    It was a pleasure speaking to Nir Guy today.  Previous clinic notes and pertinent laboratory tests were reviewed prior to Nir Guy's visit.  Signs and Symptoms of skin cancer discussed with patient.  Patient encouraged to perform monthly skin exams.  UV precautions reviewed with  patient.  Risks of non-melanoma skin cancer discussed with patient   Return to clinic next appt

## 2024-11-26 NOTE — LETTER
11/26/2024      Nir Guy  [address] [place] [id number]      Dear Colleague,    Thank you for referring your patient, Nir Guy, to the [facility]. Please see a copy of my visit note below.    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  Scalp healing well.  Patient has no other skin complaints today.  Remainder of the HPI, Meds, PMH, Allergies, FH, and SH was reviewed in chart.      Past Medical History:   Diagnosis Date     Actinic keratosis      AMD (age related macular degeneration)      Basal cell carcinoma      Former smoker      Full code status 01/06/2017     Hyperlipidemia      OA (osteoarthritis)      Osteoarthritis of left patellofemoral joint 01/06/2017     Osteoarthritis of shoulder      RBBB (right bundle branch block)      S/P colonoscopy 04/18/2011     SNHL (sensorineural hearing loss)      Squamous cell carcinoma of skin      Tinnitus      Vertigo        Past Surgical History:   Procedure Laterality Date     BASAL CELL CARCINOMA EXCISION  01/01/1999    Nose and scalp     CATARACT EXTRACTION, BILATERAL  01/01/2018     CATARACT IOL, RT/LT       IR LUMBAR EPIDURAL STEROID INJECTION  01/09/2018     RELEASE CARPAL TUNNEL Right 01/04/2021    Procedure: Revision open carpal tunnel release.;  Surgeon: Nasir Casper MD;  Location: [facility]     RELEASE TRIGGER FINGER Left      REPEAT RIGHT CARPAL TUNNEL RELEASE WITH HYPOTHENAR FAT PAD FLAP  12/2021    DR. FINNEY     TOTAL HIP ARTHROPLASTY Right 02/21/2018    Procedure:  RIGHT TOTAL HIP ARTHROPLASTY DIRECT ANTERIOR APPROACH;  Surgeon: Kaushik Hood MD;  Location: [facility] OR;  Service: Orthopedics     VASECTOMY  09/01/1980        Family History   Problem Relation Age of Onset      Melanoma No family hx of      Cancer Mother         GYN     Cancer Father         Lung     Heart Disease Father        Social History     Socioeconomic History     Marital status:      Spouse name: Not on file     Number of children: Not on file     Years of education: Not on file     Highest education level: Not on file   Occupational History     Employer: RETIRED   Tobacco Use     Smoking status: Never     Smokeless tobacco: Never   Vaping Use     Vaping status: Never Used   Substance and Sexual Activity     Alcohol use: Yes     Alcohol/week: 8.0 standard drinks of alcohol     Comment: Alcoholic Drinks/day: nightly     Drug use: No     Sexual activity: Not on file   Other Topics Concern     Not on file   Social History Narrative     Not on file     Social Drivers of Health     Financial Resource Strain: Low Risk  (3/23/2024)    Financial Resource Strain      Within the past 12 months, have you or your family members you live with been unable to get utilities (heat, electricity) when it was really needed?: No   Food Insecurity: Low Risk  (3/23/2024)    Food Insecurity      Within the past 12 months, did you worry that your food would run out before you got money to buy more?: No      Within the past 12 months, did the food you bought just not last and you didn t have money to get more?: No   Transportation Needs: Low Risk  (3/23/2024)    Transportation Needs      Within the past 12 months, has lack of transportation kept you from medical appointments, getting your medicines, non-medical meetings or appointments, work, or from getting things that you need?: No   Physical Activity: Sufficiently Active (3/23/2024)    Exercise Vital Sign      Days of Exercise per Week: 7 days      Minutes of Exercise per Session: 30 min   Stress: No Stress Concern Present (3/23/2024)    [employer] [place] [employer] - Occupational Stress Questionnaire      Feeling of Stress : Only a little   Social Connections:  Unknown (3/23/2024)    Social Connection and Isolation Panel [NHANES]      Frequency of Communication with Friends and Family: Not on file      Frequency of Social Gatherings with Friends and Family: Once a week      Attends Sabianist Services: Not on file      Active Member of Clubs or Organizations: Not on file      Attends Club or Organization Meetings: Not on file      Marital Status: Not on file   Interpersonal Safety: Low Risk  (3/28/2024)    Interpersonal Safety      Do you feel physically and emotionally safe where you currently live?: Yes      Within the past 12 months, have you been hit, slapped, kicked or otherwise physically hurt by someone?: No      Within the past 12 months, have you been humiliated or emotionally abused in other ways by your partner or ex-partner?: No   Housing Stability: Low Risk  (3/23/2024)    Housing Stability      Do you have housing? : Yes      Are you worried about losing your housing?: No       Outpatient Encounter Medications as of 11/26/2024   Medication Sig Dispense Refill     fish oil-omega-3 fatty acids 1000 MG capsule Take 2 g by mouth daily       Melatonin 10 MG TABS tablet Take 10 mg by mouth nightly as needed for sleep       Multiple Vitamins-Minerals (MENS MULTIVITAMIN PLUS PO)        polyethylene glycol-propylene glycol PF (SYSTANE HYDRATION PF) 0.4-0.3 % SOLN opthalmic solution Apply 1 drop to eye       Vitamin D3 (CHOLECALCIFEROL) 25 mcg (1000 units) tablet Take 25 mcg by mouth daily       No facility-administered encounter medications on file as of 11/26/2024.             O:   NAD, WDWN, Alert & Oriented, Mood & Affect wnl, Vitals stable   General appearance normal   Vitals stable   Alert, oriented and in no acute distress   L sideburn 6mm scaly papule       Eyes: Conjunctivae/lids:Normal     ENT: Lips, mucosa: normal    MSK:Normal    Cardiovascular: peripheral edema none    Pulm: Breathing Normal    Lymph Nodes: No Head and Neck Lymphadenopathy     Neuro/Psych:  Orientation:Alert and Orientedx3 ; Mood/Affect:normal       A/P:  L sideburn squamous cell carcinoma   MOHS:   Location    The rationale for Mohs surgery was discussed with the patient and consent was obtained.  The risks and benefits as well as alternatives to therapy were discussed, in detail.  Specifically, the risks of infection, scarring, bleeding, prolonged wound healing, incomplete removal, allergy to anesthesia, nerve injury and recurrence were addressed.  Indication for Mohs was Location. Prior to the procedure, the treatment site was clearly identified and, if available, confirmed with previous photos and confirmed by the patient   All components of the Universal Protocol/PAUSE rule were completed.  The Mohs surgeon operated in two distinct and integrated capacities as the surgeon and pathologist.      The area was prepped with Betasept.  A rim of normal appearing skin was marked circumferentially around the lesion.  The area was infiltrated with local anesthesia.  The tumor was first debulked to remove all clinically apparent tumor.  An incision following the standard Mohs approach was done and the specimen was oriented,mapped and placed in 1 block(s).  Each specimen was then chromacoded and processed in the Mohs laboratory using standard Mohs technique and submitted for frozen section histology.  Frozen section analysis showed no residual tumor but CLEAR MARGINS.      The tumor was excised using standard Mohs technique in 1 stages(s).  CLEAR MARGINS OBTAINED and Final defect size was 1.1 x 0.9 cm.     We discussed the options for wound management in full with the patient including risks/benefits/ possible outcomes.      REPAIR SECOND INTENT: We discussed the options for wound management in full with the patient including risks/benefits/possible outcomes. Decision made to allow the wound to heal by second intention. Cautery was used for for hemostasis. EBL minimal; complications none; wound care routine.   The patient was discharged in good condition and will return in one month or prn for wound evaluation.    It was a pleasure speaking to Nir Guy today.  Previous clinic notes and pertinent laboratory tests were reviewed prior to Nir Guy's visit.  Signs and Symptoms of skin cancer discussed with patient.  Patient encouraged to perform monthly skin exams.  UV precautions reviewed with patient.  Risks of non-melanoma skin cancer discussed with patient   Return to clinic next appt        Again, thank you for allowing me to participate in the care of your patient.        Sincerely,        Chavez Jay MD

## 2024-11-26 NOTE — PATIENT INSTRUCTIONS
Open Wound Care     For left sideburn        No strenuous activity for 48 hours    Take Tylenol as needed for discomfort.                                                .         Do not drink alcoholic beverages for 48 hours.    Keep the pressure bandage in place for 24 hours. If the bandage becomes blood tinged or loose, reinforce it with gauze and tape.        (Refer to the reverse side of this page for management of bleeding).    Remove bandage in 24 hours and begin wound care as follows:     Clean area with tap water using a Q tip or gauze pad, (shower / bathe normally)  Dry wound with Q tip or gauze pad  Apply Aquaphor, Vaseline, Polysporin or Bacitracin Ointment with a Q tip  Do NOT use Neosporin Ointment *  Cover the wound with a band-aid or nonstick gauze pad and paper tape.  Repeat wound care once a day until wound is completely healed.    It is an old wives tale that a wound heals better when it is exposed to air and allowed to dry out. The wound will heal faster with a better cosmetic result if it is kept moist with ointment and covered with a bandage.  Do not let the wound dry out.      Supplies Needed:                Qtips or gauze pads                Polysporin or Bacitracin Ointment                Bandaids or nonstick gauze pads and paper tape    Wound care kits and brown paper tape are available for purchase at   the pharmacy.    BLEEDING:    Use tightly rolled up gauze or cloth to apply direct pressure over the bandage for 20   minutes.  Reapply pressure for an additional 20 minutes if necessary  Call the office or go to the nearest emergency room if pressure fails to stop the bleeding.  Use additional gauze and tape to maintain pressure once the bleeding has stopped.  Begin wound care 24 hours after surgery as directed.                  WOUND HEALING    One week after surgery a pink / red halo will form around the outside of the wound.   This is new skin.  The center of the wound will appear  yellowish white and produce some drainage.  The pink halo will slowly migrate in toward the center of the wound until the wound is covered with new shiny pink skin.  There will be no more drainage when the wound is completely healed.  It will take six months to one year for the redness to fade.  The scar may be itchy, tight and sensitive to extreme temperatures for a year after the surgery.  Massaging the area several times a day for several minutes after the wound is completely healed will help the scar soften and normalize faster. Begin massage only after healing is complete.      In case of emergency call: Dr Jay: [phone]    [facility]: [phone]    [facility]:[phone]      Proper skin care from Dr. Jay- [facility]                  Eliminate harsh soaps, i.e. Dial, Zest, [place];             Use mild soaps such as Cetaphil or Dove Sensitive Skin             Avoid hot or cold showers             After showering, lightly dry off.              Aggressive use of a moisturizer (including Vanicream, Cetaphil, Aquaphor or Cerave)   We recommend using a tub that needs to be scooped out, not a pump. This has more of an oil base. It will hold moisture in your skin much better than a water base moisturizer. The ones recommended are non- pore clogging.                  If you have any questions call [phone] and follow the prompts to Dr. Jay's office.

## 2024-12-03 ENCOUNTER — OFFICE VISIT (OUTPATIENT)
Dept: DERMATOLOGY | Facility: CLINIC | Age: 84
End: 2024-12-03
Payer: COMMERCIAL

## 2024-12-03 DIAGNOSIS — D04.4 SQUAMOUS CELL CARCINOMA IN SITU (SCCIS) OF SCALP: Primary | ICD-10-CM

## 2024-12-03 PROCEDURE — 17311 MOHS 1 STAGE H/N/HF/G: CPT | Performed by: DERMATOLOGY

## 2024-12-03 NOTE — PATIENT INSTRUCTIONS
Open Wound Care           No strenuous activity for 48 hours    Take Tylenol as needed for discomfort.                                                .         Do not drink alcoholic beverages for 48 hours.    Keep the pressure bandage in place for 24 hours. If the bandage becomes blood tinged or loose, reinforce it with gauze and tape.        (Refer to the reverse side of this page for management of bleeding).    Remove bandage in 24 hours and begin wound care as follows:     Clean area with tap water using a Q tip or gauze pad, (shower / bathe normally)  Dry wound with Q tip or gauze pad  Apply Aquaphor, Vaseline, Polysporin or Bacitracin Ointment with a Q tip  Do NOT use Neosporin Ointment *  Cover the wound with a band-aid or nonstick gauze pad and paper tape.  Repeat wound care once a day until wound is completely healed.    It is an old wives tale that a wound heals better when it is exposed to air and allowed to dry out. The wound will heal faster with a better cosmetic result if it is kept moist with ointment and covered with a bandage.  Do not let the wound dry out.      Supplies Needed:                Qtips or gauze pads                Polysporin or Bacitracin Ointment                Bandaids or nonstick gauze pads and paper tape    Wound care kits and brown paper tape are available for purchase at   the pharmacy.    BLEEDING:    Use tightly rolled up gauze or cloth to apply direct pressure over the bandage for 20   minutes.  Reapply pressure for an additional 20 minutes if necessary  Call the office or go to the nearest emergency room if pressure fails to stop the bleeding.  Use additional gauze and tape to maintain pressure once the bleeding has stopped.  Begin wound care 24 hours after surgery as directed.                  WOUND HEALING    One week after surgery a pink / red halo will form around the outside of the wound.   This is new skin.  The center of the wound will appear yellowish white and  produce some drainage.  The pink halo will slowly migrate in toward the center of the wound until the wound is covered with new shiny pink skin.  There will be no more drainage when the wound is completely healed.  It will take six months to one year for the redness to fade.  The scar may be itchy, tight and sensitive to extreme temperatures for a year after the surgery.  Massaging the area several times a day for several minutes after the wound is completely healed will help the scar soften and normalize faster. Begin massage only after healing is complete.      In case of emergency call: Dr Jay: [phone]    [facility]: [phone]    [facility]:[phone]

## 2024-12-03 NOTE — LETTER
12/3/2024      Nir Guy  [address] [place] [id number]      Dear Colleague,    Thank you for referring your patient, Nir Guy, to the [facility]. Please see a copy of my visit note below.    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  Patient has no other skin complaints today.  Remainder of the HPI, Meds, PMH, Allergies, FH, and SH was reviewed in chart.      Past Medical History:   Diagnosis Date     Actinic keratosis      AMD (age related macular degeneration)      Basal cell carcinoma      Former smoker      Full code status 01/06/2017     Hyperlipidemia      OA (osteoarthritis)      Osteoarthritis of left patellofemoral joint 01/06/2017     Osteoarthritis of shoulder      RBBB (right bundle branch block)      S/P colonoscopy 04/18/2011     SNHL (sensorineural hearing loss)      Squamous cell carcinoma of skin      Tinnitus      Vertigo        Past Surgical History:   Procedure Laterality Date     BASAL CELL CARCINOMA EXCISION  01/01/1999    Nose and scalp     CATARACT EXTRACTION, BILATERAL  01/01/2018     CATARACT IOL, RT/LT       IR LUMBAR EPIDURAL STEROID INJECTION  01/09/2018     RELEASE CARPAL TUNNEL Right 01/04/2021    Procedure: Revision open carpal tunnel release.;  Surgeon: Nasir Casper MD;  Location: [facility]     RELEASE TRIGGER FINGER Left      REPEAT RIGHT CARPAL TUNNEL RELEASE WITH HYPOTHENAR FAT PAD FLAP  12/2021    DR. FINNEY     TOTAL HIP ARTHROPLASTY Right 02/21/2018    Procedure:  RIGHT TOTAL HIP ARTHROPLASTY DIRECT ANTERIOR APPROACH;  Surgeon: Kaushik Hood MD;  Location: [facility] OR;  Service: Orthopedics     VASECTOMY  09/01/1980        Family History   Problem Relation Age of Onset     Melanoma No  family hx of      Cancer Mother         GYN     Cancer Father         Lung     Heart Disease Father        Social History     Socioeconomic History     Marital status:      Spouse name: Not on file     Number of children: Not on file     Years of education: Not on file     Highest education level: Not on file   Occupational History     Employer: RETIRED   Tobacco Use     Smoking status: Never     Smokeless tobacco: Never   Vaping Use     Vaping status: Never Used   Substance and Sexual Activity     Alcohol use: Yes     Alcohol/week: 8.0 standard drinks of alcohol     Comment: Alcoholic Drinks/day: nightly     Drug use: No     Sexual activity: Not on file   Other Topics Concern     Not on file   Social History Narrative     Not on file     Social Drivers of Health     Financial Resource Strain: Low Risk  (3/23/2024)    Financial Resource Strain      Within the past 12 months, have you or your family members you live with been unable to get utilities (heat, electricity) when it was really needed?: No   Food Insecurity: Low Risk  (3/23/2024)    Food Insecurity      Within the past 12 months, did you worry that your food would run out before you got money to buy more?: No      Within the past 12 months, did the food you bought just not last and you didn t have money to get more?: No   Transportation Needs: Low Risk  (3/23/2024)    Transportation Needs      Within the past 12 months, has lack of transportation kept you from medical appointments, getting your medicines, non-medical meetings or appointments, work, or from getting things that you need?: No   Physical Activity: Sufficiently Active (3/23/2024)    Exercise Vital Sign      Days of Exercise per Week: 7 days      Minutes of Exercise per Session: 30 min   Stress: No Stress Concern Present (3/23/2024)    [place] [employer] - Occupational Stress Questionnaire      Feeling of Stress : Only a little   Social Connections: Unknown  (3/23/2024)    Social Connection and Isolation Panel [NHANES]      Frequency of Communication with Friends and Family: Not on file      Frequency of Social Gatherings with Friends and Family: Once a week      Attends Orthodoxy Services: Not on file      Active Member of Clubs or Organizations: Not on file      Attends Club or Organization Meetings: Not on file      Marital Status: Not on file   Interpersonal Safety: Low Risk  (3/28/2024)    Interpersonal Safety      Do you feel physically and emotionally safe where you currently live?: Yes      Within the past 12 months, have you been hit, slapped, kicked or otherwise physically hurt by someone?: No      Within the past 12 months, have you been humiliated or emotionally abused in other ways by your partner or ex-partner?: No   Housing Stability: Low Risk  (3/23/2024)    Housing Stability      Do you have housing? : Yes      Are you worried about losing your housing?: No       Outpatient Encounter Medications as of 12/3/2024   Medication Sig Dispense Refill     fish oil-omega-3 fatty acids 1000 MG capsule Take 2 g by mouth daily       Melatonin 10 MG TABS tablet Take 10 mg by mouth nightly as needed for sleep       Multiple Vitamins-Minerals (MENS MULTIVITAMIN PLUS PO)        polyethylene glycol-propylene glycol PF (SYSTANE HYDRATION PF) 0.4-0.3 % SOLN opthalmic solution Apply 1 drop to eye       Vitamin D3 (CHOLECALCIFEROL) 25 mcg (1000 units) tablet Take 25 mcg by mouth daily       No facility-administered encounter medications on file as of 12/3/2024.             O:   NAD, WDWN, Alert & Oriented, Mood & Affect wnl, Vitals stable   General appearance normal   Vitals stable   Alert, oriented and in no acute distress     L parietal scalp 1cm scaly papule   Previous sites on scalp and cheek healing well      Eyes: Conjunctivae/lids:Normal     ENT: Lips, mucosa: normal    MSK:Normal    Cardiovascular: peripheral edema none    Pulm: Breathing Normal    Lymph Nodes: No  Head and Neck Lymphadenopathy     Neuro/Psych: Orientation:Alert and Orientedx3 ; Mood/Affect:normal       A/P:  L parietal scalp squamous cell carcinoma in situ   MOHS:   Location    The rationale for Mohs surgery was discussed with the patient and consent was obtained.  The risks and benefits as well as alternatives to therapy were discussed, in detail.  Specifically, the risks of infection, scarring, bleeding, prolonged wound healing, incomplete removal, allergy to anesthesia, nerve injury and recurrence were addressed.  Indication for Mohs was Location. Prior to the procedure, the treatment site was clearly identified and, if available, confirmed with previous photos and confirmed by the patient   All components of the Universal Protocol/PAUSE rule were completed.  The Mohs surgeon operated in two distinct and integrated capacities as the surgeon and pathologist.      The area was prepped with Betasept.  A rim of normal appearing skin was marked circumferentially around the lesion.  The area was infiltrated with local anesthesia.  The tumor was first debulked to remove all clinically apparent tumor.  An incision following the standard Mohs approach was done and the specimen was oriented,mapped and placed in 1 block(s).  Each specimen was then chromacoded and processed in the Mohs laboratory using standard Mohs technique and submitted for frozen section histology.  Frozen section analysis showed no residual tumor but CLEAR MARGINS.      The tumor was excised using standard Mohs technique in 1 stages(s).  CLEAR MARGINS OBTAINED and Final defect size was 1.4 cm.     We discussed the options for wound management in full with the patient including risks/benefits/ possible outcomes.      REPAIR SECOND INTENT: We discussed the options for wound management in full with the patient including risks/benefits/possible outcomes. Decision made to allow the wound to heal by second intention. Cautery was used for for  hemostasis. EBL minimal; complications none; wound care routine.  The patient was discharged in good condition and will return in one month or prn for wound evaluation.    It was a pleasure speaking to Nir Guy today.  Previous clinic notes and pertinent laboratory tests were reviewed prior to Nir Guy's visit.  Signs and Symptoms of skin cancer discussed with patient.  Patient encouraged to perform monthly skin exams.  UV precautions reviewed with patient.  Risks of non-melanoma skin cancer discussed with patient   Return to clinic 6 months        Again, thank you for allowing me to participate in the care of your patient.        Sincerely,        Chavez Jay MD

## 2024-12-03 NOTE — PROGRESS NOTES
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  Patient has no other skin complaints today.  Remainder of the HPI, Meds, PMH, Allergies, FH, and SH was reviewed in chart.      Past Medical History:   Diagnosis Date    Actinic keratosis     AMD (age related macular degeneration)     Basal cell carcinoma     Former smoker     Full code status 01/06/2017    Hyperlipidemia     OA (osteoarthritis)     Osteoarthritis of left patellofemoral joint 01/06/2017    Osteoarthritis of shoulder     RBBB (right bundle branch block)     S/P colonoscopy 04/18/2011    SNHL (sensorineural hearing loss)     Squamous cell carcinoma of skin     Tinnitus     Vertigo        Past Surgical History:   Procedure Laterality Date    BASAL CELL CARCINOMA EXCISION  01/01/1999    Nose and scalp    CATARACT EXTRACTION, BILATERAL  01/01/2018    CATARACT IOL, RT/LT      IR LUMBAR EPIDURAL STEROID INJECTION  01/09/2018    RELEASE CARPAL TUNNEL Right 01/04/2021    Procedure: Revision open carpal tunnel release.;  Surgeon: Nasir Casper MD;  Location: [facility] OR    RELEASE TRIGGER FINGER Left     REPEAT RIGHT CARPAL TUNNEL RELEASE WITH HYPOTHENAR FAT PAD FLAP  12/2021    DR. FINNEY    TOTAL HIP ARTHROPLASTY Right 02/21/2018    Procedure:  RIGHT TOTAL HIP ARTHROPLASTY DIRECT ANTERIOR APPROACH;  Surgeon: Kaushik Hood MD;  Location: [facility];  Service: Orthopedics    VASECTOMY  09/01/1980        Family History   Problem Relation Age of Onset    Melanoma No family hx of     Cancer Mother         GYN    Cancer Father         Lung    Heart Disease Father        Social History     Socioeconomic History    Marital status:      Spouse name: Not on file    Number of children: Not on file    Years of education: Not on file    Highest education level: Not on file   Occupational History     Employer: RETIRED   Tobacco  Use    Smoking status: Never    Smokeless tobacco: Never   Vaping Use    Vaping status: Never Used   Substance and Sexual Activity    Alcohol use: Yes     Alcohol/week: 8.0 standard drinks of alcohol     Comment: Alcoholic Drinks/day: nightly    Drug use: No    Sexual activity: Not on file   Other Topics Concern    Not on file   Social History Narrative    Not on file     Social Drivers of Health     Financial Resource Strain: Low Risk  (3/23/2024)    Financial Resource Strain     Within the past 12 months, have you or your family members you live with been unable to get utilities (heat, electricity) when it was really needed?: No   Food Insecurity: Low Risk  (3/23/2024)    Food Insecurity     Within the past 12 months, did you worry that your food would run out before you got money to buy more?: No     Within the past 12 months, did the food you bought just not last and you didn t have money to get more?: No   Transportation Needs: Low Risk  (3/23/2024)    Transportation Needs     Within the past 12 months, has lack of transportation kept you from medical appointments, getting your medicines, non-medical meetings or appointments, work, or from getting things that you need?: No   Physical Activity: Sufficiently Active (3/23/2024)    Exercise Vital Sign     Days of Exercise per Week: 7 days     Minutes of Exercise per Session: 30 min   Stress: No Stress Concern Present (3/23/2024)    [place] of Occupational Health - Occupational Stress Questionnaire     Feeling of Stress : Only a little   Social Connections: Unknown (3/23/2024)    Social Connection and Isolation Panel [NHANES]     Frequency of Communication with Friends and Family: Not on file     Frequency of Social Gatherings with Friends and Family: Once a week     Attends Yarsanism Services: Not on file     Active Member of Clubs or Organizations: Not on file     Attends Club or Organization Meetings: Not on file     Marital Status: Not on file    Interpersonal Safety: Low Risk  (3/28/2024)    Interpersonal Safety     Do you feel physically and emotionally safe where you currently live?: Yes     Within the past 12 months, have you been hit, slapped, kicked or otherwise physically hurt by someone?: No     Within the past 12 months, have you been humiliated or emotionally abused in other ways by your partner or ex-partner?: No   Housing Stability: Low Risk  (3/23/2024)    Housing Stability     Do you have housing? : Yes     Are you worried about losing your housing?: No       Outpatient Encounter Medications as of 12/3/2024   Medication Sig Dispense Refill    fish oil-omega-3 fatty acids 1000 MG capsule Take 2 g by mouth daily      Melatonin 10 MG TABS tablet Take 10 mg by mouth nightly as needed for sleep      Multiple Vitamins-Minerals (MENS MULTIVITAMIN PLUS PO)       polyethylene glycol-propylene glycol PF (SYSTANE HYDRATION PF) 0.4-0.3 % SOLN opthalmic solution Apply 1 drop to eye      Vitamin D3 (CHOLECALCIFEROL) 25 mcg (1000 units) tablet Take 25 mcg by mouth daily       No facility-administered encounter medications on file as of 12/3/2024.             O:   NAD, WDWN, Alert & Oriented, Mood & Affect wnl, Vitals stable   General appearance normal   Vitals stable   Alert, oriented and in no acute distress     L parietal scalp 1cm scaly papule   Previous sites on scalp and cheek healing well      Eyes: Conjunctivae/lids:Normal     ENT: Lips, mucosa: normal    MSK:Normal    Cardiovascular: peripheral edema none    Pulm: Breathing Normal    Lymph Nodes: No Head and Neck Lymphadenopathy     Neuro/Psych: Orientation:Alert and Orientedx3 ; Mood/Affect:normal       A/P:  L parietal scalp squamous cell carcinoma in situ   MOHS:   Location    The rationale for Mohs surgery was discussed with the patient and consent was obtained.  The risks and benefits as well as alternatives to therapy were discussed, in detail.  Specifically, the risks of infection, scarring,  bleeding, prolonged wound healing, incomplete removal, allergy to anesthesia, nerve injury and recurrence were addressed.  Indication for Mohs was Location. Prior to the procedure, the treatment site was clearly identified and, if available, confirmed with previous photos and confirmed by the patient   All components of the Universal Protocol/PAUSE rule were completed.  The Mohs surgeon operated in two distinct and integrated capacities as the surgeon and pathologist.      The area was prepped with Betasept.  A rim of normal appearing skin was marked circumferentially around the lesion.  The area was infiltrated with local anesthesia.  The tumor was first debulked to remove all clinically apparent tumor.  An incision following the standard Mohs approach was done and the specimen was oriented,mapped and placed in 1 block(s).  Each specimen was then chromacoded and processed in the Mohs laboratory using standard Mohs technique and submitted for frozen section histology.  Frozen section analysis showed no residual tumor but CLEAR MARGINS.      The tumor was excised using standard Mohs technique in 1 stages(s).  CLEAR MARGINS OBTAINED and Final defect size was 1.4 cm.     We discussed the options for wound management in full with the patient including risks/benefits/ possible outcomes.      REPAIR SECOND INTENT: We discussed the options for wound management in full with the patient including risks/benefits/possible outcomes. Decision made to allow the wound to heal by second intention. Cautery was used for for hemostasis. EBL minimal; complications none; wound care routine.  The patient was discharged in good condition and will return in one month or prn for wound evaluation.    It was a pleasure speaking to Nir Guy today.  Previous clinic notes and pertinent laboratory tests were reviewed prior to Nir Guy's visit.  Signs and Symptoms of skin cancer discussed with patient.  Patient encouraged to perform  monthly skin exams.  UV precautions reviewed with patient.  Risks of non-melanoma skin cancer discussed with patient   Return to clinic 6 months

## 2024-12-10 ENCOUNTER — OFFICE VISIT (OUTPATIENT)
Dept: DERMATOLOGY | Facility: CLINIC | Age: 84
End: 2024-12-10
Payer: COMMERCIAL

## 2024-12-10 DIAGNOSIS — C44.311 BASAL CELL CARCINOMA (BCC) OF LEFT NASAL TIP: Primary | ICD-10-CM

## 2024-12-10 PROCEDURE — 17311 MOHS 1 STAGE H/N/HF/G: CPT | Performed by: DERMATOLOGY

## 2024-12-10 NOTE — PATIENT INSTRUCTIONS
Open Wound Care     for Nose        No strenuous activity for 48 hours    Take Tylenol as needed for discomfort.                                                .         Do not drink alcoholic beverages for 48 hours.    Keep the pressure bandage in place for 24 hours. If the bandage becomes blood tinged or loose, reinforce it with gauze and tape.        (Refer to the reverse side of this page for management of bleeding).    Remove bandage in 24 hours and begin wound care as follows:     Clean area with tap water using a Q tip or gauze pad, (shower / bathe normally)  Dry wound with Q tip or gauze pad  Apply Aquaphor, Vaseline, Polysporin or Bacitracin Ointment with a Q tip  Do NOT use Neosporin Ointment *  Cover the wound with a band-aid or nonstick gauze pad and paper tape.  Repeat wound care once a day until wound is completely healed.    It is an old wives tale that a wound heals better when it is exposed to air and allowed to dry out. The wound will heal faster with a better cosmetic result if it is kept moist with ointment and covered with a bandage.  Do not let the wound dry out.      Supplies Needed:                Qtips or gauze pads                Polysporin or Bacitracin Ointment                Bandaids or nonstick gauze pads and paper tape    Wound care kits and brown paper tape are available for purchase at   the pharmacy.    BLEEDING:    Use tightly rolled up gauze or cloth to apply direct pressure over the bandage for 20   minutes.  Reapply pressure for an additional 20 minutes if necessary  Call the office or go to the nearest emergency room if pressure fails to stop the bleeding.  Use additional gauze and tape to maintain pressure once the bleeding has stopped.  Begin wound care 24 hours after surgery as directed.                  WOUND HEALING    One week after surgery a pink / red halo will form around the outside of the wound.   This is new skin.  The center of the wound will appear yellowish white  and produce some drainage.  The pink halo will slowly migrate in toward the center of the wound until the wound is covered with new shiny pink skin.  There will be no more drainage when the wound is completely healed.  It will take six months to one year for the redness to fade.  The scar may be itchy, tight and sensitive to extreme temperatures for a year after the surgery.  Massaging the area several times a day for several minutes after the wound is completely healed will help the scar soften and normalize faster. Begin massage only after healing is complete.      In case of emergency call: Dr Jay: [phone]    [facility]: [phone]    [facility]:[phone]

## 2024-12-10 NOTE — PROGRESS NOTES
Nir Guy is an extremely pleasant 84 year old year old male patient here today for evaluation and managment of basal cell carcinoma on left nasal tip.  SCalp healing well.  Patient has no other skin complaints today.  Remainder of the HPI, Meds, PMH, Allergies, FH, and SH was reviewed in chart.      Past Medical History:   Diagnosis Date    Actinic keratosis     AMD (age related macular degeneration)     Basal cell carcinoma     Former smoker     Full code status 01/06/2017    Hyperlipidemia     OA (osteoarthritis)     Osteoarthritis of left patellofemoral joint 01/06/2017    Osteoarthritis of shoulder     RBBB (right bundle branch block)     S/P colonoscopy 04/18/2011    SNHL (sensorineural hearing loss)     Squamous cell carcinoma of skin     Tinnitus     Vertigo        Past Surgical History:   Procedure Laterality Date    BASAL CELL CARCINOMA EXCISION  01/01/1999    Nose and scalp    CATARACT EXTRACTION, BILATERAL  01/01/2018    CATARACT IOL, RT/LT      IR LUMBAR EPIDURAL STEROID INJECTION  01/09/2018    RELEASE CARPAL TUNNEL Right 01/04/2021    Procedure: Revision open carpal tunnel release.;  Surgeon: Nasir Casper MD;  Location: [facility]    RELEASE TRIGGER FINGER Left     REPEAT RIGHT CARPAL TUNNEL RELEASE WITH HYPOTHENAR FAT PAD FLAP  12/2021    DR. FINNEY    TOTAL HIP ARTHROPLASTY Right 02/21/2018    Procedure:  RIGHT TOTAL HIP ARTHROPLASTY DIRECT ANTERIOR APPROACH;  Surgeon: Kaushik Hood MD;  Location: [facility];  Service: Orthopedics    VASECTOMY  09/01/1980        Family History   Problem Relation Age of Onset    Melanoma No family hx of     Cancer Mother         GYN    Cancer Father         Lung    Heart Disease Father        Social History     Socioeconomic History    Marital status:      Spouse name: Not on file    Number of children: Not on file    Years of education: Not on file    Highest education level: Not on file   Occupational History     Employer: RETIRED    Tobacco Use    Smoking status: Never    Smokeless tobacco: Never   Vaping Use    Vaping status: Never Used   Substance and Sexual Activity    Alcohol use: Yes     Alcohol/week: 8.0 standard drinks of alcohol     Comment: Alcoholic Drinks/day: nightly    Drug use: No    Sexual activity: Not on file   Other Topics Concern    Not on file   Social History Narrative    Not on file     Social Drivers of Health     Financial Resource Strain: Low Risk  (3/23/2024)    Financial Resource Strain     Within the past 12 months, have you or your family members you live with been unable to get utilities (heat, electricity) when it was really needed?: No   Food Insecurity: Low Risk  (3/23/2024)    Food Insecurity     Within the past 12 months, did you worry that your food would run out before you got money to buy more?: No     Within the past 12 months, did the food you bought just not last and you didn t have money to get more?: No   Transportation Needs: Low Risk  (3/23/2024)    Transportation Needs     Within the past 12 months, has lack of transportation kept you from medical appointments, getting your medicines, non-medical meetings or appointments, work, or from getting things that you need?: No   Physical Activity: Sufficiently Active (3/23/2024)    Exercise Vital Sign     Days of Exercise per Week: 7 days     Minutes of Exercise per Session: 30 min   Stress: No Stress Concern Present (3/23/2024)    [employer] [place] [employer] - Occupational Stress Questionnaire     Feeling of Stress : Only a little   Social Connections: Unknown (3/23/2024)    Social Connection and Isolation Panel [NHANES]     Frequency of Communication with Friends and Family: Not on file     Frequency of Social Gatherings with Friends and Family: Once a week     Attends [employer] Services: Not on file     Active Member of Clubs or Organizations: Not on file     Attends Club or Organization Meetings: Not on file     Marital Status: Not on  file   Interpersonal Safety: Low Risk  (3/28/2024)    Interpersonal Safety     Do you feel physically and emotionally safe where you currently live?: Yes     Within the past 12 months, have you been hit, slapped, kicked or otherwise physically hurt by someone?: No     Within the past 12 months, have you been humiliated or emotionally abused in other ways by your partner or ex-partner?: No   Housing Stability: Low Risk  (3/23/2024)    Housing Stability     Do you have housing? : Yes     Are you worried about losing your housing?: No       Outpatient Encounter Medications as of 12/10/2024   Medication Sig Dispense Refill    fish oil-omega-3 fatty acids 1000 MG capsule Take 2 g by mouth daily      Melatonin 10 MG TABS tablet Take 10 mg by mouth nightly as needed for sleep      Multiple Vitamins-Minerals (MENS MULTIVITAMIN PLUS PO)       polyethylene glycol-propylene glycol PF (SYSTANE HYDRATION PF) 0.4-0.3 % SOLN opthalmic solution Apply 1 drop to eye      Vitamin D3 (CHOLECALCIFEROL) 25 mcg (1000 units) tablet Take 25 mcg by mouth daily       No facility-administered encounter medications on file as of 12/10/2024.             O:   NAD, WDWN, Alert & Oriented, Mood & Affect wnl, Vitals stable   General appearance normal   Vitals stable   Alert, oriented and in no acute distress     L nasal tip 6mm scaly papule       Eyes: Conjunctivae/lids:Normal     ENT: Lips, mucosa: normal    MSK:Normal    Cardiovascular: peripheral edema none    Pulm: Breathing Normal    Neuro/Psych: Orientation:Alert and Orientedx3 ; Mood/Affect:normal       A/P:  L nasal tip basal cell carcinoma   MOHS:   Location    The rationale for Mohs surgery was discussed with the patient and consent was obtained.  The risks and benefits as well as alternatives to therapy were discussed, in detail.  Specifically, the risks of infection, scarring, bleeding, prolonged wound healing, incomplete removal, allergy to anesthesia, nerve injury and recurrence were  addressed.  Indication for Mohs was Location. Prior to the procedure, the treatment site was clearly identified and, if available, confirmed with previous photos and confirmed by the patient   All components of the Universal Protocol/PAUSE rule were completed.  The Mohs surgeon operated in two distinct and integrated capacities as the surgeon and pathologist.      The area was prepped with Betasept.  A rim of normal appearing skin was marked circumferentially around the lesion.  The area was infiltrated with local anesthesia.  The tumor was first debulked to remove all clinically apparent tumor.  An incision following the standard Mohs approach was done and the specimen was oriented,mapped and placed in 1 block(s).  Each specimen was then chromacoded and processed in the Mohs laboratory using standard Mohs technique and submitted for frozen section histology.  Frozen section analysis showed no residual tumor but CLEAR MARGINS.      The tumor was excised using standard Mohs technique in 1 stages(s).  CLEAR MARGINS OBTAINED and Final defect size was 1.1 x 1 cm.     We discussed the options for wound management in full with the patient including risks/benefits/ possible outcomes.      DERMABRASION: After PGACAC discussed with patient, decision for tangential excision and dermabrasion was made. After anesthesia with Lido/Epi/Clinda and prep with hibiclens, hypertrophic areas were tangentially excised and entire cosmetic unit was smoothed 2.3cm. Hemostasis was obtained with pressure. Patient tolerated procedure well. There were no complications and EBL minimal. Patient advised to keep abraded surfaces covered with generous ointment until healed, approximately 2 weeks. Return to office in 3 months or prn.    It was a pleasure speaking to Nir Guy today.  Previous clinic notes and pertinent laboratory tests were reviewed prior to Nir Guy's visit.  Signs and Symptoms of skin cancer discussed with  patient.  Patient encouraged to perform monthly skin exams.  UV precautions reviewed with patient.  Risks of non-melanoma skin cancer discussed with patient   Return to clinic 6 months

## 2024-12-10 NOTE — LETTER
12/10/2024      Nir Guy  [address] [place] [id number]      Dear Colleague,    Thank you for referring your patient, Nir Guy, to the [facility]. Please see a copy of my visit note below.    Surgical Office Location :   [facility]  [address] [place] [address]      Nir Guy is an extremely pleasant 84 year old year old male patient here today for evaluation and managment of basal cell carcinoma on left nasal tip.  SCalp healing well.  Patient has no other skin complaints today.  Remainder of the HPI, Meds, PMH, Allergies, FH, and SH was reviewed in chart.      Past Medical History:   Diagnosis Date     Actinic keratosis      AMD (age related macular degeneration)      Basal cell carcinoma      Former smoker      Full code status 01/06/2017     Hyperlipidemia      OA (osteoarthritis)      Osteoarthritis of left patellofemoral joint 01/06/2017     Osteoarthritis of shoulder      RBBB (right bundle branch block)      S/P colonoscopy 04/18/2011     SNHL (sensorineural hearing loss)      Squamous cell carcinoma of skin      Tinnitus      Vertigo        Past Surgical History:   Procedure Laterality Date     BASAL CELL CARCINOMA EXCISION  01/01/1999    Nose and scalp     CATARACT EXTRACTION, BILATERAL  01/01/2018     CATARACT IOL, RT/LT       IR LUMBAR EPIDURAL STEROID INJECTION  01/09/2018     RELEASE CARPAL TUNNEL Right 01/04/2021    Procedure: Revision open carpal tunnel release.;  Surgeon: Nasir Casper MD;  Location: [facility]     RELEASE TRIGGER FINGER Left      REPEAT RIGHT CARPAL TUNNEL RELEASE WITH HYPOTHENAR FAT PAD FLAP  12/2021    DR. FINNEY     TOTAL HIP ARTHROPLASTY Right 02/21/2018    Procedure:  RIGHT TOTAL HIP ARTHROPLASTY DIRECT ANTERIOR APPROACH;  Surgeon: Kaushik Hood MD;  Location: [facility] OR;  Service: Orthopedics     VASECTOMY  09/01/1980        Family History   Problem Relation Age of Onset     Melanoma  No family hx of      Cancer Mother         GYN     Cancer Father         Lung     Heart Disease Father        Social History     Socioeconomic History     Marital status:      Spouse name: Not on file     Number of children: Not on file     Years of education: Not on file     Highest education level: Not on file   Occupational History     Employer: RETIRED   Tobacco Use     Smoking status: Never     Smokeless tobacco: Never   Vaping Use     Vaping status: Never Used   Substance and Sexual Activity     Alcohol use: Yes     Alcohol/week: 8.0 standard drinks of alcohol     Comment: Alcoholic Drinks/day: nightly     Drug use: No     Sexual activity: Not on file   Other Topics Concern     Not on file   Social History Narrative     Not on file     Social Drivers of Health     Financial Resource Strain: Low Risk  (3/23/2024)    Financial Resource Strain      Within the past 12 months, have you or your family members you live with been unable to get utilities (heat, electricity) when it was really needed?: No   Food Insecurity: Low Risk  (3/23/2024)    Food Insecurity      Within the past 12 months, did you worry that your food would run out before you got money to buy more?: No      Within the past 12 months, did the food you bought just not last and you didn t have money to get more?: No   Transportation Needs: Low Risk  (3/23/2024)    Transportation Needs      Within the past 12 months, has lack of transportation kept you from medical appointments, getting your medicines, non-medical meetings or appointments, work, or from getting things that you need?: No   Physical Activity: Sufficiently Active (3/23/2024)    Exercise Vital Sign      Days of Exercise per Week: 7 days      Minutes of Exercise per Session: 30 min   Stress: No Stress Concern Present (3/23/2024)    [employer] - Occupational Stress Questionnaire      Feeling of Stress : Only a little   Social Connections: Unknown  (3/23/2024)    Social Connection and Isolation Panel [NHANES]      Frequency of Communication with Friends and Family: Not on file      Frequency of Social Gatherings with Friends and Family: Once a week      Attends [employer] Services: Not on file      Active Member of Clubs or Organizations: Not on file      Attends Club or Organization Meetings: Not on file      Marital Status: Not on file   Interpersonal Safety: Low Risk  (3/28/2024)    Interpersonal Safety      Do you feel physically and emotionally safe where you currently live?: Yes      Within the past 12 months, have you been hit, slapped, kicked or otherwise physically hurt by someone?: No      Within the past 12 months, have you been humiliated or emotionally abused in other ways by your partner or ex-partner?: No   Housing Stability: Low Risk  (3/23/2024)    Housing Stability      Do you have housing? : Yes      Are you worried about losing your housing?: No       Outpatient Encounter Medications as of 12/10/2024   Medication Sig Dispense Refill     fish oil-omega-3 fatty acids 1000 MG capsule Take 2 g by mouth daily       Melatonin 10 MG TABS tablet Take 10 mg by mouth nightly as needed for sleep       Multiple Vitamins-Minerals (MENS MULTIVITAMIN PLUS PO)        polyethylene glycol-propylene glycol PF (SYSTANE HYDRATION PF) 0.4-0.3 % SOLN opthalmic solution Apply 1 drop to eye       Vitamin D3 (CHOLECALCIFEROL) 25 mcg (1000 units) tablet Take 25 mcg by mouth daily       No facility-administered encounter medications on file as of 12/10/2024.             O:   NAD, WDWN, Alert & Oriented, Mood & Affect wnl, Vitals stable   General appearance normal   Vitals stable   Alert, oriented and in no acute distress     L nasal tip 6mm scaly papule       Eyes: Conjunctivae/lids:Normal     ENT: Lips, mucosa: normal    MSK:Normal    Cardiovascular: peripheral edema none    Pulm: Breathing Normal    Neuro/Psych: Orientation:Alert and Orientedx3 ; Mood/Affect:normal        A/P:  L nasal tip basal cell carcinoma   MOHS:   Location    The rationale for Mohs surgery was discussed with the patient and consent was obtained.  The risks and benefits as well as alternatives to therapy were discussed, in detail.  Specifically, the risks of infection, scarring, bleeding, prolonged wound healing, incomplete removal, allergy to anesthesia, nerve injury and recurrence were addressed.  Indication for Mohs was Location. Prior to the procedure, the treatment site was clearly identified and, if available, confirmed with previous photos and confirmed by the patient   All components of the Universal Protocol/PAUSE rule were completed.  The Mohs surgeon operated in two distinct and integrated capacities as the surgeon and pathologist.      The area was prepped with Betasept.  A rim of normal appearing skin was marked circumferentially around the lesion.  The area was infiltrated with local anesthesia.  The tumor was first debulked to remove all clinically apparent tumor.  An incision following the standard Mohs approach was done and the specimen was oriented,mapped and placed in 1 block(s).  Each specimen was then chromacoded and processed in the Mohs laboratory using standard Mohs technique and submitted for frozen section histology.  Frozen section analysis showed no residual tumor but CLEAR MARGINS.      The tumor was excised using standard Mohs technique in 1 stages(s).  CLEAR MARGINS OBTAINED and Final defect size was 1.1 x 1 cm.     We discussed the options for wound management in full with the patient including risks/benefits/ possible outcomes.      DERMABRASION: After PGACAC discussed with patient, decision for tangential excision and dermabrasion was made. After anesthesia with Lido/Epi/Clinda and prep with hibiclens, hypertrophic areas were tangentially excised and entire cosmetic unit was smoothed 2.3cm. Hemostasis was obtained with pressure. Patient tolerated procedure well. There were  no complications and EBL minimal. Patient advised to keep abraded surfaces covered with generous ointment until healed, approximately 2 weeks. Return to office in 3 months or prn.    It was a pleasure speaking to Nir Guy today.  Previous clinic notes and pertinent laboratory tests were reviewed prior to Nir Guy's visit.  Signs and Symptoms of skin cancer discussed with patient.  Patient encouraged to perform monthly skin exams.  UV precautions reviewed with patient.  Risks of non-melanoma skin cancer discussed with patient   Return to clinic 6 months        Again, thank you for allowing me to participate in the care of your patient.        Sincerely,        Chavez Jay MD

## 2025-02-18 ENCOUNTER — TRANSFERRED RECORDS (OUTPATIENT)
Dept: HEALTH INFORMATION MANAGEMENT | Facility: CLINIC | Age: 85
End: 2025-02-18
Payer: COMMERCIAL

## 2025-04-06 SDOH — HEALTH STABILITY: PHYSICAL HEALTH: ON AVERAGE, HOW MANY MINUTES DO YOU ENGAGE IN EXERCISE AT THIS LEVEL?: 30 MIN

## 2025-04-06 SDOH — HEALTH STABILITY: PHYSICAL HEALTH: ON AVERAGE, HOW MANY DAYS PER WEEK DO YOU ENGAGE IN MODERATE TO STRENUOUS EXERCISE (LIKE A BRISK WALK)?: 7 DAYS

## 2025-04-06 ASSESSMENT — SOCIAL DETERMINANTS OF HEALTH (SDOH): HOW OFTEN DO YOU GET TOGETHER WITH FRIENDS OR RELATIVES?: ONCE A WEEK

## 2025-04-08 ENCOUNTER — OFFICE VISIT (OUTPATIENT)
Dept: INTERNAL MEDICINE | Facility: CLINIC | Age: 85
End: 2025-04-08
Attending: INTERNAL MEDICINE
Payer: COMMERCIAL

## 2025-04-08 VITALS
DIASTOLIC BLOOD PRESSURE: 82 MMHG | WEIGHT: 211.9 LBS | HEART RATE: 65 BPM | SYSTOLIC BLOOD PRESSURE: 138 MMHG | RESPIRATION RATE: 16 BRPM | TEMPERATURE: 98.5 F | HEIGHT: 73 IN | OXYGEN SATURATION: 95 % | BODY MASS INDEX: 28.08 KG/M2

## 2025-04-08 DIAGNOSIS — G47.61 PERIODIC LIMB MOVEMENT DISORDER: ICD-10-CM

## 2025-04-08 DIAGNOSIS — G25.81 RESTLESS LEGS SYNDROME (RLS): ICD-10-CM

## 2025-04-08 DIAGNOSIS — G47.33 OBSTRUCTIVE SLEEP APNEA (ADULT) (PEDIATRIC): ICD-10-CM

## 2025-04-08 DIAGNOSIS — R42 VERTIGO: ICD-10-CM

## 2025-04-08 DIAGNOSIS — Z00.00 MEDICARE ANNUAL WELLNESS VISIT, SUBSEQUENT: Primary | ICD-10-CM

## 2025-04-08 DIAGNOSIS — R41.3 MEMORY LOSS: ICD-10-CM

## 2025-04-08 DIAGNOSIS — J44.9 CHRONIC OBSTRUCTIVE PULMONARY DISEASE, UNSPECIFIED COPD TYPE (H): ICD-10-CM

## 2025-04-08 DIAGNOSIS — I49.3 VENTRICULAR PREMATURE BEATS: ICD-10-CM

## 2025-04-08 DIAGNOSIS — R25.9 ABNORMAL INVOLUNTARY MOVEMENT: ICD-10-CM

## 2025-04-08 DIAGNOSIS — E83.10 DISORDER OF IRON METABOLISM: ICD-10-CM

## 2025-04-08 DIAGNOSIS — M48.062 SPINAL STENOSIS OF LUMBAR REGION WITH NEUROGENIC CLAUDICATION: ICD-10-CM

## 2025-04-08 DIAGNOSIS — G60.9 IDIOPATHIC PERIPHERAL NEUROPATHY: ICD-10-CM

## 2025-04-08 LAB
ALBUMIN SERPL BCG-MCNC: 4.5 G/DL (ref 3.5–5.2)
ALP SERPL-CCNC: 71 U/L (ref 40–150)
ALT SERPL W P-5'-P-CCNC: 17 U/L (ref 0–70)
ANION GAP SERPL CALCULATED.3IONS-SCNC: 11 MMOL/L (ref 7–15)
AST SERPL W P-5'-P-CCNC: 28 U/L (ref 0–45)
BILIRUB SERPL-MCNC: 0.6 MG/DL
BUN SERPL-MCNC: 15.5 MG/DL (ref 8–23)
CALCIUM SERPL-MCNC: 9.7 MG/DL (ref 8.8–10.4)
CHLORIDE SERPL-SCNC: 102 MMOL/L (ref 98–107)
CREAT SERPL-MCNC: 0.9 MG/DL (ref 0.67–1.17)
EGFRCR SERPLBLD CKD-EPI 2021: 83 ML/MIN/1.73M2
ERYTHROCYTE [DISTWIDTH] IN BLOOD BY AUTOMATED COUNT: 12.2 % (ref 10–15)
FERRITIN SERPL-MCNC: 92 NG/ML (ref 31–409)
GLUCOSE SERPL-MCNC: 101 MG/DL (ref 70–99)
HCO3 SERPL-SCNC: 25 MMOL/L (ref 22–29)
HCT VFR BLD AUTO: 44.9 % (ref 40–53)
HGB BLD-MCNC: 15.5 G/DL (ref 13.3–17.7)
IRON BINDING CAPACITY (ROCHE): 268 UG/DL (ref 240–430)
IRON SATN MFR SERPL: 43 % (ref 15–46)
IRON SERPL-MCNC: 115 UG/DL (ref 61–157)
MCH RBC QN AUTO: 31.9 PG (ref 26.5–33)
MCHC RBC AUTO-ENTMCNC: 34.5 G/DL (ref 31.5–36.5)
MCV RBC AUTO: 92 FL (ref 78–100)
PLATELET # BLD AUTO: 178 10E3/UL (ref 150–450)
POTASSIUM SERPL-SCNC: 4.6 MMOL/L (ref 3.4–5.3)
PROT SERPL-MCNC: 7.2 G/DL (ref 6.4–8.3)
RBC # BLD AUTO: 4.86 10E6/UL (ref 4.4–5.9)
SODIUM SERPL-SCNC: 138 MMOL/L (ref 135–145)
WBC # BLD AUTO: 7.7 10E3/UL (ref 4–11)

## 2025-04-08 PROCEDURE — 85027 COMPLETE CBC AUTOMATED: CPT | Performed by: INTERNAL MEDICINE

## 2025-04-08 PROCEDURE — 36415 COLL VENOUS BLD VENIPUNCTURE: CPT | Performed by: INTERNAL MEDICINE

## 2025-04-08 PROCEDURE — 80053 COMPREHEN METABOLIC PANEL: CPT | Performed by: INTERNAL MEDICINE

## 2025-04-08 PROCEDURE — 83540 ASSAY OF IRON: CPT | Performed by: INTERNAL MEDICINE

## 2025-04-08 PROCEDURE — 83550 IRON BINDING TEST: CPT | Performed by: INTERNAL MEDICINE

## 2025-04-08 PROCEDURE — 82728 ASSAY OF FERRITIN: CPT | Performed by: INTERNAL MEDICINE

## 2025-04-08 ASSESSMENT — PAIN SCALES - GENERAL: PAINLEVEL_OUTOF10: NO PAIN (0)

## 2025-04-08 NOTE — PROGRESS NOTES
"       [facility]    Comprehensive and complex medical care - Chronic disease management - Shared decision making - Care coordination - Compassionate care    Patient advocacy - Rational deprescribing - Minimally disruptive medicine - Ethical focus - Customized care         Date of Service: 4/8/2025  Primary Provider: German Mcgregor    Patient Care Team:  German Mcgregor MD as PCP - General (Internal Medicine)  German Mcgregor MD as Assigned PCP  Rehan Victor MD as MD (Orthopaedic Surgery)  Nasir Casper MD as MD (Orthopaedic Surgery)  Chavez Jay MD as Assigned Dermatology Provider  Gege Vasquez MD as MD (Ophthalmology)  Friday, MD Peter as Physician (Neurology)         Chief Complaint   Patient presents with    Wellness Visit           4/8/2025    10:23 AM   Additional Questions   Roomed by David WILLIS MA     History of Present Illness    Nir Guy, 85 years    Memory issues  - Reports mild memory loss, described as \"mild plus\"  - Underwent multiple tests, including a brain wave scan, which did not indicate dementia or Alzheimer's  - Not on medication for memory issues  - Experiences some memory loss related to age    Bereavement and stress  - Partner, Ariadne, passed away on October 31st from cancer  - Took care of her at home with hospice support during August and September  - Experiencing significant stress due to conflicts with partner's children over inheritance and property  - Reports feeling overwhelmed and stressed by legal battles and family disputes  - Has lost some weight, possibly related to stress and reduced appetite    Hearing and vertigo  - Experiences vertigo, which occurred about a month ago  - Uses hearing aids and reports some improvement with ear wax management  - Neighbor assisted during vertigo episode  - Uses ear drops weekly to manage ear wax    Leg and foot pain  - Reports pain in the lower and upper parts " of the back of the legs, more painful than before  - Describes a bulge sensation at the bottom of the left foot towards the front  - Pain increases with activity  - Engages in daily exercise, including walking about half a mile and performing exercises on the bed    Social support  - Has a supportive social network of about 20 friends  - Engages in social activities with friends, which provides emotional support    General health  - No issues with diarrhea  - Breathing and heart seem stable  - Not on any prescription medications  - Blood pressure and pulse are looking good       We reviewed his other issues noted in the assessment but not specifically addressed in the HPI above.           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    Pericardial tamponade    PE (pulmonary thromboembolism) (H)    Pericardial effusion    Idiopathic peripheral neuropathy       Low    HLD (hyperlipidemia)    AMD (age related macular degeneration)    Vertigo    SNHL (sensorineural hearing loss)    Tinnitus    Intermediate stage nonexudative age-related macular degeneration of both eyes    RBBB (right bundle branch block)    Spinal stenosis of lumbar region with neurogenic claudication    Primary insomnia     Current Outpatient Medications   Medication Instructions    fish oil-omega-3 fatty acids 2 g, DAILY    Melatonin 10 mg, AT BEDTIME PRN    Multiple Vitamins-Minerals (MENS MULTIVITAMIN PLUS PO) No dose, route, or frequency recorded.    polyethylene glycol-propylene glycol PF (SYSTANE HYDRATION PF) 0.4-0.3 % SOLN opthalmic solution 1 drop    Vitamin D3 (CHOLECALCIFEROL) 25 mcg, DAILY      Social History  "    Occupational History     Employer: RETIRED   Tobacco Use    Smoking status: Never    Smokeless tobacco: Never   Vaping Use    Vaping status: Never Used   Substance and Sexual Activity    Alcohol use: Yes     Alcohol/week: 8.0 standard drinks of alcohol     Comment: Alcoholic Drinks/day: nightly    Drug use: No    Sexual activity: Not on file             Wt Readings from Last 3 Encounters:   04/08/25 96.1 kg (211 lb 14.4 oz)   06/14/24 99.7 kg (219 lb 11.2 oz)   03/28/24 98.6 kg (217 lb 4.8 oz)     BP Readings from Last 3 Encounters:   04/08/25 138/82   06/15/24 132/76   03/28/24 138/80     PHYSICAL EXAM  /82   Pulse 65   Temp 98.5  F (36.9  C) (Oral)   Resp 16   Ht 1.854 m (6' 1\")   Wt 96.1 kg (211 lb 14.4 oz)   SpO2 95%   BMI 27.96 kg/m     The patient is comfortable, no acute distress.  Mood good.  Insight is good.  No skin lesions or nodules of concern.  Ears clear.  Eyes are nonicteric.  Throat is clear.  Neck is supple without mass, no thyromegaly. No cervical or epitrochlear adenopathy. Heart regular rate and rhythm.  Lungs clear to auscultation bilaterally.  Respiratory effort good.  Abdomen soft and nontender.  No hepatosplenomegaly.  Extremities show no edema.           Results for orders placed or performed in visit on 04/08/25   CBC with platelets     Status: Normal   Result Value Ref Range    WBC Count 7.7 4.0 - 11.0 10e3/uL    RBC Count 4.86 4.40 - 5.90 10e6/uL    Hemoglobin 15.5 13.3 - 17.7 g/dL    Hematocrit 44.9 40.0 - 53.0 %    MCV 92 78 - 100 fL    MCH 31.9 26.5 - 33.0 pg    MCHC 34.5 31.5 - 36.5 g/dL    RDW 12.2 10.0 - 15.0 %    Platelet Count 178 150 - 450 10e3/uL            Diagnoses managed today:    1. Medicare annual wellness visit, subsequent    2. Chronic obstructive pulmonary disease, unspecified COPD type (H)    3. Idiopathic peripheral neuropathy    4. Disorder of iron metabolism    5. Restless legs syndrome (RLS)    6. Ventricular premature beats    7. Abnormal " involuntary movement    8. Periodic limb movement disorder    9. Obstructive sleep apnea (adult) (pediatric)    10. Memory loss    11. Spinal stenosis of lumbar region with neurogenic claudication    12. Vertigo          Assessment & Plan     Medicare annual wellness visit, subsequent  - Schedule follow-up in six months.    Chronic obstructive pulmonary disease, unspecified COPD type (H)  - Breathing and heart seem stable.  - No prescription medications currently needed.  - continue to monitor.    Idiopathic peripheral neuropathy  - No change in symptoms; no feeling in toes.  - Continue exercising with caution to avoid losing balance.    Disorder of iron metabolism  - Check blood work today.    Restless legs syndrome (RLS)  - Engages in daily exercise, which may help manage symptoms.    Ventricular premature beats  - Heart seems stable.  - No prescription medications currently needed.    Abnormal involuntary movement  - No specific details available.    Periodic limb movement disorder  - No specific details available.    Obstructive sleep apnea (adult) (pediatric)  - Does not require a CPAP machine as the condition is not severe.    Memory loss  - Mild memory loss related to age.  - No medication required.    Spinal stenosis of lumbar region with neurogenic claudication  - Increased pain in lower and upper parts of legs.  - Use ice on back in the morning; continue exercises.    Vertigo  - Managed with ear drops; improvement noted.  - Continue ear drops weekly; consider every 2-4 weeks for maintenance.    COVID-19 booster  - Administer COVID-19 booster today.  - Risks and side effects: Potential for feeling achy or tired for a day.        Continue current medications otherwise.  Follow up sooner if issues.    Orders Placed This Encounter   Procedures    COVID-19 12+ (PFIZER)    Comprehensive metabolic panel    CBC with platelets      Issues to follow up on:  Follow up blood work.  Foot issues likely due to peripheral  neuropathy (PN).    The longitudinal plan of care for the diagnoses and conditions as documented were addressed during this visit. Due to the added complexity in care, I will continue to support Nir in the subsequent management and with ongoing continuity of care.     German Mcgregor MD  General Internal Medicine  [facility]    Return in about 6 months (around 10/9/2025).     Future Appointments   Date Time Provider Department Center   5/5/2025  1:30 PM Chavez Jay MD [facility]   10/9/2025 10:30 AM German Mcgregor MD [facility]       ______________________________________________________________________     Additional required elements:  I have reviewed opioid use disorder and substance use disorder risk factors and made any needed referrals.  This may not apply to this individual patient, but this documentation is required by Medicare.    Counseling   Appropriate preventive services were addressed with this patient via screening, questionnaire, or discussion as appropriate for fall prevention, nutrition, physical activity, Tobacco-use cessation, social engagement, weight loss and cognition.  Checklist reviewing preventive services available has been given to the patient.    Memory screen:      3/28/2024     3:41 PM 4/8/2025    10:30 AM   MINI COG   Clock Draw Score 2 Normal 2 Normal   3 Item Recall 2 objects recalled 0 objects recalled   Mini Cog Total Score 4 2        PHQ-2 Score:       4/8/2025     9:55 AM 3/28/2024     3:40 PM   PHQ-2 ( 1999 Pfizer)   Q1: Little interest or pleasure in doing things 1 0    Q2: Feeling down, depressed or hopeless 1 0    PHQ-2 Score 2  0   Q1: Little interest or pleasure in doing things Several days Not at all   Q2: Feeling down, depressed or hopeless Several days Not at all   PHQ-2 Score 2 0       Patient-reported    Proxy-reported      Advanced care planning reviewed.        4/8/2025   Fall Risk   Fallen 2 or more times in the  past year? No     No   Trouble with walking or balance? No     No       Proxy-reported    Multiple values from one day are sorted in reverse-chronological order         Health Maintenance   Topic Date Due    ANNUAL REVIEW OF HM ORDERS  Never done    MEDICARE ANNUAL WELLNESS VISIT  04/08/2026    FALL RISK ASSESSMENT  04/08/2026    LIPID  02/03/2027    ADVANCE CARE PLANNING  03/24/2028    SPIROMETRY  Completed    PHQ-2 (once per calendar year)  Completed    INFLUENZA VACCINE  Completed    Pneumococcal Vaccine: 50+ Years  Completed    ZOSTER IMMUNIZATION  Completed    RSV VACCINE  Completed    COVID-19 Vaccine  Completed    COPD ACTION PLAN  Addressed    HPV IMMUNIZATION  Aged Out    MENINGITIS IMMUNIZATION  Aged Out    DTAP/TDAP/TD IMMUNIZATION  Discontinued      Last vision screening (if done):       No data to display

## 2025-04-08 NOTE — PATIENT INSTRUCTIONS
Patient Education   Learning About Stress  What is stress?     Stress is your body's response to a hard situation. Your body can have a physical, emotional, or mental response. Stress is a fact of life for most people, and it affects everyone differently. What causes stress for you may not be stressful for someone else.  A lot of things can cause stress. You may feel stress when you go on a job interview, take a test, or run a race. This kind of short-term stress is normal and even useful. It can help you if you need to work hard or react quickly. For example, stress can help you finish an important job on time.  Long-term stress is caused by ongoing stressful situations or events. Examples of long-term stress include long-term health problems, ongoing problems at work, or conflicts in your family. Long-term stress can harm your health.  How does stress affect your health?  When you are stressed, your body responds as though you are in danger. It makes hormones that speed up your heart, make you breathe faster, and give you a burst of energy. This is called the fight-or-flight stress response. If the stress is over quickly, your body goes back to normal and no harm is done.  But if stress happens too often or lasts too long, it can have bad effects. Long-term stress can make you more likely to get sick, and it can make symptoms of some diseases worse. If you tense up when you are stressed, you may develop neck, shoulder, or low back pain. Stress is linked to high blood pressure and heart disease.  Stress also harms your emotional health. It can make you jones, tense, or depressed. Your relationships may suffer, and you may not do well at work or school.  What can you do to manage stress?  You can try these things to help manage stress:   Do something active. Exercise or activity can help reduce stress. Walking is a great way to get started. Even everyday activities such as housecleaning or yard work can help.  Try  yoga or david chi. These techniques combine exercise and meditation. You may need some training at first to learn them.  Do something you enjoy. For example, listen to music or go to a movie. Practice your hobby or do volunteer work.  Meditate. This can help you relax, because you are not worrying about what happened before or what may happen in the future.  Do guided imagery. Imagine yourself in any setting that helps you feel calm. You can use online videos, books, or a teacher to guide you.  Do breathing exercises. For example:  From a standing position, bend forward from the waist with your knees slightly bent. Let your arms dangle close to the floor.  Breathe in slowly and deeply as you return to a standing position. Roll up slowly and lift your head last.  Hold your breath for just a few seconds in the standing position.  Breathe out slowly and bend forward from the waist.  Let your feelings out. Talk, laugh, cry, and express anger when you need to. Talking with supportive friends or family, a counselor, or a bonnie leader about your feelings is a healthy way to relieve stress. Avoid discussing your feelings with people who make you feel worse.  Write. It may help to write about things that are bothering you. This helps you find out how much stress you feel and what is causing it. When you know this, you can find better ways to cope.  What can you do to prevent stress?  You might try some of these things to help prevent stress:  Manage your time. This helps you find time to do the things you want and need to do.  Get enough sleep. Your body recovers from the stresses of the day while you are sleeping.  Get support. Your family, friends, and community can make a difference in how you experience stress.  Limit your news feed. Avoid or limit time on social media or news that may make you feel stressed.  Do something active. Exercise or activity can help reduce stress. Walking is a great way to get started.  Where  "can you learn more?  Go to https://www.[employer].net/patiented  Enter N032 in the search box to learn more about \"Learning About Stress.\"  Current as of: October 24, 2024  Content Version: 14.4    8960-7165 [employer].   Care instructions adapted under license by your healthcare professional. If you have questions about a medical condition or this instruction, always ask your healthcare professional. [employer] disclaims any warranty or liability for your use of this information.    Learning About Sleeping Well  What does sleeping well mean?     Sleeping well means getting enough sleep to feel good and stay healthy. How much sleep is enough varies among people.  The number of hours you sleep and how you feel when you wake up are both important. If you do not feel refreshed, you probably need more sleep. Another sign of not getting enough sleep is feeling tired during the day.  Experts recommend that adults get at least 7 or more hours of sleep per day. Children and older adults need more sleep.  Why is getting enough sleep important?  Getting enough quality sleep is a basic part of good health. When your sleep suffers, your physical health, mood, and your thoughts can suffer too. You may find yourself feeling more grumpy or stressed. Not getting enough sleep also can lead to serious problems, including injury, accidents, anxiety, and depression.  What might cause poor sleeping?  Many things can cause sleep problems, including:  Changes to your sleep schedule.  Stress. Stress can be caused by fear about a single event, such as giving a speech. Or you may have ongoing stress, such as worry about work or school.  Depression, anxiety, and other mental or emotional conditions.  Changes in your sleep habits or surroundings. This includes changes that happen where you sleep, such as noise, light, or sleeping in a different bed. It also includes changes in your sleep pattern, such as having jet lag " "or working a late shift.  Health problems, such as pain, breathing problems, and restless legs syndrome.  Lack of regular exercise.  Using alcohol, nicotine, or caffeine before bed.  How can you help yourself?  Here are some tips that may help you sleep more soundly and wake up feeling more refreshed.  Your sleeping area   Use your bedroom only for sleeping and sex. A bit of light reading may help you fall asleep. But if it doesn't, do your reading elsewhere in the house. Try not to use your TV, computer, smartphone, or tablet while you are in bed.  Be sure your bed is big enough to stretch out comfortably, especially if you have a sleep partner.  Keep your bedroom quiet, dark, and cool. Use curtains, blinds, or a sleep mask to block out light. To block out noise, use earplugs, soothing music, or a \"white noise\" machine.  Your evening and bedtime routine   Create a relaxing bedtime routine. You might want to take a warm shower or bath, or listen to soothing music.  Go to bed at the same time every night. And get up at the same time every morning, even if you feel tired.  What to avoid   Limit caffeine (coffee, tea, caffeinated sodas) during the day, and don't have any for at least 6 hours before bedtime.  Avoid drinking alcohol before bedtime. Alcohol can cause you to wake up more often during the night.  Try not to smoke or use tobacco, especially in the evening. Nicotine can keep you awake.  Limit naps during the day, especially close to bedtime.  Avoid lying in bed awake for too long. If you can't fall asleep or if you wake up in the middle of the night and can't get back to sleep within about 20 minutes, get out of bed and go to another room until you feel sleepy.  Avoid taking medicine right before bed that may keep you awake or make you feel hyper or energized. Your doctor can tell you if your medicine may do this and if you can take it earlier in the day.  If you can't sleep   Imagine yourself in a peaceful, " "pleasant scene. Focus on the details and feelings of being in a place that is relaxing.  Get up and do a quiet or boring activity until you feel sleepy.  Avoid drinking any liquids before going to bed to help prevent waking up often to use the bathroom.  Where can you learn more?  Go to https://www.[employer].net/patiented  Enter J942 in the search box to learn more about \"Learning About Sleeping Well.\"  Current as of: July 31, 2024  Content Version: 14.4    2869-3879 [employer].   Care instructions adapted under license by your healthcare professional. If you have questions about a medical condition or this instruction, always ask your healthcare professional. [employer] disclaims any warranty or liability for your use of this information.  Future Appointments   Date Time Provider Department Center   5/5/2025  1:30 PM Chavez Jay MD [facility]   10/9/2025 10:30 AM German Mcgregor MD [facility]         "

## 2025-04-08 NOTE — LETTER
4/9/2025    Nir Guy   [address] [place] [id number]         Dear Nir,    It was good to see you in clinic.  I hope your questions were answered at the time of your visit.    The results of your tests from your visit were as follows:    Resulted Orders   Comprehensive metabolic panel   Result Value Ref Range    Sodium 138 135 - 145 mmol/L    Potassium 4.6 3.4 - 5.3 mmol/L    Carbon Dioxide (CO2) 25 22 - 29 mmol/L    Anion Gap 11 7 - 15 mmol/L    Urea Nitrogen 15.5 8.0 - 23.0 mg/dL    Creatinine 0.90 0.67 - 1.17 mg/dL    GFR Estimate 83 >60 mL/min/1.73m2      Comment:      eGFR calculated using 2021 CKD-EPI equation.    Calcium 9.7 8.8 - 10.4 mg/dL    Chloride 102 98 - 107 mmol/L    Glucose 101 (H) 70 - 99 mg/dL    Alkaline Phosphatase 71 40 - 150 U/L    AST 28 0 - 45 U/L    ALT 17 0 - 70 U/L    Protein Total 7.2 6.4 - 8.3 g/dL    Albumin 4.5 3.5 - 5.2 g/dL    Bilirubin Total 0.6 <=1.2 mg/dL   CBC with platelets   Result Value Ref Range    WBC Count 7.7 4.0 - 11.0 10e3/uL    RBC Count 4.86 4.40 - 5.90 10e6/uL    Hemoglobin 15.5 13.3 - 17.7 g/dL    Hematocrit 44.9 40.0 - 53.0 %    MCV 92 78 - 100 fL    MCH 31.9 26.5 - 33.0 pg    MCHC 34.5 31.5 - 36.5 g/dL    RDW 12.2 10.0 - 15.0 %    Platelet Count 178 150 - 450 10e3/uL   Your kidney tests are normal.  Your electrolytes are also normal.  There is no signs of diabetes.  Your liver tests are normal.      Your blood counts are normal and you are not anemic.     If you have any questions regarding these results, please feel free to contact me at [phone].  I wish you the best of health!      Sincerely,     German Mcgregor MD  General Internal Medicine  [facility]

## 2025-05-05 ENCOUNTER — OFFICE VISIT (OUTPATIENT)
Dept: DERMATOLOGY | Facility: CLINIC | Age: 85
End: 2025-05-05
Payer: COMMERCIAL

## 2025-05-05 DIAGNOSIS — L82.1 SEBORRHEIC KERATOSES: ICD-10-CM

## 2025-05-05 DIAGNOSIS — D23.9 DERMAL NEVUS: Primary | ICD-10-CM

## 2025-05-05 DIAGNOSIS — L81.4 LENTIGO: ICD-10-CM

## 2025-05-05 DIAGNOSIS — L57.0 AK (ACTINIC KERATOSIS): ICD-10-CM

## 2025-05-05 DIAGNOSIS — D18.01 ANGIOMA OF SKIN: ICD-10-CM

## 2025-05-05 DIAGNOSIS — Z85.828 HISTORY OF SKIN CANCER: ICD-10-CM

## 2025-05-05 PROCEDURE — 99213 OFFICE O/P EST LOW 20 MIN: CPT | Mod: 25 | Performed by: DERMATOLOGY

## 2025-05-05 PROCEDURE — 17000 DESTRUCT PREMALG LESION: CPT | Performed by: DERMATOLOGY

## 2025-05-05 PROCEDURE — 17003 DESTRUCT PREMALG LES 2-14: CPT | Performed by: DERMATOLOGY

## 2025-05-05 NOTE — PATIENT INSTRUCTIONS
WOUND CARE INSTRUCTIONS   FOR CRYOSURGERY   This area treated with liquid nitrogen should form a blister (areas treated may or may not blister-skin may just turn dark and slough off). You do not need to bandage the area unless a blister forms and breaks (which may be a few days). When the blister breaks, begin daily dressing changes as follows:  1) Clean and dry the area with tap water using clean Q-tip or sterile gauze pad.   2) Apply Polysporin ointment or Bacitracin ointment over entire wound. Do NOT use Neosporin ointment.   3) Cover the wound with a band-aid or sterile non-stick gauze pad and micropore paper tape.   REPEAT THESE INSTRUCTIONS AT LEAST ONCE A DAY UNTIL THE WOUND HAS COMPLETELY HEALED.   It is an old wives tale that a wound heals better when it is exposed to air and allowed to dry out. The wound will heal faster with a better cosmetic result if it is kept moist with ointment and covered with a bandage.   Do not let the wound dry out.   IMPORTANT INFORMATION ON REVERSE SIDE   Supplies Needed:   *Cotton tipped applicators (Q-tips)   *Polysporin ointment or Bacitracin ointment (NOT NEOSPORIN)   *Band-aids, or non stick gauze pads and micropore paper tape   PATIENT INFORMATION   During the healing process you will notice a number of changes. All wounds develop a small halo of redness surrounding the wound. This means healing is occurring. Severe itching with extensive redness usually indicates sensitivity to the ointment or bandage tape used to dress the wound. You should call our office if this develops.   Swelling and/or discoloration around your surgical site is common, particularly when performed around the eye.   All wounds normally drain. The larger the wound the more drainage there will be. After 7-10 days, you will notice the wound beginning to shrink and new skin will begin to grow. The wound is healed when you can see skin has formed over the entire area. A healed wound has a healthy, shiny  look to the surface and is red to dark pink in color to normalize. Wounds may take approximately 4-6 weeks to heal. Larger wounds may take 6-8 weeks. After the wound is healed you may discontinue dressing changes.   You may experience a sensation of tightness as your wound heals. This is normal and will gradually subside.   Your healed wound may be sensitive to temperature changes. This sensitivity improves with time, but if you re having a lot of discomfort, try to avoid temperature extremes.   Patients frequently experience itching after their wound appears to have healed because of the continue healing under the skin. Plain Vaseline will help relieve the itching.          Proper skin care from [facility]:    -Eliminate harsh soaps as they strip the natural oils from the skin, often resulting in dry itchy skin ( i.e. Dial, Zest, Ivorian Spring)  -Use mild soaps such as Cetaphil or Dove Sensitive Skin in the shower. You do not need to use soap on arms, legs, and trunk every time you shower unless visibly soiled.   -Avoid hot or cold showers.  -After showering, lightly dry off and apply moisturizing within 2-3 minutes. This will help trap moisture in the skin.   -Aggressive use of a moisturizer at least 1-2 times a day to the entire body (including -Vanicream, Cetaphil, Aquaphor or Cerave) and moisturize hands after every washing.  -We recommend using moisturizers that come in a tub that needs to be scooped out, not a pump. This has more of an oil base. It will hold moisture in your skin much better than a water base moisturizer. The above recommended are non-pore clogging.      Wear a sunscreen with at least SPF 30 on your face, ears, neck and V of the chest daily. Wear sunscreen on other areas of the body if those areas are exposed to the sun throughout the day. Sunscreens can contain physical and/or chemical blockers. Physical blockers are less likely to clog pores, these include zinc oxide and titanium  dioxide. Reapply every two hour and after swimming.     Sunscreen examples: https://www.ewg.org/sunscreen/    UV radiation  UVA radiation remains constant throughout the day and throughout the year. It is a longer wavelength than UVB and therefore penetrates deeper into the skin leading to immediate and delayed tanning, photoaging, and skin cancer. 70-80% of UVA and UVB radiation occurs between the hours of 10am-2pm.  UVB radiation  UVB radiation causes the most harmful effects and is more significant during the summer months. However, snow and ice can reflect UVB radiation leading to skin damage during the winter months as well. UVB radiation is responsible for tanning, burning, inflammation, delayed erythema (pinkness), pigmentation (brown spots), and skin cancer.     I recommend self monthly full body exams and yearly full body exams with a dermatology provider. If you develop a new or changing lesion please follow up for examination. Most skin cancers are pink and scaly or pink and pearly. However, we do see blue/brown/black skin cancers.  Consider the ABCDEs of melanoma when giving yourself your monthly full body exam ( don't forget the groin, buttocks, feet, toes, etc). A-asymmetry, B-borders, C-color, D-diameter, E-elevation or evolving. If you see any of these changes please follow up in clinic. If you cannot see your back I recommend purchasing a hand held mirror to use with a larger wall mirror.       Checking for Skin Cancer  You can find cancer early by checking your skin each month. There are 3 kinds of skin cancer. They are melanoma, basal cell carcinoma, and squamous cell carcinoma. Doing monthly skin checks is the best way to find new marks or skin changes. Follow the instructions below for checking your skin.   The ABCDEs of checking moles for melanoma   Check your moles or growths for signs of melanoma using ABCDE:   Asymmetry: the sides of the mole or growth don t match  Border: the edges are  ragged, notched, or blurred  Color: the color within the mole or growth varies  Diameter: the mole or growth is larger than 6 mm (size of a pencil eraser)  Evolving: the size, shape, or color of the mole or growth is changing (evolving is not shown in the images below)    Checking for other types of skin cancer  Basal cell carcinoma or squamous cell carcinoma have symptoms such as:     A spot or mole that looks different from all other marks on your skin  Changes in how an area feels, such as itching, tenderness, or pain  Changes in the skin's surface, such as oozing, bleeding, or scaliness  A sore that does not heal  New swelling or redness beyond the border of a mole    Who s at risk?  Anyone can get skin cancer. But you are at greater risk if you have:   Fair skin, light-colored hair, or light-colored eyes  Many moles or abnormal moles on your skin  A history of sunburns from sunlight or tanning beds  A family history of skin cancer  A history of exposure to radiation or chemicals  A weakened immune system  If you have had skin cancer in the past, you are at risk for recurring skin cancer.   How to check your skin  Do your monthly skin checkups in front of a full-length mirror. Check all parts of your body, including your:   Head (ears, face, neck, and scalp)  Torso (front, back, and sides)  Arms (tops, undersides, upper, and lower armpits)  Hands (palms, backs, and fingers, including under the nails)  Buttocks and genitals  Legs (front, back, and sides)  Feet (tops, soles, toes, including under the nails, and between toes)  If you have a lot of moles, take digital photos of them each month. Make sure to take photos both up close and from a distance. These can help you see if any moles change over time.   Most skin changes are not cancer. But if you see any changes in your skin, call your doctor right away. Only he or she can diagnose a problem. If you have skin cancer, seeing your doctor can be the first step  toward getting the treatment that could save your life.   [employer] last reviewed this educational content on 4/1/2019 2000-2020 [employer]. [address] [place] [address], [place] [address]. All rights reserved. This information is not intended as a substitute for professional medical care. Always follow your healthcare professional's instructions.       When should I call my doctor?  If you are worsening or not improving, please, contact us or seek urgent care as noted below.     Who should I call with questions (adults)?    [facility] [phone]  For urgent needs outside of business hours call the [facility] at [phone] and ask for the dermatology resident on call to be paged  If this is a medical emergency and you are unable to reach an ER, Call [phone]      If you need a prescription refill, please contact your pharmacy. Refills are approved or denied by our Physicians during normal business hours, Monday through Friday.  Per office policy, refills will not be granted if you have not been seen within the past year (or sooner depending on the condition).

## 2025-05-05 NOTE — PROGRESS NOTES
Nir Guy is an extremely pleasant 85 year old year old male patient here today for hx of non-melanoma skin cancer.  Patient has no other skin complaints today.  Remainder of the HPI, Meds, PMH, Allergies, FH, and SH was reviewed in chart.      Past Medical History:   Diagnosis Date    Actinic keratosis     AMD (age related macular degeneration)     Basal cell carcinoma     Former smoker     Full code status 01/06/2017    Hyperlipidemia     OA (osteoarthritis)     Osteoarthritis of left patellofemoral joint 01/06/2017    Osteoarthritis of shoulder     RBBB (right bundle branch block)     S/P colonoscopy 04/18/2011    SNHL (sensorineural hearing loss)     Squamous cell carcinoma of skin     Tinnitus     Vertigo        Past Surgical History:   Procedure Laterality Date    BASAL CELL CARCINOMA EXCISION  01/01/1999    Nose and scalp    CATARACT EXTRACTION, BILATERAL  01/01/2018    CATARACT IOL, RT/LT      IR LUMBAR EPIDURAL STEROID INJECTION  01/09/2018    RELEASE CARPAL TUNNEL Right 01/04/2021    Procedure: Revision open carpal tunnel release.;  Surgeon: Nasir Casper MD;  Location: [facility] OR    RELEASE TRIGGER FINGER Left     REPEAT RIGHT CARPAL TUNNEL RELEASE WITH HYPOTHENAR FAT PAD FLAP  12/2021    DR. FINNEY    TOTAL HIP ARTHROPLASTY Right 02/21/2018    Procedure:  RIGHT TOTAL HIP ARTHROPLASTY DIRECT ANTERIOR APPROACH;  Surgeon: Kaushik Hood MD;  Location: [facility] OR;  Service: Orthopedics    VASECTOMY  09/01/1980        Family History   Problem Relation Age of Onset    Melanoma No family hx of     Cancer Mother         GYN    Cancer Father         Lung    Heart Disease Father        Social History     Socioeconomic History    Marital status:      Spouse name: Not on file    Number of children: Not on file    Years of education: Not on file    Highest education level: Not on file   Occupational History     Employer: RETIRED   Tobacco Use    Smoking status: Never    Smokeless tobacco:  Never   Vaping Use    Vaping status: Never Used   Substance and Sexual Activity    Alcohol use: Yes     Alcohol/week: 8.0 standard drinks of alcohol     Comment: Alcoholic Drinks/day: nightly    Drug use: No    Sexual activity: Not on file   Other Topics Concern    Not on file   Social History Narrative    Long term patient of Dr. German Mcgregor         Long term partner Ariadne (27 years)  in  from cancer.     Social Drivers of Health     Financial Resource Strain: Low Risk  (2025)    Financial Resource Strain     Within the past 12 months, have you or your family members you live with been unable to get utilities (heat, electricity) when it was really needed?: No   Food Insecurity: Low Risk  (2025)    Food Insecurity     Within the past 12 months, did you worry that your food would run out before you got money to buy more?: No     Within the past 12 months, did the food you bought just not last and you didn t have money to get more?: No   Transportation Needs: Low Risk  (2025)    Transportation Needs     Within the past 12 months, has lack of transportation kept you from medical appointments, getting your medicines, non-medical meetings or appointments, work, or from getting things that you need?: No   Physical Activity: Sufficiently Active (2025)    Exercise Vital Sign     Days of Exercise per Week: 7 days     Minutes of Exercise per Session: 30 min   Stress: Stress Concern Present (2025)    [employer] - Occupational Stress Questionnaire     Feeling of Stress : Rather much   Social Connections: Unknown (2025)    Social Connection and Isolation Panel [NHANES]     Frequency of Communication with Friends and Family: Not on file     Frequency of Social Gatherings with Friends and Family: Once a week     Attends Sabianist Services: Not on file     Active Member of Clubs or Organizations: Not on file     Attends Club or Organization Meetings: Not on file      Marital Status: Not on file   Interpersonal Safety: Low Risk  (4/8/2025)    Interpersonal Safety     Do you feel physically and emotionally safe where you currently live?: Yes     Within the past 12 months, have you been hit, slapped, kicked or otherwise physically hurt by someone?: No     Within the past 12 months, have you been humiliated or emotionally abused in other ways by your partner or ex-partner?: No   Housing Stability: High Risk (4/6/2025)    Housing Stability     Do you have housing? : Yes     Are you worried about losing your housing?: Yes       Outpatient Encounter Medications as of 5/5/2025   Medication Sig Dispense Refill    fish oil-omega-3 fatty acids 1000 MG capsule Take 2 g by mouth daily      Melatonin 10 MG TABS tablet Take 10 mg by mouth nightly as needed for sleep      Multiple Vitamins-Minerals (MENS MULTIVITAMIN PLUS PO)       polyethylene glycol-propylene glycol PF (SYSTANE HYDRATION PF) 0.4-0.3 % SOLN opthalmic solution Apply 1 drop to eye      Vitamin D3 (CHOLECALCIFEROL) 25 mcg (1000 units) tablet Take 25 mcg by mouth daily       No facility-administered encounter medications on file as of 5/5/2025.             O:   NAD, WDWN, Alert & Oriented, Mood & Affect wnl, Vitals stable   Here today alone   There were no vitals taken for this visit.   General appearance normal   Vitals stable   Alert, oriented and in no acute distress      Gritty scaly papules on scalp and face  Stuck on papules and brown macules on trunk and ext   Red papules on trunk  Flesh colored papules on trunk     The remainder of the full exam was normal; the following areas were examined:  conjunctiva/lids, neck, peripheral vascular system, abdomen, lymph nodes, digits/nails, eccrine and apocrine glands, scalp/hair, face, neck, chest, abdomen, buttocks, back, RUE, LUE, RLE, LLE       Eyes: Conjunctivae/lids:Normal     ENT: Lips, mucosa: normal    MSK:Normal    Cardiovascular: peripheral edema none    Pulm: Breathing  Normal    Lymph Nodes: No Head and Neck Lymphadenopathy     Neuro/Psych: Orientation:Alert and Orientedx3 ; Mood/Affect:normal       A/P:  1. Seborrheic keratosis, lentigo, angioma, dermal nevus, hx of non-melanoma skin cancer   2. Actinic keratosis   L cheek x3, R cheek x2, scalp x8  LN2:  Treated with LN2 for 5s for 1-2 cycles. Warned risks of blistering, pain, pigment change, scarring, and incomplete resolution.  Advised patient to return if lesions do not completely resolve.  Wound care sheet given.  It was a pleasure speaking to Nir Guy today.  Previous clinic notes and pertinent laboratory tests were reviewed prior to Nir Guy's visit.  Signs and Symptoms of skin cancer discussed with patient.  Patient encouraged to perform monthly skin exams.  UV precautions reviewed with patient.  Return to clinic 6 months

## 2025-05-05 NOTE — LETTER
5/5/2025      Nir Guy  [address] [place] [id number]      Dear Colleague,    Thank you for referring your patient, Nir Guy, to the [facility]. Please see a copy of my visit note below.    Nir Guy is an extremely pleasant 85 year old year old male patient here today for hx of non-melanoma skin cancer.  Patient has no other skin complaints today.  Remainder of the HPI, Meds, PMH, Allergies, FH, and SH was reviewed in chart.      Past Medical History:   Diagnosis Date     Actinic keratosis      AMD (age related macular degeneration)      Basal cell carcinoma      Former smoker      Full code status 01/06/2017     Hyperlipidemia      OA (osteoarthritis)      Osteoarthritis of left patellofemoral joint 01/06/2017     Osteoarthritis of shoulder      RBBB (right bundle branch block)      S/P colonoscopy 04/18/2011     SNHL (sensorineural hearing loss)      Squamous cell carcinoma of skin      Tinnitus      Vertigo        Past Surgical History:   Procedure Laterality Date     BASAL CELL CARCINOMA EXCISION  01/01/1999    Nose and scalp     CATARACT EXTRACTION, BILATERAL  01/01/2018     CATARACT IOL, RT/LT       IR LUMBAR EPIDURAL STEROID INJECTION  01/09/2018     RELEASE CARPAL TUNNEL Right 01/04/2021    Procedure: Revision open carpal tunnel release.;  Surgeon: Nasir Casper MD;  Location: [facility]     RELEASE TRIGGER FINGER Left      REPEAT RIGHT CARPAL TUNNEL RELEASE WITH HYPOTHENAR FAT PAD FLAP  12/2021    DR. FINNEY     TOTAL HIP ARTHROPLASTY Right 02/21/2018    Procedure:  RIGHT TOTAL HIP ARTHROPLASTY DIRECT ANTERIOR APPROACH;  Surgeon: Kaushik Hood MD;  Location: [facility];  Service: Orthopedics     VASECTOMY  09/01/1980        Family History   Problem Relation Age of Onset     Melanoma No family hx of      Cancer Mother         GYN     Cancer Father         Lung     Heart Disease Father        Social History     Socioeconomic History      Marital status:      Spouse name: Not on file     Number of children: Not on file     Years of education: Not on file     Highest education level: Not on file   Occupational History     Employer: RETIRED   Tobacco Use     Smoking status: Never     Smokeless tobacco: Never   Vaping Use     Vaping status: Never Used   Substance and Sexual Activity     Alcohol use: Yes     Alcohol/week: 8.0 standard drinks of alcohol     Comment: Alcoholic Drinks/day: nightly     Drug use: No     Sexual activity: Not on file   Other Topics Concern     Not on file   Social History Narrative    Long term patient of Dr. German Mcgregor         Long term partner Ariadne (27 years)  in  from cancer.     Social Drivers of Health     Financial Resource Strain: Low Risk  (2025)    Financial Resource Strain      Within the past 12 months, have you or your family members you live with been unable to get utilities (heat, electricity) when it was really needed?: No   Food Insecurity: Low Risk  (2025)    Food Insecurity      Within the past 12 months, did you worry that your food would run out before you got money to buy more?: No      Within the past 12 months, did the food you bought just not last and you didn t have money to get more?: No   Transportation Needs: Low Risk  (2025)    Transportation Needs      Within the past 12 months, has lack of transportation kept you from medical appointments, getting your medicines, non-medical meetings or appointments, work, or from getting things that you need?: No   Physical Activity: Sufficiently Active (2025)    Exercise Vital Sign      Days of Exercise per Week: 7 days      Minutes of Exercise per Session: 30 min   Stress: Stress Concern Present (2025)    [employer] - Occupational Stress Questionnaire      Feeling of Stress : Rather much   Social Connections: Unknown (2025)    Social Connection and Isolation Panel [NHANES]      Frequency  of Communication with Friends and Family: Not on file      Frequency of Social Gatherings with Friends and Family: Once a week      Attends Restorationist Services: Not on file      Active Member of Clubs or Organizations: Not on file      Attends Club or Organization Meetings: Not on file      Marital Status: Not on file   Interpersonal Safety: Low Risk  (4/8/2025)    Interpersonal Safety      Do you feel physically and emotionally safe where you currently live?: Yes      Within the past 12 months, have you been hit, slapped, kicked or otherwise physically hurt by someone?: No      Within the past 12 months, have you been humiliated or emotionally abused in other ways by your partner or ex-partner?: No   Housing Stability: High Risk (4/6/2025)    Housing Stability      Do you have housing? : Yes      Are you worried about losing your housing?: Yes       Outpatient Encounter Medications as of 5/5/2025   Medication Sig Dispense Refill     fish oil-omega-3 fatty acids 1000 MG capsule Take 2 g by mouth daily       Melatonin 10 MG TABS tablet Take 10 mg by mouth nightly as needed for sleep       Multiple Vitamins-Minerals (MENS MULTIVITAMIN PLUS PO)        polyethylene glycol-propylene glycol PF (SYSTANE HYDRATION PF) 0.4-0.3 % SOLN opthalmic solution Apply 1 drop to eye       Vitamin D3 (CHOLECALCIFEROL) 25 mcg (1000 units) tablet Take 25 mcg by mouth daily       No facility-administered encounter medications on file as of 5/5/2025.             O:   NAD, WDWN, Alert & Oriented, Mood & Affect wnl, Vitals stable   Here today alone   There were no vitals taken for this visit.   General appearance normal   Vitals stable   Alert, oriented and in no acute distress      Gritty scaly papules on scalp and face  Stuck on papules and brown macules on trunk and ext   Red papules on trunk  Flesh colored papules on trunk     The remainder of the full exam was normal; the following areas were examined:  conjunctiva/lids, neck, peripheral  vascular system, abdomen, lymph nodes, digits/nails, eccrine and apocrine glands, scalp/hair, face, neck, chest, abdomen, buttocks, back, RUE, LUE, RLE, LLE       Eyes: Conjunctivae/lids:Normal     ENT: Lips, mucosa: normal    MSK:Normal    Cardiovascular: peripheral edema none    Pulm: Breathing Normal    Lymph Nodes: No Head and Neck Lymphadenopathy     Neuro/Psych: Orientation:Alert and Orientedx3 ; Mood/Affect:normal       A/P:  1. Seborrheic keratosis, lentigo, angioma, dermal nevus, hx of non-melanoma skin cancer   2. Actinic keratosis   L cheek x3, R cheek x2, scalp x8  LN2:  Treated with LN2 for 5s for 1-2 cycles. Warned risks of blistering, pain, pigment change, scarring, and incomplete resolution.  Advised patient to return if lesions do not completely resolve.  Wound care sheet given.  It was a pleasure speaking to Nir Guy today.  Previous clinic notes and pertinent laboratory tests were reviewed prior to Nir Guy's visit.  Signs and Symptoms of skin cancer discussed with patient.  Patient encouraged to perform monthly skin exams.  UV precautions reviewed with patient.  Return to clinic 6 months      Again, thank you for allowing me to participate in the care of your patient.        Sincerely,        Chavez Jay MD    Electronically signed

## 2025-05-21 ENCOUNTER — TELEPHONE (OUTPATIENT)
Dept: INTERNAL MEDICINE | Facility: CLINIC | Age: 85
End: 2025-05-21
Payer: COMMERCIAL

## 2025-05-21 DIAGNOSIS — H35.30 MACULAR DEGENERATION (SENILE) OF RETINA: Primary | ICD-10-CM

## 2025-05-21 NOTE — TELEPHONE ENCOUNTER
Notify the patient that this was done.    Please fax the referral as noted.    Thank you,    German Mcgregor MD  General Internal Medicine  [facility]  5/21/2025, 5:31 PM

## 2025-05-21 NOTE — TELEPHONE ENCOUNTER
Please call patient -    ______________________________________________________________________     Home phone:  [phone] (home)     Cell phone:   Telephone Information:   Mobile [phone]       Other contacts:  Name Home Phone Work Phone Mobile Phone Relationship Lgl Grheather FLEMING [phone] Cousin      ______________________________________________________________________     He had left a message about his eyes.    I suspect he is referring to the macular degeneration and issues with this, but please confirm and clarify if necessary.    He could be seen at the following place for macular degeneration if he would like:    [facility]  [address] [place] [address]?[address]?[place] [address]  Phone: [phone]?Fax: [phone]    Unfortuately, there might not be much that can be done for the macular degeneration present.    Could also follow up with Dr. Vasquez related to this again if he would like a different recommendation from her.    Thank you,    German Mcgregor MD  [facility]  5/21/2025, 9:54 AM

## 2025-05-21 NOTE — TELEPHONE ENCOUNTER
Patient calls stating he spoke with [facility] and they will need a referral. Patient requests referral from Dr. Mcgregor be faxed to [facility] at [phone].    Informed patient that request would be sent to Dr. Mcgregor. We would call him back to update him on status.     NICOL Noriega   [facility]

## 2025-05-21 NOTE — TELEPHONE ENCOUNTER
Contacts       Contact Date/Time Type Contact Phone/Fax    05/21/2025 10:21 AM CDT Phone (Outgoing) Nir Guy (Self) [phone] (H)    Left Message           Attempted to reach patient to: Relay a message    Regarding: See Dr. Mcgregor's message below:     Action to take: Transfer to TC line (or update telephone encounter in after-hours)

## 2025-05-21 NOTE — TELEPHONE ENCOUNTER
Patient Returning Call    Reason for call:  Missed Phone     Information relayed to patient:  Relayed message from Dr. Mcrgegor - he would like referral sent to [facility] [place]. States he has some stuff going on and would like to follow up with Dr. Mcgregor's recommendation.    Patient has additional questions:  No

## 2025-05-22 ENCOUNTER — PATIENT OUTREACH (OUTPATIENT)
Dept: CARE COORDINATION | Facility: CLINIC | Age: 85
End: 2025-05-22
Payer: COMMERCIAL

## 2025-05-30 NOTE — TELEPHONE ENCOUNTER
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

## 2025-09-02 ENCOUNTER — OFFICE VISIT (OUTPATIENT)
Dept: INTERNAL MEDICINE | Facility: CLINIC | Age: 85
End: 2025-09-02
Payer: COMMERCIAL

## 2025-09-02 VITALS
RESPIRATION RATE: 16 BRPM | BODY MASS INDEX: 28.76 KG/M2 | SYSTOLIC BLOOD PRESSURE: 138 MMHG | OXYGEN SATURATION: 95 % | HEART RATE: 80 BPM | WEIGHT: 217 LBS | TEMPERATURE: 98.7 F | HEIGHT: 73 IN | DIASTOLIC BLOOD PRESSURE: 76 MMHG

## 2025-09-02 DIAGNOSIS — D48.5 NEOPLASM OF UNCERTAIN BEHAVIOR OF SKIN: Primary | ICD-10-CM

## 2025-09-02 DIAGNOSIS — Z63.4 BEREAVEMENT: ICD-10-CM

## 2025-09-02 DIAGNOSIS — M25.522 LEFT ELBOW PAIN: ICD-10-CM

## 2025-09-02 DIAGNOSIS — G60.9 IDIOPATHIC PERIPHERAL NEUROPATHY: ICD-10-CM

## 2025-09-02 DIAGNOSIS — M20.41 HAMMERTOES OF BOTH FEET: ICD-10-CM

## 2025-09-02 DIAGNOSIS — M20.42 HAMMERTOES OF BOTH FEET: ICD-10-CM

## 2025-09-02 PROCEDURE — 3078F DIAST BP <80 MM HG: CPT | Performed by: INTERNAL MEDICINE

## 2025-09-02 PROCEDURE — 99215 OFFICE O/P EST HI 40 MIN: CPT | Performed by: INTERNAL MEDICINE

## 2025-09-02 PROCEDURE — G2211 COMPLEX E/M VISIT ADD ON: HCPCS | Performed by: INTERNAL MEDICINE

## 2025-09-02 PROCEDURE — 90662 IIV NO PRSV INCREASED AG IM: CPT | Performed by: INTERNAL MEDICINE

## 2025-09-02 PROCEDURE — G0008 ADMIN INFLUENZA VIRUS VAC: HCPCS | Performed by: INTERNAL MEDICINE

## 2025-09-02 PROCEDURE — 3075F SYST BP GE 130 - 139MM HG: CPT | Performed by: INTERNAL MEDICINE

## 2025-09-02 RX ORDER — SODIUM FLUORIDE 5 MG/G
GEL, DENTIFRICE DENTAL AT BEDTIME
COMMUNITY

## 2025-09-02 SDOH — SOCIAL STABILITY - SOCIAL INSECURITY: DISSAPEARANCE AND DEATH OF FAMILY MEMBER: Z63.4

## 2025-09-03 ENCOUNTER — TELEPHONE (OUTPATIENT)
Dept: DERMATOLOGY | Facility: CLINIC | Age: 85
End: 2025-09-03
Payer: COMMERCIAL

## (undated) DEVICE — GLOVE PROTEXIS BLUE W/NEU-THERA 7.0  2D73EB70

## (undated) DEVICE — GLOVE PROTEXIS BLUE W/NEU-THERA 8.0  2D73EB80

## (undated) DEVICE — PACK HAND

## (undated) DEVICE — GLOVE PROTEXIS W/NEU-THERA 8.0  2D73TE80

## (undated) DEVICE — GOWN XLG DISP 9545

## (undated) DEVICE — IMM ALUMI HAND XLG 761

## (undated) DEVICE — BLADE KNIFE BEAVER 376700

## (undated) DEVICE — SU ETHILON 4-0 PS-2 18" 1667G

## (undated) DEVICE — BNDG ELASTIC 2"X5YDS UNSTERILE 6611-20

## (undated) DEVICE — SOL WATER IRRIG 1000ML BOTTLE 07139-09

## (undated) DEVICE — DECANTER VIAL 2006S

## (undated) DEVICE — PREP CHLORHEXIDINE 4% 4OZ (HIBICLENS) 57504

## (undated) RX ORDER — BUPIVACAINE HYDROCHLORIDE 5 MG/ML
INJECTION, SOLUTION PERINEURAL
Status: DISPENSED
Start: 2021-01-04

## (undated) RX ORDER — LIDOCAINE HYDROCHLORIDE 10 MG/ML
INJECTION, SOLUTION EPIDURAL; INFILTRATION; INTRACAUDAL; PERINEURAL
Status: DISPENSED
Start: 2021-01-04

## (undated) RX ORDER — ACETAMINOPHEN 325 MG/1
TABLET ORAL
Status: DISPENSED
Start: 2021-01-04

## (undated) RX ORDER — ONDANSETRON 2 MG/ML
INJECTION INTRAMUSCULAR; INTRAVENOUS
Status: DISPENSED
Start: 2021-01-04

## (undated) RX ORDER — LIDOCAINE HYDROCHLORIDE 10 MG/ML
INJECTION, SOLUTION INFILTRATION; PERINEURAL
Status: DISPENSED
Start: 2021-01-04

## (undated) RX ORDER — GABAPENTIN 300 MG/1
CAPSULE ORAL
Status: DISPENSED
Start: 2021-01-04

## (undated) RX ORDER — FENTANYL CITRATE 50 UG/ML
INJECTION, SOLUTION INTRAMUSCULAR; INTRAVENOUS
Status: DISPENSED
Start: 2021-01-04

## (undated) RX ORDER — PROPOFOL 10 MG/ML
INJECTION, EMULSION INTRAVENOUS
Status: DISPENSED
Start: 2021-01-04

## (undated) RX ORDER — DEXAMETHASONE SODIUM PHOSPHATE 4 MG/ML
INJECTION, SOLUTION INTRA-ARTICULAR; INTRALESIONAL; INTRAMUSCULAR; INTRAVENOUS; SOFT TISSUE
Status: DISPENSED
Start: 2021-01-04